# Patient Record
Sex: FEMALE | Race: WHITE | NOT HISPANIC OR LATINO | Employment: FULL TIME | URBAN - NONMETROPOLITAN AREA
[De-identification: names, ages, dates, MRNs, and addresses within clinical notes are randomized per-mention and may not be internally consistent; named-entity substitution may affect disease eponyms.]

---

## 2017-05-31 ENCOUNTER — HOSPITAL ENCOUNTER (EMERGENCY)
Facility: HOSPITAL | Age: 52
Discharge: HOME | End: 2017-05-31
Attending: EMERGENCY MEDICINE | Admitting: EMERGENCY MEDICINE

## 2017-05-31 VITALS
WEIGHT: 102.7 LBS | OXYGEN SATURATION: 98 % | RESPIRATION RATE: 18 BRPM | HEART RATE: 111 BPM | SYSTOLIC BLOOD PRESSURE: 140 MMHG | BODY MASS INDEX: 18.2 KG/M2 | DIASTOLIC BLOOD PRESSURE: 76 MMHG | HEIGHT: 63 IN | TEMPERATURE: 98.4 F

## 2017-05-31 DIAGNOSIS — S16.1XXA CERVICAL STRAIN, INITIAL ENCOUNTER: Primary | ICD-10-CM

## 2017-05-31 DIAGNOSIS — S39.012A LOW BACK STRAIN, INITIAL ENCOUNTER: ICD-10-CM

## 2017-05-31 PROCEDURE — 96372 THER/PROPH/DIAG INJ SC/IM: CPT

## 2017-05-31 PROCEDURE — 25010000002 KETOROLAC TROMETHAMINE PER 15 MG: Performed by: EMERGENCY MEDICINE

## 2017-05-31 PROCEDURE — 99283 EMERGENCY DEPT VISIT LOW MDM: CPT

## 2017-05-31 RX ORDER — KETOROLAC TROMETHAMINE 30 MG/ML
60 INJECTION, SOLUTION INTRAMUSCULAR; INTRAVENOUS ONCE
Status: COMPLETED | OUTPATIENT
Start: 2017-05-31 | End: 2017-05-31

## 2017-05-31 RX ORDER — NAPROXEN SODIUM 550 MG/1
550 TABLET ORAL 2 TIMES DAILY WITH MEALS
Qty: 40 TABLET | Refills: 0 | Status: SHIPPED | OUTPATIENT
Start: 2017-05-31 | End: 2017-06-28

## 2017-05-31 RX ORDER — CYCLOBENZAPRINE HCL 10 MG
10 TABLET ORAL ONCE
Status: COMPLETED | OUTPATIENT
Start: 2017-05-31 | End: 2017-05-31

## 2017-05-31 RX ORDER — CYCLOBENZAPRINE HCL 10 MG
10 TABLET ORAL 3 TIMES DAILY PRN
Qty: 30 TABLET | Refills: 0 | Status: ON HOLD | OUTPATIENT
Start: 2017-05-31 | End: 2018-04-23

## 2017-05-31 RX ADMIN — KETOROLAC TROMETHAMINE 60 MG: 60 INJECTION, SOLUTION INTRAMUSCULAR at 11:45

## 2017-05-31 RX ADMIN — CYCLOBENZAPRINE HYDROCHLORIDE 10 MG: 10 TABLET, FILM COATED ORAL at 11:44

## 2017-06-26 ENCOUNTER — HOSPITAL ENCOUNTER (EMERGENCY)
Facility: HOSPITAL | Age: 52
Discharge: HOME | End: 2017-06-26
Attending: EMERGENCY MEDICINE | Admitting: EMERGENCY MEDICINE

## 2017-06-26 VITALS
BODY MASS INDEX: 19.43 KG/M2 | WEIGHT: 105.6 LBS | OXYGEN SATURATION: 97 % | TEMPERATURE: 98.5 F | HEART RATE: 84 BPM | HEIGHT: 62 IN | RESPIRATION RATE: 18 BRPM | DIASTOLIC BLOOD PRESSURE: 67 MMHG | SYSTOLIC BLOOD PRESSURE: 131 MMHG

## 2017-06-26 DIAGNOSIS — F10.10 ALCOHOL ABUSE: Primary | ICD-10-CM

## 2017-06-26 LAB
ALBUMIN SERPL-MCNC: 4.3 G/DL (ref 3.4–4.8)
ALBUMIN/GLOB SERPL: 1 G/DL (ref 1.1–1.8)
ALP SERPL-CCNC: 105 U/L (ref 38–126)
ALT SERPL W P-5'-P-CCNC: 29 U/L (ref 9–52)
AMPHET+METHAMPHET UR QL: NEGATIVE
ANION GAP SERPL CALCULATED.3IONS-SCNC: 14 MMOL/L (ref 5–15)
APAP SERPL-MCNC: <10 MCG/ML (ref 10–30)
AST SERPL-CCNC: 30 U/L (ref 14–36)
BARBITURATES UR QL SCN: NEGATIVE
BASOPHILS # BLD MANUAL: 0.07 10*3/MM3 (ref 0–0.2)
BASOPHILS NFR BLD AUTO: 2 % (ref 0–2)
BENZODIAZ UR QL SCN: NEGATIVE
BILIRUB SERPL-MCNC: 0.4 MG/DL (ref 0.2–1.3)
BUN BLD-MCNC: 14 MG/DL (ref 7–21)
BUN/CREAT SERPL: 20.6 (ref 7–25)
CALCIUM SPEC-SCNC: 9.1 MG/DL (ref 8.4–10.2)
CANNABINOIDS SERPL QL: POSITIVE
CHLORIDE SERPL-SCNC: 113 MMOL/L (ref 95–110)
CO2 SERPL-SCNC: 18 MMOL/L (ref 22–31)
COCAINE UR QL: NEGATIVE
CREAT BLD-MCNC: 0.68 MG/DL (ref 0.5–1)
DEPRECATED RDW RBC AUTO: 47.6 FL (ref 36.4–46.3)
EOSINOPHIL # BLD MANUAL: 0.03 10*3/MM3 (ref 0–0.7)
EOSINOPHIL NFR BLD MANUAL: 1 % (ref 0–7)
ERYTHROCYTE [DISTWIDTH] IN BLOOD BY AUTOMATED COUNT: 14.6 % (ref 11.5–14.5)
ETHANOL BLD-MCNC: 267 MG/DL (ref 0–10)
ETHANOL UR QL: 0.27 %
GFR SERPL CREATININE-BSD FRML MDRD: 91 ML/MIN/1.73 (ref 51–120)
GLOBULIN UR ELPH-MCNC: 4.5 GM/DL (ref 2.3–3.5)
GLUCOSE BLD-MCNC: 81 MG/DL (ref 60–100)
GLUCOSE BLDC GLUCOMTR-MCNC: 75 MG/DL (ref 70–130)
HCT VFR BLD AUTO: 39.3 % (ref 35–45)
HGB BLD-MCNC: 13.2 G/DL (ref 12–15.5)
HOLD SPECIMEN: NORMAL
HOLD SPECIMEN: NORMAL
LYMPHOCYTES # BLD MANUAL: 1.92 10*3/MM3 (ref 0.6–4.2)
LYMPHOCYTES NFR BLD MANUAL: 15 % (ref 0–12)
LYMPHOCYTES NFR BLD MANUAL: 59 % (ref 10–50)
MCH RBC QN AUTO: 30 PG (ref 26.5–34)
MCHC RBC AUTO-ENTMCNC: 33.6 G/DL (ref 31.4–36)
MCV RBC AUTO: 89.3 FL (ref 80–98)
METHADONE UR QL SCN: NEGATIVE
MONOCYTES # BLD AUTO: 0.49 10*3/MM3 (ref 0–0.9)
NEUTROPHILS # BLD AUTO: 0.72 10*3/MM3 (ref 2–8.6)
NEUTROPHILS NFR BLD MANUAL: 22 % (ref 37–80)
OPIATES UR QL: NEGATIVE
OXYCODONE UR QL SCN: NEGATIVE
PLAT MORPH BLD: NORMAL
PLATELET # BLD AUTO: 244 10*3/MM3 (ref 150–450)
PMV BLD AUTO: 9.9 FL (ref 8–12)
POTASSIUM BLD-SCNC: 3.9 MMOL/L (ref 3.5–5.1)
PROT SERPL-MCNC: 8.8 G/DL (ref 6.3–8.6)
RBC # BLD AUTO: 4.4 10*6/MM3 (ref 3.77–5.16)
RBC MORPH BLD: NORMAL
SALICYLATES SERPL-MCNC: <1 MG/DL (ref 10–20)
SODIUM BLD-SCNC: 145 MMOL/L (ref 137–145)
VARIANT LYMPHS NFR BLD MANUAL: 1 % (ref 0–5)
WBC MORPH BLD: NORMAL
WBC NRBC COR # BLD: 3.25 10*3/MM3 (ref 3.2–9.8)
WHOLE BLOOD HOLD SPECIMEN: NORMAL
WHOLE BLOOD HOLD SPECIMEN: NORMAL

## 2017-06-26 PROCEDURE — 80307 DRUG TEST PRSMV CHEM ANLYZR: CPT | Performed by: EMERGENCY MEDICINE

## 2017-06-26 PROCEDURE — 25010000002 LORAZEPAM PER 2 MG: Performed by: EMERGENCY MEDICINE

## 2017-06-26 PROCEDURE — 96375 TX/PRO/DX INJ NEW DRUG ADDON: CPT

## 2017-06-26 PROCEDURE — 85025 COMPLETE CBC W/AUTO DIFF WBC: CPT | Performed by: EMERGENCY MEDICINE

## 2017-06-26 PROCEDURE — 85007 BL SMEAR W/DIFF WBC COUNT: CPT | Performed by: EMERGENCY MEDICINE

## 2017-06-26 PROCEDURE — 96365 THER/PROPH/DIAG IV INF INIT: CPT

## 2017-06-26 PROCEDURE — 25010000002 THIAMINE PER 100 MG: Performed by: EMERGENCY MEDICINE

## 2017-06-26 PROCEDURE — 25010000002 MAGNESIUM SULFATE PER 500 MG OF MAGNESIUM: Performed by: EMERGENCY MEDICINE

## 2017-06-26 PROCEDURE — 99284 EMERGENCY DEPT VISIT MOD MDM: CPT

## 2017-06-26 PROCEDURE — 93005 ELECTROCARDIOGRAM TRACING: CPT | Performed by: EMERGENCY MEDICINE

## 2017-06-26 PROCEDURE — 80053 COMPREHEN METABOLIC PANEL: CPT | Performed by: EMERGENCY MEDICINE

## 2017-06-26 PROCEDURE — 96376 TX/PRO/DX INJ SAME DRUG ADON: CPT

## 2017-06-26 PROCEDURE — 96366 THER/PROPH/DIAG IV INF ADDON: CPT

## 2017-06-26 PROCEDURE — 93010 ELECTROCARDIOGRAM REPORT: CPT | Performed by: INTERNAL MEDICINE

## 2017-06-26 PROCEDURE — 82962 GLUCOSE BLOOD TEST: CPT

## 2017-06-26 RX ORDER — NICOTINE 21 MG/24HR
1 PATCH, TRANSDERMAL 24 HOURS TRANSDERMAL EVERY 24 HOURS
Status: DISCONTINUED | OUTPATIENT
Start: 2017-06-26 | End: 2017-06-26 | Stop reason: HOSPADM

## 2017-06-26 RX ORDER — LORAZEPAM 2 MG/ML
1 INJECTION INTRAMUSCULAR ONCE
Status: COMPLETED | OUTPATIENT
Start: 2017-06-26 | End: 2017-06-26

## 2017-06-26 RX ORDER — SODIUM CHLORIDE 0.9 % (FLUSH) 0.9 %
10 SYRINGE (ML) INJECTION AS NEEDED
Status: DISCONTINUED | OUTPATIENT
Start: 2017-06-26 | End: 2017-06-26 | Stop reason: HOSPADM

## 2017-06-26 RX ORDER — LORAZEPAM 1 MG/1
1 TABLET ORAL EVERY 8 HOURS PRN
Qty: 15 TABLET | Refills: 0 | Status: SHIPPED | OUTPATIENT
Start: 2017-06-26 | End: 2017-06-28

## 2017-06-26 RX ADMIN — NICOTINE 1 PATCH: 21 PATCH TRANSDERMAL at 15:21

## 2017-06-26 RX ADMIN — FOLIC ACID 1000 ML/HR: 5 INJECTION, SOLUTION INTRAMUSCULAR; INTRAVENOUS; SUBCUTANEOUS at 14:43

## 2017-06-26 RX ADMIN — LORAZEPAM 1 MG: 2 INJECTION INTRAMUSCULAR; INTRAVENOUS at 14:42

## 2017-06-26 RX ADMIN — LORAZEPAM 1 MG: 2 INJECTION INTRAMUSCULAR; INTRAVENOUS at 16:09

## 2017-06-26 NOTE — ED TRIAGE NOTES
Requested that Dr. Maloney step into see pt because she has been agitated and needs to be seen. Informed  Of pt escalating behavior.

## 2017-06-26 NOTE — ED NOTES
"Pt is presented to ED with c/o needing help with alcohol abuse.  Pt tearfully states \"I have a bad problem with drinking, about 5 shots of 99 proof vodka daily, and I can't go without it\".  Pt states she is needing help with her addiction.     Rayna Adam RN  06/26/17 [id number]    "

## 2017-06-26 NOTE — ED NOTES
Pt came in seeking treatment for substance abuse.  After being medically cleared,  A representative from psych saw her and recommended outpatient treatment.  I gave pt resources.  She and family member verbalized understanding pt needs to make the call.

## 2017-06-26 NOTE — ED TRIAGE NOTES
[facility] per dr. Maloney. Spoke with cornel AKERS who requested to speak with the dr. No infromation has been given to Molly AKERS about the situation.

## 2017-06-27 NOTE — ED NOTES
Pt verbalized understanding of discharge instruction. Pt states she had to leave now.      Molly Angela RN  06/26/17 1942

## 2017-06-27 NOTE — ED PROVIDER NOTES
Subjective   Patient is a 52 y.o. female presenting with mental health disorder.   Mental Health Problem   Presenting symptoms: depression    Presenting symptoms: no aggressive behavior, no agitation, no bizarre behavior, no delusions, no disorganized speech, no disorganized thought process, no hallucinations, no homicidal ideas, no paranoid behavior, no self-mutilation, no suicidal thoughts, no suicidal threats and no suicide attempt    Patient accompanied by:  Guardian  Degree of incapacity (severity):  Moderate  Onset quality:  Gradual  Duration:  12 months  Timing:  Constant  Progression:  Worsening  Chronicity:  Chronic  Context: alcohol use and drug abuse (states she quit 1 year ago and switched to alcohol)    Treatment compliance:  Untreated  Relieved by:  Nothing  Worsened by:  Nothing  Ineffective treatments:  None tried  Associated symptoms: anhedonia    Associated symptoms: no abdominal pain, no appetite change, no chest pain, no decreased need for sleep, not distractible, no euphoric mood, no fatigue, no feelings of worthlessness, no headaches, no hypersomnia, no hyperventilation, no insomnia, no irritability, no poor judgment, no school problems and no weight change    Risk factors: no family hx of mental illness, no family violence, no hx of mental illness, no hx of suicide attempts, no neurological disease and no recent psychiatric admission        Review of Systems   Constitutional: Negative.  Negative for appetite change, fatigue and irritability.   HENT: Negative.    Eyes: Negative.    Respiratory: Negative.    Cardiovascular: Negative.  Negative for chest pain.   Gastrointestinal: Negative.  Negative for abdominal pain.   Musculoskeletal: Negative.    Skin: Negative.    Neurological: Negative.  Negative for headaches.   Psychiatric/Behavioral: Negative for agitation, hallucinations, homicidal ideas, paranoia, self-injury and suicidal ideas. The patient does not have insomnia.        Past Medical  History:   Diagnosis Date   • ADHD (attention deficit hyperactivity disorder)    • Alcohol abuse    • Bipolar 1 disorder    • Carpal tunnel syndrome on both sides 2015   • Chronic pain disorder     Back   • Collapsed lung 1986       No Known Allergies    Past Surgical History:   Procedure Laterality Date   • HYSTERECTOMY         Family History   Problem Relation Age of Onset   • Depression Mother    • Anxiety disorder Mother    • Alcohol abuse Father    • Depression Father    • Drug abuse Father    • Anxiety disorder Sister    • Anxiety disorder Maternal Aunt    • Alcohol abuse Paternal Grandfather    • Alcohol abuse Paternal Grandmother    • Drug abuse Cousin        Social History     Social History   • Marital status:      Spouse name: N/A   • Number of children: N/A   • Years of education: N/A     Social History Main Topics   • Smoking status: Current Every Day Smoker     Packs/day: 0.50     Start date: 1977   • Smokeless tobacco: None   • Alcohol use 42.0 oz/week     70 Shots of liquor per week   • Drug use: No      Comment: Previously meth (11/16), THC(2 days ago), synthetics(every couple weeks), shrooms (9-10 months ago)   • Sexual activity: Defer     Other Topics Concern   • None     Social History Narrative   • None           Objective   Physical Exam   Constitutional: She is oriented to person, place, and time. She appears well-developed and well-nourished.   HENT:   Head: Normocephalic and atraumatic.   Mouth/Throat: Oropharynx is clear and moist.   Eyes: Conjunctivae and EOM are normal. Pupils are equal, round, and reactive to light.   Neck: Normal range of motion. Neck supple.   Cardiovascular: Normal rate, regular rhythm and normal heart sounds.  Exam reveals no gallop and no friction rub.    No murmur heard.  Pulmonary/Chest: Effort normal and breath sounds normal. She has no wheezes. She has no rales.   Abdominal: Soft. Bowel sounds are normal. She exhibits no mass. There is no tenderness.  There is no rebound and no guarding.   Musculoskeletal: Normal range of motion. She exhibits no tenderness.   Neurological: She is alert and oriented to person, place, and time. She has normal reflexes. No cranial nerve deficit.   Skin: Skin is warm and dry.   Nursing note and vitals reviewed.      Procedures         ED Course  ED Course      Labs Reviewed   COMPREHENSIVE METABOLIC PANEL - Abnormal; Notable for the following:        Result Value    Chloride 113 (*)     CO2 18.0 (*)     Total Protein 8.8 (*)     Globulin 4.5 (*)     A/G Ratio 1.0 (*)     All other components within normal limits   ACETAMINOPHEN LEVEL - Abnormal; Notable for the following:     Acetaminophen <10.0 (*)     All other components within normal limits   ETHANOL - Abnormal; Notable for the following:     Ethanol 267 (*)     All other components within normal limits   SALICYLATE LEVEL - Abnormal; Notable for the following:     Salicylate <1.0 (*)     All other components within normal limits   URINE DRUG SCREEN - Abnormal; Notable for the following:     THC, Screen, Urine Positive (*)     All other components within normal limits    Narrative:     Negative Thresholds For Drugs Screened in Urine:     Amphetamines          500 ng/ml  Barbiturates          200 ng/ml  Benzodiazepines       200 ng/ml  Cocaine               150 ng/ml  Methadone             300 ng/mL  Opiates               300 ng/mL  Oxycodone             100 ng/mL  THC                   20 ng/mL    The normal value for all drugs tested is negative. This report includes final unconfirmed screening results.  A positive result by this assay can be, at your request, sent to the Reference Lab for confirmation by gas chromatography. Unconfirmed results must not be used for non-medical purposes, such as employment or legal testing. Clinical consideration should be applied to any drug of abuse test result, particularly when unconfirmed results are used.   CBC WITH AUTO DIFFERENTIAL -  Abnormal; Notable for the following:     RDW 14.6 (*)     RDW-SD 47.6 (*)     All other components within normal limits   POCT GLUCOSE FINGERSTICK - Normal   RAINBOW DRAW    Narrative:     The following orders were created for panel order [facility] Draw.  Procedure                               Abnormality         Status                     ---------                               -----------         ------                     Light Blue Top[[id number]]                                   Final result               Green Top (Gel)[[id number]]                                  Final result               Lavender Top[[id number]]                                     Final result               Gold Top - SST[[id number]]                                   Final result                 Please view results for these tests on the individual orders.   POCT GLUCOSE FINGERSTICK   LIGHT BLUE TOP   GREEN TOP   LAVENDER TOP   GOLD TOP - SST   CBC AND DIFFERENTIAL    Narrative:     The following orders were created for panel order CBC & Differential.  Procedure                               Abnormality         Status                     ---------                               -----------         ------                     Manual Differential[[id number]]                              In process                 CBC Auto Differential[[id number]]        Abnormal            Final result                 Please view results for these tests on the individual orders.   MANUAL DIFFERENTIAL       No orders to display   mental heal consult on chart.  Pt is a candidate for OP Tx              MDM    Final diagnoses:   Alcohol abuse            Servando Maloney MD  07/14/17 1500       Servando Maloney MD  07/15/17 1431

## 2017-06-28 ENCOUNTER — LAB (OUTPATIENT)
Dept: LAB | Facility: HOSPITAL | Age: 52
End: 2017-06-28

## 2017-06-28 ENCOUNTER — OFFICE VISIT (OUTPATIENT)
Dept: FAMILY MEDICINE CLINIC | Facility: CLINIC | Age: 52
End: 2017-06-28

## 2017-06-28 VITALS
HEIGHT: 62 IN | HEART RATE: 126 BPM | DIASTOLIC BLOOD PRESSURE: 86 MMHG | WEIGHT: 100.31 LBS | BODY MASS INDEX: 18.46 KG/M2 | SYSTOLIC BLOOD PRESSURE: 132 MMHG | OXYGEN SATURATION: 98 %

## 2017-06-28 DIAGNOSIS — Z76.89 ENCOUNTER TO ESTABLISH CARE WITH NEW DOCTOR: Primary | ICD-10-CM

## 2017-06-28 DIAGNOSIS — F33.2 SEVERE EPISODE OF RECURRENT MAJOR DEPRESSIVE DISORDER, WITHOUT PSYCHOTIC FEATURES (HCC): ICD-10-CM

## 2017-06-28 DIAGNOSIS — Z72.0 TOBACCO ABUSE: ICD-10-CM

## 2017-06-28 DIAGNOSIS — Z76.89 ENCOUNTER TO ESTABLISH CARE WITH NEW DOCTOR: ICD-10-CM

## 2017-06-28 DIAGNOSIS — Z11.59 NEED FOR HEPATITIS C SCREENING TEST: ICD-10-CM

## 2017-06-28 DIAGNOSIS — F10.10 ALCOHOL ABUSE: ICD-10-CM

## 2017-06-28 LAB
HCV AB SER DONR QL: NEGATIVE
T4 FREE SERPL-MCNC: 1.58 NG/DL (ref 0.78–2.19)
TSH SERPL DL<=0.05 MIU/L-ACNC: 0.34 MIU/ML (ref 0.46–4.68)
VIT B12 BLD-MCNC: 632 PG/ML (ref 239–931)

## 2017-06-28 PROCEDURE — 84439 ASSAY OF FREE THYROXINE: CPT | Performed by: FAMILY MEDICINE

## 2017-06-28 PROCEDURE — 99202 OFFICE O/P NEW SF 15 MIN: CPT | Performed by: FAMILY MEDICINE

## 2017-06-28 PROCEDURE — 86803 HEPATITIS C AB TEST: CPT | Performed by: FAMILY MEDICINE

## 2017-06-28 PROCEDURE — 36415 COLL VENOUS BLD VENIPUNCTURE: CPT | Performed by: FAMILY MEDICINE

## 2017-06-28 PROCEDURE — 82607 VITAMIN B-12: CPT | Performed by: FAMILY MEDICINE

## 2017-06-28 PROCEDURE — 84443 ASSAY THYROID STIM HORMONE: CPT | Performed by: FAMILY MEDICINE

## 2017-06-28 RX ORDER — TRAZODONE HYDROCHLORIDE 50 MG/1
50 TABLET ORAL NIGHTLY
Qty: 30 TABLET | Refills: 0 | Status: ON HOLD | OUTPATIENT
Start: 2017-06-28 | End: 2018-04-23

## 2017-06-28 RX ORDER — HYDROXYZINE HYDROCHLORIDE 25 MG/1
25 TABLET, FILM COATED ORAL 3 TIMES DAILY PRN
Qty: 90 TABLET | Refills: 0 | Status: CANCELLED | OUTPATIENT
Start: 2017-06-28

## 2017-06-28 NOTE — PATIENT INSTRUCTIONS
Alcohol Abuse and Nutrition  Alcohol abuse is any pattern of alcohol consumption that harms your health, relationships, or work. Alcohol abuse can affect how your body breaks down and absorbs nutrients from food by causing your liver to work abnormally. Additionally, many people who abuse alcohol do not eat enough carbohydrates, protein, fat, vitamins, and minerals. This can cause poor nutrition (malnutrition) and a lack of nutrients (nutrient deficiencies), which can lead to further complications.  Nutrients that are commonly lacking (deficient) among people who abuse alcohol include:  · Vitamins.    Vitamin A. This is stored in your liver. It is important for your vision, metabolism, and ability to fight off infections (immunity).    B vitamins. These include vitamins such as folate, thiamin, and niacin. These are important in new cell growth and maintenance.    Vitamin C. This plays an important role in iron absorption, wound healing, and immunity.    Vitamin D. This is produced by your liver, but you can also get vitamin D from food. Vitamin D is necessary for your body to absorb and use calcium.  · Minerals.    Calcium. This is important for your bones and your heart and blood vessel (cardiovascular) function.    Iron. This is important for blood, muscle, and nervous system functioning.    Magnesium. This plays an important role in muscle and nerve function, and it helps to control blood sugar and blood pressure.    Zinc. This is important for the normal function of your nervous system and digestive system (gastrointestinal tract).  Nutrition is an essential component of therapy for alcohol abuse. Your health care provider or dietitian will work with you to design a plan that can help restore nutrients to your body and prevent potential complications.  WHAT IS MY PLAN?  Your dietitian may develop a specific diet plan that is based on your condition and any other complications you may have. A diet plan will  commonly include:  · A balanced diet.    Grains: 6-8 oz per day.    Vegetables: 2-3 cups per day.    Fruits: 1-2 cups per day.    Meat and other protein: 5-6 oz per day.    Dairy: 2-3 cups per day.  · Vitamin and mineral supplements.  WHAT DO I NEED TO KNOW ABOUT ALCOHOL AND NUTRITION?  · Consume foods that are high in antioxidants, such as grapes, berries, nuts, green tea, and dark green and orange vegetables. This can help to counteract some of the stress that is placed on your liver by consuming alcohol.  · Avoid food and drinks that are high in fat and sugar. Foods such as sugared soft drinks, salty snack foods, and candy contain empty calories. This means that they lack important nutrients such as protein, fiber, and vitamins.  · Eat frequent meals and snacks. Try to eat 5-6 small meals each day.  · Eat a variety of fresh fruits and vegetables each day. This will help you get plenty of water, fiber, and vitamins in your diet.  · Drink plenty of water and other clear fluids. Try to drink at least 48-64 oz (1.5-2 L) of water per day.  · If you are a vegetarian, eat a variety of protein-rich foods. Pair whole grains with plant-based proteins at meals and snacks to obtain the greatest nutrient benefit from your food. For example, eat rice with beans, put peanut butter on whole-grain toast, or eat oatmeal with sunflower seeds.  · Soak beans and whole grains overnight before cooking. This can help your body to absorb the nutrients more easily.  · Include foods fortified with vitamins and minerals in your diet. Commonly fortified foods include milk, orange juice, cereal, and bread.  · If you are malnourished, your dietitian may recommend a high-protein, high-calorie diet. This may include:    2,000-3,000 calories (kilocalories) per day.     grams of protein per day.  · Your health care provider may recommend a complete nutritional supplement beverage. This can help to restore calories, protein, and vitamins to  your body. Depending on your condition, you may be advised to consume this instead of or in addition to meals.  · Limit your intake of caffeine. Replace drinks like coffee and black tea with decaffeinated coffee and herbal tea.  · Eat a variety of foods that are high in omega fatty acids. These include fish, nuts and seeds, and soybeans. These foods may help your liver to recover and may also stabilize your mood.  · Certain medicines may cause changes in your appetite, taste, and weight. Work with your health care provider and dietitian to make any adjustments to your medicines and diet plan.  · Include other healthy lifestyle choices in your daily routine.    Be physically active.    Get enough sleep.    Spend time doing activities that you enjoy.  · If you are unable to take in enough food and calories by mouth, your health care provider may recommend a feeding tube. This is a tube that passes through your nose and throat, directly into your stomach. Nutritional supplement beverages can be given to you through the feeding tube to help you get the nutrients you need.  · Take vitamin or mineral supplements as recommended by your health care provider.  WHAT FOODS CAN I EAT?  Grains  Enriched pasta. Enriched rice. Fortified whole-grain bread. Fortified whole-grain cereal. Barley. Brown rice. Quinoa. Millet.  Vegetables  All fresh, frozen, and canned vegetables. Spinach. Kale. Artichoke. Carrots. Winter squash and pumpkin. Sweet potatoes. Broccoli. Cabbage. Cucumbers. Tomatoes. Sweet peppers. Green beans. Peas. Corn.  Fruits  All fresh and frozen fruits. Berries. Grapes. Reynaldo. Papaya. Guava. Cherries. Apples. Bananas. Peaches. Plums. Pineapple. Watermelon. Cantaloupe. Oranges. Avocado.  Meats and Other Protein Sources  Beef liver. Lean beef. Pork. Fresh and canned chicken. Fresh fish. Oysters. Sardines. Canned tuna. Shrimp. Eggs with yolks. Nuts and seeds. Peanut butter. Beans and lentils. Soybeans.  Tofu.  Dairy  Whole, low-fat, and nonfat milk. Whole, low-fat, and nonfat yogurt. Cottage cheese. Sour cream. Hard and soft cheeses.  Beverages  Water. Herbal tea. Decaffeinated coffee. Decaffeinated green tea. 100% fruit juice. 100% vegetable juice. Instant breakfast shakes.  Condiments  Ketchup. Mayonnaise. Mustard. Salad dressing. Barbecue sauce.  Sweets and Desserts  Sugar-free ice cream. Sugar-free pudding. Sugar-free gelatin.  Fats and Oils  Butter. Vegetable oil, flaxseed oil, olive oil, and walnut oil.  Other  Complete nutrition shakes. Protein bars. Sugar-free gum.  The items listed above may not be a complete list of recommended foods or beverages. Contact your dietitian for more options.  WHAT FOODS ARE NOT RECOMMENDED?  Grains  Sugar-sweetened breakfast cereals. Flavored instant oatmeal. Fried breads.  Vegetables  Breaded or deep-fried vegetables.  Fruits  Dried fruit with added sugar. Candied fruit. Canned fruit in syrup.  Meats and Other Protein Sources  Breaded or deep-fried meats.  Dairy  Flavored milks. Fried cheese curds or fried cheese sticks.  Beverages  Alcohol. Sugar-sweetened soft drinks. Sugar-sweetened tea. Caffeinated coffee and tea.  Condiments  Sugar. Honey. Agave nectar. Molasses.  Sweets and Desserts  Chocolate. Cake. Cookies. Candy.  Other  Potato chips. Pretzels. Salted nuts. Candied nuts.  The items listed above may not be a complete list of foods and beverages to avoid. Contact your dietitian for more information.     This information is not intended to replace advice given to you by your health care provider. Make sure you discuss any questions you have with your health care provider.     Document Released: 10/12/2006 Document Revised: 01/08/2016 Document Reviewed: 07/21/2015  [employer] Interactive Patient Education ©2017 [employer]    You Can Quit Smoking  If you are ready to quit smoking or are thinking about it, congratulations! You have chosen to help yourself be healthier  "and live longer! There are lots of different ways to quit smoking. Nicotine gum, nicotine patches, a nicotine inhaler, or nicotine nasal spray can help with physical craving. Hypnosis, support groups, and medicines help break the habit of smoking.  TIPS TO GET OFF AND STAY OFF CIGARETTES  · Learn to predict your moods. Do not let a bad situation be your excuse to have a cigarette. Some situations in your life might tempt you to have a cigarette.  · Ask friends and co-workers not to smoke around you.  · Make your home smoke-free.  · Never have \"just one\" cigarette. It leads to wanting another and another. Remind yourself of your decision to quit.  · On a card, make a list of your reasons for not smoking. Read it at least the same number of times a day as you have a cigarette. Tell yourself everyday, \"I do not want to smoke. I choose not to smoke.\"  · Ask someone at home or work to help you with your plan to quit smoking.  · Have something planned after you eat or have a cup of coffee. Take a walk or get other exercise to perk you up. This will help to keep you from overeating.  · Try a relaxation exercise to calm you down and decrease your stress. Remember, you may be tense and nervous the first two weeks after you quit. This will pass.  · Find new activities to keep your hands busy. Play with a pen, coin, or rubber band. Doodle or draw things on paper.  · Brush your teeth right after eating. This will help cut down the craving for the taste of tobacco after meals. You can try mouthwash too.  · Try gum, breath mints, or diet candy to keep something in your mouth.  IF YOU SMOKE AND WANT TO QUIT:  · Do not stock up on cigarettes. Never buy a carton. Wait until one pack is finished before you buy another.  · Never carry cigarettes with you at work or at home.  · Keep cigarettes as far away from you as possible. Leave them with someone else.  · Never carry matches or a lighter with you.  · Ask yourself, \"Do I need this " "cigarette or is this just a reflex?\"  · Bet with someone that you can quit. Put cigarette money in a [employer] bank every morning. If you smoke, you give up the money. If you do not smoke, by the end of the week, you keep the money.  · Keep trying. It takes 21 days to change a habit!  · Talk to your doctor about using medicines to help you quit. These include nicotine replacement gum, lozenges, or skin patches.     This information is not intended to replace advice given to you by your health care provider. Make sure you discuss any questions you have with your health care provider.     Document Released: 10/14/2010 Document Revised: 03/11/2013 Document Reviewed: 05/03/2016  [employer] Interactive Patient Education ©2017 [employer]  Sertraline tablets  What is this medicine?  SERTRALINE (SER tra kerry) is used to treat depression. It may also be used to treat obsessive compulsive disorder, panic disorder, post-trauma stress, premenstrual dysphoric disorder (PMDD) or social anxiety.  This medicine may be used for other purposes; ask your health care provider or pharmacist if you have questions.  COMMON BRAND NAME(S): Zoloft  What should I tell my health care provider before I take this medicine?  They need to know if you have any of these conditions:  -bleeding disorders  -bipolar disorder or a family history of bipolar disorder  -glaucoma  -heart disease  -high blood pressure  -history of irregular heartbeat  -history of low levels of calcium, magnesium, or potassium in the blood  -if you often drink alcohol  -liver disease  -receiving electroconvulsive therapy  -seizures  -suicidal thoughts, plans, or attempt; a previous suicide attempt by you or a family member  -take medicines that treat or prevent blood clots  -thyroid disease  -an unusual or allergic reaction to sertraline, other medicines, foods, dyes, or preservatives  -pregnant or trying to get pregnant  -breast-feeding  How should I use this medicine?  Take " this medicine by mouth with a glass of water. Follow the directions on the prescription label. You can take it with or without food. Take your medicine at regular intervals. Do not take your medicine more often than directed. Do not stop taking this medicine suddenly except upon the advice of your doctor. Stopping this medicine too quickly may cause serious side effects or your condition may worsen.  A special MedGuide will be given to you by the pharmacist with each prescription and refill. Be sure to read this information carefully each time.  Talk to your pediatrician regarding the use of this medicine in children. While this drug may be prescribed for children as young as 7 years for selected conditions, precautions do apply.  Overdosage: If you think you have taken too much of this medicine contact a poison control center or emergency room at once.  NOTE: This medicine is only for you. Do not share this medicine with others.  What if I miss a dose?  If you miss a dose, take it as soon as you can. If it is almost time for your next dose, take only that dose. Do not take double or extra doses.  What may interact with this medicine?  Do not take this medicine with any of the following medications:  -certain medicines for fungal infections like fluconazole, itraconazole, ketoconazole, posaconazole, voriconazole  -cisapride  -disulfiram  -dofetilide  -linezolid  -MAOIs like Carbex, Eldepryl, Marplan, Nardil, and Parnate  -metronidazole  -methylene blue (injected into a vein)  -pimozide  -thioridazine  -ziprasidone  This medicine may also interact with the following medications:  -alcohol  -amphetamines  -aspirin and aspirin-like medicines  -certain medicines for depression, anxiety, or psychotic disturbances  -certain medicines for irregular heart beat like flecainide, propafenone  -certain medicines for migraine headaches like almotriptan, eletriptan, frovatriptan, naratriptan, rizatriptan, sumatriptan,  zolmitriptan  -certain medicines for sleep  -certain medicines for seizures like carbamazepine, valproic acid, phenytoin  -certain medicines that treat or prevent blood clots like warfarin, enoxaparin, dalteparin  -cimetidine  -digoxin  -diuretics  -fentanyl  -furazolidone  -isoniazid  -lithium  -NSAIDs, medicines for pain and inflammation, like ibuprofen or naproxen  -other medicines that prolong the QT interval (cause an abnormal heart rhythm)  -procarbazine  -rasagiline  -supplements like [place]'s wort, kava kava, valerian  -tolbutamide  -tramadol  -tryptophan  This list may not describe all possible interactions. Give your health care provider a list of all the medicines, herbs, non-prescription drugs, or dietary supplements you use. Also tell them if you smoke, drink alcohol, or use illegal drugs. Some items may interact with your medicine.  What should I watch for while using this medicine?  Tell your doctor if your symptoms do not get better or if they get worse. Visit your doctor or health care professional for regular checks on your progress. Because it may take several weeks to see the full effects of this medicine, it is important to continue your treatment as prescribed by your doctor.  Patients and their families should watch out for new or worsening thoughts of suicide or depression. Also watch out for sudden changes in feelings such as feeling anxious, agitated, panicky, irritable, hostile, aggressive, impulsive, severely restless, overly excited and hyperactive, or not being able to sleep. If this happens, especially at the beginning of treatment or after a change in dose, call your health care professional.  You may get drowsy or dizzy. Do not drive, use machinery, or do anything that needs mental alertness until you know how this medicine affects you. Do not stand or sit up quickly, especially if you are an older patient. This reduces the risk of dizzy or fainting spells. Alcohol may interfere  with the effect of this medicine. Avoid alcoholic drinks.  Your mouth may get dry. Chewing sugarless gum or sucking hard candy, and drinking plenty of water may help. Contact your doctor if the problem does not go away or is severe.  What side effects may I notice from receiving this medicine?  Side effects that you should report to your doctor or health care professional as soon as possible:  -allergic reactions like skin rash, itching or hives, swelling of the face, lips, or tongue  -black, tarry stools  -changes in vision  -confusion  -eye pain  -fast, irregular heartbeat  -feeling faint or lightheaded, falls  -hallucination, loss of contact with reality  -loss of balance or coordination  -loss of memory  -restlessness, pacing, inability to keep still  -seizures  -stiff muscles  -suicidal thoughts or other mood changes  -trouble sleeping  -unusual bleeding or bruising  -unusually weak or tired  -vomiting  Side effects that usually do not require medical attention (report to your doctor or health care professional if they continue or are bothersome):  -anxious  -change in appetite or weight  -change in sex drive or performance  -diarrhea  -increased sweating  -indigestion, nausea  -tremors  This list may not describe all possible side effects. Call your doctor for medical advice about side effects. You may report side effects to FDA at 6-934-FDA-7307.  Where should I keep my medicine?  Keep out of the reach of children.  Store at room temperature between 15 and 30 degrees C (59 and 86 degrees F). Throw away any unused medicine after the expiration date.  NOTE: This sheet is a summary. It may not cover all possible information. If you have questions about this medicine, talk to your doctor, pharmacist, or health care provider.     © 2017, [employer]. (2017-02-07 11:57:26)  Suicidal Feelings: How to Help Yourself  Suicide is the taking of one's own life. If you feel as though life is getting too tough to  handle and are thinking about suicide, get help right away. To get help:  · Call your local emergency services (911 in the [place]  · Call a suicide hotline to speak with a trained counselor who understands how you are feeling. The following is a list of suicide hotlines in the [place]. For a list of hotlines in [place], visit www.suicide.org/hotlines/international/ksrozi-dsksqhg-lvnuldpp.html.     7-932-209-TALK (1-727.398.2785).     5-102-VJLAUTV (1-506.743.9652).     1-251.443.4962. This is a hotline for Armenian speakers.     2-566-901-4TTY (1-247.976.7282). This is a hotline for TTY users.     9-538-5-U-DURAN (1-738.528.1874). This is a hotline for lesbian, hernandez, bisexual, transgender, or questioning youth.  · Contact a crisis center or a local suicide prevention center. To find a crisis center or suicide prevention center:    Call your local hospital, clinic, community service organization, mental health center, social service provider, or health department. Ask for assistance in connecting to a crisis center.    Visit www.suicidepreventionlifeline.org/getinvolved/ for a list of crisis centers in the [place], or visit www.suicideprevention.ca/fhbasblr-futhl-lyoxrsi/find-a-crisis-centre for a list of centers in [place].  · Visit the following websites:     National Suicide Prevention Lifeline: www.suicidepreventionlifeline.org     Hopeline: www.hopeline.com     American Foundation for Suicide Prevention: www.afsp.org     The [employer] Project (for lesbian, hernandez, bisexual, transgender, or questioning youth): www.thetrevorproject.org  HOW CAN I HELP MYSELF FEEL BETTER?  · Promise yourself that you will not do anything drastic when you have suicidal feelings. Remember, there is hope. Many people have gotten through suicidal thoughts and feelings, and you will, too. You may have gotten through them before, and this proves that you can get through them again.  · Let family, friends, teachers, or  counselors know how you are feeling. Try not to isolate yourself from those who care about you. Remember, they will want to help you. Talk with someone every day, even if you do not feel sociable. Face-to-face conversation is best.  · Call a mental health professional and see one regularly.  · Visit your primary health care provider every year.  · Eat a well-balanced diet, and space your meals so you eat regularly.  · Get plenty of rest.  · Avoid alcohol and drugs, and remove them from your home. They will only make you feel worse.  · If you are thinking of taking a lot of medicine, give your medicine to someone who can give it to you one day at a time. If you are on antidepressants and are concerned you will overdose, let your health care provider know so he or she can give you safer medicines. Ask your mental health professional about the possible side effects of any medicines you are taking.  · Remove weapons, poisons, knives, and anything else that could harm you from your home.  · Try to stick to routines. Follow a schedule every day. Put self-care on your schedule.  · Make a list of realistic goals, and cross them off when you achieve them. Accomplishments give a sense of worth.  · Wait until you are feeling better before doing the things you find difficult or unpleasant.  · Exercise if you are able. You will feel better if you exercise for even a half hour each day.  · Go out in the sun or into nature. This will help you recover from depression faster. If you have a favorite place to walk, go there.  · Do the things that have always given you pleasure. Play your favorite music, read a good book, paint a picture, play your favorite instrument, or do anything else that takes your mind off your depression if it is safe to do.  · Keep your living space well lit.  · When you are feeling well, write yourself a letter about tips and support that you can read when you are not feeling well.  · Remember that life's  difficulties can be sorted out with help. Conditions can be treated. You can work on thoughts and strategies that serve you well.     This information is not intended to replace advice given to you by your health care provider. Make sure you discuss any questions you have with your health care provider.     Document Released: 06/23/2004 Document Revised: 01/08/2016 Document Reviewed: 04/14/2015  [employer] Interactive Patient Education ©2017 [employer]
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
## 2017-07-25 ENCOUNTER — HOSPITAL ENCOUNTER (EMERGENCY)
Facility: HOSPITAL | Age: 52
Discharge: LEFT BEFORE DISCHARGE | End: 2017-07-25

## 2017-07-25 PROCEDURE — 99211 OFF/OP EST MAY X REQ PHY/QHP: CPT

## 2017-07-26 NOTE — ED NOTES
Pt. Presents to the ED ambulatory after being bitten by a dog (vanita) 2 days prior to arrival.  Pt. Has not been treated, but states that the area has gotten worse.  Pt. Has two areas one on the left upper thigh that is bruised and the other to the left shin that is bruised and has a small bite tere to the center.      Lulu Trivedi RN  07/25/17 [id number]

## 2018-02-09 ENCOUNTER — TELEPHONE (OUTPATIENT)
Dept: FAMILY MEDICINE CLINIC | Facility: CLINIC | Age: 53
End: 2018-02-09

## 2018-02-09 ENCOUNTER — APPOINTMENT (OUTPATIENT)
Dept: GENERAL RADIOLOGY | Facility: HOSPITAL | Age: 53
End: 2018-02-09

## 2018-02-09 ENCOUNTER — HOSPITAL ENCOUNTER (EMERGENCY)
Facility: HOSPITAL | Age: 53
Discharge: HOME | End: 2018-02-09
Attending: FAMILY MEDICINE | Admitting: FAMILY MEDICINE

## 2018-02-09 VITALS
HEIGHT: 62 IN | WEIGHT: 105 LBS | OXYGEN SATURATION: 99 % | DIASTOLIC BLOOD PRESSURE: 87 MMHG | SYSTOLIC BLOOD PRESSURE: 146 MMHG | HEART RATE: 93 BPM | RESPIRATION RATE: 18 BRPM | BODY MASS INDEX: 19.32 KG/M2 | TEMPERATURE: 97.2 F

## 2018-02-09 DIAGNOSIS — S22.42XA CLOSED FRACTURE OF MULTIPLE RIBS OF LEFT SIDE, INITIAL ENCOUNTER: Primary | ICD-10-CM

## 2018-02-09 PROCEDURE — 71101 X-RAY EXAM UNILAT RIBS/CHEST: CPT

## 2018-02-09 PROCEDURE — 99283 EMERGENCY DEPT VISIT LOW MDM: CPT

## 2018-02-09 RX ORDER — HYDROCODONE BITARTRATE AND ACETAMINOPHEN 5; 325 MG/1; MG/1
1 TABLET ORAL EVERY 6 HOURS PRN
Qty: 12 TABLET | Refills: 0 | Status: SHIPPED | OUTPATIENT
Start: 2018-02-09 | End: 2018-02-12

## 2018-02-09 NOTE — DISCHARGE INSTRUCTIONS
Rib Fracture  A rib fracture is a break or crack in one of the bones of the ribs. The ribs are like a cage that goes around your upper chest. A broken or cracked rib is often painful, but most do not cause other problems. Most rib fractures heal on their own in 1-3 months.  Follow these instructions at home:  · Avoid activities that cause pain to the injured area. Protect your injured area.  · Slowly increase activity as told by your doctor.  · Take medicine as told by your doctor.  · Put ice on the injured area for the first 1-2 days after you have been treated or as told by your doctor.  ¨ Put ice in a plastic bag.  ¨ Place a towel between your skin and the bag.  ¨ Leave the ice on for 15-20 minutes at a time, every 2 hours while you are awake.  · Do deep breathing as told by your doctor. You may be told to:  ¨ Take deep breaths many times a day.  ¨ Cough many times a day while hugging a pillow.  ¨ Use a device (incentive spirometer) to perform deep breathing many times a day.  · Drink enough fluids to keep your pee (urine) clear or pale yellow.  · Do not wear a rib belt or binder. These do not allow you to breathe deeply.  Get help right away if:  · You have a fever.  · You have trouble breathing.  · You cannot stop coughing.  · You cough up thick or bloody spit (mucus).  · You feel sick to your stomach (nauseous), throw up (vomit), or have belly (abdominal) pain.  · Your pain gets worse and medicine does not help.  This information is not intended to replace advice given to you by your health care provider. Make sure you discuss any questions you have with your health care provider.  Document Released: 09/26/2009 Document Revised: 05/25/2017 Document Reviewed: 02/19/2014  [employer] Interactive Patient Education © 2017 [employer]
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
## 2018-04-23 ENCOUNTER — HOSPITAL ENCOUNTER (OUTPATIENT)
Facility: HOSPITAL | Age: 53
Setting detail: OBSERVATION
Discharge: HOME | End: 2018-04-25
Attending: EMERGENCY MEDICINE | Admitting: FAMILY MEDICINE

## 2018-04-23 DIAGNOSIS — E87.29 ALCOHOLIC KETOACIDOSIS: ICD-10-CM

## 2018-04-23 DIAGNOSIS — F10.931 DELIRIUM TREMENS (HCC): Primary | ICD-10-CM

## 2018-04-23 DIAGNOSIS — R13.10 DYSPHAGIA, UNSPECIFIED TYPE: ICD-10-CM

## 2018-04-23 PROBLEM — R03.0 ELEVATED BLOOD PRESSURE READING WITHOUT DIAGNOSIS OF HYPERTENSION: Status: ACTIVE | Noted: 2018-04-23

## 2018-04-23 PROBLEM — F10.929 ALCOHOL INTOXICATION (HCC): Status: ACTIVE | Noted: 2018-04-23

## 2018-04-23 PROBLEM — E51.9 THIAMINE DEFICIENCY: Status: ACTIVE | Noted: 2018-04-23

## 2018-04-23 PROBLEM — F32.A DEPRESSION: Status: ACTIVE | Noted: 2018-04-23

## 2018-04-23 PROBLEM — D72.819 LEUKOPENIA: Status: ACTIVE | Noted: 2018-04-23

## 2018-04-23 PROBLEM — E46 PROTEIN MALNUTRITION (HCC): Status: ACTIVE | Noted: 2018-04-23

## 2018-04-23 LAB
ALBUMIN SERPL-MCNC: 4.4 G/DL (ref 3.4–4.8)
ALBUMIN/GLOB SERPL: 1 G/DL (ref 1.1–1.8)
ALP SERPL-CCNC: 114 U/L (ref 38–126)
ALT SERPL W P-5'-P-CCNC: 28 U/L (ref 9–52)
AMPHET+METHAMPHET UR QL: NEGATIVE
ANION GAP SERPL CALCULATED.3IONS-SCNC: 19 MMOL/L (ref 5–15)
AST SERPL-CCNC: 31 U/L (ref 14–36)
B-HCG UR QL: NEGATIVE
BACTERIA UR QL AUTO: ABNORMAL /HPF
BARBITURATES UR QL SCN: NEGATIVE
BASOPHILS # BLD AUTO: 0.02 10*3/MM3 (ref 0–0.2)
BASOPHILS NFR BLD AUTO: 0.9 % (ref 0–2)
BENZODIAZ UR QL SCN: NEGATIVE
BILIRUB SERPL-MCNC: 0.4 MG/DL (ref 0.2–1.3)
BILIRUB UR QL STRIP: NEGATIVE
BUN BLD-MCNC: 13 MG/DL (ref 7–21)
BUN/CREAT SERPL: 19.7 (ref 7–25)
CALCIUM SPEC-SCNC: 8.6 MG/DL (ref 8.4–10.2)
CANNABINOIDS SERPL QL: POSITIVE
CHLORIDE SERPL-SCNC: 103 MMOL/L (ref 95–110)
CLARITY UR: ABNORMAL
CO2 SERPL-SCNC: 18 MMOL/L (ref 22–31)
COCAINE UR QL: NEGATIVE
COLOR UR: YELLOW
CREAT BLD-MCNC: 0.66 MG/DL (ref 0.5–1)
DEPRECATED RDW RBC AUTO: 45.7 FL (ref 36.4–46.3)
EOSINOPHIL # BLD AUTO: 0.01 10*3/MM3 (ref 0–0.7)
EOSINOPHIL NFR BLD AUTO: 0.4 % (ref 0–7)
ERYTHROCYTE [DISTWIDTH] IN BLOOD BY AUTOMATED COUNT: 13.9 % (ref 11.5–14.5)
ETHANOL BLD-MCNC: 94 MG/DL (ref 0–10)
ETHANOL UR QL: 0.09 %
GFR SERPL CREATININE-BSD FRML MDRD: 94 ML/MIN/1.73 (ref 51–120)
GLOBULIN UR ELPH-MCNC: 4.5 GM/DL (ref 2.3–3.5)
GLUCOSE BLD-MCNC: 126 MG/DL (ref 60–100)
GLUCOSE UR STRIP-MCNC: NEGATIVE MG/DL
HCT VFR BLD AUTO: 41.5 % (ref 35–45)
HGB BLD-MCNC: 14.7 G/DL (ref 12–15.5)
HGB UR QL STRIP.AUTO: NEGATIVE
HOLD SPECIMEN: NORMAL
HYALINE CASTS UR QL AUTO: ABNORMAL /LPF
IMM GRANULOCYTES # BLD: 0 10*3/MM3 (ref 0–0.02)
IMM GRANULOCYTES NFR BLD: 0 % (ref 0–0.5)
KETONES UR QL STRIP: ABNORMAL
LEUKOCYTE ESTERASE UR QL STRIP.AUTO: ABNORMAL
LIPASE SERPL-CCNC: 159 U/L (ref 23–300)
LYMPHOCYTES # BLD AUTO: 0.56 10*3/MM3 (ref 0.6–4.2)
LYMPHOCYTES NFR BLD AUTO: 24.1 % (ref 10–50)
MAGNESIUM SERPL-MCNC: 1.5 MG/DL (ref 1.6–2.3)
MCH RBC QN AUTO: 31.7 PG (ref 26.5–34)
MCHC RBC AUTO-ENTMCNC: 35.4 G/DL (ref 31.4–36)
MCV RBC AUTO: 89.6 FL (ref 80–98)
METHADONE UR QL SCN: NEGATIVE
MONOCYTES # BLD AUTO: 0.35 10*3/MM3 (ref 0–0.9)
MONOCYTES NFR BLD AUTO: 15.1 % (ref 0–12)
NEUTROPHILS # BLD AUTO: 1.38 10*3/MM3 (ref 2–8.6)
NEUTROPHILS NFR BLD AUTO: 59.5 % (ref 37–80)
NITRITE UR QL STRIP: NEGATIVE
NRBC BLD MANUAL-RTO: 0 /100 WBC (ref 0–0)
OPIATES UR QL: NEGATIVE
OXYCODONE UR QL SCN: NEGATIVE
PH UR STRIP.AUTO: 7 [PH] (ref 5–9)
PHOSPHATE SERPL-MCNC: 3.1 MG/DL (ref 2.4–4.4)
PLATELET # BLD AUTO: 187 10*3/MM3 (ref 150–450)
PMV BLD AUTO: 9.9 FL (ref 8–12)
POTASSIUM BLD-SCNC: 3 MMOL/L (ref 3.5–5.1)
PROT SERPL-MCNC: 8.9 G/DL (ref 6.3–8.6)
PROT UR QL STRIP: ABNORMAL
RBC # BLD AUTO: 4.63 10*6/MM3 (ref 3.77–5.16)
RBC # UR: ABNORMAL /HPF
REF LAB TEST METHOD: ABNORMAL
SODIUM BLD-SCNC: 140 MMOL/L (ref 137–145)
SP GR UR STRIP: 1.01 (ref 1–1.03)
SQUAMOUS #/AREA URNS HPF: ABNORMAL /HPF
UROBILINOGEN UR QL STRIP: ABNORMAL
WBC NRBC COR # BLD: 2.32 10*3/MM3 (ref 3.2–9.8)
WBC UR QL AUTO: ABNORMAL /HPF
WHOLE BLOOD HOLD SPECIMEN: NORMAL
WHOLE BLOOD HOLD SPECIMEN: NORMAL

## 2018-04-23 PROCEDURE — 96365 THER/PROPH/DIAG IV INF INIT: CPT

## 2018-04-23 PROCEDURE — G0378 HOSPITAL OBSERVATION PER HR: HCPCS

## 2018-04-23 PROCEDURE — 80307 DRUG TEST PRSMV CHEM ANLYZR: CPT | Performed by: EMERGENCY MEDICINE

## 2018-04-23 PROCEDURE — G8998 SWALLOW D/C STATUS: HCPCS | Performed by: SPEECH-LANGUAGE PATHOLOGIST

## 2018-04-23 PROCEDURE — 25010000002 ONDANSETRON PER 1 MG: Performed by: EMERGENCY MEDICINE

## 2018-04-23 PROCEDURE — 85025 COMPLETE CBC W/AUTO DIFF WBC: CPT | Performed by: EMERGENCY MEDICINE

## 2018-04-23 PROCEDURE — 96375 TX/PRO/DX INJ NEW DRUG ADDON: CPT

## 2018-04-23 PROCEDURE — 25010000002 ENOXAPARIN PER 10 MG: Performed by: FAMILY MEDICINE

## 2018-04-23 PROCEDURE — 81001 URINALYSIS AUTO W/SCOPE: CPT | Performed by: EMERGENCY MEDICINE

## 2018-04-23 PROCEDURE — 84100 ASSAY OF PHOSPHORUS: CPT | Performed by: FAMILY MEDICINE

## 2018-04-23 PROCEDURE — 96372 THER/PROPH/DIAG INJ SC/IM: CPT

## 2018-04-23 PROCEDURE — 25010000002 MAGNESIUM SULFATE PER 500 MG OF MAGNESIUM: Performed by: EMERGENCY MEDICINE

## 2018-04-23 PROCEDURE — G8997 SWALLOW GOAL STATUS: HCPCS | Performed by: SPEECH-LANGUAGE PATHOLOGIST

## 2018-04-23 PROCEDURE — 83735 ASSAY OF MAGNESIUM: CPT | Performed by: EMERGENCY MEDICINE

## 2018-04-23 PROCEDURE — G8996 SWALLOW CURRENT STATUS: HCPCS | Performed by: SPEECH-LANGUAGE PATHOLOGIST

## 2018-04-23 PROCEDURE — 25010000002 LORAZEPAM PER 2 MG: Performed by: EMERGENCY MEDICINE

## 2018-04-23 PROCEDURE — 80053 COMPREHEN METABOLIC PANEL: CPT | Performed by: EMERGENCY MEDICINE

## 2018-04-23 PROCEDURE — 99284 EMERGENCY DEPT VISIT MOD MDM: CPT

## 2018-04-23 PROCEDURE — 25010000002 THIAMINE PER 100 MG: Performed by: EMERGENCY MEDICINE

## 2018-04-23 PROCEDURE — 81025 URINE PREGNANCY TEST: CPT | Performed by: EMERGENCY MEDICINE

## 2018-04-23 PROCEDURE — 83690 ASSAY OF LIPASE: CPT | Performed by: EMERGENCY MEDICINE

## 2018-04-23 PROCEDURE — 92610 EVALUATE SWALLOWING FUNCTION: CPT | Performed by: SPEECH-LANGUAGE PATHOLOGIST

## 2018-04-23 RX ORDER — LORAZEPAM 2 MG/ML
1 INJECTION INTRAMUSCULAR
Status: DISCONTINUED | OUTPATIENT
Start: 2018-04-23 | End: 2018-04-25 | Stop reason: HOSPADM

## 2018-04-23 RX ORDER — LABETALOL HYDROCHLORIDE 5 MG/ML
10 INJECTION, SOLUTION INTRAVENOUS
Status: DISCONTINUED | OUTPATIENT
Start: 2018-04-23 | End: 2018-04-25 | Stop reason: HOSPADM

## 2018-04-23 RX ORDER — OXYMETAZOLINE HYDROCHLORIDE 0.05 G/100ML
1 SPRAY NASAL 2 TIMES DAILY PRN
Status: DISCONTINUED | OUTPATIENT
Start: 2018-04-23 | End: 2018-04-25 | Stop reason: HOSPADM

## 2018-04-23 RX ORDER — CHLORDIAZEPOXIDE HYDROCHLORIDE 25 MG/1
50 CAPSULE, GELATIN COATED ORAL EVERY 6 HOURS SCHEDULED
Status: DISCONTINUED | OUTPATIENT
Start: 2018-04-23 | End: 2018-04-25 | Stop reason: HOSPADM

## 2018-04-23 RX ORDER — NALOXONE HCL 0.4 MG/ML
0.4 VIAL (ML) INJECTION
Status: DISCONTINUED | OUTPATIENT
Start: 2018-04-23 | End: 2018-04-25

## 2018-04-23 RX ORDER — LORAZEPAM 2 MG/ML
1 INJECTION INTRAMUSCULAR ONCE
Status: COMPLETED | OUTPATIENT
Start: 2018-04-23 | End: 2018-04-23

## 2018-04-23 RX ORDER — SODIUM CHLORIDE 0.9 % (FLUSH) 0.9 %
1-10 SYRINGE (ML) INJECTION AS NEEDED
Status: DISCONTINUED | OUTPATIENT
Start: 2018-04-23 | End: 2018-04-25 | Stop reason: HOSPADM

## 2018-04-23 RX ORDER — LORAZEPAM 2 MG/ML
0.5 INJECTION INTRAMUSCULAR
Status: DISCONTINUED | OUTPATIENT
Start: 2018-04-23 | End: 2018-04-25 | Stop reason: HOSPADM

## 2018-04-23 RX ORDER — OXYMETAZOLINE HYDROCHLORIDE 0.05 G/100ML
1 SPRAY NASAL ONCE
Status: COMPLETED | OUTPATIENT
Start: 2018-04-23 | End: 2018-04-23

## 2018-04-23 RX ORDER — LORAZEPAM 0.5 MG/1
0.5 TABLET ORAL
Status: DISCONTINUED | OUTPATIENT
Start: 2018-04-23 | End: 2018-04-25 | Stop reason: HOSPADM

## 2018-04-23 RX ORDER — CALCIUM CARBONATE 200(500)MG
1 TABLET,CHEWABLE ORAL 2 TIMES DAILY PRN
Status: DISCONTINUED | OUTPATIENT
Start: 2018-04-23 | End: 2018-04-25 | Stop reason: HOSPADM

## 2018-04-23 RX ORDER — ACETAMINOPHEN 325 MG/1
650 TABLET ORAL EVERY 4 HOURS PRN
Status: DISCONTINUED | OUTPATIENT
Start: 2018-04-23 | End: 2018-04-25 | Stop reason: HOSPADM

## 2018-04-23 RX ORDER — LORAZEPAM 1 MG/1
1 TABLET ORAL
Status: DISCONTINUED | OUTPATIENT
Start: 2018-04-23 | End: 2018-04-25 | Stop reason: HOSPADM

## 2018-04-23 RX ORDER — MORPHINE SULFATE 2 MG/ML
1 INJECTION, SOLUTION INTRAMUSCULAR; INTRAVENOUS EVERY 4 HOURS PRN
Status: DISCONTINUED | OUTPATIENT
Start: 2018-04-23 | End: 2018-04-25

## 2018-04-23 RX ORDER — ONDANSETRON 2 MG/ML
4 INJECTION INTRAMUSCULAR; INTRAVENOUS ONCE
Status: COMPLETED | OUTPATIENT
Start: 2018-04-23 | End: 2018-04-23

## 2018-04-23 RX ORDER — NICOTINE 21 MG/24HR
1 PATCH, TRANSDERMAL 24 HOURS TRANSDERMAL EVERY 24 HOURS
Status: DISCONTINUED | OUTPATIENT
Start: 2018-04-23 | End: 2018-04-25 | Stop reason: HOSPADM

## 2018-04-23 RX ADMIN — ENOXAPARIN SODIUM 40 MG: 40 INJECTION SUBCUTANEOUS at 16:28

## 2018-04-23 RX ADMIN — CHLORDIAZEPOXIDE HYDROCHLORIDE 50 MG: 25 CAPSULE ORAL at 19:30

## 2018-04-23 RX ADMIN — THIAMINE HYDROCHLORIDE 1000 ML/HR: 100 INJECTION, SOLUTION INTRAMUSCULAR; INTRAVENOUS at 11:33

## 2018-04-23 RX ADMIN — NICOTINE 1 PATCH: 21 PATCH TRANSDERMAL at 16:26

## 2018-04-23 RX ADMIN — ONDANSETRON 4 MG: 2 INJECTION INTRAMUSCULAR; INTRAVENOUS at 11:04

## 2018-04-23 RX ADMIN — LORAZEPAM 1 MG: 2 INJECTION INTRAMUSCULAR; INTRAVENOUS at 11:04

## 2018-04-23 RX ADMIN — OXYMETAZOLINE HYDROCHLORIDE 1 SPRAY: 5 SPRAY NASAL at 11:04

## 2018-04-24 PROBLEM — R04.0 EPISTAXIS: Status: ACTIVE | Noted: 2018-04-24

## 2018-04-24 PROBLEM — E87.6 HYPOKALEMIA: Status: ACTIVE | Noted: 2018-04-24

## 2018-04-24 LAB
ALBUMIN SERPL-MCNC: 3.7 G/DL (ref 3.4–4.8)
ALBUMIN/GLOB SERPL: 1 G/DL (ref 1.1–1.8)
ALP SERPL-CCNC: 83 U/L (ref 38–126)
ALT SERPL W P-5'-P-CCNC: 27 U/L (ref 9–52)
ANION GAP SERPL CALCULATED.3IONS-SCNC: 11 MMOL/L (ref 5–15)
AST SERPL-CCNC: 20 U/L (ref 14–36)
BASOPHILS # BLD AUTO: 0.01 10*3/MM3 (ref 0–0.2)
BASOPHILS NFR BLD AUTO: 0.4 % (ref 0–2)
BILIRUB SERPL-MCNC: 0.5 MG/DL (ref 0.2–1.3)
BUN BLD-MCNC: 33 MG/DL (ref 7–21)
BUN/CREAT SERPL: 55.9 (ref 7–25)
CALCIUM SPEC-SCNC: 8.2 MG/DL (ref 8.4–10.2)
CHLORIDE SERPL-SCNC: 105 MMOL/L (ref 95–110)
CO2 SERPL-SCNC: 20 MMOL/L (ref 22–31)
CREAT BLD-MCNC: 0.59 MG/DL (ref 0.5–1)
DEPRECATED RDW RBC AUTO: 45.1 FL (ref 36.4–46.3)
EOSINOPHIL # BLD AUTO: 0.01 10*3/MM3 (ref 0–0.7)
EOSINOPHIL NFR BLD AUTO: 0.4 % (ref 0–7)
ERYTHROCYTE [DISTWIDTH] IN BLOOD BY AUTOMATED COUNT: 13.7 % (ref 11.5–14.5)
ETHANOL BLD-MCNC: <10 MG/DL (ref 0–10)
ETHANOL UR QL: <0.01 %
GFR SERPL CREATININE-BSD FRML MDRD: 107 ML/MIN/1.73 (ref 51–120)
GLOBULIN UR ELPH-MCNC: 3.7 GM/DL (ref 2.3–3.5)
GLUCOSE BLD-MCNC: 105 MG/DL (ref 60–100)
HCT VFR BLD AUTO: 31.1 % (ref 35–45)
HCT VFR BLD AUTO: 33.5 % (ref 35–45)
HGB BLD-MCNC: 11.1 G/DL (ref 12–15.5)
HGB BLD-MCNC: 11.8 G/DL (ref 12–15.5)
IMM GRANULOCYTES # BLD: 0 10*3/MM3 (ref 0–0.02)
IMM GRANULOCYTES NFR BLD: 0 % (ref 0–0.5)
LYMPHOCYTES # BLD AUTO: 0.85 10*3/MM3 (ref 0.6–4.2)
LYMPHOCYTES NFR BLD AUTO: 31 % (ref 10–50)
MCH RBC QN AUTO: 31.6 PG (ref 26.5–34)
MCHC RBC AUTO-ENTMCNC: 35.2 G/DL (ref 31.4–36)
MCV RBC AUTO: 89.8 FL (ref 80–98)
MONOCYTES # BLD AUTO: 0.3 10*3/MM3 (ref 0–0.9)
MONOCYTES NFR BLD AUTO: 10.9 % (ref 0–12)
NEUTROPHILS # BLD AUTO: 1.57 10*3/MM3 (ref 2–8.6)
NEUTROPHILS NFR BLD AUTO: 57.3 % (ref 37–80)
PLATELET # BLD AUTO: 158 10*3/MM3 (ref 150–450)
PMV BLD AUTO: 9.7 FL (ref 8–12)
POTASSIUM BLD-SCNC: 3.4 MMOL/L (ref 3.5–5.1)
POTASSIUM BLD-SCNC: 3.4 MMOL/L (ref 3.5–5.1)
PROT SERPL-MCNC: 7.4 G/DL (ref 6.3–8.6)
RBC # BLD AUTO: 3.73 10*6/MM3 (ref 3.77–5.16)
SODIUM BLD-SCNC: 136 MMOL/L (ref 137–145)
WBC NRBC COR # BLD: 2.74 10*3/MM3 (ref 3.2–9.8)

## 2018-04-24 PROCEDURE — 99219 PR INITIAL OBSERVATION CARE/DAY 50 MINUTES: CPT | Performed by: OTOLARYNGOLOGY

## 2018-04-24 PROCEDURE — 96366 THER/PROPH/DIAG IV INF ADDON: CPT

## 2018-04-24 PROCEDURE — 85018 HEMOGLOBIN: CPT | Performed by: FAMILY MEDICINE

## 2018-04-24 PROCEDURE — 25010000002 LORAZEPAM PER 2 MG: Performed by: FAMILY MEDICINE

## 2018-04-24 PROCEDURE — 85014 HEMATOCRIT: CPT | Performed by: FAMILY MEDICINE

## 2018-04-24 PROCEDURE — 80053 COMPREHEN METABOLIC PANEL: CPT | Performed by: FAMILY MEDICINE

## 2018-04-24 PROCEDURE — G0378 HOSPITAL OBSERVATION PER HR: HCPCS

## 2018-04-24 PROCEDURE — 25010000002 MAGNESIUM SULFATE PER 500 MG OF MAGNESIUM: Performed by: FAMILY MEDICINE

## 2018-04-24 PROCEDURE — 99219 PR INITIAL OBSERVATION CARE/DAY 50 MINUTES: CPT | Performed by: STUDENT IN AN ORGANIZED HEALTH CARE EDUCATION/TRAINING PROGRAM

## 2018-04-24 PROCEDURE — 25010000002 MORPHINE PER 10 MG: Performed by: FAMILY MEDICINE

## 2018-04-24 PROCEDURE — 80307 DRUG TEST PRSMV CHEM ANLYZR: CPT | Performed by: FAMILY MEDICINE

## 2018-04-24 PROCEDURE — 84132 ASSAY OF SERUM POTASSIUM: CPT | Performed by: STUDENT IN AN ORGANIZED HEALTH CARE EDUCATION/TRAINING PROGRAM

## 2018-04-24 PROCEDURE — 25010000002 THIAMINE PER 100 MG: Performed by: FAMILY MEDICINE

## 2018-04-24 PROCEDURE — 85025 COMPLETE CBC W/AUTO DIFF WBC: CPT | Performed by: FAMILY MEDICINE

## 2018-04-24 PROCEDURE — 96376 TX/PRO/DX INJ SAME DRUG ADON: CPT

## 2018-04-24 RX ORDER — AMOXICILLIN AND CLAVULANATE POTASSIUM 500; 125 MG/1; MG/1
1 TABLET, FILM COATED ORAL EVERY 12 HOURS SCHEDULED
Status: DISCONTINUED | OUTPATIENT
Start: 2018-04-24 | End: 2018-04-25 | Stop reason: HOSPADM

## 2018-04-24 RX ORDER — POTASSIUM CHLORIDE 750 MG/1
40 CAPSULE, EXTENDED RELEASE ORAL ONCE
Status: COMPLETED | OUTPATIENT
Start: 2018-04-24 | End: 2018-04-24

## 2018-04-24 RX ADMIN — LORAZEPAM 1 MG: 2 INJECTION, SOLUTION INTRAMUSCULAR; INTRAVENOUS at 14:51

## 2018-04-24 RX ADMIN — AMOXICILLIN AND CLAVULANATE POTASSIUM 500 MG: 500; 125 TABLET, FILM COATED ORAL at 14:43

## 2018-04-24 RX ADMIN — CHLORDIAZEPOXIDE HYDROCHLORIDE 50 MG: 25 CAPSULE ORAL at 01:00

## 2018-04-24 RX ADMIN — CHLORDIAZEPOXIDE HYDROCHLORIDE 50 MG: 25 CAPSULE ORAL at 18:26

## 2018-04-24 RX ADMIN — CHLORDIAZEPOXIDE HYDROCHLORIDE 50 MG: 25 CAPSULE ORAL at 12:28

## 2018-04-24 RX ADMIN — NICOTINE 1 PATCH: 21 PATCH TRANSDERMAL at 14:51

## 2018-04-24 RX ADMIN — POTASSIUM CHLORIDE 40 MEQ: 750 CAPSULE, EXTENDED RELEASE ORAL at 08:39

## 2018-04-24 RX ADMIN — CHLORDIAZEPOXIDE HYDROCHLORIDE 50 MG: 25 CAPSULE ORAL at 05:53

## 2018-04-24 RX ADMIN — LORAZEPAM 1 MG: 2 INJECTION, SOLUTION INTRAMUSCULAR; INTRAVENOUS at 20:07

## 2018-04-24 RX ADMIN — MORPHINE SULFATE 1 MG: 2 INJECTION, SOLUTION INTRAMUSCULAR; INTRAVENOUS at 18:30

## 2018-04-24 RX ADMIN — LORAZEPAM 1 MG: 1 TABLET ORAL at 01:11

## 2018-04-24 RX ADMIN — THIAMINE HYDROCHLORIDE 100 ML/HR: 100 INJECTION, SOLUTION INTRAMUSCULAR; INTRAVENOUS at 08:36

## 2018-04-24 RX ADMIN — MORPHINE SULFATE 1 MG: 2 INJECTION, SOLUTION INTRAMUSCULAR; INTRAVENOUS at 12:29

## 2018-04-24 RX ADMIN — AMOXICILLIN AND CLAVULANATE POTASSIUM 500 MG: 500; 125 TABLET, FILM COATED ORAL at 20:07

## 2018-04-25 VITALS
DIASTOLIC BLOOD PRESSURE: 65 MMHG | WEIGHT: 108 LBS | RESPIRATION RATE: 18 BRPM | BODY MASS INDEX: 19.88 KG/M2 | HEART RATE: 98 BPM | SYSTOLIC BLOOD PRESSURE: 119 MMHG | HEIGHT: 62 IN | TEMPERATURE: 98.2 F | OXYGEN SATURATION: 96 %

## 2018-04-25 LAB
ALBUMIN SERPL-MCNC: 3.5 G/DL (ref 3.4–4.8)
ALBUMIN/GLOB SERPL: 1 G/DL (ref 1.1–1.8)
ALP SERPL-CCNC: 77 U/L (ref 38–126)
ALT SERPL W P-5'-P-CCNC: 20 U/L (ref 9–52)
ANION GAP SERPL CALCULATED.3IONS-SCNC: 10 MMOL/L (ref 5–15)
AST SERPL-CCNC: 16 U/L (ref 14–36)
BASOPHILS # BLD AUTO: 0.01 10*3/MM3 (ref 0–0.2)
BASOPHILS NFR BLD AUTO: 0.3 % (ref 0–2)
BILIRUB SERPL-MCNC: 0.3 MG/DL (ref 0.2–1.3)
BUN BLD-MCNC: 14 MG/DL (ref 7–21)
BUN/CREAT SERPL: 21.9 (ref 7–25)
CALCIUM SPEC-SCNC: 8.6 MG/DL (ref 8.4–10.2)
CHLORIDE SERPL-SCNC: 107 MMOL/L (ref 95–110)
CO2 SERPL-SCNC: 21 MMOL/L (ref 22–31)
CREAT BLD-MCNC: 0.64 MG/DL (ref 0.5–1)
DEPRECATED RDW RBC AUTO: 45.7 FL (ref 36.4–46.3)
EOSINOPHIL # BLD AUTO: 0.07 10*3/MM3 (ref 0–0.7)
EOSINOPHIL NFR BLD AUTO: 2.3 % (ref 0–7)
ERYTHROCYTE [DISTWIDTH] IN BLOOD BY AUTOMATED COUNT: 13.7 % (ref 11.5–14.5)
GFR SERPL CREATININE-BSD FRML MDRD: 97 ML/MIN/1.73 (ref 60–120)
GLOBULIN UR ELPH-MCNC: 3.5 GM/DL (ref 2.3–3.5)
GLUCOSE BLD-MCNC: 84 MG/DL (ref 60–100)
HCT VFR BLD AUTO: 29.6 % (ref 35–45)
HGB BLD-MCNC: 10.5 G/DL (ref 12–15.5)
HOLD SPECIMEN: NORMAL
IMM GRANULOCYTES # BLD: 0 10*3/MM3 (ref 0–0.02)
IMM GRANULOCYTES NFR BLD: 0 % (ref 0–0.5)
LYMPHOCYTES # BLD AUTO: 1.42 10*3/MM3 (ref 0.6–4.2)
LYMPHOCYTES NFR BLD AUTO: 47 % (ref 10–50)
MCH RBC QN AUTO: 32.1 PG (ref 26.5–34)
MCHC RBC AUTO-ENTMCNC: 35.5 G/DL (ref 31.4–36)
MCV RBC AUTO: 90.5 FL (ref 80–98)
MONOCYTES # BLD AUTO: 0.32 10*3/MM3 (ref 0–0.9)
MONOCYTES NFR BLD AUTO: 10.6 % (ref 0–12)
NEUTROPHILS # BLD AUTO: 1.2 10*3/MM3 (ref 2–8.6)
NEUTROPHILS NFR BLD AUTO: 39.8 % (ref 37–80)
PLATELET # BLD AUTO: 129 10*3/MM3 (ref 150–450)
PMV BLD AUTO: 10.1 FL (ref 8–12)
POTASSIUM BLD-SCNC: 3.6 MMOL/L (ref 3.5–5.1)
PROT SERPL-MCNC: 7 G/DL (ref 6.3–8.6)
RBC # BLD AUTO: 3.27 10*6/MM3 (ref 3.77–5.16)
SODIUM BLD-SCNC: 138 MMOL/L (ref 137–145)
WBC NRBC COR # BLD: 3.02 10*3/MM3 (ref 3.2–9.8)

## 2018-04-25 PROCEDURE — 99217 PR OBSERVATION CARE DISCHARGE MANAGEMENT: CPT | Performed by: FAMILY MEDICINE

## 2018-04-25 PROCEDURE — 85025 COMPLETE CBC W/AUTO DIFF WBC: CPT | Performed by: FAMILY MEDICINE

## 2018-04-25 PROCEDURE — 80053 COMPREHEN METABOLIC PANEL: CPT | Performed by: FAMILY MEDICINE

## 2018-04-25 PROCEDURE — 25010000002 THIAMINE PER 100 MG: Performed by: FAMILY MEDICINE

## 2018-04-25 PROCEDURE — 25010000002 MAGNESIUM SULFATE PER 500 MG OF MAGNESIUM: Performed by: FAMILY MEDICINE

## 2018-04-25 PROCEDURE — 96366 THER/PROPH/DIAG IV INF ADDON: CPT

## 2018-04-25 PROCEDURE — G0378 HOSPITAL OBSERVATION PER HR: HCPCS

## 2018-04-25 RX ORDER — CHLORDIAZEPOXIDE HYDROCHLORIDE 25 MG/1
50 CAPSULE, GELATIN COATED ORAL EVERY 6 HOURS SCHEDULED
Qty: 96 CAPSULE | Refills: 0 | Status: SHIPPED | OUTPATIENT
Start: 2018-04-25 | End: 2018-05-07

## 2018-04-25 RX ORDER — ACETAMINOPHEN 325 MG/1
650 TABLET ORAL EVERY 6 HOURS PRN
Status: DISCONTINUED | OUTPATIENT
Start: 2018-04-25 | End: 2018-04-25 | Stop reason: HOSPADM

## 2018-04-25 RX ORDER — AMOXICILLIN AND CLAVULANATE POTASSIUM 500; 125 MG/1; MG/1
1 TABLET, FILM COATED ORAL EVERY 12 HOURS SCHEDULED
Qty: 22 TABLET | Refills: 0 | Status: SHIPPED | OUTPATIENT
Start: 2018-04-25 | End: 2018-05-06

## 2018-04-25 RX ADMIN — THIAMINE HYDROCHLORIDE 100 ML/HR: 100 INJECTION, SOLUTION INTRAMUSCULAR; INTRAVENOUS at 08:32

## 2018-04-25 RX ADMIN — CHLORDIAZEPOXIDE HYDROCHLORIDE 50 MG: 25 CAPSULE ORAL at 00:00

## 2018-04-25 RX ADMIN — AMOXICILLIN AND CLAVULANATE POTASSIUM 500 MG: 500; 125 TABLET, FILM COATED ORAL at 08:32

## 2018-04-25 RX ADMIN — CHLORDIAZEPOXIDE HYDROCHLORIDE 50 MG: 25 CAPSULE ORAL at 06:11

## 2018-04-25 RX ADMIN — CHLORDIAZEPOXIDE HYDROCHLORIDE 50 MG: 25 CAPSULE ORAL at 11:09

## 2018-04-27 ENCOUNTER — OFFICE VISIT (OUTPATIENT)
Dept: OTOLARYNGOLOGY | Facility: CLINIC | Age: 53
End: 2018-04-27

## 2018-04-27 VITALS — TEMPERATURE: 97.1 F | HEIGHT: 62 IN | BODY MASS INDEX: 20.8 KG/M2 | WEIGHT: 113 LBS

## 2018-04-27 DIAGNOSIS — J34.89 NASAL SEPTAL PERFORATION: ICD-10-CM

## 2018-04-27 DIAGNOSIS — R04.0 SEVERE EPISTAXIS: Primary | ICD-10-CM

## 2018-04-27 PROCEDURE — 99213 OFFICE O/P EST LOW 20 MIN: CPT | Performed by: OTOLARYNGOLOGY

## 2018-04-27 PROCEDURE — 31231 NASAL ENDOSCOPY DX: CPT | Performed by: OTOLARYNGOLOGY

## 2018-04-27 NOTE — PATIENT INSTRUCTIONS

## 2018-04-27 NOTE — PROGRESS NOTES
Subjective   Laura Bruce is a 52 y.o. female.   CCfollow-up epistaxis with nasal pack  History of Present Illness   Comes in to have packing removed sh    She has no prior history of epistax she's not have prior nosebleeds re she says she is about to go into a alcohol rehabilitation facility    Is uncomfortable not having a lot o  The following portions of the patient's history were reviewed and updated as appropriate: allergies, current medications, past family history, past medical history, past social history, past surgical history and problem list.      Laura Bruce reports that she has been smoking.  She started smoking about 41 years ago. She has been smoking about 1.00 pack per day. She has never used smokeless tobacco. She reports that she drinks about 42.0 oz of alcohol per week . She reports that she does not use drugs.  Patifollow-up epistaxis with nasal packit is a tobacco user and has been counseled for use of tobacco products    Family History   Problem Relation Age of Onset   • Depression Mother    • Anxiety disorder Mother    • COPD Mother    • Alcohol abuse Father    • Depression Father    • Diabetes Father    • Anxiety disorder Sister    • Bipolar disorder Sister    • Anxiety disorder Maternal Aunt    • Alcohol abuse Paternal Grandfather    • Alcohol abuse Paternal Grandmother          Current Outpatient Prescriptions:   •  amoxicillin-clavulanate (AUGMENTIN) 500-125 MG per tablet, Take 1 tablet by mouth Every 12 (Twelve) Hours for 11 days., Disp: 22 tablet, Rfl: 0  •  chlordiazePOXIDE (LIBRIUM) 25 MG capsule, Take 2 capsules by mouth Every 6 (Six) Hours for 12 days., Disp: 96 capsule, Rfl: 0    No Known Allergies    Past Medical History:   Diagnosis Date   • ADHD (attention deficit hyperactivity disorder)    • Alcohol abuse    • Bilateral carpal tunnel syndrome    • Bipolar 1 disorder    • Carpal tunnel syndrome on both sides 2015   • Chronic pain disorder     Back   • Collapsed  lung 1986   • Depression    • Irritable bowel syndrome    • Spontaneous pneumothorax          Review of Systems   Constitutional: Negative.         Weight gain   HENT: Negative.    Eyes: Negative.    Respiratory: Negative.    Cardiovascular: Negative.    Gastrointestinal: Negative.    Endocrine: Negative.         Hot flashes   Genitourinary: Negative.    Musculoskeletal: Positive for back pain.   Skin: Negative.    Allergic/Immunologic: Negative.    Neurological: Negative.    Hematological: Negative.    Psychiatric/Behavioral: Negative.         Mood changes  depression           Objective   Physical Exam   Constitutional: She is oriented to person, place, and time. She appears well-developed and well-nourished.   HENT:   Head: Normocephalic and atraumatic.   Right Ear: Hearing, tympanic membrane, external ear and ear canal normal.   Left Ear: Hearing, tympanic membrane, external ear and ear canal normal.   Nose: Nose normal. No mucosal edema, rhinorrhea, nasal deformity or septal deviation. No epistaxis. Right sinus exhibits no maxillary sinus tenderness and no frontal sinus tenderness. Left sinus exhibits no maxillary sinus tenderness and no frontal sinus tenderness.       Mouth/Throat: Uvula is midline and oropharynx is clear and moist. Dentures: no clots or blood in pharynx. No trismus in the jaw. Normal dentition. No oropharyngeal exudate or posterior oropharyngeal edema.       Eyes: Conjunctivae are normal.   Neck: Normal range of motion. Neck supple. No JVD present. No tracheal deviation present. No thyromegaly present.   Cardiovascular: Normal rate.    Pulmonary/Chest: Effort normal.   Musculoskeletal: Normal range of motion.   Lymphadenopathy:        Head (right side): No submental, no submandibular, no tonsillar, no preauricular, no posterior auricular and no occipital adenopathy present.        Head (left side): No submental, no submandibular, no tonsillar, no preauricular, no posterior auricular and no  occipital adenopathy present.     She has no cervical adenopathy.        Right cervical: No superficial cervical, no deep cervical and no posterior cervical adenopathy present.       Left cervical: No superficial cervical, no deep cervical and no posterior cervical adenopathy present.   Neurological: She is alert and oriented to person, place, and time. No cranial nerve deficit.   Skin: Skin is warm.   Psychiatric: She has a normal mood and affect. Her speech is normal and behavior is normal. Thought content normal.   Nursing note and vitals reviewed.          Patient comes in with packing in The balloon was deflated and the  The nose further decongestion and and there is found to be a septal perf but no leading site   OMU's were opened her some trauma from the packing in the left inferior turbinat bilaterally    Assessment/Plan   Laura was seen today for follow-up.    Diagnoses and all orders for this visit:    Severe epistaxis    Nasal septal perforation        Brochure was provided she is expla    She'll follow-up in 3 weeks    Limit her activity in terms of be    Information using nasal saline rinSure antibiotics she is tolerating

## 2018-11-16 ENCOUNTER — HOSPITAL ENCOUNTER (EMERGENCY)
Facility: HOSPITAL | Age: 53
Discharge: HOME | End: 2018-11-16
Attending: FAMILY MEDICINE | Admitting: FAMILY MEDICINE

## 2018-11-16 ENCOUNTER — APPOINTMENT (OUTPATIENT)
Dept: GENERAL RADIOLOGY | Facility: HOSPITAL | Age: 53
End: 2018-11-16

## 2018-11-16 VITALS
TEMPERATURE: 98.2 F | RESPIRATION RATE: 18 BRPM | HEART RATE: 85 BPM | HEIGHT: 62 IN | OXYGEN SATURATION: 97 % | BODY MASS INDEX: 20.8 KG/M2 | SYSTOLIC BLOOD PRESSURE: 158 MMHG | WEIGHT: 113 LBS | DIASTOLIC BLOOD PRESSURE: 83 MMHG

## 2018-11-16 DIAGNOSIS — M54.5 ACUTE BILATERAL LOW BACK PAIN, WITH SCIATICA PRESENCE UNSPECIFIED: Primary | ICD-10-CM

## 2018-11-16 PROBLEM — N17.9 ACUTE RENAL FAILURE (HCC): Status: ACTIVE | Noted: 2018-11-16

## 2018-11-16 LAB
ALBUMIN SERPL-MCNC: 3.9 G/DL (ref 3.4–4.8)
ALBUMIN SERPL-MCNC: 4.1 G/DL (ref 3.4–4.8)
ALBUMIN/GLOB SERPL: 1.1 G/DL (ref 1.1–1.8)
ALBUMIN/GLOB SERPL: 1.3 G/DL (ref 1.1–1.8)
ALP SERPL-CCNC: 110 U/L (ref 38–126)
ALP SERPL-CCNC: 177 U/L (ref 38–126)
ALT SERPL W P-5'-P-CCNC: 15 U/L (ref 9–52)
ALT SERPL W P-5'-P-CCNC: 17 U/L (ref 9–52)
ANION GAP SERPL CALCULATED.3IONS-SCNC: 17 MMOL/L (ref 5–15)
ANION GAP SERPL CALCULATED.3IONS-SCNC: 7 MMOL/L (ref 5–15)
AST SERPL-CCNC: 28 U/L (ref 14–36)
AST SERPL-CCNC: 42 U/L (ref 14–36)
BACTERIA UR QL AUTO: ABNORMAL /HPF
BASOPHILS # BLD AUTO: 0.01 10*3/MM3 (ref 0–0.2)
BASOPHILS NFR BLD AUTO: 0.4 % (ref 0–2)
BILIRUB SERPL-MCNC: 0.3 MG/DL (ref 0.2–1.3)
BILIRUB SERPL-MCNC: 0.5 MG/DL (ref 0.2–1.3)
BILIRUB UR QL STRIP: NEGATIVE
BUN BLD-MCNC: 15 MG/DL (ref 7–21)
BUN BLD-MCNC: 28 MG/DL (ref 7–21)
BUN/CREAT SERPL: 25.4 (ref 7–25)
BUN/CREAT SERPL: 8 (ref 7–25)
CALCIUM SPEC-SCNC: 8.5 MG/DL (ref 8.4–10.2)
CALCIUM SPEC-SCNC: 8.6 MG/DL (ref 8.4–10.2)
CHLORIDE SERPL-SCNC: 108 MMOL/L (ref 95–110)
CHLORIDE SERPL-SCNC: 90 MMOL/L (ref 95–110)
CLARITY UR: CLEAR
CO2 SERPL-SCNC: 20 MMOL/L (ref 22–31)
CO2 SERPL-SCNC: 28 MMOL/L (ref 22–31)
COLOR UR: YELLOW
CREAT BLD-MCNC: 0.59 MG/DL (ref 0.5–1)
CREAT BLD-MCNC: 3.51 MG/DL (ref 0.5–1)
DEPRECATED RDW RBC AUTO: 46.5 FL (ref 36.4–46.3)
EOSINOPHIL # BLD AUTO: 0.02 10*3/MM3 (ref 0–0.7)
EOSINOPHIL NFR BLD AUTO: 0.8 % (ref 0–7)
ERYTHROCYTE [DISTWIDTH] IN BLOOD BY AUTOMATED COUNT: 14.2 % (ref 11.5–14.5)
ETHANOL BLD-MCNC: <10 MG/DL (ref 0–10)
ETHANOL UR QL: <0.01 %
GFR SERPL CREATININE-BSD FRML MDRD: 107 ML/MIN/1.73 (ref 51–120)
GFR SERPL CREATININE-BSD FRML MDRD: 14 ML/MIN/1.73 (ref 51–120)
GFR SERPL CREATININE-BSD FRML MDRD: ABNORMAL ML/MIN/1.73 (ref 51–120)
GLOBULIN UR ELPH-MCNC: 3 GM/DL (ref 2.3–3.5)
GLOBULIN UR ELPH-MCNC: 3.6 GM/DL (ref 2.3–3.5)
GLUCOSE BLD-MCNC: 88 MG/DL (ref 60–100)
GLUCOSE BLD-MCNC: 98 MG/DL (ref 60–100)
GLUCOSE BLDC GLUCOMTR-MCNC: 86 MG/DL (ref 70–130)
GLUCOSE UR STRIP-MCNC: NEGATIVE MG/DL
HCT VFR BLD AUTO: 41.7 % (ref 35–45)
HGB BLD-MCNC: 14.6 G/DL (ref 12–15.5)
HGB UR QL STRIP.AUTO: NEGATIVE
HOLD SPECIMEN: NORMAL
HYALINE CASTS UR QL AUTO: ABNORMAL /LPF
IMM GRANULOCYTES # BLD: 0 10*3/MM3 (ref 0–0.02)
IMM GRANULOCYTES NFR BLD: 0 % (ref 0–0.5)
KETONES UR QL STRIP: NEGATIVE
LEUKOCYTE ESTERASE UR QL STRIP.AUTO: NEGATIVE
LYMPHOCYTES # BLD AUTO: 1.36 10*3/MM3 (ref 0.6–4.2)
LYMPHOCYTES NFR BLD AUTO: 54 % (ref 10–50)
MCH RBC QN AUTO: 31.5 PG (ref 26.5–34)
MCHC RBC AUTO-ENTMCNC: 35 G/DL (ref 31.4–36)
MCV RBC AUTO: 90.1 FL (ref 80–98)
MONOCYTES # BLD AUTO: 0.38 10*3/MM3 (ref 0–0.9)
MONOCYTES NFR BLD AUTO: 15.1 % (ref 0–12)
NEUTROPHILS # BLD AUTO: 0.75 10*3/MM3 (ref 2–8.6)
NEUTROPHILS NFR BLD AUTO: 29.7 % (ref 37–80)
NITRITE UR QL STRIP: NEGATIVE
NRBC BLD MANUAL-RTO: 0 /100 WBC (ref 0–0)
PH UR STRIP.AUTO: 7 [PH] (ref 5–9)
PLATELET # BLD AUTO: 217 10*3/MM3 (ref 150–450)
PMV BLD AUTO: 10.3 FL (ref 8–12)
POTASSIUM BLD-SCNC: 3.3 MMOL/L (ref 3.5–5.1)
POTASSIUM BLD-SCNC: 3.6 MMOL/L (ref 3.5–5.1)
PROT SERPL-MCNC: 6.9 G/DL (ref 6.3–8.6)
PROT SERPL-MCNC: 7.7 G/DL (ref 6.3–8.6)
PROT UR QL STRIP: ABNORMAL
RBC # BLD AUTO: 4.63 10*6/MM3 (ref 3.77–5.16)
RBC # UR: ABNORMAL /HPF
REF LAB TEST METHOD: ABNORMAL
SODIUM BLD-SCNC: 135 MMOL/L (ref 137–145)
SODIUM BLD-SCNC: 135 MMOL/L (ref 137–145)
SP GR UR STRIP: 1.02 (ref 1–1.03)
SQUAMOUS #/AREA URNS HPF: ABNORMAL /HPF
UNIDENT CRYS URNS QL MICRO: ABNORMAL /HPF
UROBILINOGEN UR QL STRIP: ABNORMAL
WBC NRBC COR # BLD: 2.52 10*3/MM3 (ref 3.2–9.8)
WBC UR QL AUTO: ABNORMAL /HPF
WHOLE BLOOD HOLD SPECIMEN: NORMAL
WHOLE BLOOD HOLD SPECIMEN: NORMAL

## 2018-11-16 PROCEDURE — 80053 COMPREHEN METABOLIC PANEL: CPT | Performed by: PHYSICIAN ASSISTANT

## 2018-11-16 PROCEDURE — 96360 HYDRATION IV INFUSION INIT: CPT

## 2018-11-16 PROCEDURE — 85025 COMPLETE CBC W/AUTO DIFF WBC: CPT | Performed by: PHYSICIAN ASSISTANT

## 2018-11-16 PROCEDURE — 80053 COMPREHEN METABOLIC PANEL: CPT | Performed by: FAMILY MEDICINE

## 2018-11-16 PROCEDURE — 99284 EMERGENCY DEPT VISIT MOD MDM: CPT

## 2018-11-16 PROCEDURE — 36415 COLL VENOUS BLD VENIPUNCTURE: CPT | Performed by: FAMILY MEDICINE

## 2018-11-16 PROCEDURE — 72100 X-RAY EXAM L-S SPINE 2/3 VWS: CPT

## 2018-11-16 PROCEDURE — 80307 DRUG TEST PRSMV CHEM ANLYZR: CPT | Performed by: PHYSICIAN ASSISTANT

## 2018-11-16 PROCEDURE — 82962 GLUCOSE BLOOD TEST: CPT

## 2018-11-16 PROCEDURE — 81001 URINALYSIS AUTO W/SCOPE: CPT | Performed by: PHYSICIAN ASSISTANT

## 2018-11-16 RX ORDER — ORPHENADRINE CITRATE 100 MG/1
100 TABLET, EXTENDED RELEASE ORAL 2 TIMES DAILY PRN
Qty: 14 TABLET | Refills: 0 | Status: SHIPPED | OUTPATIENT
Start: 2018-11-16 | End: 2018-11-16 | Stop reason: SDUPTHER

## 2018-11-16 RX ORDER — HYDROCODONE BITARTRATE AND ACETAMINOPHEN 5; 325 MG/1; MG/1
1 TABLET ORAL ONCE
Status: COMPLETED | OUTPATIENT
Start: 2018-11-16 | End: 2018-11-16

## 2018-11-16 RX ORDER — ORPHENADRINE CITRATE 100 MG/1
100 TABLET, EXTENDED RELEASE ORAL 2 TIMES DAILY PRN
Qty: 14 TABLET | Refills: 0 | Status: SHIPPED | OUTPATIENT
Start: 2018-11-16 | End: 2018-11-23

## 2018-11-16 RX ORDER — SODIUM CHLORIDE 0.9 % (FLUSH) 0.9 %
10 SYRINGE (ML) INJECTION AS NEEDED
Status: DISCONTINUED | OUTPATIENT
Start: 2018-11-16 | End: 2018-11-16 | Stop reason: HOSPADM

## 2018-11-16 RX ORDER — IBUPROFEN 600 MG/1
600 TABLET ORAL 3 TIMES DAILY
Qty: 21 TABLET | Refills: 0 | Status: SHIPPED | OUTPATIENT
Start: 2018-11-16 | End: 2018-11-23

## 2018-11-16 RX ORDER — IBUPROFEN 600 MG/1
600 TABLET ORAL 3 TIMES DAILY
Qty: 21 TABLET | Refills: 0 | Status: SHIPPED | OUTPATIENT
Start: 2018-11-16 | End: 2018-11-16 | Stop reason: SDUPTHER

## 2018-11-16 RX ADMIN — SODIUM CHLORIDE 1000 ML: 9 INJECTION, SOLUTION INTRAVENOUS at 13:35

## 2018-11-16 RX ADMIN — HYDROCODONE BITARTRATE AND ACETAMINOPHEN 1 TABLET: 5; 325 TABLET ORAL at 13:46

## 2018-11-16 RX ADMIN — Medication 10 ML: at 13:35

## 2018-11-16 NOTE — ED NOTES
Notified DASHAWN Mcclure that patient's finger stick is 86 on blood glucose machine. We are redrawing patient's blood at this time to double check results.      Judy Adams RN  11/16/18 [id number]

## 2018-11-16 NOTE — ED PROVIDER NOTES
Subjective   Patient presents to emergency department for low back pain.  Hx of chronic back pain, depression, IBS, ETOH abuse, bipolar 1, ADHD.  Patient states symptoms started 4 days ago after she got off from work.  States this pain feels different than her chronic low back pain and she has radiating pain to bilateral thighs.  She is a  and bends over a lot.          History provided by:  Patient   used: No    Back Pain   Location:  Lumbar spine  Quality:  Aching  Radiates to:  L thigh and R thigh  Pain severity:  Moderate  Onset quality:  Sudden  Duration:  5 days  Timing:  Constant  Progression:  Worsening  Chronicity:  New  Context: not lifting heavy objects, not occupational injury and not recent injury    Ineffective treatments:  Heating pad  Associated symptoms: leg pain and tingling    Associated symptoms: no abdominal pain, no abdominal swelling, no bladder incontinence, no bowel incontinence, no chest pain, no dysuria, no fever, no headaches, no numbness, no paresthesias, no pelvic pain, no perianal numbness, no weakness and no weight loss        Review of Systems   Constitutional: Negative for fever and weight loss.   HENT: Negative for sore throat and trouble swallowing.    Eyes: Negative for visual disturbance.   Respiratory: Negative for shortness of breath and wheezing.    Cardiovascular: Negative for chest pain.   Gastrointestinal: Negative for abdominal pain and bowel incontinence.   Genitourinary: Positive for flank pain. Negative for bladder incontinence, dysuria and pelvic pain.   Musculoskeletal: Positive for back pain.   Skin: Negative for color change.   Allergic/Immunologic: Negative for immunocompromised state.   Neurological: Positive for tingling. Negative for weakness, numbness, headaches and paresthesias.   Hematological: Does not bruise/bleed easily.   Psychiatric/Behavioral: Negative for confusion.       Past Medical History:   Diagnosis Date   • ADHD  "(attention deficit hyperactivity disorder)    • Alcohol abuse    • Bilateral carpal tunnel syndrome    • Bipolar 1 disorder (CMS/HCC)    • Carpal tunnel syndrome on both sides 2015   • Chronic pain disorder     Back   • Collapsed lung 1986   • Depression    • Irritable bowel syndrome    • Spontaneous pneumothorax        No Known Allergies    Past Surgical History:   Procedure Laterality Date   • CHEST TUBE INSERTION      30 years ago for a spontaneous pneumothorax   • COLONOSCOPY     • HYSTERECTOMY     • TOTAL ABDOMINAL HYSTERECTOMY WITH SALPINGO OOPHORECTOMY         Family History   Problem Relation Age of Onset   • Depression Mother    • Anxiety disorder Mother    • COPD Mother    • Alcohol abuse Father    • Depression Father    • Diabetes Father    • Anxiety disorder Sister    • Bipolar disorder Sister    • Anxiety disorder Maternal Aunt    • Alcohol abuse Paternal Grandfather    • Alcohol abuse Paternal Grandmother        Social History     Socioeconomic History   • Marital status: Legally      Spouse name: Not on file   • Number of children: Not on file   • Years of education: Not on file   • Highest education level: Not on file   Tobacco Use   • Smoking status: Current Every Day Smoker     Packs/day: 1.00     Start date: 1977   • Smokeless tobacco: Never Used   Substance and Sexual Activity   • Alcohol use: Yes     Alcohol/week: 42.0 oz     Types: 70 Shots of liquor per week     Comment: 6-10 shots of 99 proof daily(states no alcohol in 5 days)   • Drug use: No     Comment: Previously meth (11/16), THC(2 days ago), synthetics(every couple weeks), shrooms (9-10 months ago)   • Sexual activity: Yes     Partners: Male   Social History Narrative    Lives at home with ernesto.  Used to work as a  and .           Objective      /81 (BP Location: Left arm, Patient Position: Lying)   Pulse 91   Temp 98.2 °F (36.8 °C) (Oral)   Resp 18   Ht 157.5 cm (62\")   Wt 51.3 kg (113 lb)   " SpO2 96%   BMI 20.67 kg/m²     Physical Exam   Constitutional: She is oriented to person, place, and time. She appears well-developed and well-nourished. She appears distressed.   HENT:   Head: Normocephalic and atraumatic.   Eyes: Conjunctivae are normal.   Cardiovascular: Normal rate, regular rhythm, normal heart sounds and intact distal pulses.   Pulmonary/Chest: Effort normal and breath sounds normal. No stridor. No respiratory distress.   Abdominal: Soft. Normal appearance and bowel sounds are normal. She exhibits no distension. There is tenderness. There is CVA tenderness (bilateral).   Musculoskeletal: She exhibits no edema.   Neurological: She is alert and oriented to person, place, and time.   Skin: Skin is warm. Capillary refill takes less than 2 seconds.   Psychiatric: She has a normal mood and affect. Her behavior is normal. Thought content normal.   Nursing note and vitals reviewed.      Procedures           ED Course  ED Course as of Nov 16 1448 Fri Nov 16, 2018   1308 Paged hospitalist  [HERNANDEZ]   1339 POC glucose per nursing staff was 86.  Holding regular insulin bolus.  Re-drawing CMP.    [HERNANDEZ]   1431 Repeat CMP was normal.  Contacted Mai Ayala in lab and discussed.  Concerned about CMP results.  Lab is looking into it.  Apologized to patient and discussed results.  Stable to discharge home.    [HERNANDEZ]   1444 Original CMP showed creatine in 3.5's and glucose was 431.  Results no longer in epic.    [HERNANDEZ]      ED Course User Index  [HERNANDEZ] Ren Sun PA-C      Results for orders placed or performed during the hospital encounter of 11/16/18   Comprehensive Metabolic Panel   Result Value Ref Range    Glucose 431 (C) 60 - 100 mg/dL    BUN 28 (H) 7 - 21 mg/dL    Creatinine 3.51 (H) 0.50 - 1.00 mg/dL    Sodium 135 (L) 137 - 145 mmol/L    Potassium 3.6 3.5 - 5.1 mmol/L    Chloride 90 (L) 95 - 110 mmol/L    CO2 28.0 22.0 - 31.0 mmol/L    Calcium 8.6 8.4 - 10.2 mg/dL    Total Protein 6.9 6.3 - 8.6 g/dL     Albumin 3.90 3.40 - 4.80 g/dL    ALT (SGPT) 17 9 - 52 U/L    AST (SGOT) 42 (H) 14 - 36 U/L    Alkaline Phosphatase 177 (H) 38 - 126 U/L    Total Bilirubin 0.3 0.2 - 1.3 mg/dL    eGFR Non  Amer 14 (L) 51 - 120 mL/min/1.73    eGFR  African Amer  51 - 120 mL/min/1.73    Globulin 3.0 2.3 - 3.5 gm/dL    A/G Ratio 1.3 1.1 - 1.8 g/dL    BUN/Creatinine Ratio 8.0 7.0 - 25.0    Anion Gap 17.0 (H) 5.0 - 15.0 mmol/L   Ethanol   Result Value Ref Range    Ethanol <10 0 - 10 mg/dL    Ethanol % <0.010 %   CBC Auto Differential   Result Value Ref Range    WBC 2.52 (L) 3.20 - 9.80 10*3/mm3    RBC 4.63 3.77 - 5.16 10*6/mm3    Hemoglobin 14.6 12.0 - 15.5 g/dL    Hematocrit 41.7 35.0 - 45.0 %    MCV 90.1 80.0 - 98.0 fL    MCH 31.5 26.5 - 34.0 pg    MCHC 35.0 31.4 - 36.0 g/dL    RDW 14.2 11.5 - 14.5 %    RDW-SD 46.5 (H) 36.4 - 46.3 fl    MPV 10.3 8.0 - 12.0 fL    Platelets 217 150 - 450 10*3/mm3    Neutrophil % 29.7 (L) 37.0 - 80.0 %    Lymphocyte % 54.0 (H) 10.0 - 50.0 %    Monocyte % 15.1 (H) 0.0 - 12.0 %    Eosinophil % 0.8 0.0 - 7.0 %    Basophil % 0.4 0.0 - 2.0 %    Immature Grans % 0.0 0.0 - 0.5 %    Neutrophils, Absolute 0.75 (L) 2.00 - 8.60 10*3/mm3    Lymphocytes, Absolute 1.36 0.60 - 4.20 10*3/mm3    Monocytes, Absolute 0.38 0.00 - 0.90 10*3/mm3    Eosinophils, Absolute 0.02 0.00 - 0.70 10*3/mm3    Basophils, Absolute 0.01 0.00 - 0.20 10*3/mm3    Immature Grans, Absolute 0.00 0.00 - 0.02 10*3/mm3    nRBC 0.0 0.0 - 0.0 /100 WBC     Xr Spine Lumbar 2 Or 3 View    Result Date: 11/16/2018  Narrative: Patient Name:  ASPEN BENOIT Patient ID:  [id number] Ordering:  LORENA LINDER Attending:  MALGORZATA HORTON Referring:  LORENA LINDER ------------------------------------------------ Lumbar Spine, INDICATION: Low back pain with pain in the bilateral hips. COMPARISON: None FINDINGS:  3 Images The lumbar spine shows normal curvature. All the vertebral bodies show normal height and density. No compression seen. The  intervertebral disc spaces are maintained. Facet arthropathy noted at L5-S1. No anterolisthesis seen. No acute bony abnormality identified. Bilateral sacroiliac joints are unremarkable.     Impression: CONCLUSION:  1. Multilevel degenerative changes noted at L5-S1. No acute bony abnormality seen. Electronically signed by:  Dylan Monroe  11/16/2018 12:43 PM CST Workstation: [id number]                [facility]      Final diagnoses:   Acute renal failure, unspecified acute renal failure type (CMS/HCC)   Hyperglycemia                   Ren Sun PA-C  11/16/18 9400

## 2018-11-16 NOTE — ED NOTES
Per DASHAWN Mcclure do not send patient upstairs before repeat CMP results.      Judy Adams, RN  11/16/18 6027

## 2019-01-05 ENCOUNTER — HOSPITAL ENCOUNTER (OUTPATIENT)
Facility: HOSPITAL | Age: 54
Setting detail: OBSERVATION
End: 2019-01-06
Attending: FAMILY MEDICINE | Admitting: FAMILY MEDICINE

## 2019-01-05 DIAGNOSIS — F10.920 ALCOHOLIC INTOXICATION WITHOUT COMPLICATION (HCC): ICD-10-CM

## 2019-01-05 DIAGNOSIS — R45.851 SUICIDAL IDEATION: Primary | ICD-10-CM

## 2019-01-05 PROBLEM — R64 CACHEXIA (HCC): Status: ACTIVE | Noted: 2019-01-05

## 2019-01-05 PROBLEM — F10.10 ALCOHOL ABUSE: Status: ACTIVE | Noted: 2019-01-05

## 2019-01-05 LAB
ALBUMIN SERPL-MCNC: 4.5 G/DL (ref 3.4–4.8)
ALBUMIN/GLOB SERPL: 1.3 G/DL (ref 1.1–1.8)
ALP SERPL-CCNC: 105 U/L (ref 38–126)
ALT SERPL W P-5'-P-CCNC: 8 U/L (ref 9–52)
AMPHET+METHAMPHET UR QL: NEGATIVE
ANION GAP SERPL CALCULATED.3IONS-SCNC: 12 MMOL/L (ref 5–15)
APAP SERPL-MCNC: <10 MCG/ML (ref 10–30)
AST SERPL-CCNC: 15 U/L (ref 14–36)
BARBITURATES UR QL SCN: NEGATIVE
BENZODIAZ UR QL SCN: NEGATIVE
BILIRUB SERPL-MCNC: 0.2 MG/DL (ref 0.2–1.3)
BUN BLD-MCNC: 16 MG/DL (ref 7–21)
BUN/CREAT SERPL: 18.8 (ref 7–25)
CALCIUM SPEC-SCNC: 9.3 MG/DL (ref 8.4–10.2)
CANNABINOIDS SERPL QL: POSITIVE
CHLORIDE SERPL-SCNC: 106 MMOL/L (ref 95–110)
CO2 SERPL-SCNC: 21 MMOL/L (ref 22–31)
COCAINE UR QL: NEGATIVE
CREAT BLD-MCNC: 0.85 MG/DL (ref 0.5–1)
DEPRECATED RDW RBC AUTO: 44.1 FL (ref 36.4–46.3)
ERYTHROCYTE [DISTWIDTH] IN BLOOD BY AUTOMATED COUNT: 13.6 % (ref 11.5–14.5)
ETHANOL BLD-MCNC: 263 MG/DL (ref 0–10)
ETHANOL UR QL: 0.26 %
GFR SERPL CREATININE-BSD FRML MDRD: 70 ML/MIN/1.73 (ref 51–120)
GLOBULIN UR ELPH-MCNC: 3.6 GM/DL (ref 2.3–3.5)
GLUCOSE BLD-MCNC: 90 MG/DL (ref 60–100)
HCT VFR BLD AUTO: 43.2 % (ref 35–45)
HGB BLD-MCNC: 15.6 G/DL (ref 12–15.5)
HOLD SPECIMEN: NORMAL
LYMPHOCYTES # BLD MANUAL: 2.98 10*3/MM3 (ref 0.6–4.2)
LYMPHOCYTES NFR BLD MANUAL: 2 % (ref 0–12)
LYMPHOCYTES NFR BLD MANUAL: 64 % (ref 10–50)
MCH RBC QN AUTO: 31.7 PG (ref 26.5–34)
MCHC RBC AUTO-ENTMCNC: 36.1 G/DL (ref 31.4–36)
MCV RBC AUTO: 87.8 FL (ref 80–98)
METHADONE UR QL SCN: NEGATIVE
MONOCYTES # BLD AUTO: 0.09 10*3/MM3 (ref 0–0.9)
NEUTROPHILS # BLD AUTO: 1.26 10*3/MM3 (ref 2–8.6)
NEUTROPHILS NFR BLD MANUAL: 27 % (ref 37–80)
OPIATES UR QL: NEGATIVE
OXYCODONE UR QL SCN: NEGATIVE
PLATELET # BLD AUTO: 291 10*3/MM3 (ref 150–450)
PMV BLD AUTO: 10.1 FL (ref 8–12)
POTASSIUM BLD-SCNC: 3.9 MMOL/L (ref 3.5–5.1)
PROT SERPL-MCNC: 8.1 G/DL (ref 6.3–8.6)
RBC # BLD AUTO: 4.92 10*6/MM3 (ref 3.77–5.16)
RBC MORPH BLD: NORMAL
SALICYLATES SERPL-MCNC: 8.6 MG/DL (ref 10–20)
SMALL PLATELETS BLD QL SMEAR: ADEQUATE
SODIUM BLD-SCNC: 139 MMOL/L (ref 137–145)
VARIANT LYMPHS NFR BLD MANUAL: 7 % (ref 0–5)
WBC MORPH BLD: NORMAL
WBC NRBC COR # BLD: 4.65 10*3/MM3 (ref 3.2–9.8)
WHOLE BLOOD HOLD SPECIMEN: NORMAL

## 2019-01-05 PROCEDURE — 80307 DRUG TEST PRSMV CHEM ANLYZR: CPT | Performed by: PHYSICIAN ASSISTANT

## 2019-01-05 PROCEDURE — 85007 BL SMEAR W/DIFF WBC COUNT: CPT | Performed by: PHYSICIAN ASSISTANT

## 2019-01-05 PROCEDURE — 25010000002 LORAZEPAM PER 2 MG: Performed by: FAMILY MEDICINE

## 2019-01-05 PROCEDURE — G0378 HOSPITAL OBSERVATION PER HR: HCPCS

## 2019-01-05 PROCEDURE — 99285 EMERGENCY DEPT VISIT HI MDM: CPT

## 2019-01-05 PROCEDURE — 80053 COMPREHEN METABOLIC PANEL: CPT | Performed by: PHYSICIAN ASSISTANT

## 2019-01-05 PROCEDURE — 96374 THER/PROPH/DIAG INJ IV PUSH: CPT

## 2019-01-05 PROCEDURE — 85025 COMPLETE CBC W/AUTO DIFF WBC: CPT | Performed by: PHYSICIAN ASSISTANT

## 2019-01-05 RX ORDER — FOLIC ACID 1 MG/1
1 TABLET ORAL DAILY
Status: DISCONTINUED | OUTPATIENT
Start: 2019-01-06 | End: 2019-01-06 | Stop reason: HOSPADM

## 2019-01-05 RX ORDER — ONDANSETRON 2 MG/ML
4 INJECTION INTRAMUSCULAR; INTRAVENOUS EVERY 6 HOURS PRN
Status: DISCONTINUED | OUTPATIENT
Start: 2019-01-05 | End: 2019-01-06 | Stop reason: HOSPADM

## 2019-01-05 RX ORDER — SODIUM CHLORIDE 0.9 % (FLUSH) 0.9 %
3 SYRINGE (ML) INJECTION EVERY 12 HOURS SCHEDULED
Status: DISCONTINUED | OUTPATIENT
Start: 2019-01-05 | End: 2019-01-06 | Stop reason: HOSPADM

## 2019-01-05 RX ORDER — NICOTINE 21 MG/24HR
1 PATCH, TRANSDERMAL 24 HOURS TRANSDERMAL
Status: DISCONTINUED | OUTPATIENT
Start: 2019-01-05 | End: 2019-01-06 | Stop reason: HOSPADM

## 2019-01-05 RX ORDER — DIPHENOXYLATE HYDROCHLORIDE AND ATROPINE SULFATE 2.5; .025 MG/1; MG/1
1 TABLET ORAL DAILY
Status: DISCONTINUED | OUTPATIENT
Start: 2019-01-06 | End: 2019-01-06 | Stop reason: HOSPADM

## 2019-01-05 RX ORDER — SODIUM CHLORIDE 0.9 % (FLUSH) 0.9 %
3-10 SYRINGE (ML) INJECTION AS NEEDED
Status: DISCONTINUED | OUTPATIENT
Start: 2019-01-05 | End: 2019-01-06 | Stop reason: HOSPADM

## 2019-01-05 RX ORDER — LORAZEPAM 2 MG/ML
1 INJECTION INTRAMUSCULAR
Status: DISCONTINUED | OUTPATIENT
Start: 2019-01-05 | End: 2019-01-06

## 2019-01-05 RX ORDER — LORAZEPAM 1 MG/1
1 TABLET ORAL
Status: DISCONTINUED | OUTPATIENT
Start: 2019-01-05 | End: 2019-01-06

## 2019-01-05 RX ORDER — LORAZEPAM 2 MG/ML
0.5 INJECTION INTRAMUSCULAR
Status: DISCONTINUED | OUTPATIENT
Start: 2019-01-05 | End: 2019-01-06

## 2019-01-05 RX ORDER — LORAZEPAM 0.5 MG/1
0.5 TABLET ORAL
Status: DISCONTINUED | OUTPATIENT
Start: 2019-01-05 | End: 2019-01-06

## 2019-01-05 RX ORDER — THIAMINE MONONITRATE (VIT B1) 100 MG
100 TABLET ORAL DAILY
Status: DISCONTINUED | OUTPATIENT
Start: 2019-01-06 | End: 2019-01-06 | Stop reason: HOSPADM

## 2019-01-05 RX ADMIN — LORAZEPAM 1 MG: 2 INJECTION, SOLUTION INTRAMUSCULAR; INTRAVENOUS at 20:02

## 2019-01-05 RX ADMIN — NICOTINE 1 PATCH: 21 PATCH TRANSDERMAL at 17:01

## 2019-01-05 RX ADMIN — SODIUM CHLORIDE, PRESERVATIVE FREE 3 ML: 5 INJECTION INTRAVENOUS at 20:06

## 2019-01-05 NOTE — ED NOTES
Patient placed in yellow gown, skid socks upon arrival. Security has belonging. Sitter at door     Amalia Brewster LPN  01/05/19 0450

## 2019-01-05 NOTE — NURSING NOTE
[facility] to ER to evaluate pt. Was informed by Dr.Jason Early that pt was going to be admitted to the medical floor.

## 2019-01-05 NOTE — ED PROVIDER NOTES
Subjective   Patient presents to emergency department for suicidal ideation, depression, anxiety.  No suicide plan.  Hx of bipolar, ADHD, depression, anxiety.    States she ran out of her medications several months ago.  No plan but is having suicidal thoughts and depression which is worsening.  Endorses ETOH and occasional marijuana use.  Hx of being homeless, states she moved back from [place] to live with her father.            History provided by:  Patient   used: No    Psychiatric Evaluation   Location:  Suicidal ideation, depression, anxiety  Severity:  Moderate  Onset quality:  Gradual  Timing:  Constant  Progression:  Worsening  Associated symptoms: no abdominal pain, no chest pain, no congestion, no cough, no diarrhea, no ear pain, no fatigue, no fever, no headaches, no loss of consciousness, no myalgias, no nausea, no rash, no rhinorrhea, no shortness of breath, no sore throat, no vomiting and no wheezing        Review of Systems   Constitutional: Negative for fatigue and fever.   HENT: Negative for congestion, ear pain, rhinorrhea and sore throat.    Respiratory: Negative for cough, shortness of breath and wheezing.    Cardiovascular: Negative for chest pain.   Gastrointestinal: Negative for abdominal pain, diarrhea, nausea and vomiting.   Genitourinary: Negative for dysuria and flank pain.   Musculoskeletal: Negative for myalgias.   Skin: Negative for color change and rash.   Allergic/Immunologic: Negative for immunocompromised state.   Neurological: Negative for loss of consciousness and headaches.   Psychiatric/Behavioral: Positive for suicidal ideas. Negative for confusion and hallucinations. The patient is nervous/anxious.        Past Medical History:   Diagnosis Date   • ADHD (attention deficit hyperactivity disorder)    • Alcohol abuse    • Bilateral carpal tunnel syndrome    • Bipolar 1 disorder (CMS/[facility])    • Carpal tunnel syndrome on both sides 2015   • Chronic pain disorder      Back   • Collapsed lung 1986   • Depression    • Irritable bowel syndrome    • Spontaneous pneumothorax        No Known Allergies    Past Surgical History:   Procedure Laterality Date   • CHEST TUBE INSERTION      30 years ago for a spontaneous pneumothorax   • COLONOSCOPY     • HYSTERECTOMY     • TOTAL ABDOMINAL HYSTERECTOMY WITH SALPINGO OOPHORECTOMY         Family History   Problem Relation Age of Onset   • Depression Mother    • Anxiety disorder Mother    • COPD Mother    • Alcohol abuse Father    • Depression Father    • Diabetes Father    • Anxiety disorder Sister    • Bipolar disorder Sister    • Anxiety disorder Maternal Aunt    • Alcohol abuse Paternal Grandfather    • Alcohol abuse Paternal Grandmother        Social History     Socioeconomic History   • Marital status: Legally      Spouse name: Not on file   • Number of children: Not on file   • Years of education: Not on file   • Highest education level: Not on file   Tobacco Use   • Smoking status: Current Every Day Smoker     Packs/day: 1.00     Start date: 1977   • Smokeless tobacco: Never Used   Substance and Sexual Activity   • Alcohol use: Yes     Alcohol/week: 42.0 oz     Types: 70 Shots of liquor per week     Comment: 6-10 shots of 99 proof daily(states no alcohol in 5 days)   • Drug use: No     Comment: Previously meth (11/16), THC(2 days ago), synthetics(every couple weeks), shrooms (9-10 months ago)   • Sexual activity: Yes     Partners: Male   Social History Narrative    Lives at home with ernesto.  Used to work as a  and .           Objective   Physical Exam   Constitutional: She is oriented to person, place, and time. She appears well-developed and well-nourished. No distress.   Cachectic     HENT:   Head: Normocephalic and atraumatic.   Cardiovascular: Normal rate, regular rhythm, normal heart sounds and intact distal pulses.   Pulmonary/Chest: Effort normal and breath sounds normal. No respiratory distress. She  has no wheezes.   Abdominal: Soft. She exhibits no distension. There is no tenderness.   Musculoskeletal: She exhibits no edema.   Neurological: She is alert and oriented to person, place, and time.   Skin: Skin is warm. Capillary refill takes less than 2 seconds.   Psychiatric: Her behavior is normal. Thought content normal.   Tearful     Nursing note and vitals reviewed.      Procedures           ED Course      Results for orders placed or performed during the hospital encounter of 01/05/19   Comprehensive Metabolic Panel   Result Value Ref Range    Glucose 90 60 - 100 mg/dL    BUN 16 7 - 21 mg/dL    Creatinine 0.85 0.50 - 1.00 mg/dL    Sodium 139 137 - 145 mmol/L    Potassium 3.9 3.5 - 5.1 mmol/L    Chloride 106 95 - 110 mmol/L    CO2 21.0 (L) 22.0 - 31.0 mmol/L    Calcium 9.3 8.4 - 10.2 mg/dL    Total Protein 8.1 6.3 - 8.6 g/dL    Albumin 4.50 3.40 - 4.80 g/dL    ALT (SGPT) 8 (L) 9 - 52 U/L    AST (SGOT) 15 14 - 36 U/L    Alkaline Phosphatase 105 38 - 126 U/L    Total Bilirubin 0.2 0.2 - 1.3 mg/dL    eGFR Non  Amer 70 51 - 120 mL/min/1.73    Globulin 3.6 (H) 2.3 - 3.5 gm/dL    A/G Ratio 1.3 1.1 - 1.8 g/dL    BUN/Creatinine Ratio 18.8 7.0 - 25.0    Anion Gap 12.0 5.0 - 15.0 mmol/L   Acetaminophen Level   Result Value Ref Range    Acetaminophen <10.0 (L) 10.0 - 30.0 mcg/mL   Ethanol   Result Value Ref Range    Ethanol 263 (H) 0 - 10 mg/dL    Ethanol % 0.263 %   Salicylate Level   Result Value Ref Range    Salicylate 8.6 (L) 10.0 - 20.0 mg/dL   Urine Drug Screen - Urine, Clean Catch   Result Value Ref Range    Amphetamine Screen, Urine Negative Negative    Barbiturates Screen, Urine Negative Negative    Benzodiazepine Screen, Urine Negative Negative    Cocaine Screen, Urine Negative Negative    Methadone Screen, Urine Negative Negative    Opiate Screen Negative Negative    Oxycodone Screen, Urine Negative Negative    THC, Screen, Urine Positive (A) Negative   CBC Auto Differential   Result Value Ref Range     WBC 4.65 3.20 - 9.80 10*3/mm3    RBC 4.92 3.77 - 5.16 10*6/mm3    Hemoglobin 15.6 (H) 12.0 - 15.5 g/dL    Hematocrit 43.2 35.0 - 45.0 %    MCV 87.8 80.0 - 98.0 fL    MCH 31.7 26.5 - 34.0 pg    MCHC 36.1 (H) 31.4 - 36.0 g/dL    RDW 13.6 11.5 - 14.5 %    RDW-SD 44.1 36.4 - 46.3 fl    MPV 10.1 8.0 - 12.0 fL    Platelets 291 150 - 450 10*3/mm3   Light Blue Top   Result Value Ref Range    Extra Tube hold for add-on    Gold Top - SST   Result Value Ref Range    Extra Tube Hold for add-ons.    Manual Differential   Result Value Ref Range    Neutrophil % 27.0 (L) 37.0 - 80.0 %    Lymphocyte % 64.0 (H) 10.0 - 50.0 %    Monocyte % 2.0 0.0 - 12.0 %    Atypical Lymphocyte % 7.0 (H) 0.0 - 5.0 %    Neutrophils Absolute 1.26 (L) 2.00 - 8.60 10*3/mm3    Lymphocytes Absolute 2.98 0.60 - 4.20 10*3/mm3    Monocytes Absolute 0.09 0.00 - 0.90 10*3/mm3    RBC Morphology Normal Normal    WBC Morphology Normal Normal    Platelet Estimate Adequate Normal                 MDM      Final diagnoses:   Suicidal ideation   Alcoholic intoxication without complication (CMS/HCC)            Ren Sun PA-C  01/05/19 2284

## 2019-01-06 ENCOUNTER — HOSPITAL ENCOUNTER (INPATIENT)
Facility: HOSPITAL | Age: 54
LOS: 4 days | Discharge: HOME | End: 2019-01-10
Attending: PSYCHIATRY & NEUROLOGY | Admitting: PSYCHIATRY & NEUROLOGY

## 2019-01-06 VITALS
TEMPERATURE: 98.7 F | OXYGEN SATURATION: 94 % | RESPIRATION RATE: 18 BRPM | DIASTOLIC BLOOD PRESSURE: 79 MMHG | BODY MASS INDEX: 19.71 KG/M2 | SYSTOLIC BLOOD PRESSURE: 136 MMHG | HEIGHT: 62 IN | WEIGHT: 107.1 LBS | HEART RATE: 96 BPM

## 2019-01-06 LAB
ALBUMIN SERPL-MCNC: 4.1 G/DL (ref 3.4–4.8)
ALBUMIN/GLOB SERPL: 1.2 G/DL (ref 1.1–1.8)
ALP SERPL-CCNC: 107 U/L (ref 38–126)
ALT SERPL W P-5'-P-CCNC: 9 U/L (ref 9–52)
ANION GAP SERPL CALCULATED.3IONS-SCNC: 12 MMOL/L (ref 5–15)
AST SERPL-CCNC: 18 U/L (ref 14–36)
BASOPHILS # BLD AUTO: 0.03 10*3/MM3 (ref 0–0.2)
BASOPHILS NFR BLD AUTO: 0.7 % (ref 0–2)
BILIRUB SERPL-MCNC: 0.6 MG/DL (ref 0.2–1.3)
BUN BLD-MCNC: 16 MG/DL (ref 7–21)
BUN/CREAT SERPL: 19.3 (ref 7–25)
CALCIUM SPEC-SCNC: 8.7 MG/DL (ref 8.4–10.2)
CHLORIDE SERPL-SCNC: 101 MMOL/L (ref 95–110)
CO2 SERPL-SCNC: 19 MMOL/L (ref 22–31)
CREAT BLD-MCNC: 0.83 MG/DL (ref 0.5–1)
DEPRECATED RDW RBC AUTO: 43.4 FL (ref 36.4–46.3)
EOSINOPHIL # BLD AUTO: 0.09 10*3/MM3 (ref 0–0.7)
EOSINOPHIL NFR BLD AUTO: 2.2 % (ref 0–7)
ERYTHROCYTE [DISTWIDTH] IN BLOOD BY AUTOMATED COUNT: 13.3 % (ref 11.5–14.5)
ETHANOL BLD-MCNC: <10 MG/DL (ref 0–10)
ETHANOL UR QL: <0.01 %
GFR SERPL CREATININE-BSD FRML MDRD: 72 ML/MIN/1.73 (ref 51–120)
GLOBULIN UR ELPH-MCNC: 3.3 GM/DL (ref 2.3–3.5)
GLUCOSE BLD-MCNC: 105 MG/DL (ref 60–100)
HCT VFR BLD AUTO: 38.7 % (ref 35–45)
HGB BLD-MCNC: 13.9 G/DL (ref 12–15.5)
IMM GRANULOCYTES # BLD AUTO: 0.01 10*3/MM3 (ref 0–0.02)
IMM GRANULOCYTES NFR BLD AUTO: 0.2 % (ref 0–0.5)
LYMPHOCYTES # BLD AUTO: 2.09 10*3/MM3 (ref 0.6–4.2)
LYMPHOCYTES NFR BLD AUTO: 52 % (ref 10–50)
MCH RBC QN AUTO: 31.6 PG (ref 26.5–34)
MCHC RBC AUTO-ENTMCNC: 35.9 G/DL (ref 31.4–36)
MCV RBC AUTO: 88 FL (ref 80–98)
MONOCYTES # BLD AUTO: 0.74 10*3/MM3 (ref 0–0.9)
MONOCYTES NFR BLD AUTO: 18.4 % (ref 0–12)
NEUTROPHILS # BLD AUTO: 1.06 10*3/MM3 (ref 2–8.6)
NEUTROPHILS NFR BLD AUTO: 26.5 % (ref 37–80)
PLATELET # BLD AUTO: 225 10*3/MM3 (ref 150–450)
PMV BLD AUTO: 10 FL (ref 8–12)
POTASSIUM BLD-SCNC: 2.9 MMOL/L (ref 3.5–5.1)
POTASSIUM BLD-SCNC: 3.7 MMOL/L (ref 3.5–5.1)
PROT SERPL-MCNC: 7.4 G/DL (ref 6.3–8.6)
RBC # BLD AUTO: 4.4 10*6/MM3 (ref 3.77–5.16)
SODIUM BLD-SCNC: 132 MMOL/L (ref 137–145)
WBC NRBC COR # BLD: 4.02 10*3/MM3 (ref 3.2–9.8)

## 2019-01-06 PROCEDURE — 80053 COMPREHEN METABOLIC PANEL: CPT | Performed by: FAMILY MEDICINE

## 2019-01-06 PROCEDURE — 84443 ASSAY THYROID STIM HORMONE: CPT | Performed by: FAMILY MEDICINE

## 2019-01-06 PROCEDURE — 94799 UNLISTED PULMONARY SVC/PX: CPT

## 2019-01-06 PROCEDURE — 84132 ASSAY OF SERUM POTASSIUM: CPT | Performed by: FAMILY MEDICINE

## 2019-01-06 PROCEDURE — 25010000002 LORAZEPAM PER 2 MG: Performed by: FAMILY MEDICINE

## 2019-01-06 PROCEDURE — G0378 HOSPITAL OBSERVATION PER HR: HCPCS

## 2019-01-06 PROCEDURE — 96376 TX/PRO/DX INJ SAME DRUG ADON: CPT

## 2019-01-06 PROCEDURE — 84439 ASSAY OF FREE THYROXINE: CPT | Performed by: FAMILY MEDICINE

## 2019-01-06 PROCEDURE — 99244 OFF/OP CNSLTJ NEW/EST MOD 40: CPT | Performed by: PSYCHIATRY & NEUROLOGY

## 2019-01-06 PROCEDURE — 82306 VITAMIN D 25 HYDROXY: CPT | Performed by: PSYCHIATRY & NEUROLOGY

## 2019-01-06 PROCEDURE — 25010000002 THIAMINE PER 100 MG: Performed by: PSYCHIATRY & NEUROLOGY

## 2019-01-06 PROCEDURE — 85025 COMPLETE CBC W/AUTO DIFF WBC: CPT | Performed by: FAMILY MEDICINE

## 2019-01-06 PROCEDURE — 99220 PR INITIAL OBSERVATION CARE/DAY 70 MINUTES: CPT | Performed by: FAMILY MEDICINE

## 2019-01-06 PROCEDURE — 80307 DRUG TEST PRSMV CHEM ANLYZR: CPT | Performed by: FAMILY MEDICINE

## 2019-01-06 PROCEDURE — 94760 N-INVAS EAR/PLS OXIMETRY 1: CPT

## 2019-01-06 RX ORDER — LORAZEPAM 1 MG/1
1 TABLET ORAL
Status: DISCONTINUED | OUTPATIENT
Start: 2019-01-06 | End: 2019-01-10 | Stop reason: HOSPADM

## 2019-01-06 RX ORDER — POTASSIUM CHLORIDE 750 MG/1
40 CAPSULE, EXTENDED RELEASE ORAL 2 TIMES DAILY WITH MEALS
Status: DISCONTINUED | OUTPATIENT
Start: 2019-01-06 | End: 2019-01-06 | Stop reason: HOSPADM

## 2019-01-06 RX ORDER — LANOLIN ALCOHOL/MO/W.PET/CERES
5 CREAM (GRAM) TOPICAL NIGHTLY
Status: DISCONTINUED | OUTPATIENT
Start: 2019-01-06 | End: 2019-01-06 | Stop reason: HOSPADM

## 2019-01-06 RX ORDER — POTASSIUM CHLORIDE 750 MG/1
40 CAPSULE, EXTENDED RELEASE ORAL DAILY
Status: DISCONTINUED | OUTPATIENT
Start: 2019-01-06 | End: 2019-01-06

## 2019-01-06 RX ORDER — LORAZEPAM 0.5 MG/1
0.5 TABLET ORAL EVERY 8 HOURS
Status: DISCONTINUED | OUTPATIENT
Start: 2019-01-07 | End: 2019-01-06 | Stop reason: HOSPADM

## 2019-01-06 RX ORDER — ONDANSETRON 4 MG/1
4 TABLET, FILM COATED ORAL EVERY 6 HOURS PRN
Status: DISCONTINUED | OUTPATIENT
Start: 2019-01-06 | End: 2019-01-10 | Stop reason: HOSPADM

## 2019-01-06 RX ORDER — NICOTINE 21 MG/24HR
1 PATCH, TRANSDERMAL 24 HOURS TRANSDERMAL EVERY 24 HOURS
Status: DISCONTINUED | OUTPATIENT
Start: 2019-01-07 | End: 2019-01-10 | Stop reason: HOSPADM

## 2019-01-06 RX ORDER — LORAZEPAM 0.5 MG/1
0.5 TABLET ORAL EVERY 6 HOURS
Status: DISCONTINUED | OUTPATIENT
Start: 2019-01-06 | End: 2019-01-06 | Stop reason: HOSPADM

## 2019-01-06 RX ORDER — TRAZODONE HYDROCHLORIDE 50 MG/1
50 TABLET ORAL NIGHTLY PRN
Status: DISCONTINUED | OUTPATIENT
Start: 2019-01-06 | End: 2019-01-09

## 2019-01-06 RX ORDER — CLONIDINE HYDROCHLORIDE 0.1 MG/1
0.1 TABLET ORAL EVERY 4 HOURS PRN
Status: DISCONTINUED | OUTPATIENT
Start: 2019-01-06 | End: 2019-01-10 | Stop reason: HOSPADM

## 2019-01-06 RX ORDER — LOPERAMIDE HYDROCHLORIDE 2 MG/1
2 CAPSULE ORAL 4 TIMES DAILY PRN
Status: DISCONTINUED | OUTPATIENT
Start: 2019-01-06 | End: 2019-01-10 | Stop reason: HOSPADM

## 2019-01-06 RX ORDER — ACETAMINOPHEN 325 MG/1
650 TABLET ORAL EVERY 4 HOURS PRN
Status: DISCONTINUED | OUTPATIENT
Start: 2019-01-06 | End: 2019-01-10 | Stop reason: HOSPADM

## 2019-01-06 RX ORDER — ALUMINA, MAGNESIA, AND SIMETHICONE 2400; 2400; 240 MG/30ML; MG/30ML; MG/30ML
15 SUSPENSION ORAL EVERY 6 HOURS PRN
Status: DISCONTINUED | OUTPATIENT
Start: 2019-01-06 | End: 2019-01-10 | Stop reason: HOSPADM

## 2019-01-06 RX ORDER — HYDROXYZINE PAMOATE 50 MG/1
50 CAPSULE ORAL EVERY 6 HOURS PRN
Status: DISCONTINUED | OUTPATIENT
Start: 2019-01-06 | End: 2019-01-07

## 2019-01-06 RX ADMIN — LORAZEPAM 0.5 MG: 2 INJECTION, SOLUTION INTRAMUSCULAR; INTRAVENOUS at 08:41

## 2019-01-06 RX ADMIN — MELATONIN 5.25 MG: 3 TAB ORAL at 20:13

## 2019-01-06 RX ADMIN — LORAZEPAM 1 MG: 1 TABLET ORAL at 21:29

## 2019-01-06 RX ADMIN — FOLIC ACID 1 MG: 1 TABLET ORAL at 08:41

## 2019-01-06 RX ADMIN — LORAZEPAM 1 MG: 2 INJECTION, SOLUTION INTRAMUSCULAR; INTRAVENOUS at 00:56

## 2019-01-06 RX ADMIN — NICOTINE 1 PATCH: 21 PATCH TRANSDERMAL at 10:44

## 2019-01-06 RX ADMIN — THERA TABS 1 TABLET: TAB at 08:41

## 2019-01-06 RX ADMIN — LORAZEPAM 0.5 MG: 0.5 TABLET ORAL at 14:42

## 2019-01-06 RX ADMIN — Medication 100 MG: at 08:41

## 2019-01-06 RX ADMIN — THIAMINE HYDROCHLORIDE 100 MG: 100 INJECTION, SOLUTION INTRAMUSCULAR; INTRAVENOUS at 18:06

## 2019-01-06 RX ADMIN — POTASSIUM CHLORIDE 40 MEQ: 750 CAPSULE, EXTENDED RELEASE ORAL at 11:34

## 2019-01-06 RX ADMIN — CLONIDINE HYDROCHLORIDE 0.1 MG: 0.1 TABLET ORAL at 21:29

## 2019-01-06 RX ADMIN — POTASSIUM CHLORIDE 40 MEQ: 750 CAPSULE, EXTENDED RELEASE ORAL at 14:39

## 2019-01-06 RX ADMIN — SODIUM CHLORIDE, PRESERVATIVE FREE 3 ML: 5 INJECTION INTRAVENOUS at 08:41

## 2019-01-06 RX ADMIN — TRAZODONE HYDROCHLORIDE 50 MG: 50 TABLET ORAL at 21:30

## 2019-01-06 NOTE — PLAN OF CARE
Problem: Patient Care Overview  Goal: Plan of Care Review  Outcome: Ongoing (interventions implemented as appropriate)    Goal: Individualization and Mutuality  Outcome: Ongoing (interventions implemented as appropriate)    Goal: Discharge Needs Assessment  Outcome: Ongoing (interventions implemented as appropriate)    Goal: Interprofessional Rounds/Family Conf  Outcome: Ongoing (interventions implemented as appropriate)      Problem: Suicide Risk (Adult)  Goal: Identify Related Risk Factors and Signs and Symptoms  Outcome: Ongoing (interventions implemented as appropriate)    Goal: Strength-Based Wellness/Recovery  Outcome: Ongoing (interventions implemented as appropriate)    Goal: Physical Safety  Outcome: Ongoing (interventions implemented as appropriate)      Problem: Fall Risk (Adult)  Goal: Identify Related Risk Factors and Signs and Symptoms  Outcome: Ongoing (interventions implemented as appropriate)    Goal: Absence of Fall  Outcome: Ongoing (interventions implemented as appropriate)      Problem: Alcohol Withdrawal Acute, Risk/Actual (Adult)  Goal: Signs and Symptoms of Listed Potential Problems Will be Absent, Minimized or Managed (Alcohol Withdrawal Acute, Risk/Actual)  Outcome: Ongoing (interventions implemented as appropriate)

## 2019-01-06 NOTE — CONSULTS
"Inpatient Consult to Psychiatry    2019    Referring Provider: Dr. Estrada  Reason for Consultation: suicidal ideation    Source of History:  chart review and the patient    HPI:    Patient is a 53 y.o. female who seen for suicidal ideation. Onset of depression was gradual starting 2 years ago.  Symptoms have been present on an increasingly more frequent basis. Symptoms are associated with anxiety, insomnia, depressed mood and substance use.  Symptoms are aggravated by problems with substance abuse.   Patient's symptom severity is severe.   Patient reports that level of hopefulness is poor.  Patient's symptoms occur in the context of history of alcohol use.    Patient was admitted to family medicine service due to elevated ETOH level of 263.  She had reported suicidal ideation in the ED so consult to psych was made.  She notes she feels helpless and \"in a rut\" and wants help.  She notes she had gotten meds from rehab that she ran out of in one month and was not able to follow up.  She notes finding it hard to live since mom .    Patient is a chronic alcoholic. She began using alcohol at the age of 13. She began to use this daily after her the death of her mother 2 years ago. She usually drinks 6-7 shots of 90 proof alcohol. She drank 9-10 shots day before admission, and had 1.5-2 shots the morning of admission.    She did attend and graduate from [facility] in May 2018, but relapsed 90 days later. She has had withdrawals with DT's in the past, but denies any seizures from withdrawals.  She does have a hx of drug use in the past for approximately one year. She reports she quit this 2.5 years ago. She smoke meth and snorted many other drugs. She does have damage to her nasal septum from this.    Psychiatric Review Of Systems:  anxiety, depression and suicidal ideations    History  Past psychiatric history:    Psychiatric Hospitalizations: Patient has had 2 prior hospitalizations.  First for suicide " attempt by OD.  Second for threatening  while intoxicated.    Suicide Attempts: Patient has had 1  prior suicide attempt.    Prior Treatment and Medications Tried: Rehab twice.  Once about 25yrs ago and stayed sober 4yrs and second in April 2018 and stayed sober 90 days.    History of violence or legal issues: DUI x 1, THC possession, paraphernalia, trunacy as a child    Social History:    Social History     Socioeconomic History   • Marital status: Legally      Spouse name: Not on file   • Number of children: Not on file   • Years of education: Not on file   • Highest education level: Not on file   Social Needs   • Financial resource strain: Not on file   • Food insecurity - worry: Not on file   • Food insecurity - inability: Not on file   • Transportation needs - medical: Not on file   • Transportation needs - non-medical: Not on file   Occupational History   • Not on file   Tobacco Use   • Smoking status: Current Every Day Smoker     Packs/day: 1.00     Start date: 1977   • Smokeless tobacco: Never Used   Substance and Sexual Activity   • Alcohol use: Yes     Alcohol/week: 42.0 oz     Types: 70 Shots of liquor per week     Comment: 6-10 shots of 99 proof daily(states no alcohol in 5 days)   • Drug use: No     Comment: Previously meth (11/16), THC(2 days ago), synthetics(every couple weeks), shrooms (9-10 months ago)   • Sexual activity: Yes     Partners: Male   Other Topics Concern   • Not on file   Social History Narrative    Lives at home with fijanaee.  Used to work as a  and .         Family History:    Family History   Problem Relation Age of Onset   • Depression Mother    • Anxiety disorder Mother    • COPD Mother    • Alcohol abuse Father    • Depression Father    • Diabetes Father    • Anxiety disorder Sister    • Bipolar disorder Sister    • Anxiety disorder Maternal Aunt    • Alcohol abuse Paternal Grandfather    • Alcohol abuse Paternal Grandmother        Past Medical  and Surgical History:    Past Medical History:   Diagnosis Date   • ADHD (attention deficit hyperactivity disorder)    • Alcohol abuse    • Bilateral carpal tunnel syndrome    • Bipolar 1 disorder (CMS/HCC)    • Carpal tunnel syndrome on both sides 2015   • Chronic pain disorder     Back   • Collapsed lung 1986   • Depression    • Irritable bowel syndrome    • Spontaneous pneumothorax      Past Surgical History:   Procedure Laterality Date   • CHEST TUBE INSERTION      30 years ago for a spontaneous pneumothorax   • COLONOSCOPY     • HYSTERECTOMY     • TOTAL ABDOMINAL HYSTERECTOMY WITH SALPINGO OOPHORECTOMY       Allergies:  Patient has no known allergies.  No medications prior to admission.       Medical Review Of Systems:  Reviewed review of systems from  Dr. Jensen's H&P note from yesterday:  Constitutional: Positive for appetite change. Negative for chills and fever.   HENT: Negative for congestion, ear pain, hearing loss, rhinorrhea, sore throat and trouble swallowing.    Eyes: Negative for pain and visual disturbance.   Respiratory: Positive for cough (productive of clear mucus. ) and shortness of breath.    Cardiovascular: Negative for chest pain and palpitations.   Gastrointestinal: Negative for abdominal pain, constipation, diarrhea, nausea and vomiting.   Genitourinary: Negative for hematuria.   Musculoskeletal: Negative for back pain and neck pain.   Skin: Negative for rash and wound.   Neurological: Positive for headaches. Negative for dizziness, syncope and weakness.   Psychiatric/Behavioral: Positive for dysphoric mood, sleep disturbance and suicidal ideas. The patient is nervous/anxious.    Objective     Vital Signs    Temp:  [96.8 °F (36 °C)-99.6 °F (37.6 °C)] 98.7 °F (37.1 °C)  Heart Rate:  [] 96  Resp:  [18-20] 18  BP: (113-151)/() 136/79    Physical Exam:   General Appearance: alert, appears stated age and cooperative,  Hygiene:   good  Gait & Station: in bed  Musculoskeletal: tremor  present    Mental Status Exam:   Cooperation:  Cooperative  Eye Contact:  Good  Psychomotor Behavior:  Restless  Mood: Sad/Depressed and Anxious/Nervous  Affect:  mood-congruent  Speech:  Rapid  Thought Process:  Coherent  Associations: Circumstantial  Thought Content:     Mood congurent   Suicidal:  Suicidal Ideation   Homicidal:  None   Hallucinations:  None   Delusion:  Unable to demonstrate  Cognitive Functioning:   Consciousness: awake and alert   Orientation:  Person, Place, Time and Situation   Attention: normal Concentration: Grossly intact   Language:  Intact Vocabulary: Average   Short Term Memory: grossly intact   Long Term Memory: grossly intact   Fund of Knowledge: Average  Reliability:  fair  Insight:  diminished  Judgement:  Impaired  Impulse Control:  Impaired    Diagnostic Data:    Lab Results (last 72 hours)     Procedure Component Value Units Date/Time    Potassium [[id number]]  (Normal) Collected:  01/06/19 1442    Specimen:  Blood Updated:  01/06/19 1505     Potassium 3.7 mmol/L     Ethanol [[id number]] Collected:  01/06/19 0543    Specimen:  Blood Updated:  01/06/19 0753     Ethanol <10 mg/dL      Ethanol % <0.010 %     Comprehensive Metabolic Panel [[id number]]  (Abnormal) Collected:  01/06/19 0543    Specimen:  Blood Updated:  01/06/19 0748     Glucose 105 mg/dL      BUN 16 mg/dL      Creatinine 0.83 mg/dL      Sodium 132 mmol/L      Potassium 2.9 mmol/L      Chloride 101 mmol/L      CO2 19.0 mmol/L      Calcium 8.7 mg/dL      Total Protein 7.4 g/dL      Albumin 4.10 g/dL      ALT (SGPT) 9 U/L      AST (SGOT) 18 U/L      Alkaline Phosphatase 107 U/L      Total Bilirubin 0.6 mg/dL      eGFR Non African Amer 72 mL/min/1.73      Globulin 3.3 gm/dL      A/G Ratio 1.2 g/dL      BUN/Creatinine Ratio 19.3     Anion Gap 12.0 mmol/L     CBC Auto Differential [[id number]]  (Abnormal) Collected:  01/06/19 0543    Specimen:  Blood Updated:  01/06/19 0731     WBC 4.02 10*3/mm3      RBC 4.40 10*6/mm3       Hemoglobin 13.9 g/dL      Hematocrit 38.7 %      MCV 88.0 fL      MCH 31.6 pg      MCHC 35.9 g/dL      RDW 13.3 %      RDW-SD 43.4 fl      MPV 10.0 fL      Platelets 225 10*3/mm3      Neutrophil % 26.5 %      Lymphocyte % 52.0 %      Comment: Manual diff 1/5/2019         Monocyte % 18.4 %      Eosinophil % 2.2 %      Basophil % 0.7 %      Immature Grans % 0.2 %      Neutrophils, Absolute 1.06 10*3/mm3      Lymphocytes, Absolute 2.09 10*3/mm3      Monocytes, Absolute 0.74 10*3/mm3      Eosinophils, Absolute 0.09 10*3/mm3      Basophils, Absolute 0.03 10*3/mm3      Immature Grans, Absolute 0.01 10*3/mm3     Narrative:       Manual diff 1/5/2019    CBC & Differential [[id number]] Collected:  01/05/19 1512    Specimen:  Blood Updated:  01/05/19 1627    Narrative:       The following orders were created for panel order CBC & Differential.  Procedure                               Abnormality         Status                     ---------                               -----------         ------                     Manual Differential[[id number]]          Abnormal            Final result               CBC Auto Differential[[id number]]        Abnormal            Final result                 Please view results for these tests on the individual orders.    Manual Differential [[id number]]  (Abnormal) Collected:  01/05/19 1512    Specimen:  Blood Updated:  01/05/19 1627     Neutrophil % 27.0 %      Lymphocyte % 64.0 %      Monocyte % 2.0 %      Atypical Lymphocyte % 7.0 %      Neutrophils Absolute 1.26 10*3/mm3      Lymphocytes Absolute 2.98 10*3/mm3      Monocytes Absolute 0.09 10*3/mm3      RBC Morphology Normal     WBC Morphology Normal     Platelet Estimate Adequate    Extra Tubes [[id number]] Collected:  01/05/19 1511    Specimen:  Blood, Venous Line Updated:  01/05/19 1615    Narrative:       The following orders were created for panel order Extra Tubes.  Procedure                               Abnormality         Status                      ---------                               -----------         ------                     Light Blue Top[[id number]]                                   Final result               Gold Top - SST[[id number]]                                   Final result                 Please view results for these tests on the individual orders.    Light Blue Top [[id number]] Collected:  01/05/19 1511    Specimen:  Blood Updated:  01/05/19 1615     Extra Tube hold for add-on     Comment: Auto resulted       Gold Top - SST [[id number]] Collected:  01/05/19 1511    Specimen:  Blood Updated:  01/05/19 1615     Extra Tube Hold for add-ons.     Comment: Auto resulted.       CBC Auto Differential [[id number]]  (Abnormal) Collected:  01/05/19 1512    Specimen:  Blood Updated:  01/05/19 1603     WBC 4.65 10*3/mm3      RBC 4.92 10*6/mm3      Hemoglobin 15.6 g/dL      Hematocrit 43.2 %      MCV 87.8 fL      MCH 31.7 pg      MCHC 36.1 g/dL      RDW 13.6 %      RDW-SD 44.1 fl      MPV 10.1 fL      Platelets 291 10*3/mm3     Acetaminophen Level [[id number]]  (Abnormal) Collected:  01/05/19 1511    Specimen:  Blood Updated:  01/05/19 1600     Acetaminophen <10.0 mcg/mL     Ethanol [[id number]]  (Abnormal) Collected:  01/05/19 1511    Specimen:  Blood Updated:  01/05/19 1559     Ethanol 263 mg/dL      Ethanol % 0.263 %     Urine Drug Screen - Urine, Clean Catch [[id number]]  (Abnormal) Collected:  01/05/19 1522    Specimen:  Urine, Clean Catch Updated:  01/05/19 1559     Amphetamine Screen, Urine Negative     Barbiturates Screen, Urine Negative     Benzodiazepine Screen, Urine Negative     Cocaine Screen, Urine Negative     Methadone Screen, Urine Negative     Opiate Screen Negative     Oxycodone Screen, Urine Negative     THC, Screen, Urine Positive    Narrative:       Negative Thresholds For Drugs Screened in Urine:     Amphetamines          500 ng/ml  Barbiturates          200 ng/ml  Benzodiazepines       200 ng/ml  Cocaine               150  ng/ml  Methadone             300 ng/mL  Opiates               300 ng/mL  Oxycodone             100 ng/mL  THC                   20 ng/mL    The normal value for all drugs tested is negative. This report includes final unconfirmed screening results.  A positive result by this assay can be, at your request, sent to the Reference Lab for confirmation by gas chromatography. Unconfirmed results must not be used for non-medical purposes, such as employment or legal testing. Clinical consideration should be applied to any drug of abuse test result, particularly when unconfirmed results are used.    Comprehensive Metabolic Panel [[id number]]  (Abnormal) Collected:  01/05/19 1511    Specimen:  Blood Updated:  01/05/19 1549     Glucose 90 mg/dL      BUN 16 mg/dL      Creatinine 0.85 mg/dL      Sodium 139 mmol/L      Potassium 3.9 mmol/L      Chloride 106 mmol/L      CO2 21.0 mmol/L      Calcium 9.3 mg/dL      Total Protein 8.1 g/dL      Albumin 4.50 g/dL      ALT (SGPT) 8 U/L      AST (SGOT) 15 U/L      Alkaline Phosphatase 105 U/L      Total Bilirubin 0.2 mg/dL      eGFR Non African Amer 70 mL/min/1.73      Globulin 3.6 gm/dL      A/G Ratio 1.3 g/dL      BUN/Creatinine Ratio 18.8     Anion Gap 12.0 mmol/L     Salicylate Level [[id number]]  (Abnormal) Collected:  01/05/19 1511    Specimen:  Blood Updated:  01/05/19 1549     Salicylate 8.6 mg/dL         Imaging Results (last 72 hours)     ** No results found for the last 72 hours. **          Assessment/Plan       Suicidal ideation    Alcohol abuse    Cachexia (CMS/[facility])      Recommendations:    Patient needs psych admission once medically cleared.  Will not start any medications for the depression now.  Will order one dose of IV thiamine as patient has had only oral thus far.    Harrison Quach MD  01/06/19  4:37 PM

## 2019-01-06 NOTE — ED NOTES
Security has belongings including cell phone locked up. [facility] saw patient, reports patient to go to medical floor for observation medically     Amalia Brewster, ALEXYS  01/05/19 1852       Amalia Brewster, ALEXYS  01/05/19 1900

## 2019-01-06 NOTE — ED NOTES
Telemetry transmitter placed and confirmed at ext [phone].   Paitent transported per Wheelchair to floor with ED tech2 and security present.     Amalia Brewster, LPN  01/05/19 4611

## 2019-01-06 NOTE — PLAN OF CARE
Problem: Patient Care Overview  Goal: Plan of Care Review   01/06/19 0416   Coping/Psychosocial   Plan of Care Reviewed With patient   Plan of Care Review   Progress no change       Problem: Suicide Risk (Adult)  Goal: Identify Related Risk Factors and Signs and Symptoms  Outcome: Ongoing (interventions implemented as appropriate)    Goal: Strength-Based Wellness/Recovery  Outcome: Ongoing (interventions implemented as appropriate)    Goal: Physical Safety  Outcome: Ongoing (interventions implemented as appropriate)      Problem: Fall Risk (Adult)  Goal: Identify Related Risk Factors and Signs and Symptoms  Outcome: Ongoing (interventions implemented as appropriate)    Goal: Absence of Fall  Outcome: Ongoing (interventions implemented as appropriate)      Problem: Alcohol Withdrawal Acute, Risk/Actual (Adult)  Goal: Signs and Symptoms of Listed Potential Problems Will be Absent, Minimized or Managed (Alcohol Withdrawal Acute, Risk/Actual)  Outcome: Ongoing (interventions implemented as appropriate)

## 2019-01-06 NOTE — SIGNIFICANT NOTE
Called Dr Andrade this morning at 0825 regarding pyschiatric consult.   Alcohol level this morning was <10, 263 at time of Admission    Dr Andrade agreed to see pt today.     Shawn Estrada MD  Family Medicine Resident PGY1  [facility]        This document has been electronically signed by Shawn Estrada MD on January 6, 2019 8:48 AM

## 2019-01-06 NOTE — H&P
"    HISTORY AND PHYSICAL  NAME: Laura Bruce  : 1965  MRN: [id number]    DATE OF ADMISSION: 19    DATE & TIME SEEN: 19 7:23 PM    PCP: Tessy Charles MD    CODE STATUS: Full code    CHIEF COMPLAINT Suicidal ideations    HPI:  Laura Bruce is a 53 y.o. female with a CMH of depression, chronic alcoholism, and back pain, who presents to the E.D. complaining of suicidal ideations. She has long standing depression. She was started on antidepression medication while in rehab for her Alcoholism in 2018. She did not follow up with outpatient providers, and ran out of her medications. She has not taken this for several months. She has became progressively more depressed. She reports she feels \"tired with life\", and \" just wants life to get better\". She denies having a plan to kill herself because she states, \" I am a coward, and cant do it\". She does think of scenarios often of different ways that she could die.    Patient is a chronic alcoholic. She began using alcohol at the age of 13. She began to use this daily after her the death of her mother 2 years ago. She usually drinks 6-7 shots of 90 proof alcohol. She drank 9-10 shots yesterday, and had 1.5-2 shots this morning between 0500 and 0700. She did attend and graduate from [facility] in May 2018, but relapsed 90 days later. She has had withdrawals with DT's in the past, but denies any seizures from withdrawals. Patient currently lives at the [facility]. She does have a 6 months hx of homelessness in the past. She does have a hx of drug use in the past for approximately one year. She reports she quit this 2.5 years ago. She smoke meth and snorted many other drugs. She does have damage to her nasal septum from this.     CONCURRENT MEDICAL HISTORY:  Past Medical History:   Diagnosis Date   • ADHD (attention deficit hyperactivity disorder)    • Alcohol abuse    • Bilateral carpal tunnel syndrome    • Bipolar 1 " disorder (CMS/[facility])    • Carpal tunnel syndrome on both sides 2015   • Chronic pain disorder     Back   • Collapsed lung 1986   • Depression    • Irritable bowel syndrome    • Spontaneous pneumothorax        PAST SURGICAL HISTORY:  Past Surgical History:   Procedure Laterality Date   • CHEST TUBE INSERTION      30 years ago for a spontaneous pneumothorax   • COLONOSCOPY     • HYSTERECTOMY     • TOTAL ABDOMINAL HYSTERECTOMY WITH SALPINGO OOPHORECTOMY         FAMILY HISTORY:  Family History   Problem Relation Age of Onset   • Depression Mother    • Anxiety disorder Mother    • COPD Mother    • Alcohol abuse Father    • Depression Father    • Diabetes Father    • Anxiety disorder Sister    • Bipolar disorder Sister    • Anxiety disorder Maternal Aunt    • Alcohol abuse Paternal Grandfather    • Alcohol abuse Paternal Grandmother         SOCIAL HISTORY:  Social History     Socioeconomic History   • Marital status: Legally      Spouse name: Not on file   • Number of children: Not on file   • Years of education: Not on file   • Highest education level: Not on file   Social Needs   • Financial resource strain: Not on file   • Food insecurity - worry: Not on file   • Food insecurity - inability: Not on file   • Transportation needs - medical: Not on file   • Transportation needs - non-medical: Not on file   Occupational History   • Not on file   Tobacco Use   • Smoking status: Current Every Day Smoker     Packs/day: 1.00     Start date: 1977   • Smokeless tobacco: Never Used   Substance and Sexual Activity   • Alcohol use: Yes     Alcohol/week: 42.0 oz     Types: 70 Shots of liquor per week     Comment: 6-10 shots of 99 proof daily(states no alcohol in 5 days)   • Drug use: No     Comment: Previously meth (11/16), THC(2 days ago), synthetics(every couple weeks), shrooms (9-10 months ago)   • Sexual activity: Yes     Partners: Male   Other Topics Concern   • Not on file   Social History Narrative    Lives at home  with ernesto.  Used to work as a  and .       HOME MEDICATIONS:  Prior to Admission medications    Not on File       ALLERGIES:  Patient has no known allergies.    REVIEW OF SYSTEMS  Review of Systems   Constitutional: Positive for appetite change. Negative for chills and fever.   HENT: Negative for congestion, ear pain, hearing loss, rhinorrhea, sore throat and trouble swallowing.    Eyes: Negative for pain and visual disturbance.   Respiratory: Positive for cough (productive of clear mucus. ) and shortness of breath.    Cardiovascular: Negative for chest pain and palpitations.   Gastrointestinal: Negative for abdominal pain, constipation, diarrhea, nausea and vomiting.   Genitourinary: Negative for hematuria.   Musculoskeletal: Negative for back pain and neck pain.   Skin: Negative for rash and wound.   Neurological: Positive for headaches. Negative for dizziness, syncope and weakness.   Psychiatric/Behavioral: Positive for dysphoric mood, sleep disturbance and suicidal ideas. The patient is nervous/anxious.        PHYSICAL EXAM:  Temp:  [98.2 °F (36.8 °C)-99.6 °F (37.6 °C)] 99.6 °F (37.6 °C)  Heart Rate:  [] 129  Resp:  [18-20] 20  BP: (133-163)/() 148/100  Body mass index is 19.27 kg/m².     Physical Exam   Constitutional: She is oriented to person, place, and time. She appears well-developed.   Patient appears Cachetic. She is tearful several times during the examination.    HENT:   Head: Normocephalic and atraumatic.   Right Ear: External ear normal.   Left Ear: External ear normal.   There is a hole in the inner portion of her nasal septum.    Eyes: Conjunctivae and EOM are normal. Pupils are equal, round, and reactive to light. No scleral icterus.   Neck: Normal range of motion. Neck supple. No tracheal deviation present. No thyromegaly present.   Cardiovascular: Normal rate, regular rhythm and normal heart sounds. Exam reveals no gallop and no friction rub.   No murmur  heard.  Pulmonary/Chest: Effort normal. No respiratory distress. She has no wheezes. She has no rales.   Abdominal: Soft. Bowel sounds are normal. She exhibits no distension. There is no tenderness.   Musculoskeletal: Normal range of motion. She exhibits no tenderness.   Lymphadenopathy:     She has no cervical adenopathy.   Neurological: She is alert and oriented to person, place, and time.   Skin: Skin is warm and dry. No rash noted.   Psychiatric: She has a normal mood and affect. Her behavior is normal. Judgment and thought content normal.   Vitals reviewed.      DIAGNOSTIC DATA:   Lab Results (last 24 hours)     Procedure Component Value Units Date/Time    CBC & Differential [[id number]] Collected:  01/05/19 1512    Specimen:  Blood Updated:  01/05/19 1627    Narrative:       The following orders were created for panel order CBC & Differential.  Procedure                               Abnormality         Status                     ---------                               -----------         ------                     Manual Differential[[id number]]          Abnormal            Final result               CBC Auto Differential[[id number]]        Abnormal            Final result                 Please view results for these tests on the individual orders.    Manual Differential [[id number]]  (Abnormal) Collected:  01/05/19 1512    Specimen:  Blood Updated:  01/05/19 1627     Neutrophil % 27.0 %      Lymphocyte % 64.0 %      Monocyte % 2.0 %      Atypical Lymphocyte % 7.0 %      Neutrophils Absolute 1.26 10*3/mm3      Lymphocytes Absolute 2.98 10*3/mm3      Monocytes Absolute 0.09 10*3/mm3      RBC Morphology Normal     WBC Morphology Normal     Platelet Estimate Adequate    Extra Tubes [[id number]] Collected:  01/05/19 1511    Specimen:  Blood, Venous Line Updated:  01/05/19 1615    Narrative:       The following orders were created for panel order Extra Tubes.  Procedure                               Abnormality          Status                     ---------                               -----------         ------                     Light Blue Top[[id number]]                                   Final result               Gold Top - SST[[id number]]                                   Final result                 Please view results for these tests on the individual orders.    Light Blue Top [[id number]] Collected:  01/05/19 1511    Specimen:  Blood Updated:  01/05/19 1615     Extra Tube hold for add-on     Comment: Auto resulted       Gold Top - SST [[id number]] Collected:  01/05/19 1511    Specimen:  Blood Updated:  01/05/19 1615     Extra Tube Hold for add-ons.     Comment: Auto resulted.       CBC Auto Differential [[id number]]  (Abnormal) Collected:  01/05/19 1512    Specimen:  Blood Updated:  01/05/19 1603     WBC 4.65 10*3/mm3      RBC 4.92 10*6/mm3      Hemoglobin 15.6 g/dL      Hematocrit 43.2 %      MCV 87.8 fL      MCH 31.7 pg      MCHC 36.1 g/dL      RDW 13.6 %      RDW-SD 44.1 fl      MPV 10.1 fL      Platelets 291 10*3/mm3     Acetaminophen Level [[id number]]  (Abnormal) Collected:  01/05/19 1511    Specimen:  Blood Updated:  01/05/19 1600     Acetaminophen <10.0 mcg/mL     Ethanol [[id number]]  (Abnormal) Collected:  01/05/19 1511    Specimen:  Blood Updated:  01/05/19 1559     Ethanol 263 mg/dL      Ethanol % 0.263 %     Urine Drug Screen - Urine, Clean Catch [[id number]]  (Abnormal) Collected:  01/05/19 1522    Specimen:  Urine, Clean Catch Updated:  01/05/19 1559     Amphetamine Screen, Urine Negative     Barbiturates Screen, Urine Negative     Benzodiazepine Screen, Urine Negative     Cocaine Screen, Urine Negative     Methadone Screen, Urine Negative     Opiate Screen Negative     Oxycodone Screen, Urine Negative     THC, Screen, Urine Positive    Narrative:       Negative Thresholds For Drugs Screened in Urine:     Amphetamines          500 ng/ml  Barbiturates          200 ng/ml  Benzodiazepines       200 ng/ml  Cocaine                150 ng/ml  Methadone             300 ng/mL  Opiates               300 ng/mL  Oxycodone             100 ng/mL  THC                   20 ng/mL    The normal value for all drugs tested is negative. This report includes final unconfirmed screening results.  A positive result by this assay can be, at your request, sent to the Reference Lab for confirmation by gas chromatography. Unconfirmed results must not be used for non-medical purposes, such as employment or legal testing. Clinical consideration should be applied to any drug of abuse test result, particularly when unconfirmed results are used.    Comprehensive Metabolic Panel [[id number]]  (Abnormal) Collected:  01/05/19 1511    Specimen:  Blood Updated:  01/05/19 1549     Glucose 90 mg/dL      BUN 16 mg/dL      Creatinine 0.85 mg/dL      Sodium 139 mmol/L      Potassium 3.9 mmol/L      Chloride 106 mmol/L      CO2 21.0 mmol/L      Calcium 9.3 mg/dL      Total Protein 8.1 g/dL      Albumin 4.50 g/dL      ALT (SGPT) 8 U/L      AST (SGOT) 15 U/L      Alkaline Phosphatase 105 U/L      Total Bilirubin 0.2 mg/dL      eGFR Non African Amer 70 mL/min/1.73      Globulin 3.6 gm/dL      A/G Ratio 1.3 g/dL      BUN/Creatinine Ratio 18.8     Anion Gap 12.0 mmol/L     Salicylate Level [[id number]]  (Abnormal) Collected:  01/05/19 1511    Specimen:  Blood Updated:  01/05/19 1549     Salicylate 8.6 mg/dL            Imaging Results (last 24 hours)     ** No results found for the last 24 hours. **            I reviewed the patient's new clinical results.    ASSESSMENT AND PLAN: This is a 53 y.o. female with:    Active Hospital Problems    Diagnosis Date Noted   • Suicidal ideation [R45.851] 01/05/2019     The patient is having suicidal ideations. A 72 hour hold has been placed. The patient is currently intoxicated.  - Inpatient psych will be consulted when her ETOH level reaches zero.   - One on one sitter.  - Suicidal precautions.  - I will not initiate any any depression  medications at this time, will defer this until evaluation via psych.      • Alcohol abuse [F10.10] 01/05/2019     Patient is currently intoxicated. Her alcohol level was 0.263 at admission. I will recheck this in the morning. Patient has a hx of DT's.  - Psychiatry will be consulted when alcohol level reaches zeros.   - I will order CIWA protocol. I will start with the lower dose due to being less than 50 kg. This will increase if needed.   - Vitamin supplementation will be given for 3 days.      • Cachexia (CMS/HCC) [R64] 01/05/2019     Patient has a BMI of 19.27.   - Nutrition consult will be placed.          DVT prophylaxis: SCDs/TEDs     [facility] # [id number], reviewed and consistent with patient reported medications.    Expected Length of Stay: Where: Inpatient psych.  and When:  1-2 days    I discussed the patients findings and my recommendations with patient.     Dr. Whatley  is the attending on record at time of admission, She is aware of the patient's status and agrees with the above history and physical.          This document has been electronically signed by Declan Jensen MD on January 5, 2019 8:27 PM

## 2019-01-06 NOTE — ED NOTES
I relieved Fabiola from sitting. Patient is still being monitored. Just put the Telimentary on pt and will be going upstairs in a second.      Summer Prater  01/05/19 [id number]

## 2019-01-06 NOTE — PROGRESS NOTES
FAMILY MEDICINE DAILY PROGRESS NOTE  NAME: Laura Bruce  : 1965  MRN: [id number]     LOS: 0 days     PROVIDER OF SERVICE: Shawn Estrada MD    Chief Complaint: Suicidal ideation    Subjective:     Interval History:  History taken from: patient  Patient is a 53-year-old  female admitted for verbalized suicidal ideation.  Patient stated that she has no intent to harm herself currently  Denied chest pain, shortness of air, fever, chills, nausea or vomiting, diarrhea.    CIWA score 9     Review of Systems:   Review of Systems   Constitutional: Negative for activity change, appetite change and chills.   HENT: Negative for congestion, dental problem, sneezing, sore throat and tinnitus.    Eyes: Negative for discharge and itching.   Respiratory: Negative for apnea, choking and chest tightness.    Cardiovascular: Negative for chest pain and leg swelling.   Gastrointestinal: Negative for abdominal distention and abdominal pain.   Genitourinary: Negative for difficulty urinating, dyspareunia and dysuria.   Musculoskeletal: Negative for arthralgias, back pain and gait problem.   Skin: Negative for color change.   Allergic/Immunologic: Negative for environmental allergies.   Neurological: Negative for dizziness and facial asymmetry.   Psychiatric/Behavioral: Negative for agitation and behavioral problems.       Objective:     Vital Signs  Temp:  [96.8 °F (36 °C)-99.6 °F (37.6 °C)] 97.2 °F (36.2 °C)  Heart Rate:  [] 94  Resp:  [18-20] 18  BP: (113-163)/() 113/93    Physical Exam  Physical Exam   Constitutional: She is oriented to person, place, and time. She appears well-developed and well-nourished. She is cooperative.   HENT:   Head: Normocephalic and atraumatic.   Right Ear: Hearing normal.   Left Ear: Hearing normal.   Nose: Nose normal.   Mouth/Throat: Oropharynx is clear and moist and mucous membranes are normal.   Eyes: Conjunctivae and lids are normal.   Neck: Trachea normal, normal  range of motion and phonation normal.   Cardiovascular: Normal rate and regular rhythm.   Pulses:       Dorsalis pedis pulses are 2+ on the right side, and 2+ on the left side.   Pulmonary/Chest: Effort normal and breath sounds normal.   Abdominal: Soft. Bowel sounds are normal.   Neurological: She is alert and oriented to person, place, and time.   Psychiatric: She has a normal mood and affect. Her speech is normal. Thought content normal. She is not actively hallucinating.   Denied: SI and HI       Medication Review    Current Facility-Administered Medications:   •  thiamine (VITAMIN B-1) tablet 100 mg, 100 mg, Oral, Daily **AND** multivitamin (THERAGRAN) tablet 1 tablet, 1 tablet, Oral, Daily **AND** folic acid (FOLVITE) tablet 1 mg, 1 mg, Oral, Daily, Declan Jensen MD  •  LORazepam (ATIVAN) tablet 0.5 mg, 0.5 mg, Oral, Q2H PRN **OR** LORazepam (ATIVAN) injection 0.5 mg, 0.5 mg, Intravenous, Q2H PRN **OR** LORazepam (ATIVAN) tablet 1 mg, 1 mg, Oral, Q1H PRN **OR** LORazepam (ATIVAN) injection 1 mg, 1 mg, Intravenous, Q1H PRN, 1 mg at 01/06/19 0056 **OR** LORazepam (ATIVAN) injection 1 mg, 1 mg, Intravenous, Q15 Min PRN, 1 mg at 01/05/19 2002 **OR** LORazepam (ATIVAN) injection 1 mg, 1 mg, Intramuscular, Q15 Min PRN, Declan Jensen MD  •  nicotine (NICODERM CQ) 21 MG/24HR patch 1 patch, 1 patch, Transdermal, Q24H, Ren Sun PA-C, 1 patch at 01/05/19 1701  •  ondansetron (ZOFRAN) injection 4 mg, 4 mg, Intravenous, Q6H PRN, Declan Jensen MD  •  sodium chloride 0.9 % flush 3 mL, 3 mL, Intravenous, Q12H, Declan Jensen MD, 3 mL at 01/05/19 2006  •  sodium chloride 0.9 % flush 3-10 mL, 3-10 mL, Intravenous, PRN, Declan Jensen MD     Diagnostic Data    Lab Results (last 24 hours)     Procedure Component Value Units Date/Time    Ethanol [[id number]] Collected:  01/06/19 0543    Specimen:  Blood Updated:  01/06/19 0753     Ethanol <10 mg/dL      Ethanol % <0.010 %      Comprehensive Metabolic Panel [[id number]]  (Abnormal) Collected:  01/06/19 0543    Specimen:  Blood Updated:  01/06/19 0748     Glucose 105 mg/dL      BUN 16 mg/dL      Creatinine 0.83 mg/dL      Sodium 132 mmol/L      Potassium 2.9 mmol/L      Chloride 101 mmol/L      CO2 19.0 mmol/L      Calcium 8.7 mg/dL      Total Protein 7.4 g/dL      Albumin 4.10 g/dL      ALT (SGPT) 9 U/L      AST (SGOT) 18 U/L      Alkaline Phosphatase 107 U/L      Total Bilirubin 0.6 mg/dL      eGFR Non African Amer 72 mL/min/1.73      Globulin 3.3 gm/dL      A/G Ratio 1.2 g/dL      BUN/Creatinine Ratio 19.3     Anion Gap 12.0 mmol/L     CBC Auto Differential [[id number]]  (Abnormal) Collected:  01/06/19 0543    Specimen:  Blood Updated:  01/06/19 0731     WBC 4.02 10*3/mm3      RBC 4.40 10*6/mm3      Hemoglobin 13.9 g/dL      Hematocrit 38.7 %      MCV 88.0 fL      MCH 31.6 pg      MCHC 35.9 g/dL      RDW 13.3 %      RDW-SD 43.4 fl      MPV 10.0 fL      Platelets 225 10*3/mm3      Neutrophil % 26.5 %      Lymphocyte % 52.0 %      Comment: Manual diff 1/5/2019         Monocyte % 18.4 %      Eosinophil % 2.2 %      Basophil % 0.7 %      Immature Grans % 0.2 %      Neutrophils, Absolute 1.06 10*3/mm3      Lymphocytes, Absolute 2.09 10*3/mm3      Monocytes, Absolute 0.74 10*3/mm3      Eosinophils, Absolute 0.09 10*3/mm3      Basophils, Absolute 0.03 10*3/mm3      Immature Grans, Absolute 0.01 10*3/mm3     Narrative:       Manual diff 1/5/2019    CBC & Differential [[id number]] Collected:  01/05/19 1512    Specimen:  Blood Updated:  01/05/19 1627    Narrative:       The following orders were created for panel order CBC & Differential.  Procedure                               Abnormality         Status                     ---------                               -----------         ------                     Manual Differential[[id number]]          Abnormal            Final result               CBC Auto Differential[[id number]]        Abnormal             Final result                 Please view results for these tests on the individual orders.    Manual Differential [[id number]]  (Abnormal) Collected:  01/05/19 1512    Specimen:  Blood Updated:  01/05/19 1627     Neutrophil % 27.0 %      Lymphocyte % 64.0 %      Monocyte % 2.0 %      Atypical Lymphocyte % 7.0 %      Neutrophils Absolute 1.26 10*3/mm3      Lymphocytes Absolute 2.98 10*3/mm3      Monocytes Absolute 0.09 10*3/mm3      RBC Morphology Normal     WBC Morphology Normal     Platelet Estimate Adequate    Extra Tubes [[id number]] Collected:  01/05/19 1511    Specimen:  Blood, Venous Line Updated:  01/05/19 1615    Narrative:       The following orders were created for panel order Extra Tubes.  Procedure                               Abnormality         Status                     ---------                               -----------         ------                     Light Blue Top[[id number]]                                   Final result               Gold Top - SST[[id number]]                                   Final result                 Please view results for these tests on the individual orders.    Light Blue Top [[id number]] Collected:  01/05/19 1511    Specimen:  Blood Updated:  01/05/19 1615     Extra Tube hold for add-on     Comment: Auto resulted       Gold Top - SST [[id number]] Collected:  01/05/19 1511    Specimen:  Blood Updated:  01/05/19 1615     Extra Tube Hold for add-ons.     Comment: Auto resulted.       CBC Auto Differential [[id number]]  (Abnormal) Collected:  01/05/19 1512    Specimen:  Blood Updated:  01/05/19 1603     WBC 4.65 10*3/mm3      RBC 4.92 10*6/mm3      Hemoglobin 15.6 g/dL      Hematocrit 43.2 %      MCV 87.8 fL      MCH 31.7 pg      MCHC 36.1 g/dL      RDW 13.6 %      RDW-SD 44.1 fl      MPV 10.1 fL      Platelets 291 10*3/mm3     Acetaminophen Level [[id number]]  (Abnormal) Collected:  01/05/19 1511    Specimen:  Blood Updated:  01/05/19 1600     Acetaminophen <10.0 mcg/mL      Ethanol [[id number]]  (Abnormal) Collected:  01/05/19 1511    Specimen:  Blood Updated:  01/05/19 1559     Ethanol 263 mg/dL      Ethanol % 0.263 %     Urine Drug Screen - Urine, Clean Catch [[id number]]  (Abnormal) Collected:  01/05/19 1522    Specimen:  Urine, Clean Catch Updated:  01/05/19 1559     Amphetamine Screen, Urine Negative     Barbiturates Screen, Urine Negative     Benzodiazepine Screen, Urine Negative     Cocaine Screen, Urine Negative     Methadone Screen, Urine Negative     Opiate Screen Negative     Oxycodone Screen, Urine Negative     THC, Screen, Urine Positive    Narrative:       Negative Thresholds For Drugs Screened in Urine:     Amphetamines          500 ng/ml  Barbiturates          200 ng/ml  Benzodiazepines       200 ng/ml  Cocaine               150 ng/ml  Methadone             300 ng/mL  Opiates               300 ng/mL  Oxycodone             100 ng/mL  THC                   20 ng/mL    The normal value for all drugs tested is negative. This report includes final unconfirmed screening results.  A positive result by this assay can be, at your request, sent to the Reference Lab for confirmation by gas chromatography. Unconfirmed results must not be used for non-medical purposes, such as employment or legal testing. Clinical consideration should be applied to any drug of abuse test result, particularly when unconfirmed results are used.    Comprehensive Metabolic Panel [[id number]]  (Abnormal) Collected:  01/05/19 1511    Specimen:  Blood Updated:  01/05/19 1549     Glucose 90 mg/dL      BUN 16 mg/dL      Creatinine 0.85 mg/dL      Sodium 139 mmol/L      Potassium 3.9 mmol/L      Chloride 106 mmol/L      CO2 21.0 mmol/L      Calcium 9.3 mg/dL      Total Protein 8.1 g/dL      Albumin 4.50 g/dL      ALT (SGPT) 8 U/L      AST (SGOT) 15 U/L      Alkaline Phosphatase 105 U/L      Total Bilirubin 0.2 mg/dL      eGFR Non African Amer 70 mL/min/1.73      Globulin 3.6 gm/dL      A/G Ratio 1.3 g/dL       BUN/Creatinine Ratio 18.8     Anion Gap 12.0 mmol/L     Salicylate Level [[id number]]  (Abnormal) Collected:  01/05/19 1511    Specimen:  Blood Updated:  01/05/19 1549     Salicylate 8.6 mg/dL            Imaging Results (last 24 hours)     ** No results found for the last 24 hours. **          I reviewed the patient's new clinical results.    Assessment/Plan:     Active Hospital Problems    Diagnosis   • **Suicidal ideation     The patient is having suicidal ideations. A 72 hour hold has been placed. The patient is currently intoxicated.  - Inpatient psych will be consulted when her ETOH level reaches zero.   - One on one sitter.  - Suicidal precautions.  - I will not initiate any any depression medications at this time, will defer this until evaluation via psych.      • Alcohol abuse     Patient is currently intoxicated. Her alcohol level was 0.263 at admission. I will recheck this in the morning. Patient has a hx of DT's.  - Psychiatry will be consulted when alcohol level reaches zeros.   - I will order CIWA protocol. I will start with the lower dose due to being less than 50 kg. This will increase if needed.   - Vitamin supplementation will be given for 3 days.      • Cachexia (CMS/HCC)     Patient has a BMI of 19.27.   - Nutrition consult appreciate recommendations           DVT prophylaxis: SCDs/TEDs  Code Status and Medical Interventions:   Ordered at: 01/05/19 1943     Level Of Support Discussed With:    Patient     Code Status:    CPR     Medical Interventions (Level of Support Prior to Arrest):    Full       Plan for disposition:Home when medically and pyschologically stable.       Time: 20 min     Shawn Estrada MD  Family Medicine Resident PGY1  [facility]        This document has been electronically signed by Shawn Estrada MD on January 6, 2019 8:01 AM

## 2019-01-07 PROBLEM — F43.10 POST TRAUMATIC STRESS DISORDER (PTSD): Status: ACTIVE | Noted: 2019-01-07

## 2019-01-07 PROBLEM — F33.2 SEVERE EPISODE OF RECURRENT MAJOR DEPRESSIVE DISORDER, WITHOUT PSYCHOTIC FEATURES: Status: ACTIVE | Noted: 2019-01-07

## 2019-01-07 PROBLEM — F10.20 ALCOHOL USE DISORDER, SEVERE, DEPENDENCE (HCC): Status: ACTIVE | Noted: 2019-01-07

## 2019-01-07 LAB
25(OH)D3 SERPL-MCNC: 35.4 NG/ML (ref 30–100)
ARTICHOKE IGE QN: 53 MG/DL (ref 1–129)
CHOLEST SERPL-MCNC: 151 MG/DL (ref 0–199)
GLUCOSE P FAST SERPL-MCNC: 93 MG/DL (ref 60–110)
HDLC SERPL-MCNC: 84 MG/DL (ref 60–200)
LDLC/HDLC SERPL: 0.65 {RATIO} (ref 0–3.22)
T4 FREE SERPL-MCNC: 1.14 NG/DL (ref 0.78–2.19)
TRIGL SERPL-MCNC: 62 MG/DL (ref 20–199)
TSH SERPL DL<=0.05 MIU/L-ACNC: 0.82 MIU/ML (ref 0.46–4.68)

## 2019-01-07 PROCEDURE — 80061 LIPID PANEL: CPT | Performed by: PSYCHIATRY & NEUROLOGY

## 2019-01-07 PROCEDURE — 99232 SBSQ HOSP IP/OBS MODERATE 35: CPT | Performed by: FAMILY MEDICINE

## 2019-01-07 PROCEDURE — 93005 ELECTROCARDIOGRAM TRACING: CPT | Performed by: PSYCHIATRY & NEUROLOGY

## 2019-01-07 PROCEDURE — 90791 PSYCH DIAGNOSTIC EVALUATION: CPT | Performed by: PSYCHIATRY & NEUROLOGY

## 2019-01-07 PROCEDURE — 82947 ASSAY GLUCOSE BLOOD QUANT: CPT | Performed by: PSYCHIATRY & NEUROLOGY

## 2019-01-07 PROCEDURE — 93010 ELECTROCARDIOGRAM REPORT: CPT | Performed by: INTERNAL MEDICINE

## 2019-01-07 RX ORDER — DULOXETIN HYDROCHLORIDE 30 MG/1
30 CAPSULE, DELAYED RELEASE ORAL DAILY
Status: DISCONTINUED | OUTPATIENT
Start: 2019-01-07 | End: 2019-01-08

## 2019-01-07 RX ORDER — MULTIVITAMIN,THERAPEUTIC
1 TABLET ORAL DAILY
Status: COMPLETED | OUTPATIENT
Start: 2019-01-07 | End: 2019-01-09

## 2019-01-07 RX ORDER — TERAZOSIN 1 MG/1
1 CAPSULE ORAL NIGHTLY
Status: DISCONTINUED | OUTPATIENT
Start: 2019-01-07 | End: 2019-01-08

## 2019-01-07 RX ORDER — HYDROXYZINE PAMOATE 50 MG/1
50 CAPSULE ORAL EVERY 4 HOURS PRN
Status: DISCONTINUED | OUTPATIENT
Start: 2019-01-07 | End: 2019-01-10 | Stop reason: HOSPADM

## 2019-01-07 RX ORDER — FOLIC ACID 1 MG/1
1 TABLET ORAL DAILY
Status: COMPLETED | OUTPATIENT
Start: 2019-01-07 | End: 2019-01-09

## 2019-01-07 RX ORDER — THIAMINE MONONITRATE (VIT B1) 100 MG
100 TABLET ORAL DAILY
Status: COMPLETED | OUTPATIENT
Start: 2019-01-07 | End: 2019-01-09

## 2019-01-07 RX ADMIN — Medication 100 MG: at 20:15

## 2019-01-07 RX ADMIN — TRAZODONE HYDROCHLORIDE 50 MG: 50 TABLET ORAL at 20:13

## 2019-01-07 RX ADMIN — THERA TABS 1 TABLET: TAB at 20:14

## 2019-01-07 RX ADMIN — HYDROXYZINE PAMOATE 50 MG: 50 CAPSULE ORAL at 08:47

## 2019-01-07 RX ADMIN — NICOTINE 1 PATCH: 21 PATCH TRANSDERMAL at 08:43

## 2019-01-07 RX ADMIN — FOLIC ACID 1 MG: 1 TABLET ORAL at 20:15

## 2019-01-07 RX ADMIN — HYDROXYZINE PAMOATE 50 MG: 50 CAPSULE ORAL at 13:54

## 2019-01-07 RX ADMIN — TERAZOSIN HYDROCHLORIDE ANHYDROUS 1 MG: 1 CAPSULE ORAL at 20:10

## 2019-01-07 RX ADMIN — DULOXETINE HYDROCHLORIDE 30 MG: 30 CAPSULE, DELAYED RELEASE ORAL at 20:10

## 2019-01-07 RX ADMIN — HYDROXYZINE PAMOATE 50 MG: 50 CAPSULE ORAL at 18:04

## 2019-01-07 NOTE — NURSING NOTE
Behavior     Anxiety: Excess Worry  Depression: depressed mood  Pain  0  AVH   no  S/I   no  H/I   no    Affect   mood-congruent    Note: Patient reports felling anxious this morning. CIWA score of 6 at that time. Vistaril was given. Patient states she is anxious to leave the hospital and just wants to get back (to motel). Patient denies SI at this time. Will continue to monitor.      Intervention    Instructed in medication usage and effects  Medications administered as ordered  Encouraged to verbalize needs      Response    Verbalized understanding   Did patient take medications as ordered yes   Did patient interact with assessment?  yes     Plan    Will monitor for safety  Will monitor every 15 minutes as ordered  Will evaluate and promote the plan of care    Last BM:  unknown date  (Please chart in I/O as well)

## 2019-01-07 NOTE — CONSULTS
CHIEF COMPLAINT/REASON FOR VISIT:  Suicidal Ideation    HPI:  Patient presented to our ED with the above complaint on January 5 around 3 PM.  He reported history of bipolar disease heavy alcohol use.  She had no specific plan at that point and says that she really has no home but reports living at the [facility].  Initial ethanol level was 263 and she was initially admitted to the family medicine inpatient service.  There is no discharge summary from that admission as yet and the last progress note January 6 had not been updated since admission.  Patient does report on the behavioral health unit that she considers herself a chronic alcoholic and has been using alcohol since age 13.  He denies a previous history of alcoholic seizures.          PROBLEM LIST:  Patient Active Problem List    Diagnosis   • Suicidal ideation [R45.851]   • Alcohol abuse [F10.10]   • Cachexia (CMS/[facility]) [R64]   • Acute renal failure (CMS/[facility]) [N17.9]   • Hypokalemia [E87.6]   • Epistaxis [R04.0]   • Alcohol intoxication (CMS/[facility]) [F10.929]   • Depression [F32.9]   • Suspected thiamine deficiency due to chronic alcohol intake [E51.9]   • Protein malnutrition (CMS/[facility]) [E46]   • Alcoholic ketoacidosis [E87.2]   • Elevated blood pressure reading without diagnosis of hypertension [R03.0]   • Leukopenia [D72.819]         CURRENT MEDICATIONS:  No medications prior to admission.       ALLERGIES:  Patient has no known allergies.      PAST MEDICAL/SURGICAL HISTORY:  Past Medical History:   Diagnosis Date   • ADHD (attention deficit hyperactivity disorder)    • Alcohol abuse    • Bilateral carpal tunnel syndrome    • Bipolar 1 disorder (CMS/HCC)    • Carpal tunnel syndrome on both sides 2015   • Chronic pain disorder     Back   • Collapsed lung 1986   • Depression    • Irritable bowel syndrome    • Spontaneous pneumothorax        Past Surgical History:   Procedure Laterality Date   • CHEST TUBE INSERTION      30 years ago for a spontaneous  pneumothorax   • COLONOSCOPY     • HYSTERECTOMY     • TOTAL ABDOMINAL HYSTERECTOMY WITH SALPINGO OOPHORECTOMY         Review of Systems   Constitutional: Negative for activity change, appetite change, fatigue and fever.   HENT: Negative for congestion, ear discharge, ear pain, facial swelling, hearing loss, nosebleeds, postnasal drip, rhinorrhea, sinus pressure, sore throat, tinnitus and trouble swallowing.    Eyes: Negative for pain, discharge and visual disturbance.   Respiratory: Negative for cough, shortness of breath and wheezing.    Cardiovascular: Negative for chest pain, palpitations and leg swelling.   Gastrointestinal: Negative for abdominal pain, blood in stool, constipation, diarrhea, nausea and vomiting.   Genitourinary: Negative for difficulty urinating, dyspareunia, dysuria, flank pain, frequency, hematuria, menstrual problem, pelvic pain, urgency, vaginal bleeding and vaginal discharge.   Musculoskeletal: Negative for arthralgias, back pain, joint swelling, myalgias and neck pain.   Skin: Negative for rash and wound.   Neurological: Negative for dizziness, seizures, syncope, weakness, light-headedness and headaches.   Hematological: Negative for adenopathy.       Social History     Socioeconomic History   • Marital status: Legally      Spouse name: Not on file   • Number of children: Not on file   • Years of education: Not on file   • Highest education level: Not on file   Social Needs   • Financial resource strain: Not on file   • Food insecurity - worry: Not on file   • Food insecurity - inability: Not on file   • Transportation needs - medical: Not on file   • Transportation needs - non-medical: Not on file   Occupational History   • Not on file   Tobacco Use   • Smoking status: Current Every Day Smoker     Packs/day: 1.00     Start date: 1977   • Smokeless tobacco: Never Used   Substance and Sexual Activity   • Alcohol use: Yes     Alcohol/week: 42.0 oz     Types: 70 Shots of liquor per  "week     Comment: 6-10 shots of 99 proof daily(states no alcohol in 5 days)   • Drug use: No     Comment: Previously meth (11/16), THC(2 days ago), synthetics(every couple weeks), shrooms (9-10 months ago)   • Sexual activity: Yes     Partners: Male   Other Topics Concern   • Not on file   Social History Narrative    Lives at home with fijanaee.  Used to work as a  and .       Family History   Problem Relation Age of Onset   • Depression Mother    • Anxiety disorder Mother    • COPD Mother    • Alcohol abuse Father    • Depression Father    • Diabetes Father    • Anxiety disorder Sister    • Bipolar disorder Sister    • Anxiety disorder Maternal Aunt    • Alcohol abuse Paternal Grandfather    • Alcohol abuse Paternal Grandmother              Objective     /65 (BP Location: Left arm, Patient Position: Lying)   Pulse 98   Temp 98.6 °F (37 °C) (Oral)   Resp 18   Ht 157.5 cm (62.01\")   Wt 49.4 kg (109 lb)   SpO2 94%   BMI 19.93 kg/m²     Physical Exam   Constitutional: She appears well-developed and well-nourished.   HENT:   Head: Normocephalic and atraumatic.   Eyes: Conjunctivae and EOM are normal.   Neck: Normal range of motion. Neck supple. Thyromegaly present.   Thyroid is symmetrically enlarged about 2+ with no nodule appreciated.   Cardiovascular: Normal rate, regular rhythm and normal heart sounds. Exam reveals no gallop and no friction rub.   No murmur heard.  Pulmonary/Chest: Effort normal and breath sounds normal. No respiratory distress. She has no wheezes. She has no rales.   Abdominal: Soft. She exhibits no distension and no mass. There is no tenderness. There is no rebound and no guarding.   Musculoskeletal: Normal range of motion.   Lymphadenopathy:     She has no cervical adenopathy.   Neurological: She is alert. She has normal strength. She displays no tremor and normal reflexes. No cranial nerve deficit or sensory deficit. She exhibits normal muscle tone. Coordination " normal. She displays no Babinski's sign on the right side. She displays no Babinski's sign on the left side.   Reflex Scores:       Tricep reflexes are 4+ on the right side and 4+ on the left side.       Bicep reflexes are 4+ on the right side and 4+ on the left side.       Brachioradialis reflexes are 4+ on the right side and 4+ on the left side.       Patellar reflexes are 4+ on the right side and 4+ on the left side.       Achilles reflexes are 4+ on the right side and 4+ on the left side.  Finger to nose has a little dysmetria with the left hand but is basically normal and there is no tremor.   Skin: Skin is warm and dry. No rash noted. No erythema.   Bilateral forearm superficial scratches (patient says this is from her dog).  There is no unusual erythema or exudate.   Nursing note and vitals reviewed.      Dystonia/Tardive Dyskinesia  Absent  Meningeal Signs  Absent    Diagnostic Studies  Initial urine drug screen positive only for THC.  The ethanol level on January 5 at 3 PM was 263 and then on January 6 at 6 AM it was less than 10.  Most recent lab was on January 6 at 6 AM where glucose was minimally elevated at 105 but fasting this morning is 93.  On that CMP everything was normal except for slightly decreased sodium at 132 and potassium of 2.9.  Repeat potassium that afternoon was 3.7.  CBC on January 6 is normal.  Urinalysis was apparently not done.  Salicylate level on January 5 at 3 PM was 8.6 and acetaminophen was less than 10.    EKG done on January 7 at 6 AM shows:  Vent. Rate : 084 BPM     Atrial Rate : 084 BPM     P-R Int : 156 ms          QRS Dur : 088 ms      QT Int : 380 ms       P-R-T Axes : 079 087 081 degrees     QTc Int : 449 ms    Normal sinus rhythm  Normal ECG  When compared with ECG of 26-JUN-2017 15:00,  No significant change was found    Assessment/Plan     Past Medical History:   Diagnosis Date   • ADHD (attention deficit hyperactivity disorder)    • Alcohol abuse    • Bilateral  carpal tunnel syndrome    • Bipolar 1 disorder (CMS/HCC)    • Carpal tunnel syndrome on both sides 2015   • Chronic pain disorder     Back   • Collapsed lung 1986   • Depression    • Irritable bowel syndrome    • Spontaneous pneumothorax      Hyper reflexia may be mild alcohol withdrawal, but TSH was decreased in June 2017 with a normal free T4.  We will recheck that on this admission.    Suggest continuing unit protocol for possibility of withdrawal.      Continue Home Meds as ordered. Mental health and pain issues managed per psychiatry.  Further diagnostic studies or intervention based on hospital course.

## 2019-01-07 NOTE — PLAN OF CARE
Problem: Patient Care Overview  Goal: Plan of Care Review  Outcome: Ongoing (interventions implemented as appropriate)    Goal: Individualization and Mutuality  Outcome: Ongoing (interventions implemented as appropriate)    Goal: Discharge Needs Assessment  Outcome: Ongoing (interventions implemented as appropriate)    Goal: Interprofessional Rounds/Family Conf  Outcome: Ongoing (interventions implemented as appropriate)      Problem: Overarching Goals (Adult)  Goal: Adheres to Safety Considerations for Self and Others  Outcome: Ongoing (interventions implemented as appropriate)    Goal: Optimized Coping Skills in Response to Life Stressors  Outcome: Ongoing (interventions implemented as appropriate)    Goal: Develops/Participates in Therapeutic [place] to Support Successful Transition  Outcome: Ongoing (interventions implemented as appropriate)

## 2019-01-07 NOTE — NURSING NOTE
"Pt on unit at 2020, escorted by 4 [facility] nurse and security, belonging brought and being processed.  Pt is alert and oriented x 4, highly anxious but cooperative, and signed in as a voluntary admission.  Pt has notable tremors, complains of light disturbance, and has mild sweating, is tearful.  Blood pressure is elevated.  Pt will receive ativan 1mg po prn per alcohol withdrawal protocol, as well as catapres for blood pressure. Pt body check is complete, scratches to left forearm noted \"from my dog.\"  Pt stated she does not feel suicidal at this time, that this is mostly related to her drinking, denies feeling like she is any danger living with boyfriend at [facility], states \"hes probably more likely to be in danger from me when im drinking because I can get quite mouthy,\" though denies any HI.  pt has recently lost her job. Pt states she drank to cope with the depression of \"being in that room all day.\"  Pt monitored on CIWA, current score of 12.  Suicide, seizure, and fall precautions initiated.  Will continue to monitor for safety.  "

## 2019-01-07 NOTE — CONSULTS
Adult Nutrition  Assessment    Patient Name:  Laura Bruce  YOB: 1965  MRN: [id number]  Admit Date:  1/6/2019    Assessment Date:  1/7/2019    Comments:  Pt reports good appetite.  Voiced beverage preferences.  BMI 19.  Pt indicates her usual wt is 110-115.  Willing receive milk with meals and Boost at breakfast.  Intake 100% - 3x, 75% - 1x.  Meds and labs reviewed.    Reason for Assessment     Row Name 01/07/19 1322          Reason for Assessment    Reason For Assessment  per organizational policy     Identified At Risk by Screening Criteria  BMI             Labs/Tests/Procedures/Meds     Row Name 01/07/19 1322          Labs/Procedures/Meds    Lab Results Reviewed  reviewed        Medications    Pertinent Medications Reviewed  reviewed           Estimated/Assessed Needs     Row Name 01/07/19 1323          Calculation Measurements    Weight Used For Calculations  50 kg (110 lb 3.7 oz)        Estimated/Assessed Needs    Additional Documentation  Calorie Requirements (Group);Fluid Requirements (Group);Doddridge-St. Jeor Equation (Group);Protein Requirements (Group)        Calorie Requirements    Estimated Calorie Requirement (kcal/day)  1375     Estimated Calorie Need Method  Doddridge-St Jeor        KCAL/KG    14 Kcal/Kg (kcal)  700     15 Kcal/Kg (kcal)  750     18 Kcal/Kg (kcal)  900     20 Kcal/Kg (kcal)  1000     25 Kcal/Kg (kcal)  1250     30 Kcal/Kg (kcal)  1500     35 Kcal/Kg (kcal)  1750     40 Kcal/Kg (kcal)  2000     45 Kcal/Kg (kcal)  2250     50 Kcal/Kg (kcal)  2500        Doddridge-St. Jeor Equation    RMR (Doddridge-St. Jeor Equation)  1058.38        Protein Requirements    Est Protein Requirement Amount (gms/kg)  1.0 gm protein     Estimated Protein Requirements (gms/day)  50        Fluid Requirements    Estimated Fluid Requirements (mL/day)  1500     RDA Method (mL)  1500     Beto-Segar Method (over 20 kg)  2500         Nutrition Prescription Ordered     Row Name 01/07/19 132           Nutrition Prescription PO    Current PO Diet  Regular         Evaluation of Received Nutrient/Fluid Intake     Row Name 01/07/19 1325 01/07/19 1323       Calculation Measurements    Weight Used For Calculations  --  50 kg (110 lb 3.7 oz)       PO Evaluation    Number of Meals  4  --    % PO Intake  100% - 3x, 75% - 1x  --        Evaluation of Prescribed Nutrient/Fluid Intake     Row Name 01/07/19 1323          Calculation Measurements    Weight Used For Calculations  50 kg (110 lb 3.7 oz)             Electronically signed by:  Eun Duvall RD  01/07/19 1:26 PM

## 2019-01-07 NOTE — NURSING NOTE
Dr Chavez ROS         General  Up from 4 [place] after alcohol intoxication with withdrawal, CIWA 12    Eyes   glasses/contact lens    ENT/Mouth   None    Cardio   None    Resp   None    GI    None       None    MS    None    Skin/Hair/Nails   Scratches form pet dog    Neuro   None

## 2019-01-07 NOTE — PLAN OF CARE
Problem: Patient Care Overview  Goal: Plan of Care Review  Outcome: Ongoing (interventions implemented as appropriate)  Encourage intake of meals and supplements.   01/07/19 1331   Coping/Psychosocial   Plan of Care Reviewed With patient   Plan of Care Review   Progress no change   OTHER   Outcome Summary Intake 100% - 3x, 75% - 1x.

## 2019-01-07 NOTE — NURSING NOTE
"Patient has been tearful and anxious intermittently throughout the day. Patient has visible tremors in her hands. Patient states \"I think it's just because today is my first day here and I can't control my emotions\". Patient reports vistaril administered this morning did help with her anxiety. Vistaril given again and order changed to q 4 hrs prn per Dr. Quach. Patient currently participating in recreational therapy. Will continue to monitor.   "

## 2019-01-07 NOTE — DISCHARGE SUMMARY
DISCHARGE SUMMARY    PATIENT NAME: Laura Bruce       PHYSICIAN: Tessy Charles MD  : 1965  MRN: [id number]    ADMITTED: 2019     DISCHARGED: 2019    ADMISSION DIAGNOSES:  Active Hospital Problems    Diagnosis Date Noted   • **Suicidal ideation [R45.851] 2019   • Alcohol abuse [F10.10] 2019   • Cachexia (CMS/HCC) [R64] 2019      Resolved Hospital Problems   No resolved problems to display.     DISCHARGE DIAGNOSES:   Active Hospital Problems    Diagnosis Date Noted   • **Suicidal ideation [R45.851] 2019   • Alcohol abuse [F10.10] 2019   • Cachexia (CMS/HCC) [R64] 2019      Resolved Hospital Problems   No resolved problems to display.       SERVICE: Family Medicine.  Attending: Dr. Whatley   Resident: Tessy Charles MD    CONSULTS:   Consult Orders (all) (From admission, onward)    Start     Ordered    19 0935  Inpatient Psychiatrist Consult  Once,   Status:  Canceled     Specialty:  Psychiatry  Provider:  (Not yet assigned)    19 0936    19 0852  Inpatient Nutrition Consult  Once,   Status:  Canceled     Provider:  (Not yet assigned)    19 0852    19 0832  Inpatient Psychiatrist Consult  Once     Specialty:  Psychiatry  Provider:  Harrison Quach MD    19 0831    19 0806  Inpatient Case Management  Consult  Once     Provider:  (Not yet assigned)    19 0807    19 0801  Inpatient Nutrition Consult  Once,   Status:  Canceled     Provider:  (Not yet assigned)    19 0800    19 1646  Family Practice - Resident (on-call MD unless specified)  Once,   Status:  Canceled     Specialty:  Family Medicine  Provider:  Tessy Charles MD    19 1646    19 1450  Psych / Access to see  Once,   Status:  Canceled     Provider:  (Not yet assigned)    19 1450          PROCEDURES:   Imaging Results (last 24 hours)     ** No results found for the last 24 hours.  "**        ECG/EMG Results (last 24 hours)     ** No results found for the last 24 hours. **          HISTORY OF PRESENT ILLNESS: Copied from Dr. ANDREW Jensen's H&P  Laura Rimma Bruce is a 53 y.o. female with a CMH of depression, chronic alcoholism, and back pain, who presents to the E.D. complaining of suicidal ideations. She has long standing depression. She was started on antidepression medication while in rehab for her Alcoholism in 5/2018. She did not follow up with outpatient providers, and ran out of her medications. She has not taken this for several months. She has became progressively more depressed. She reports she feels \"tired with life\", and \" just wants life to get better\". She denies having a plan to kill herself because she states, \" I am a coward, and cant do it\". She does think of scenarios often of different ways that she could die.     Patient is a chronic alcoholic. She began using alcohol at the age of 13. She began to use this daily after her the death of her mother 2 years ago. She usually drinks 6-7 shots of 90 proof alcohol. She drank 9-10 shots yesterday, and had 1.5-2 shots this morning between 0500 and 0700. She did attend and graduate from [facility] in May 2018, but relapsed 90 days later. She has had withdrawals with DT's in the past, but denies any seizures from withdrawals. Patient currently lives at the [facility]. She does have a 6 months hx of homelessness in the past. She does have a hx of drug use in the past for approximately one year. She reports she quit this 2.5 years ago. She smoke meth and snorted many other drugs. She does have damage to her nasal septum from this.     DIAGNOSTIC DATA:   Lab Results (last 24 hours)     Procedure Component Value Units Date/Time    Potassium [[id number]]  (Normal) Collected:  01/06/19 1442    Specimen:  Blood Updated:  01/06/19 1505     Potassium 3.7 mmol/L     Ethanol [[id number]] Collected:  01/06/19 0543    Specimen:  " Blood Updated:  01/06/19 0753     Ethanol <10 mg/dL      Ethanol % <0.010 %     Comprehensive Metabolic Panel [[id number]]  (Abnormal) Collected:  01/06/19 0543    Specimen:  Blood Updated:  01/06/19 0748     Glucose 105 mg/dL      BUN 16 mg/dL      Creatinine 0.83 mg/dL      Sodium 132 mmol/L      Potassium 2.9 mmol/L      Chloride 101 mmol/L      CO2 19.0 mmol/L      Calcium 8.7 mg/dL      Total Protein 7.4 g/dL      Albumin 4.10 g/dL      ALT (SGPT) 9 U/L      AST (SGOT) 18 U/L      Alkaline Phosphatase 107 U/L      Total Bilirubin 0.6 mg/dL      eGFR Non African Amer 72 mL/min/1.73      Globulin 3.3 gm/dL      A/G Ratio 1.2 g/dL      BUN/Creatinine Ratio 19.3     Anion Gap 12.0 mmol/L     CBC Auto Differential [[id number]]  (Abnormal) Collected:  01/06/19 0543    Specimen:  Blood Updated:  01/06/19 0731     WBC 4.02 10*3/mm3      RBC 4.40 10*6/mm3      Hemoglobin 13.9 g/dL      Hematocrit 38.7 %      MCV 88.0 fL      MCH 31.6 pg      MCHC 35.9 g/dL      RDW 13.3 %      RDW-SD 43.4 fl      MPV 10.0 fL      Platelets 225 10*3/mm3      Neutrophil % 26.5 %      Lymphocyte % 52.0 %      Comment: Manual diff 1/5/2019         Monocyte % 18.4 %      Eosinophil % 2.2 %      Basophil % 0.7 %      Immature Grans % 0.2 %      Neutrophils, Absolute 1.06 10*3/mm3      Lymphocytes, Absolute 2.09 10*3/mm3      Monocytes, Absolute 0.74 10*3/mm3      Eosinophils, Absolute 0.09 10*3/mm3      Basophils, Absolute 0.03 10*3/mm3      Immature Grans, Absolute 0.01 10*3/mm3     Narrative:       Manual diff 1/5/2019         HOSPITAL COURSE:  Laura Bruce was admitted to the hospital overnight for observation and management of acute alcohol intoxication, alcohol level 263 mg/dL. Patient endorsed a concurrent history of alcohol abuse, having completed multiple attempts at rehabilitation and multiple relapses. Patient was provided IV fluids and multivitamin. CIWA protocol was initiated and PRN Ativan was provided for agitation and  signs of withdrawal. Electrolytes were monitored and replaced as needed.     Additionally, patient reported symptoms of depression and suicidal ideations. Thyroid function was within normal limits. UDS was positive for THC. Patient was ordered a personal sitter to monitor her through the course of the night. Patient had an uneventful night and when alcohol levels dropped below 10 mg/dL, consultation was placed to psychiatry. Psychiatry was agreeable to transferring patient to psychiatric unit for further evaluation and management of depression.     Patient was prepared for discharge and transfer to psychiatric unit. Patient was encouraged to follow up with  Residency following discharge from psychiatric unit.     DISCHARGE CONDITION:   Stable    DISPOSITION:  Psychiatric Hospital or Unit ([facility])    DISCHARGE MEDICATIONS     Discharge Medications      Patient Not Prescribed Medications Upon Discharge         INSTRUCTIONS:  Activity:   Activity Instructions     Activity as Tolerated          Diet:   Diet Instructions     Diet: Regular      Discharge Diet:  Regular        Special instructions: Patient instructed to call MD or return to ED with worsening shortness of breath, chest pain, fever greater than 100.4 degrees F or any other medical concerns.    FOLLOW UP: No PCP on file; follow up upon discharge from psychiatric unit  Dr. Shawn Estrada  [address] [place] [address]    [place] [address]      PENDING TEST RESULTS AT DISCHARGE  None    Dr. Whatley  is the attending at time of discharge. She is aware of the patient's status and agrees with the above discharge summary.    Signature  Tessy Charles MD  [facility] Resident, PGY III        This document has been electronically signed by Tessy Charles MD on January 7, 2019 11:39 AM

## 2019-01-07 NOTE — PLAN OF CARE
Problem: Alcohol Withdrawal Acute, Risk/Actual (Adult)  Goal: Signs and Symptoms of Listed Potential Problems Will be Absent, Minimized or Managed (Alcohol Withdrawal Acute, Risk/Actual)  Outcome: Ongoing (interventions implemented as appropriate)  Pt has elevated blood pressure, score of 12 on CIWA due to anxiety/tremors/visual disturbance.  Pt received ativan 1mg po prn per alcohol withdrawal protocol.  Will continue to monitor for safety.

## 2019-01-07 NOTE — PLAN OF CARE
Problem: Patient Care Overview  Goal: Discharge Needs Assessment  Outcome: Ongoing (interventions implemented as appropriate)    Goal: Interprofessional Rounds/Family Conf  Outcome: Ongoing (interventions implemented as appropriate)      Problem: Overarching Goals (Adult)  Goal: Adheres to Safety Considerations for Self and Others  Outcome: Ongoing (interventions implemented as appropriate)    Goal: Optimized Coping Skills in Response to Life Stressors  Outcome: Ongoing (interventions implemented as appropriate)    Goal: Develops/Participates in Therapeutic [place] to Support Successful Transition  Outcome: Ongoing (interventions implemented as appropriate)      Problem: Suicidal Behavior (Adult)  Goal: Suicidal Behavior is Absent/Minimized/Managed  Outcome: Ongoing (interventions implemented as appropriate)      Problem: Alcohol Withdrawal Acute, Risk/Actual (Adult)  Goal: Signs and Symptoms of Listed Potential Problems Will be Absent, Minimized or Managed (Alcohol Withdrawal Acute, Risk/Actual)  Outcome: Ongoing (interventions implemented as appropriate)
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
## 2019-01-08 LAB — VIT B12 BLD-MCNC: 619 PG/ML (ref 239–931)

## 2019-01-08 PROCEDURE — 99232 SBSQ HOSP IP/OBS MODERATE 35: CPT | Performed by: PSYCHIATRY & NEUROLOGY

## 2019-01-08 PROCEDURE — 82607 VITAMIN B-12: CPT | Performed by: PSYCHIATRY & NEUROLOGY

## 2019-01-08 RX ORDER — HYDROXYZINE PAMOATE 25 MG/1
25 CAPSULE ORAL 3 TIMES DAILY
Status: DISCONTINUED | OUTPATIENT
Start: 2019-01-08 | End: 2019-01-10 | Stop reason: HOSPADM

## 2019-01-08 RX ORDER — TERAZOSIN 1 MG/1
1 CAPSULE ORAL EVERY 12 HOURS SCHEDULED
Status: DISCONTINUED | OUTPATIENT
Start: 2019-01-08 | End: 2019-01-09

## 2019-01-08 RX ORDER — DULOXETIN HYDROCHLORIDE 60 MG/1
60 CAPSULE, DELAYED RELEASE ORAL DAILY
Status: DISCONTINUED | OUTPATIENT
Start: 2019-01-09 | End: 2019-01-10 | Stop reason: HOSPADM

## 2019-01-08 RX ADMIN — TERAZOSIN HYDROCHLORIDE 1 MG: 1 CAPSULE ORAL at 15:15

## 2019-01-08 RX ADMIN — HYDROXYZINE PAMOATE 50 MG: 50 CAPSULE ORAL at 11:59

## 2019-01-08 RX ADMIN — HYDROXYZINE PAMOATE 25 MG: 25 CAPSULE ORAL at 15:21

## 2019-01-08 RX ADMIN — DULOXETINE HYDROCHLORIDE 30 MG: 30 CAPSULE, DELAYED RELEASE ORAL at 08:05

## 2019-01-08 RX ADMIN — Medication 100 MG: at 08:06

## 2019-01-08 RX ADMIN — TERAZOSIN HYDROCHLORIDE 1 MG: 1 CAPSULE ORAL at 20:23

## 2019-01-08 RX ADMIN — NICOTINE 1 PATCH: 21 PATCH TRANSDERMAL at 08:05

## 2019-01-08 RX ADMIN — HYDROXYZINE PAMOATE 50 MG: 50 CAPSULE ORAL at 08:06

## 2019-01-08 RX ADMIN — TRAZODONE HYDROCHLORIDE 50 MG: 50 TABLET ORAL at 20:24

## 2019-01-08 RX ADMIN — HYDROXYZINE PAMOATE 25 MG: 25 CAPSULE ORAL at 20:23

## 2019-01-08 RX ADMIN — THERA TABS 1 TABLET: TAB at 08:06

## 2019-01-08 RX ADMIN — FOLIC ACID 1 MG: 1 TABLET ORAL at 08:06

## 2019-01-08 NOTE — PROGRESS NOTES
1/8/2019    Chief Complaint: suicidal ideation    Subjective:  Patient is a 53 y.o. female who was hospitalized for suicidal ideation.       Patient was seen in treatment team and alone.  She notes mood is improving.  She notes some anxiety and some continued hypervigilance.  She notes vistaril helps anxiety but does not cause sedation.  She notes no hypotensive side effects to terazosin.  She notes some trouble with tossing and turning last night.  Withdrawal improving.    Objective     Vital Signs    Temp:  [96.8 °F (36 °C)-97 °F (36.1 °C)] 96.8 °F (36 °C)  Heart Rate:  [92-98] 98  Resp:  [18] 18  BP: (134-163)/(85-90) 134/85    Physical Exam:   General Appearance: alert, appears stated age and cooperative,  Hygiene:   good  Gait & Station: Normal  Musculoskeletal: No tremors or abnormal involuntary movements    Mental Status Exam:   Cooperation:  Cooperative  Eye Contact:  Good  Psychomotor Behavior:  Appropriate  Mood: Anxious/Nervous  Affect:  constricted  Speech:  Normal  Thought Process:  Coherent  Associations: Goal Directed  Thought Content:     Normal   Suicidal:  Denies SI and nihilistic thinking is improving   Homicidal:  None   Hallucinations:  None   Delusion:  None  Cognitive Functioning:   Consciousness: awake, alert and oriented  Reliability:  good  Insight:  Fair  Judgement:  improving  Impulse Control:  Fair    Lab Results (last 24 hours)     Procedure Component Value Units Date/Time    Vitamin B12 [[id number]]  (Normal) Collected:  01/08/19 0525    Specimen:  Blood Updated:  01/08/19 0653     Vitamin B-12 619 pg/mL     Vitamin D 25 Hydroxy [[id number]]  (Normal) Collected:  01/06/19 1442    Specimen:  Blood Updated:  01/07/19 1702     25 Hydroxy, Vitamin D 35.4 ng/ml         Imaging Results (last 24 hours)     ** No results found for the last 24 hours. **          Medicine:   Current Facility-Administered Medications:   •  acetaminophen (TYLENOL) tablet 650 mg, 650 mg, Oral, Q4H PRN, Philomena  MD Harrison  •  aluminum-magnesium hydroxide-simethicone (MAALOX MAX) 400-400-40 MG/5ML suspension 15 mL, 15 mL, Oral, Q6H PRN, Harrison Quach MD  •  CloNIDine (CATAPRES) tablet 0.1 mg, 0.1 mg, Oral, Q4H PRN, Harrison Quach MD, 0.1 mg at 01/06/19 2129  •  DULoxetine (CYMBALTA) DR capsule 30 mg, 30 mg, Oral, Daily, Harrison Quach MD, 30 mg at 01/08/19 0805  •  thiamine (VITAMIN B-1) tablet 100 mg, 100 mg, Oral, Daily, 100 mg at 01/08/19 0806 **AND** THERA tablet 1 tablet, 1 tablet, Oral, Daily, 1 tablet at 01/08/19 0806 **AND** folic acid (FOLVITE) tablet 1 mg, 1 mg, Oral, Daily, Harrison Quach MD, 1 mg at 01/08/19 0806  •  hydrOXYzine (VISTARIL) capsule 50 mg, 50 mg, Oral, Q4H PRN, Harrison Quach MD, 50 mg at 01/08/19 0806  •  loperamide (IMODIUM) capsule 2 mg, 2 mg, Oral, 4x Daily PRN, Harrison Quach MD  •  LORazepam (ATIVAN) tablet 1 mg, 1 mg, Oral, Q2H PRN, Harrison Quach MD, 1 mg at 01/06/19 2129  •  magnesium hydroxide (MILK OF MAGNESIA) suspension 2400 mg/10mL 10 mL, 10 mL, Oral, Daily PRN, Harrison Quach MD  •  nicotine (NICODERM CQ) 21 MG/24HR patch 1 patch, 1 patch, Transdermal, Q24H, Harrison Quach MD, 1 patch at 01/08/19 0805  •  ondansetron (ZOFRAN) tablet 4 mg, 4 mg, Oral, Q6H PRN, Harrison Quach MD  •  terazosin (HYTRIN) capsule 1 mg, 1 mg, Oral, Nightly, Harrison Quach MD, 1 mg at 01/07/19 2010  •  traZODone (DESYREL) tablet 50 mg, 50 mg, Oral, Nightly PRN, Harrison Quach MD, 50 mg at 01/07/19 2013    Diagnoses/Assessment:     Post traumatic stress disorder (PTSD)    Suicidal ideation    Severe episode of recurrent major depressive disorder, without psychotic features (CMS/HCC)    Alcohol use disorder, severe, dependence (CMS/HCC)      Treatment Plan:    1) Will continue care for the patient on the behavioral health unit at [facility] to ensure patient safety.  2) Will continue to provide treatment with the unit milieu,  activities, therapies and psychopharmacological management.  3) Patient to be placed on or continued on  Q15 minute checks  and Suicide and Seizure precautions.  4) Pertinent medical issues: no active medical concerns.  5) Will order following labs: none  6) Will make the following medication changes: Will increase cymbalta to 60mg daily.  Will increase the terazosin to 1mg bid.  Will start vistaril 25mg tid for anxiety.  Will continue the trazodone 50mg qhs prn for sleep.  7) Will continue discharge planning as appropriate for patient.  8) Psychotherapy provided for less than 16 minutes.    Treatment plan and medication risks and benefits discussed with: Patient    Harrison Quach MD  01/08/19  1:19 PM

## 2019-01-08 NOTE — PLAN OF CARE
Problem: Patient Care Overview  Goal: Plan of Care Review  Outcome: Ongoing (interventions implemented as appropriate)   01/08/19 1004   Coping/Psychosocial   Plan of Care Reviewed With patient   Plan of Care Review   Progress improving   Coping/Psychosocial   Patient Agreement with Plan of Care agrees     Goal: Individualization and Mutuality  Outcome: Ongoing (interventions implemented as appropriate)    Goal: Discharge Needs Assessment  Outcome: Ongoing (interventions implemented as appropriate)    Goal: Interprofessional Rounds/Family Conf  Outcome: Ongoing (interventions implemented as appropriate)      Problem: Overarching Goals (Adult)  Goal: Adheres to Safety Considerations for Self and Others  Outcome: Ongoing (interventions implemented as appropriate)    Goal: Optimized Coping Skills in Response to Life Stressors  Outcome: Ongoing (interventions implemented as appropriate)    Goal: Develops/Participates in Therapeutic [place] to Support Successful Transition  Outcome: Ongoing (interventions implemented as appropriate)

## 2019-01-08 NOTE — PLAN OF CARE
Problem: Overarching Goals (Adult)  Goal: Adheres to Safety Considerations for Self and Others  Outcome: Ongoing (interventions implemented as appropriate)   01/07/19 2043   Overarching Goals (Adult)   Adheres to Safety Considerations for Self and Others making progress toward outcome     Goal: Optimized Coping Skills in Response to Life Stressors  Outcome: Ongoing (interventions implemented as appropriate)   01/07/19 2043   Overarching Goals (Adult)   Optimized Coping Skills in Response to Life Stressors making progress toward outcome     Goal: Develops/Participates in Therapeutic [place] to Support Successful Transition  Outcome: Ongoing (interventions implemented as appropriate)   01/07/19 2043   Overarching Goals (Adult)   Develops/Participates in Therapeutic [place] to Support Successful Transition making progress toward outcome

## 2019-01-08 NOTE — PLAN OF CARE
Problem: Patient Care Overview  Goal: Discharge Needs Assessment  Outcome: Ongoing (interventions implemented as appropriate)    Goal: Interprofessional Rounds/Family Conf  Outcome: Ongoing (interventions implemented as appropriate)      Problem: Overarching Goals (Adult)  Goal: Adheres to Safety Considerations for Self and Others  Outcome: Ongoing (interventions implemented as appropriate)    Goal: Optimized Coping Skills in Response to Life Stressors  Outcome: Ongoing (interventions implemented as appropriate)    Goal: Develops/Participates in Therapeutic [place] to Support Successful Transition  Outcome: Ongoing (interventions implemented as appropriate)      Problem: Suicidal Behavior (Adult)  Goal: Suicidal Behavior is Absent/Minimized/Managed  Outcome: Ongoing (interventions implemented as appropriate)      Problem: Alcohol Withdrawal Acute, Risk/Actual (Adult)  Goal: Signs and Symptoms of Listed Potential Problems Will be Absent, Minimized or Managed (Alcohol Withdrawal Acute, Risk/Actual)  Outcome: Ongoing (interventions implemented as appropriate)

## 2019-01-08 NOTE — NURSING NOTE
Behavior     Anxiety: Excess Worry  Depression: depressed mood and hopelessness  Pain  0  AVH   no  S/I   no  H/I   no    Affect   mood-congruent    Note: Patient's affect is depressed with frown and slouched shoulders. Patient requested vistaril for anxiety this morning. Patient participates in group and socializes with peers.       Intervention    Instructed in medication usage and effects  Medications administered as ordered  Encouraged to verbalize needs      Response    Verbalized understanding   Did patient take medications as ordered yes   Did patient interact with assessment?  yes     Plan    Will monitor for safety  Will monitor every 15 minutes as ordered  Will evaluate and promote the plan of care    Last BM:  unknown date  (Please chart in I/O as well)

## 2019-01-08 NOTE — NURSING NOTE
"Behavior     Anxiety: Excess Worry  Depression: depressed mood  Pain  0  AVH   no  S/I   no  H/I   no    Affect   mood-congruent    Note: Patient sitting in hallway on assessment. Patient reports anxiety and depression but states that her anxiety \"is better.\" Patient's CIWA score is 1 at this time. Patient is cooperative. Makes appropriate eye contact. Took all medications as ordered. Requested PRN Trazodone for sleep. Encouraged open communication with staff. No S/S of distress. Will continue to monitor.       Intervention    Instructed in medication usage and effects  Medications administered as ordered  Encouraged to verbalize needs      Response    Verbalized understanding   Did patient take medications as ordered yes   Did patient interact with assessment?  yes     Plan    Will monitor for safety  Will monitor every 15 minutes as ordered  Will evaluate and promote the plan of care    Last BM:  unknown date  (Please chart in I/O as well)    "

## 2019-01-09 PROCEDURE — 99232 SBSQ HOSP IP/OBS MODERATE 35: CPT | Performed by: PSYCHIATRY & NEUROLOGY

## 2019-01-09 RX ORDER — TERAZOSIN 1 MG/1
1 CAPSULE ORAL DAILY
Status: DISCONTINUED | OUTPATIENT
Start: 2019-01-10 | End: 2019-01-10 | Stop reason: HOSPADM

## 2019-01-09 RX ORDER — NALTREXONE HYDROCHLORIDE 50 MG/1
50 TABLET, FILM COATED ORAL DAILY
Status: DISCONTINUED | OUTPATIENT
Start: 2019-01-11 | End: 2019-01-10 | Stop reason: HOSPADM

## 2019-01-09 RX ORDER — NALTREXONE HYDROCHLORIDE 50 MG/1
25 TABLET, FILM COATED ORAL DAILY
Status: COMPLETED | OUTPATIENT
Start: 2019-01-10 | End: 2019-01-10

## 2019-01-09 RX ORDER — TERAZOSIN 2 MG/1
2 CAPSULE ORAL NIGHTLY
Status: DISCONTINUED | OUTPATIENT
Start: 2019-01-09 | End: 2019-01-10 | Stop reason: HOSPADM

## 2019-01-09 RX ORDER — TRAZODONE HYDROCHLORIDE 100 MG/1
100 TABLET ORAL NIGHTLY PRN
Status: DISCONTINUED | OUTPATIENT
Start: 2019-01-09 | End: 2019-01-10 | Stop reason: HOSPADM

## 2019-01-09 RX ADMIN — HYDROXYZINE PAMOATE 25 MG: 25 CAPSULE ORAL at 20:17

## 2019-01-09 RX ADMIN — THERA TABS 1 TABLET: TAB at 08:20

## 2019-01-09 RX ADMIN — HYDROXYZINE PAMOATE 50 MG: 50 CAPSULE ORAL at 11:12

## 2019-01-09 RX ADMIN — TERAZOSIN HYDROCHLORIDE 1 MG: 1 CAPSULE ORAL at 08:20

## 2019-01-09 RX ADMIN — HYDROXYZINE PAMOATE 25 MG: 25 CAPSULE ORAL at 08:20

## 2019-01-09 RX ADMIN — NICOTINE 1 PATCH: 21 PATCH TRANSDERMAL at 08:19

## 2019-01-09 RX ADMIN — Medication 100 MG: at 08:20

## 2019-01-09 RX ADMIN — TERAZOSIN HYDROCHLORIDE ANHYDROUS 2 MG: 2 CAPSULE ORAL at 20:17

## 2019-01-09 RX ADMIN — FOLIC ACID 1 MG: 1 TABLET ORAL at 08:20

## 2019-01-09 RX ADMIN — DULOXETINE 60 MG: 60 CAPSULE, DELAYED RELEASE ORAL at 08:20

## 2019-01-09 RX ADMIN — HYDROXYZINE PAMOATE 25 MG: 25 CAPSULE ORAL at 16:07

## 2019-01-09 RX ADMIN — TRAZODONE HYDROCHLORIDE 100 MG: 100 TABLET ORAL at 20:17

## 2019-01-09 NOTE — PROGRESS NOTES
1/9/2019    Chief Complaint: suicidal ideation    Subjective:  Patient is a 53 y.o. female who was hospitalized for suicidal ideation.       Patient reports that she had trouble sleeping last night.  She notes anxiety is better this morning.  She notes no side effects to the terazosin.  No withdrawal.    Objective     Vital Signs    Temp:  [97.1 °F (36.2 °C)-98.7 °F (37.1 °C)] 98.7 °F (37.1 °C)  Heart Rate:  [90-96] 96  Resp:  [18] 18  BP: (122-140)/(76-90) 140/90    Physical Exam:   General Appearance: alert, appears stated age and cooperative,  Hygiene:   good  Gait & Station: Normal  Musculoskeletal: No tremors or abnormal involuntary movements    Mental Status Exam:   Cooperation:  Cooperative  Eye Contact:  Good  Psychomotor Behavior:  Appropriate  Mood: Improving but still notes depression  Affect:  mood-congruent  Speech:  Normal  Thought Process:  Coherent  Associations: Goal Directed  Thought Content:     Normal   Suicidal:  Denies   Homicidal:  None   Hallucinations:  None   Delusion:  None  Cognitive Functioning:   Consciousness: awake, alert and oriented  Reliability:  good  Insight:  Fair  Judgement:  improving  Impulse Control:  Fair    Lab Results (last 24 hours)     ** No results found for the last 24 hours. **        Imaging Results (last 24 hours)     ** No results found for the last 24 hours. **          Medicine:   Current Facility-Administered Medications:   •  acetaminophen (TYLENOL) tablet 650 mg, 650 mg, Oral, Q4H PRN, Harrison Quach MD  •  aluminum-magnesium hydroxide-simethicone (MAALOX MAX) 400-400-40 MG/5ML suspension 15 mL, 15 mL, Oral, Q6H PRN, Harrison Quach MD  •  CloNIDine (CATAPRES) tablet 0.1 mg, 0.1 mg, Oral, Q4H PRN, Harrison Quach MD, 0.1 mg at 01/06/19 2129  •  DULoxetine (CYMBALTA) DR capsule 60 mg, 60 mg, Oral, Daily, Harrison Quach MD, 60 mg at 01/09/19 0820  •  hydrOXYzine (VISTARIL) capsule 25 mg, 25 mg, Oral, TID, Harrison Quach MD, 25 mg at  01/09/19 0820  •  hydrOXYzine (VISTARIL) capsule 50 mg, 50 mg, Oral, Q4H PRN, Harrison Quach MD, 50 mg at 01/09/19 1112  •  loperamide (IMODIUM) capsule 2 mg, 2 mg, Oral, 4x Daily PRN, Harrison Quach MD  •  LORazepam (ATIVAN) tablet 1 mg, 1 mg, Oral, Q2H PRN, Harrison Quach MD, 1 mg at 01/06/19 2129  •  magnesium hydroxide (MILK OF MAGNESIA) suspension 2400 mg/10mL 10 mL, 10 mL, Oral, Daily PRN, Harrison Quach MD  •  nicotine (NICODERM CQ) 21 MG/24HR patch 1 patch, 1 patch, Transdermal, Q24H, Harrison Quach MD, 1 patch at 01/09/19 0819  •  ondansetron (ZOFRAN) tablet 4 mg, 4 mg, Oral, Q6H PRN, Harrison Quach MD  •  terazosin (HYTRIN) capsule 1 mg, 1 mg, Oral, Q12H, Harrison Quach MD, 1 mg at 01/09/19 0820  •  traZODone (DESYREL) tablet 50 mg, 50 mg, Oral, Nightly PRN, Harrison Quach MD, 50 mg at 01/08/19 2024    Diagnoses/Assessment:     Post traumatic stress disorder (PTSD)    Suicidal ideation    Severe episode of recurrent major depressive disorder, without psychotic features (CMS/HCC)    Alcohol use disorder, severe, dependence (CMS/HCC)      Treatment Plan:    1) Will continue care for the patient on the behavioral health unit at [facility] to ensure patient safety.  2) Will continue to provide treatment with the unit milieu, activities, therapies and psychopharmacological management.  3) Patient to be placed on or continued on  Q15 minute checks  and Suicide and Seizure precautions.  4) Pertinent medical issues: no active medical concerns.  5) Will order following labs: none  6) Will make the following medication changes: Will cont cymbalta 60mg daily.  Will increase the terazosin to 1mg qam and 2mg qhs.  Will continue vistaril 25mg tid for anxiety.  Will increase the trazodone to 100mg qhs prn for sleep.  Will start naltrexone 25mg daily tomorrow morning.  7) Will continue discharge planning as appropriate for patient.  8) Psychotherapy provided for 20  minutes.  Provided CBT for insomnia and discussed relaxations techniques.    Treatment plan and medication risks and benefits discussed with: Patient    Harrison Quach MD  01/09/19  2:58 PM

## 2019-01-09 NOTE — PLAN OF CARE
Problem: Overarching Goals (Adult)  Goal: Adheres to Safety Considerations for Self and Others  Outcome: Ongoing (interventions implemented as appropriate)    Goal: Optimized Coping Skills in Response to Life Stressors  Outcome: Ongoing (interventions implemented as appropriate)    Goal: Develops/Participates in Therapeutic [place] to Support Successful Transition  Outcome: Ongoing (interventions implemented as appropriate)      Problem: Suicidal Behavior (Adult)  Goal: Suicidal Behavior is Absent/Minimized/Managed  Outcome: Ongoing (interventions implemented as appropriate)      Problem: Alcohol Withdrawal Acute, Risk/Actual (Adult)  Goal: Signs and Symptoms of Listed Potential Problems Will be Absent, Minimized or Managed (Alcohol Withdrawal Acute, Risk/Actual)  Outcome: Ongoing (interventions implemented as appropriate)

## 2019-01-09 NOTE — PLAN OF CARE
Problem: Patient Care Overview  Goal: Individualization and Mutuality  Outcome: Ongoing (interventions implemented as appropriate)    Goal: Discharge Needs Assessment  Outcome: Ongoing (interventions implemented as appropriate)    Goal: Interprofessional Rounds/Family Conf  Outcome: Ongoing (interventions implemented as appropriate)      Problem: Overarching Goals (Adult)  Goal: Adheres to Safety Considerations for Self and Others  Outcome: Ongoing (interventions implemented as appropriate)    Goal: Optimized Coping Skills in Response to Life Stressors  Outcome: Ongoing (interventions implemented as appropriate)    Goal: Develops/Participates in Therapeutic [place] to Support Successful Transition  Outcome: Ongoing (interventions implemented as appropriate)      Problem: Suicidal Behavior (Adult)  Goal: Suicidal Behavior is Absent/Minimized/Managed  Outcome: Ongoing (interventions implemented as appropriate)

## 2019-01-09 NOTE — PROGRESS NOTES
Met with patient and completed Recreation Therapy Assessment.  Patient states she used to be very active and social.  She used to enjoy seeing friends, going shopping, riding bikes and writing poetry.  She states that she has quit doing everything.  She reports that she loves working but recently lost her job.  Patient states that she drinks to cope with stress.  She states that she wants to get back to enjoying life and doing the things she used to do.  Patient will be encouraged to participate in all groups and increase motivation for normal leisure interest.

## 2019-01-09 NOTE — NURSING NOTE
Behavior     Anxiety: Restless/Edgy  Depression: depressed mood  Pain  0  AVH   no  S/I   no  H/I   no    Affect   flat    Note: Patient interviewed in her room. Her affect is flat, she has a depressed mood. She denies having any AVH, S/I, or H/I. She slept well last night. She is still having some anxiety and depression. She interacts well with staff and peers. No abnormal movements or gestures. She states she is feeling some better. Says before she came in she felt helpless, she states that she had been off of her medications for a while.           Intervention    Instructed in medication usage and effects  Medications administered as ordered  Encouraged to verbalize needs      Response    Verbalized understanding   Did patient take medications as ordered yes   Did patient interact with assessment?  yes     Plan    Will monitor for safety  Will monitor every 15 minutes as ordered  Will evaluate and promote the plan of care    Last BM:  01/08/19  (Please chart in I/O as well)

## 2019-01-10 VITALS
BODY MASS INDEX: 20.06 KG/M2 | SYSTOLIC BLOOD PRESSURE: 122 MMHG | TEMPERATURE: 97.6 F | WEIGHT: 109 LBS | DIASTOLIC BLOOD PRESSURE: 69 MMHG | OXYGEN SATURATION: 97 % | HEIGHT: 62 IN | RESPIRATION RATE: 18 BRPM | HEART RATE: 91 BPM

## 2019-01-10 PROBLEM — R45.851 SUICIDAL IDEATION: Status: RESOLVED | Noted: 2019-01-05 | Resolved: 2019-01-10

## 2019-01-10 PROCEDURE — 99238 HOSP IP/OBS DSCHRG MGMT 30/<: CPT | Performed by: PSYCHIATRY & NEUROLOGY

## 2019-01-10 RX ORDER — NALTREXONE HYDROCHLORIDE 50 MG/1
50 TABLET, FILM COATED ORAL DAILY
Qty: 30 TABLET | Refills: 0 | Status: SHIPPED | OUTPATIENT
Start: 2019-01-11 | End: 2019-01-24 | Stop reason: SDUPTHER

## 2019-01-10 RX ORDER — TRAZODONE HYDROCHLORIDE 100 MG/1
100 TABLET ORAL NIGHTLY PRN
Qty: 30 TABLET | Refills: 0 | Status: SHIPPED | OUTPATIENT
Start: 2019-01-10 | End: 2019-01-24 | Stop reason: DRUGHIGH

## 2019-01-10 RX ORDER — DULOXETIN HYDROCHLORIDE 60 MG/1
60 CAPSULE, DELAYED RELEASE ORAL DAILY
Qty: 30 CAPSULE | Refills: 0 | Status: SHIPPED | OUTPATIENT
Start: 2019-01-11 | End: 2019-01-24 | Stop reason: SDUPTHER

## 2019-01-10 RX ORDER — HYDROXYZINE PAMOATE 25 MG/1
25 CAPSULE ORAL 3 TIMES DAILY
Qty: 90 CAPSULE | Refills: 0 | Status: SHIPPED | OUTPATIENT
Start: 2019-01-10 | End: 2019-01-24

## 2019-01-10 RX ORDER — TERAZOSIN 2 MG/1
2 CAPSULE ORAL 2 TIMES DAILY
Qty: 60 CAPSULE | Refills: 0 | Status: SHIPPED | OUTPATIENT
Start: 2019-01-10 | End: 2019-01-24 | Stop reason: SDUPTHER

## 2019-01-10 RX ADMIN — TERAZOSIN HYDROCHLORIDE ANHYDROUS 1 MG: 2 CAPSULE ORAL at 08:11

## 2019-01-10 RX ADMIN — DULOXETINE 60 MG: 60 CAPSULE, DELAYED RELEASE ORAL at 08:10

## 2019-01-10 RX ADMIN — HYDROXYZINE PAMOATE 25 MG: 25 CAPSULE ORAL at 08:11

## 2019-01-10 RX ADMIN — HYDROXYZINE PAMOATE 50 MG: 50 CAPSULE ORAL at 12:36

## 2019-01-10 RX ADMIN — Medication 25 MG: at 08:11

## 2019-01-10 RX ADMIN — NICOTINE 1 PATCH: 21 PATCH TRANSDERMAL at 08:13

## 2019-01-10 NOTE — PLAN OF CARE
Problem: Suicidal Behavior (Adult)  Goal: Suicidal Behavior is Absent/Minimized/Managed  Outcome: Outcome(s) achieved Date Met: 01/10/19      Problem: Alcohol Withdrawal Acute, Risk/Actual (Adult)  Goal: Signs and Symptoms of Listed Potential Problems Will be Absent, Minimized or Managed (Alcohol Withdrawal Acute, Risk/Actual)  Outcome: Outcome(s) achieved Date Met: 01/10/19

## 2019-01-10 NOTE — PLAN OF CARE
Problem: Suicidal Behavior (Adult)  Goal: Suicidal Behavior is Absent/Minimized/Managed  Outcome: Ongoing (interventions implemented as appropriate)      Problem: Alcohol Withdrawal Acute, Risk/Actual (Adult)  Goal: Signs and Symptoms of Listed Potential Problems Will be Absent, Minimized or Managed (Alcohol Withdrawal Acute, Risk/Actual)  Outcome: Ongoing (interventions implemented as appropriate)

## 2019-01-10 NOTE — NURSING NOTE
Pt discharged per Dr's orders . Aftercare and discharge plan reviewed with pt and copy given. Scripts called into [facility] pharmacy. Pt had all personal belongings upon leaving.  She was alert, oriented x3 verbal and ambulatory upon discharge.

## 2019-01-10 NOTE — NURSING NOTE
Behavior     Anxiety: Restless/Edgy  Depression: depressed mood  Pain  0  AVH   no  S/I   no  H/I   no    Affect   flat    Note:Patient in visitation room interacting with peers. She states that she is having some very mild anxiety and depression at this time but states she received vistaril earlier for anxiety attack and it helped her calm down. She states she had a better day today but had restless sleep last night due to two nightmares. Patient request something for sleep, PRN trazodone given.    Will continue to monitor and provide for needs.      Intervention    Instructed in medication usage and effects  Medications administered as ordered  Encouraged to verbalize needs      Response    Verbalized understanding   Did patient take medications as ordered yes   Did patient interact with assessment?  yes     Plan    Will monitor for safety  Will monitor every 15 minutes as ordered  Will evaluate and promote the plan of care    Last BM:  unknown date  (Please chart in I/O as well)

## 2019-01-10 NOTE — PLAN OF CARE
Problem: Patient Care Overview  Goal: Individualization and Mutuality  Outcome: Ongoing (interventions implemented as appropriate)    Goal: Discharge Needs Assessment  Outcome: Ongoing (interventions implemented as appropriate)    Goal: Interprofessional Rounds/Family Conf  Outcome: Ongoing (interventions implemented as appropriate)      Problem: Overarching Goals (Adult)  Goal: Adheres to Safety Considerations for Self and Others  Outcome: Ongoing (interventions implemented as appropriate)    Goal: Optimized Coping Skills in Response to Life Stressors  Outcome: Ongoing (interventions implemented as appropriate)    Goal: Develops/Participates in Therapeutic [place] to Support Successful Transition  Outcome: Ongoing (interventions implemented as appropriate)      Problem: Suicidal Behavior (Adult)  Goal: Suicidal Behavior is Absent/Minimized/Managed  Outcome: Ongoing (interventions implemented as appropriate)
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
## 2019-01-10 NOTE — NURSING NOTE
Behavior     Anxiety: Patient denies at this time  Depression: Patient denies at this time  Pain  0  AVH   no  S/I   no  H/I   no    Affect   flat    Note:See previous note      Intervention    Instructed in medication usage and effects  Medications administered as ordered  Encouraged to verbalize needs      Response    Verbalized understanding   Did patient take medications as ordered yes   Did patient interact with assessment?  yes     Plan    Will monitor for safety  Will monitor every 15 minutes as ordered  Will evaluate and promote the plan of care    Last BM:  01.09/19  (Please chart in I/O as well)

## 2019-01-10 NOTE — SIGNIFICANT NOTE
"   01/10/19 1125   Individual Counseling   Length of Session 15   Topic Dc planning/ safety plan   Patient Response Pt presented in interview room dressed appropriately. Mood was calm, affect was bright, Pt. reported that she came in because she was intoxicated with alcohol, the  has scheduled pt. at [facility] and have refereed pt. to outpatient rehab. Per pt. \"I wish I had knew the resources before and there is a medication which can help me with the craving\" Pt. 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 Per pt. she feels safe going back at the [employer] as his boyfriend and dog stays there too . Pt. will take [employer] to go back to [employer]. CSW educated pt on Crisis Hotline. BPRS was completed upon dc. Pt did participate in individual and group tx, was med compliant.      "

## 2019-01-10 NOTE — NURSING NOTE
Pt is alert, oriented x3 verbal and ambulatory.Reports sleeping well last night. Appropriate interaction with staff and peers. Took meds with no problem. Denies depression or anxiety at this time. Will monitor for safety.

## 2019-01-24 ENCOUNTER — LAB (OUTPATIENT)
Dept: LAB | Facility: HOSPITAL | Age: 54
End: 2019-01-24

## 2019-01-24 ENCOUNTER — OFFICE VISIT (OUTPATIENT)
Dept: FAMILY MEDICINE CLINIC | Facility: CLINIC | Age: 54
End: 2019-01-24

## 2019-01-24 VITALS
WEIGHT: 114.13 LBS | OXYGEN SATURATION: 96 % | SYSTOLIC BLOOD PRESSURE: 136 MMHG | BODY MASS INDEX: 21 KG/M2 | DIASTOLIC BLOOD PRESSURE: 78 MMHG | HEART RATE: 107 BPM | HEIGHT: 62 IN

## 2019-01-24 DIAGNOSIS — F43.10 PTSD (POST-TRAUMATIC STRESS DISORDER): ICD-10-CM

## 2019-01-24 DIAGNOSIS — F31.9 BIPOLAR 1 DISORDER (HCC): ICD-10-CM

## 2019-01-24 DIAGNOSIS — F31.9 BIPOLAR 1 DISORDER (HCC): Primary | ICD-10-CM

## 2019-01-24 DIAGNOSIS — F10.10 ALCOHOL ABUSE: ICD-10-CM

## 2019-01-24 LAB
ALBUMIN SERPL-MCNC: 4.5 G/DL (ref 3.4–4.8)
ALBUMIN/GLOB SERPL: 1.4 G/DL (ref 1.1–1.8)
ALP SERPL-CCNC: 85 U/L (ref 38–126)
ALT SERPL W P-5'-P-CCNC: 10 U/L (ref 9–52)
ANION GAP SERPL CALCULATED.3IONS-SCNC: 6 MMOL/L (ref 5–15)
AST SERPL-CCNC: 17 U/L (ref 14–36)
BASOPHILS # BLD AUTO: 0.02 10*3/MM3 (ref 0–0.2)
BASOPHILS NFR BLD AUTO: 0.5 % (ref 0–2)
BILIRUB SERPL-MCNC: 0.3 MG/DL (ref 0.2–1.3)
BUN BLD-MCNC: 13 MG/DL (ref 7–21)
BUN/CREAT SERPL: 14.3 (ref 7–25)
CALCIUM SPEC-SCNC: 9.8 MG/DL (ref 8.4–10.2)
CHLORIDE SERPL-SCNC: 100 MMOL/L (ref 95–110)
CO2 SERPL-SCNC: 27 MMOL/L (ref 22–31)
CREAT BLD-MCNC: 0.91 MG/DL (ref 0.5–1)
DEPRECATED RDW RBC AUTO: 42.1 FL (ref 36.4–46.3)
EOSINOPHIL # BLD AUTO: 0.09 10*3/MM3 (ref 0–0.7)
EOSINOPHIL NFR BLD AUTO: 2.4 % (ref 0–7)
ERYTHROCYTE [DISTWIDTH] IN BLOOD BY AUTOMATED COUNT: 12.9 % (ref 11.5–14.5)
GFR SERPL CREATININE-BSD FRML MDRD: 65 ML/MIN/1.73 (ref 51–120)
GLOBULIN UR ELPH-MCNC: 3.3 GM/DL (ref 2.3–3.5)
GLUCOSE BLD-MCNC: 92 MG/DL (ref 60–100)
HCT VFR BLD AUTO: 38.7 % (ref 35–45)
HGB BLD-MCNC: 13.6 G/DL (ref 12–15.5)
IMM GRANULOCYTES # BLD AUTO: 0.01 10*3/MM3 (ref 0–0.02)
IMM GRANULOCYTES NFR BLD AUTO: 0.3 % (ref 0–0.5)
LYMPHOCYTES # BLD AUTO: 1.61 10*3/MM3 (ref 0.6–4.2)
LYMPHOCYTES NFR BLD AUTO: 42.6 % (ref 10–50)
MCH RBC QN AUTO: 31.3 PG (ref 26.5–34)
MCHC RBC AUTO-ENTMCNC: 35.1 G/DL (ref 31.4–36)
MCV RBC AUTO: 89 FL (ref 80–98)
MONOCYTES # BLD AUTO: 0.7 10*3/MM3 (ref 0–0.9)
MONOCYTES NFR BLD AUTO: 18.5 % (ref 0–12)
NEUTROPHILS # BLD AUTO: 1.35 10*3/MM3 (ref 2–8.6)
NEUTROPHILS NFR BLD AUTO: 35.7 % (ref 37–80)
PLATELET # BLD AUTO: 279 10*3/MM3 (ref 150–450)
PMV BLD AUTO: 9.6 FL (ref 8–12)
POTASSIUM BLD-SCNC: 4.2 MMOL/L (ref 3.5–5.1)
PROT SERPL-MCNC: 7.8 G/DL (ref 6.3–8.6)
RBC # BLD AUTO: 4.35 10*6/MM3 (ref 3.77–5.16)
SODIUM BLD-SCNC: 133 MMOL/L (ref 137–145)
WBC NRBC COR # BLD: 3.78 10*3/MM3 (ref 3.2–9.8)

## 2019-01-24 PROCEDURE — 85025 COMPLETE CBC W/AUTO DIFF WBC: CPT

## 2019-01-24 PROCEDURE — 80053 COMPREHEN METABOLIC PANEL: CPT

## 2019-01-24 PROCEDURE — 99214 OFFICE O/P EST MOD 30 MIN: CPT | Performed by: FAMILY MEDICINE

## 2019-01-24 PROCEDURE — 36415 COLL VENOUS BLD VENIPUNCTURE: CPT

## 2019-01-24 RX ORDER — QUETIAPINE FUMARATE 50 MG/1
50 TABLET, FILM COATED ORAL NIGHTLY
Qty: 30 TABLET | Refills: 2 | Status: SHIPPED | OUTPATIENT
Start: 2019-01-24 | End: 2019-04-10 | Stop reason: SDUPTHER

## 2019-01-24 RX ORDER — NALTREXONE HYDROCHLORIDE 50 MG/1
50 TABLET, FILM COATED ORAL DAILY
Qty: 30 TABLET | Refills: 1 | Status: SHIPPED | OUTPATIENT
Start: 2019-01-24 | End: 2019-02-12 | Stop reason: SDUPTHER

## 2019-01-24 RX ORDER — HYDROXYZINE PAMOATE 25 MG/1
50 CAPSULE ORAL 3 TIMES DAILY PRN
Qty: 180 CAPSULE | Refills: 2 | Status: SHIPPED | OUTPATIENT
Start: 2019-01-24 | End: 2020-05-19 | Stop reason: HOSPADM

## 2019-01-24 RX ORDER — TERAZOSIN 2 MG/1
2 CAPSULE ORAL 2 TIMES DAILY
Qty: 60 CAPSULE | Refills: 1 | Status: SHIPPED | OUTPATIENT
Start: 2019-01-24 | End: 2019-02-12 | Stop reason: SDUPTHER

## 2019-01-24 RX ORDER — DULOXETIN HYDROCHLORIDE 60 MG/1
60 CAPSULE, DELAYED RELEASE ORAL DAILY
Qty: 30 CAPSULE | Refills: 1 | Status: SHIPPED | OUTPATIENT
Start: 2019-01-24 | End: 2019-02-12 | Stop reason: SDUPTHER

## 2019-01-24 NOTE — PROGRESS NOTES
Subjective   Laura Bruce is a 53 y.o. female.    cc: Establish Care  History of Present Illness The patient comes in for check of their chronic medical issues which include PTSD,Bipolar and Alcohol Abuse. She has been hospitalized recently on the Psychiatry Floor. She is doing better. She needs medications refilled. .      The following portions of the patient's history were reviewed and updated as appropriate: allergies, current medications, past family history, past medical history, past social history, past surgical history and problem list.    Review of Systems   Constitutional: Negative for fatigue and fever.   Respiratory: Negative for cough, chest tightness and stridor.    Cardiovascular: Negative for chest pain and leg swelling.   Psychiatric/Behavioral: Positive for agitation, dysphoric mood, sleep disturbance, depressed mood and stress. Negative for behavioral problems, decreased concentration, hallucinations and negative for hyperactivity. The patient is nervous/anxious.    All other systems reviewed and are negative.      Objective   Physical Exam   Constitutional: She appears well-developed and well-nourished.   HENT:   Head: Normocephalic and atraumatic.   Right Ear: External ear normal.   Left Ear: External ear normal.   Nose: Nose normal.   Mouth/Throat: Oropharynx is clear and moist.   Eyes: Pupils are equal, round, and reactive to light.   Neck: Normal range of motion.   Cardiovascular: Normal rate, regular rhythm and normal heart sounds. Exam reveals no gallop and no friction rub.   No murmur heard.  Pulmonary/Chest: Effort normal and breath sounds normal. No stridor. No respiratory distress. She has no wheezes. She has no rales.   Abdominal: Soft. Bowel sounds are normal. She exhibits no distension and no mass. There is no tenderness. There is no guarding.   Skin: Skin is warm and dry.   Vitals reviewed.        Assessment/Plan   Laura was seen today for establish care.    Diagnoses and all  orders for this visit:    Bipolar 1 disorder (CMS/HCC)  -     Comprehensive metabolic panel; Future  -     CBC w AUTO Differential; Future    PTSD (post-traumatic stress disorder)    Alcohol abuse    Other orders  -     DULoxetine (CYMBALTA) 60 MG capsule; Take 1 capsule by mouth Daily.  -     terazosin (HYTRIN) 2 MG capsule; Take 1 capsule by mouth 2 (Two) Times a Day.  -     naltrexone (DEPADE) 50 MG tablet; Take 1 tablet by mouth Daily.  -     hydrOXYzine pamoate (VISTARIL) 25 MG capsule; Take 2 capsules by mouth 3 (Three) Times a Day As Needed for Itching.  -     QUEtiapine (SEROQUEL) 50 MG tablet; Take 1 tablet by mouth Every Night.      Return to the clinic in 1 month/s.  Will contact with results as needed.

## 2019-02-12 ENCOUNTER — TELEPHONE (OUTPATIENT)
Dept: FAMILY MEDICINE CLINIC | Facility: CLINIC | Age: 54
End: 2019-02-12

## 2019-02-12 RX ORDER — NALTREXONE HYDROCHLORIDE 50 MG/1
50 TABLET, FILM COATED ORAL DAILY
Qty: 30 TABLET | Refills: 1 | Status: SHIPPED | OUTPATIENT
Start: 2019-02-12 | End: 2020-07-07 | Stop reason: HOSPADM

## 2019-02-12 RX ORDER — TERAZOSIN 2 MG/1
2 CAPSULE ORAL 2 TIMES DAILY
Qty: 60 CAPSULE | Refills: 1 | Status: SHIPPED | OUTPATIENT
Start: 2019-02-12 | End: 2019-04-25 | Stop reason: SDUPTHER

## 2019-02-12 RX ORDER — DULOXETIN HYDROCHLORIDE 60 MG/1
60 CAPSULE, DELAYED RELEASE ORAL DAILY
Qty: 30 CAPSULE | Refills: 1 | Status: SHIPPED | OUTPATIENT
Start: 2019-02-12 | End: 2019-04-25 | Stop reason: SDUPTHER

## 2019-02-12 NOTE — TELEPHONE ENCOUNTER
ASPEN BENOIT IS NEEDING REFILLS TO BE SENT TO [facility] FOR:  CYMBALTA  NALTREXONE 50MG  TERAZOSIN 2MG  HER# [phone]

## 2019-02-15 ENCOUNTER — TELEPHONE (OUTPATIENT)
Dept: FAMILY MEDICINE CLINIC | Facility: CLINIC | Age: 54
End: 2019-02-15

## 2019-04-10 ENCOUNTER — LAB (OUTPATIENT)
Dept: LAB | Facility: HOSPITAL | Age: 54
End: 2019-04-10

## 2019-04-10 ENCOUNTER — OFFICE VISIT (OUTPATIENT)
Dept: FAMILY MEDICINE CLINIC | Facility: CLINIC | Age: 54
End: 2019-04-10

## 2019-04-10 VITALS
DIASTOLIC BLOOD PRESSURE: 70 MMHG | WEIGHT: 107.5 LBS | BODY MASS INDEX: 19.78 KG/M2 | HEART RATE: 84 BPM | SYSTOLIC BLOOD PRESSURE: 126 MMHG | OXYGEN SATURATION: 98 % | HEIGHT: 62 IN

## 2019-04-10 DIAGNOSIS — F43.21 GRIEF: Primary | ICD-10-CM

## 2019-04-10 DIAGNOSIS — F32.1 CURRENT MODERATE EPISODE OF MAJOR DEPRESSIVE DISORDER, UNSPECIFIED WHETHER RECURRENT (HCC): ICD-10-CM

## 2019-04-10 DIAGNOSIS — F43.10 POST TRAUMATIC STRESS DISORDER (PTSD): ICD-10-CM

## 2019-04-10 PROCEDURE — 80053 COMPREHEN METABOLIC PANEL: CPT

## 2019-04-10 PROCEDURE — 85025 COMPLETE CBC W/AUTO DIFF WBC: CPT

## 2019-04-10 PROCEDURE — 99214 OFFICE O/P EST MOD 30 MIN: CPT | Performed by: FAMILY MEDICINE

## 2019-04-10 PROCEDURE — 36415 COLL VENOUS BLD VENIPUNCTURE: CPT

## 2019-04-10 RX ORDER — QUETIAPINE FUMARATE 50 MG/1
100 TABLET, FILM COATED ORAL NIGHTLY
Qty: 60 TABLET | Refills: 2 | Status: SHIPPED | OUTPATIENT
Start: 2019-04-10 | End: 2019-09-25 | Stop reason: SDUPTHER

## 2019-04-10 NOTE — PROGRESS NOTES
Subjective   Laura Bruce is a 53 y.o. female.   Cc:dot  History of Present Illness The patient comes in for check of her PTSD and Depression. She is going through the loss of her boyfriend to cancer. SHe is quite upset.     The following portions of the patient's history were reviewed and updated as appropriate: allergies, current medications, past family history, past medical history, past social history, past surgical history and problem list.    Review of Systems   Constitutional: Negative for fatigue and fever.   Respiratory: Negative for cough, chest tightness and stridor.    Cardiovascular: Negative for chest pain, palpitations and leg swelling.   Psychiatric/Behavioral: Positive for dysphoric mood and depressed mood. Negative for agitation.       Objective   Physical Exam   Constitutional: She appears well-developed and well-nourished.   HENT:   Head: Normocephalic and atraumatic.   Right Ear: External ear normal.   Left Ear: External ear normal.   Nose: Nose normal.   Mouth/Throat: Oropharynx is clear and moist.   Eyes: Pupils are equal, round, and reactive to light.   Neck: Normal range of motion.   Cardiovascular: Normal rate, regular rhythm and normal heart sounds. Exam reveals no gallop and no friction rub.   No murmur heard.  Pulmonary/Chest: Effort normal and breath sounds normal. No stridor. No respiratory distress. She has no wheezes. She has no rales.   Abdominal: Soft. Bowel sounds are normal. She exhibits no distension. There is no tenderness. There is no guarding.   Neurological: She is alert.   Skin: Skin is warm and dry.   Vitals reviewed.        Assessment/Plan   Laura was seen today for follow-up.    Diagnoses and all orders for this visit:    Grief    Post traumatic stress disorder (PTSD)    Current moderate episode of major depressive disorder, unspecified whether recurrent (CMS/HCC)  -     Comprehensive metabolic panel; Future  -     CBC w AUTO Differential; Future    Other  orders  -     QUEtiapine (SEROQUEL) 50 MG tablet; Take 2 tablets by mouth Every Night.      Return to the clinic in 6 weeks/s.  Will contact with results as needed.

## 2019-04-11 LAB
ALBUMIN SERPL-MCNC: 4.3 G/DL (ref 3.5–5.2)
ALBUMIN/GLOB SERPL: 1.1 G/DL
ALP SERPL-CCNC: 80 U/L (ref 39–117)
ALT SERPL W P-5'-P-CCNC: 12 U/L (ref 1–33)
ANION GAP SERPL CALCULATED.3IONS-SCNC: 14.3 MMOL/L
AST SERPL-CCNC: 9 U/L (ref 1–32)
BASOPHILS # BLD AUTO: 0.04 10*3/MM3 (ref 0–0.2)
BASOPHILS NFR BLD AUTO: 1.2 % (ref 0–1.5)
BILIRUB SERPL-MCNC: 0.3 MG/DL (ref 0.2–1.2)
BUN BLD-MCNC: 14 MG/DL (ref 6–20)
BUN/CREAT SERPL: 15.2 (ref 7–25)
CALCIUM SPEC-SCNC: 9.9 MG/DL (ref 8.6–10.5)
CHLORIDE SERPL-SCNC: 101 MMOL/L (ref 98–107)
CO2 SERPL-SCNC: 22.7 MMOL/L (ref 22–29)
CREAT BLD-MCNC: 0.92 MG/DL (ref 0.57–1)
DEPRECATED RDW RBC AUTO: 43.8 FL (ref 37–54)
EOSINOPHIL # BLD AUTO: 0.06 10*3/MM3 (ref 0–0.4)
EOSINOPHIL NFR BLD AUTO: 1.7 % (ref 0.3–6.2)
ERYTHROCYTE [DISTWIDTH] IN BLOOD BY AUTOMATED COUNT: 13.3 % (ref 12.3–15.4)
GFR SERPL CREATININE-BSD FRML MDRD: 64 ML/MIN/1.73
GLOBULIN UR ELPH-MCNC: 3.9 GM/DL
GLUCOSE BLD-MCNC: 96 MG/DL (ref 65–99)
HCT VFR BLD AUTO: 41.6 % (ref 34–46.6)
HGB BLD-MCNC: 13.6 G/DL (ref 12–15.9)
IMM GRANULOCYTES # BLD AUTO: 0 10*3/MM3 (ref 0–0.05)
IMM GRANULOCYTES NFR BLD AUTO: 0 % (ref 0–0.5)
LYMPHOCYTES # BLD AUTO: 1.67 10*3/MM3 (ref 0.7–3.1)
LYMPHOCYTES NFR BLD AUTO: 48.3 % (ref 19.6–45.3)
MCH RBC QN AUTO: 29.4 PG (ref 26.6–33)
MCHC RBC AUTO-ENTMCNC: 32.7 G/DL (ref 31.5–35.7)
MCV RBC AUTO: 89.8 FL (ref 79–97)
MONOCYTES # BLD AUTO: 0.5 10*3/MM3 (ref 0.1–0.9)
MONOCYTES NFR BLD AUTO: 14.5 % (ref 5–12)
NEUTROPHILS # BLD AUTO: 1.19 10*3/MM3 (ref 1.4–7)
NEUTROPHILS NFR BLD AUTO: 34.3 % (ref 42.7–76)
NRBC BLD AUTO-RTO: 0 /100 WBC (ref 0–0)
PLATELET # BLD AUTO: 299 10*3/MM3 (ref 140–450)
PMV BLD AUTO: 10.7 FL (ref 6–12)
POTASSIUM BLD-SCNC: 3.5 MMOL/L (ref 3.5–5.2)
PROT SERPL-MCNC: 8.2 G/DL (ref 6–8.5)
RBC # BLD AUTO: 4.63 10*6/MM3 (ref 3.77–5.28)
SODIUM BLD-SCNC: 138 MMOL/L (ref 136–145)
WBC NRBC COR # BLD: 3.46 10*3/MM3 (ref 3.4–10.8)

## 2019-04-25 RX ORDER — TERAZOSIN 2 MG/1
2 CAPSULE ORAL 2 TIMES DAILY
Qty: 60 CAPSULE | Refills: 2 | Status: ON HOLD | OUTPATIENT
Start: 2019-04-25 | End: 2020-05-05

## 2019-04-25 RX ORDER — DULOXETIN HYDROCHLORIDE 60 MG/1
60 CAPSULE, DELAYED RELEASE ORAL DAILY
Qty: 30 CAPSULE | Refills: 2 | Status: SHIPPED | OUTPATIENT
Start: 2019-04-25 | End: 2020-01-27

## 2019-09-26 RX ORDER — QUETIAPINE FUMARATE 50 MG/1
100 TABLET, FILM COATED ORAL NIGHTLY
Qty: 60 TABLET | Refills: 2 | Status: SHIPPED | OUTPATIENT
Start: 2019-09-26 | End: 2020-02-19 | Stop reason: SDUPTHER

## 2020-01-27 ENCOUNTER — OFFICE VISIT (OUTPATIENT)
Dept: FAMILY MEDICINE CLINIC | Facility: CLINIC | Age: 55
End: 2020-01-27

## 2020-01-27 ENCOUNTER — LAB (OUTPATIENT)
Dept: LAB | Facility: HOSPITAL | Age: 55
End: 2020-01-27

## 2020-01-27 VITALS
WEIGHT: 114.3 LBS | DIASTOLIC BLOOD PRESSURE: 86 MMHG | BODY MASS INDEX: 21.04 KG/M2 | HEART RATE: 103 BPM | SYSTOLIC BLOOD PRESSURE: 158 MMHG | HEIGHT: 62 IN | OXYGEN SATURATION: 95 %

## 2020-01-27 DIAGNOSIS — F33.9 RECURRENT MAJOR DEPRESSIVE DISORDER, REMISSION STATUS UNSPECIFIED (HCC): ICD-10-CM

## 2020-01-27 DIAGNOSIS — K64.9 HEMORRHOIDS, UNSPECIFIED HEMORRHOID TYPE: ICD-10-CM

## 2020-01-27 DIAGNOSIS — M25.512 ACUTE PAIN OF LEFT SHOULDER: Primary | ICD-10-CM

## 2020-01-27 DIAGNOSIS — F41.9 ANXIETY: ICD-10-CM

## 2020-01-27 LAB
ALBUMIN SERPL-MCNC: 4.2 G/DL (ref 3.5–5.2)
ALBUMIN/GLOB SERPL: 1 G/DL
ALP SERPL-CCNC: 95 U/L (ref 39–117)
ALT SERPL W P-5'-P-CCNC: 47 U/L (ref 1–33)
ANION GAP SERPL CALCULATED.3IONS-SCNC: 14.8 MMOL/L (ref 5–15)
AST SERPL-CCNC: 22 U/L (ref 1–32)
BASOPHILS # BLD AUTO: 0.05 10*3/MM3 (ref 0–0.2)
BASOPHILS NFR BLD AUTO: 1 % (ref 0–1.5)
BILIRUB SERPL-MCNC: <0.2 MG/DL (ref 0.2–1.2)
BUN BLD-MCNC: 13 MG/DL (ref 6–20)
BUN/CREAT SERPL: 14 (ref 7–25)
CALCIUM SPEC-SCNC: 10.3 MG/DL (ref 8.6–10.5)
CHLORIDE SERPL-SCNC: 98 MMOL/L (ref 98–107)
CHOLEST SERPL-MCNC: 209 MG/DL (ref 0–200)
CO2 SERPL-SCNC: 23.2 MMOL/L (ref 22–29)
CREAT BLD-MCNC: 0.93 MG/DL (ref 0.57–1)
DEPRECATED RDW RBC AUTO: 42.8 FL (ref 37–54)
EOSINOPHIL # BLD AUTO: 0.19 10*3/MM3 (ref 0–0.4)
EOSINOPHIL NFR BLD AUTO: 4 % (ref 0.3–6.2)
ERYTHROCYTE [DISTWIDTH] IN BLOOD BY AUTOMATED COUNT: 12.9 % (ref 12.3–15.4)
GFR SERPL CREATININE-BSD FRML MDRD: 63 ML/MIN/1.73
GLOBULIN UR ELPH-MCNC: 4.3 GM/DL
GLUCOSE BLD-MCNC: 91 MG/DL (ref 65–99)
HCT VFR BLD AUTO: 43.5 % (ref 34–46.6)
HDLC SERPL-MCNC: 71 MG/DL (ref 40–60)
HGB BLD-MCNC: 15.4 G/DL (ref 12–15.9)
IMM GRANULOCYTES # BLD AUTO: 0.01 10*3/MM3 (ref 0–0.05)
IMM GRANULOCYTES NFR BLD AUTO: 0.2 % (ref 0–0.5)
LDLC SERPL CALC-MCNC: 124 MG/DL (ref 0–100)
LDLC/HDLC SERPL: 1.75 {RATIO}
LYMPHOCYTES # BLD AUTO: 1.8 10*3/MM3 (ref 0.7–3.1)
LYMPHOCYTES NFR BLD AUTO: 37.4 % (ref 19.6–45.3)
MCH RBC QN AUTO: 33 PG (ref 26.6–33)
MCHC RBC AUTO-ENTMCNC: 35.4 G/DL (ref 31.5–35.7)
MCV RBC AUTO: 93.3 FL (ref 79–97)
MONOCYTES # BLD AUTO: 0.78 10*3/MM3 (ref 0.1–0.9)
MONOCYTES NFR BLD AUTO: 16.2 % (ref 5–12)
NEUTROPHILS # BLD AUTO: 1.98 10*3/MM3 (ref 1.7–7)
NEUTROPHILS NFR BLD AUTO: 41.2 % (ref 42.7–76)
NRBC BLD AUTO-RTO: 0 /100 WBC (ref 0–0.2)
PLATELET # BLD AUTO: 391 10*3/MM3 (ref 140–450)
PMV BLD AUTO: 10.3 FL (ref 6–12)
POTASSIUM BLD-SCNC: 4.6 MMOL/L (ref 3.5–5.2)
PROT SERPL-MCNC: 8.5 G/DL (ref 6–8.5)
RBC # BLD AUTO: 4.66 10*6/MM3 (ref 3.77–5.28)
SODIUM BLD-SCNC: 136 MMOL/L (ref 136–145)
TRIGL SERPL-MCNC: 70 MG/DL (ref 0–150)
VLDLC SERPL-MCNC: 14 MG/DL (ref 5–40)
WBC NRBC COR # BLD: 4.81 10*3/MM3 (ref 3.4–10.8)

## 2020-01-27 PROCEDURE — 80053 COMPREHEN METABOLIC PANEL: CPT

## 2020-01-27 PROCEDURE — 80061 LIPID PANEL: CPT

## 2020-01-27 PROCEDURE — 99214 OFFICE O/P EST MOD 30 MIN: CPT | Performed by: FAMILY MEDICINE

## 2020-01-27 PROCEDURE — 85025 COMPLETE CBC W/AUTO DIFF WBC: CPT

## 2020-01-27 PROCEDURE — 36415 COLL VENOUS BLD VENIPUNCTURE: CPT

## 2020-01-27 RX ORDER — BUSPIRONE HYDROCHLORIDE 5 MG/1
5 TABLET ORAL 3 TIMES DAILY
Qty: 90 TABLET | Refills: 3 | Status: SHIPPED | OUTPATIENT
Start: 2020-01-27 | End: 2020-05-19 | Stop reason: HOSPADM

## 2020-01-27 RX ORDER — METHOCARBAMOL 500 MG/1
500 TABLET, FILM COATED ORAL 4 TIMES DAILY
Qty: 112 TABLET | Refills: 0 | Status: ON HOLD | OUTPATIENT
Start: 2020-01-27 | End: 2020-05-05

## 2020-01-27 RX ORDER — PAROXETINE HYDROCHLORIDE 20 MG/1
20 TABLET, FILM COATED ORAL EVERY MORNING
Qty: 30 TABLET | Refills: 2 | Status: SHIPPED | OUTPATIENT
Start: 2020-01-27 | End: 2020-05-19 | Stop reason: HOSPADM

## 2020-01-27 NOTE — PROGRESS NOTES
"Subjective   Laura Rimma Bruce is a 54 y.o. female.   Cc:follow up  HPI The patient comes in for evaluation of some pain in her left scapular area.She denies injury .It has been present several weeks. Weight is actually up 4 pounds. She needs refills for her Anxiety and Depression. She needs referral for colonoscopy. She has been having what feels like a prolapse of her rectum.    The following portions of the patient's history were reviewed and updated as appropriate: allergies, current medications, past family history, past medical history, past social history, past surgical history and problem list.    Review of Systems   Constitutional: Negative for fatigue and fever.   Respiratory: Negative for cough, chest tightness and stridor.    Cardiovascular: Negative for chest pain, palpitations and leg swelling.   Musculoskeletal: Positive for arthralgias and back pain.       Objective   Physical Exam   Constitutional: She appears well-developed and well-nourished.   HENT:   Head: Normocephalic and atraumatic.   Right Ear: External ear normal.   Left Ear: External ear normal.   Nose: Nose normal.   Mouth/Throat: Oropharynx is clear and moist.   Eyes: Conjunctivae are normal.   Neck: Normal range of motion.   Cardiovascular: Normal rate, regular rhythm and normal heart sounds. Exam reveals no gallop and no friction rub.   No murmur heard.  Pulmonary/Chest: Effort normal and breath sounds normal. No stridor. No respiratory distress. She has no wheezes. She has no rales.   Abdominal: Soft. Bowel sounds are normal. She exhibits no distension. There is no tenderness. There is no guarding.   Genitourinary:   Genitourinary Comments: There are multiple Hemorrhoids present.externally.   Musculoskeletal:   The area over the left scapula is mildly tender.    Skin: Skin is warm.   Vitals reviewed.        Visit Vitals  /86   Pulse 103   Ht 157.5 cm (62\")   Wt 51.8 kg (114 lb 4.8 oz)   LMP 01/24/2001 (Within Months)   SpO2 " 95%   BMI 20.91 kg/m²     Body mass index is 20.91 kg/m².      Assessment/Plan   Laura was seen today for med refill and generalized body aches.    Diagnoses and all orders for this visit:    Acute pain of left shoulder  -     XR Chest PA & Lateral; Future    Recurrent major depressive disorder, remission status unspecified (CMS/HCC)  -     Comprehensive metabolic panel; Future  -     Lipid panel; Future  -     CBC w AUTO Differential; Future    Anxiety    Hemorrhoids, unspecified hemorrhoid type  -     Ambulatory Referral to Gastroenterology    Other orders  -     busPIRone (BUSPAR) 5 MG tablet; Take 1 tablet by mouth 3 (Three) Times a Day.  -     PARoxetine (PAXIL) 20 MG tablet; Take 1 tablet by mouth Every Morning.  -     methocarbamol (ROBAXIN) 500 MG tablet; Take 1 tablet by mouth 4 (Four) Times a Day.    Return to the clinic in 1 month/s.  Will contact with results as needed.

## 2020-02-19 RX ORDER — QUETIAPINE FUMARATE 50 MG/1
100 TABLET, FILM COATED ORAL NIGHTLY
Qty: 60 TABLET | Refills: 2 | Status: SHIPPED | OUTPATIENT
Start: 2020-02-19 | End: 2020-07-07 | Stop reason: HOSPADM

## 2020-05-05 ENCOUNTER — HOSPITAL ENCOUNTER (INPATIENT)
Facility: HOSPITAL | Age: 55
LOS: 12 days | Discharge: HOME | End: 2020-05-19
Attending: FAMILY MEDICINE | Admitting: FAMILY MEDICINE

## 2020-05-05 DIAGNOSIS — R45.851 SUICIDAL IDEATION: ICD-10-CM

## 2020-05-05 DIAGNOSIS — Z74.09 IMPAIRED PHYSICAL MOBILITY: ICD-10-CM

## 2020-05-05 DIAGNOSIS — F10.921 ALCOHOL INTOXICATION WITH DELIRIUM (HCC): Primary | ICD-10-CM

## 2020-05-05 DIAGNOSIS — D69.6 THROMBOCYTOPENIA (HCC): ICD-10-CM

## 2020-05-05 DIAGNOSIS — F10.931 ALCOHOL WITHDRAWAL SYNDROME, WITH DELIRIUM (HCC): ICD-10-CM

## 2020-05-05 DIAGNOSIS — D72.819 LEUKOPENIA, UNSPECIFIED TYPE: ICD-10-CM

## 2020-05-05 PROBLEM — N89.8 VAGINAL DISCHARGE: Status: ACTIVE | Noted: 2020-05-05

## 2020-05-05 LAB
ALBUMIN SERPL-MCNC: 4.2 G/DL (ref 3.5–5.2)
ALBUMIN/GLOB SERPL: 1.2 G/DL
ALP SERPL-CCNC: 111 U/L (ref 39–117)
ALT SERPL W P-5'-P-CCNC: 40 U/L (ref 1–33)
AMPHET+METHAMPHET UR QL: NEGATIVE
AMPHETAMINES UR QL: NEGATIVE
ANION GAP SERPL CALCULATED.3IONS-SCNC: 15 MMOL/L (ref 5–15)
APAP SERPL-MCNC: <5 MCG/ML (ref 10–30)
APAP SERPL-MCNC: <5 MCG/ML (ref 10–30)
AST SERPL-CCNC: 58 U/L (ref 1–32)
BARBITURATES UR QL SCN: NEGATIVE
BASOPHILS # BLD MANUAL: 0.03 10*3/MM3 (ref 0–0.2)
BASOPHILS NFR BLD AUTO: 1 % (ref 0–1.5)
BENZODIAZ UR QL SCN: NEGATIVE
BILIRUB SERPL-MCNC: <0.2 MG/DL (ref 0.2–1.2)
BUN BLD-MCNC: 11 MG/DL (ref 6–20)
BUN/CREAT SERPL: 17.7 (ref 7–25)
BUPRENORPHINE SERPL-MCNC: NEGATIVE NG/ML
CALCIUM SPEC-SCNC: 8.4 MG/DL (ref 8.6–10.5)
CANDIDA ALBICANS: POSITIVE
CANNABINOIDS SERPL QL: NEGATIVE
CHLORIDE SERPL-SCNC: 105 MMOL/L (ref 98–107)
CO2 SERPL-SCNC: 22 MMOL/L (ref 22–29)
COCAINE UR QL: NEGATIVE
CREAT BLD-MCNC: 0.62 MG/DL (ref 0.57–1)
DEPRECATED RDW RBC AUTO: 47.3 FL (ref 37–54)
EOSINOPHIL # BLD MANUAL: 0.03 10*3/MM3 (ref 0–0.4)
EOSINOPHIL NFR BLD MANUAL: 1 % (ref 0.3–6.2)
ERYTHROCYTE [DISTWIDTH] IN BLOOD BY AUTOMATED COUNT: 14 % (ref 12.3–15.4)
ETHANOL BLD-MCNC: 490 MG/DL (ref 0–10)
ETHANOL UR QL: 0.49 %
GARDNERELLA VAGINALIS: NEGATIVE
GFR SERPL CREATININE-BSD FRML MDRD: 100 ML/MIN/1.73
GLOBULIN UR ELPH-MCNC: 3.5 GM/DL
GLUCOSE BLD-MCNC: 100 MG/DL (ref 65–99)
HCT VFR BLD AUTO: 39.8 % (ref 34–46.6)
HGB BLD-MCNC: 13.6 G/DL (ref 12–15.9)
HOLD SPECIMEN: NORMAL
HOLD SPECIMEN: NORMAL
LYMPHOCYTES # BLD MANUAL: 1.49 10*3/MM3 (ref 0.7–3.1)
LYMPHOCYTES NFR BLD MANUAL: 16 % (ref 5–12)
LYMPHOCYTES NFR BLD MANUAL: 56 % (ref 19.6–45.3)
MAGNESIUM SERPL-MCNC: 2.1 MG/DL (ref 1.6–2.6)
MAGNESIUM SERPL-MCNC: 2.8 MG/DL (ref 1.6–2.6)
MCH RBC QN AUTO: 31.3 PG (ref 26.6–33)
MCHC RBC AUTO-ENTMCNC: 34.2 G/DL (ref 31.5–35.7)
MCV RBC AUTO: 91.7 FL (ref 79–97)
METHADONE UR QL SCN: NEGATIVE
MONOCYTES # BLD AUTO: 0.43 10*3/MM3 (ref 0.1–0.9)
NEUTROPHILS # BLD AUTO: 0.69 10*3/MM3 (ref 1.7–7)
NEUTROPHILS NFR BLD MANUAL: 26 % (ref 42.7–76)
OPIATES UR QL: NEGATIVE
OXYCODONE UR QL SCN: NEGATIVE
PCP UR QL SCN: NEGATIVE
PLATELET # BLD AUTO: 136 10*3/MM3 (ref 140–450)
PMV BLD AUTO: 10.4 FL (ref 6–12)
POTASSIUM BLD-SCNC: 3.4 MMOL/L (ref 3.5–5.2)
PROPOXYPH UR QL: NEGATIVE
PROT SERPL-MCNC: 7.7 G/DL (ref 6–8.5)
RBC # BLD AUTO: 4.34 10*6/MM3 (ref 3.77–5.28)
RBC MORPH BLD: NORMAL
SALICYLATES SERPL-MCNC: <0.3 MG/DL
SALICYLATES SERPL-MCNC: <0.3 MG/DL
SMALL PLATELETS BLD QL SMEAR: ADEQUATE
SODIUM BLD-SCNC: 142 MMOL/L (ref 136–145)
T VAGINALIS DNA VAG QL PROBE+SIG AMP: NEGATIVE
TRICYCLICS UR QL SCN: NEGATIVE
WBC MORPH BLD: NORMAL
WBC NRBC COR # BLD: 2.66 10*3/MM3 (ref 3.4–10.8)
WHOLE BLOOD HOLD SPECIMEN: NORMAL
WHOLE BLOOD HOLD SPECIMEN: NORMAL

## 2020-05-05 PROCEDURE — 80053 COMPREHEN METABOLIC PANEL: CPT | Performed by: FAMILY MEDICINE

## 2020-05-05 PROCEDURE — 80307 DRUG TEST PRSMV CHEM ANLYZR: CPT | Performed by: FAMILY MEDICINE

## 2020-05-05 PROCEDURE — G0378 HOSPITAL OBSERVATION PER HR: HCPCS

## 2020-05-05 PROCEDURE — 99285 EMERGENCY DEPT VISIT HI MDM: CPT

## 2020-05-05 PROCEDURE — 87661 TRICHOMONAS VAGINALIS AMPLIF: CPT | Performed by: STUDENT IN AN ORGANIZED HEALTH CARE EDUCATION/TRAINING PROGRAM

## 2020-05-05 PROCEDURE — 85025 COMPLETE CBC W/AUTO DIFF WBC: CPT

## 2020-05-05 PROCEDURE — 80307 DRUG TEST PRSMV CHEM ANLYZR: CPT | Performed by: STUDENT IN AN ORGANIZED HEALTH CARE EDUCATION/TRAINING PROGRAM

## 2020-05-05 PROCEDURE — 87510 GARDNER VAG DNA DIR PROBE: CPT | Performed by: STUDENT IN AN ORGANIZED HEALTH CARE EDUCATION/TRAINING PROGRAM

## 2020-05-05 PROCEDURE — 87491 CHLMYD TRACH DNA AMP PROBE: CPT | Performed by: STUDENT IN AN ORGANIZED HEALTH CARE EDUCATION/TRAINING PROGRAM

## 2020-05-05 PROCEDURE — 83735 ASSAY OF MAGNESIUM: CPT | Performed by: FAMILY MEDICINE

## 2020-05-05 PROCEDURE — 83735 ASSAY OF MAGNESIUM: CPT | Performed by: PHYSICIAN ASSISTANT

## 2020-05-05 PROCEDURE — 87480 CANDIDA DNA DIR PROBE: CPT | Performed by: STUDENT IN AN ORGANIZED HEALTH CARE EDUCATION/TRAINING PROGRAM

## 2020-05-05 PROCEDURE — 99219 PR INITIAL OBSERVATION CARE/DAY 50 MINUTES: CPT | Performed by: STUDENT IN AN ORGANIZED HEALTH CARE EDUCATION/TRAINING PROGRAM

## 2020-05-05 PROCEDURE — 25010000002 THIAMINE PER 100 MG: Performed by: PHYSICIAN ASSISTANT

## 2020-05-05 PROCEDURE — 25010000002 MAGNESIUM SULFATE PER 500 MG OF MAGNESIUM: Performed by: PHYSICIAN ASSISTANT

## 2020-05-05 PROCEDURE — 87660 TRICHOMONAS VAGIN DIR PROBE: CPT | Performed by: STUDENT IN AN ORGANIZED HEALTH CARE EDUCATION/TRAINING PROGRAM

## 2020-05-05 PROCEDURE — 85007 BL SMEAR W/DIFF WBC COUNT: CPT | Performed by: FAMILY MEDICINE

## 2020-05-05 PROCEDURE — 25010000002 LORAZEPAM PER 2 MG: Performed by: STUDENT IN AN ORGANIZED HEALTH CARE EDUCATION/TRAINING PROGRAM

## 2020-05-05 PROCEDURE — 87591 N.GONORRHOEAE DNA AMP PROB: CPT | Performed by: STUDENT IN AN ORGANIZED HEALTH CARE EDUCATION/TRAINING PROGRAM

## 2020-05-05 RX ORDER — TERAZOSIN 2 MG/1
2 CAPSULE ORAL 2 TIMES DAILY
Status: DISCONTINUED | OUTPATIENT
Start: 2020-05-05 | End: 2020-05-19 | Stop reason: HOSPADM

## 2020-05-05 RX ORDER — QUETIAPINE FUMARATE 100 MG/1
100 TABLET, FILM COATED ORAL NIGHTLY
Status: DISCONTINUED | OUTPATIENT
Start: 2020-05-05 | End: 2020-05-06

## 2020-05-05 RX ORDER — LORAZEPAM 2 MG/1
2 TABLET ORAL ONCE
Status: COMPLETED | OUTPATIENT
Start: 2020-05-05 | End: 2020-05-05

## 2020-05-05 RX ORDER — LORAZEPAM 2 MG/ML
1 INJECTION INTRAMUSCULAR
Status: DISCONTINUED | OUTPATIENT
Start: 2020-05-05 | End: 2020-05-06

## 2020-05-05 RX ORDER — DOCUSATE SODIUM 100 MG/1
100 CAPSULE, LIQUID FILLED ORAL 2 TIMES DAILY PRN
Status: DISCONTINUED | OUTPATIENT
Start: 2020-05-05 | End: 2020-05-19 | Stop reason: HOSPADM

## 2020-05-05 RX ORDER — LORAZEPAM 2 MG/1
2 TABLET ORAL
Status: DISCONTINUED | OUTPATIENT
Start: 2020-05-05 | End: 2020-05-06

## 2020-05-05 RX ORDER — SODIUM CHLORIDE 0.9 % (FLUSH) 0.9 %
10 SYRINGE (ML) INJECTION AS NEEDED
Status: DISCONTINUED | OUTPATIENT
Start: 2020-05-05 | End: 2020-05-19 | Stop reason: HOSPADM

## 2020-05-05 RX ORDER — METHOCARBAMOL 500 MG/1
500 TABLET, FILM COATED ORAL 4 TIMES DAILY PRN
COMMUNITY
End: 2020-07-13 | Stop reason: HOSPADM

## 2020-05-05 RX ORDER — PAROXETINE HYDROCHLORIDE 20 MG/1
20 TABLET, FILM COATED ORAL EVERY MORNING
Status: DISCONTINUED | OUTPATIENT
Start: 2020-05-06 | End: 2020-05-06

## 2020-05-05 RX ORDER — METHOCARBAMOL 500 MG/1
500 TABLET, FILM COATED ORAL 4 TIMES DAILY
Status: DISCONTINUED | OUTPATIENT
Start: 2020-05-05 | End: 2020-05-19 | Stop reason: HOSPADM

## 2020-05-05 RX ORDER — LORAZEPAM 2 MG/ML
2 INJECTION INTRAMUSCULAR
Status: DISCONTINUED | OUTPATIENT
Start: 2020-05-05 | End: 2020-05-06

## 2020-05-05 RX ORDER — NICOTINE 21 MG/24HR
1 PATCH, TRANSDERMAL 24 HOURS TRANSDERMAL EVERY 24 HOURS
Status: DISCONTINUED | OUTPATIENT
Start: 2020-05-05 | End: 2020-05-19 | Stop reason: HOSPADM

## 2020-05-05 RX ORDER — LORAZEPAM 0.5 MG/1
1 TABLET ORAL
Status: DISCONTINUED | OUTPATIENT
Start: 2020-05-05 | End: 2020-05-06

## 2020-05-05 RX ORDER — NALTREXONE HYDROCHLORIDE 50 MG/1
50 TABLET, FILM COATED ORAL DAILY
Status: DISCONTINUED | OUTPATIENT
Start: 2020-05-06 | End: 2020-05-06

## 2020-05-05 RX ORDER — THIAMINE MONONITRATE (VIT B1) 100 MG
100 TABLET ORAL DAILY
Status: DISCONTINUED | OUTPATIENT
Start: 2020-05-05 | End: 2020-05-05 | Stop reason: SDUPTHER

## 2020-05-05 RX ORDER — LORAZEPAM 2 MG/ML
1 INJECTION INTRAMUSCULAR EVERY 6 HOURS
Status: DISCONTINUED | OUTPATIENT
Start: 2020-05-05 | End: 2020-05-06

## 2020-05-05 RX ORDER — POTASSIUM CHLORIDE 750 MG/1
20 CAPSULE, EXTENDED RELEASE ORAL DAILY
Status: DISCONTINUED | OUTPATIENT
Start: 2020-05-05 | End: 2020-05-19 | Stop reason: HOSPADM

## 2020-05-05 RX ORDER — BUSPIRONE HYDROCHLORIDE 5 MG/1
5 TABLET ORAL 3 TIMES DAILY
Status: DISCONTINUED | OUTPATIENT
Start: 2020-05-05 | End: 2020-05-06

## 2020-05-05 RX ORDER — FAMOTIDINE 20 MG/1
20 TABLET, FILM COATED ORAL DAILY
Status: DISCONTINUED | OUTPATIENT
Start: 2020-05-06 | End: 2020-05-19 | Stop reason: HOSPADM

## 2020-05-05 RX ORDER — LORAZEPAM 2 MG/ML
1 INJECTION INTRAMUSCULAR EVERY 8 HOURS
Status: DISCONTINUED | OUTPATIENT
Start: 2020-05-06 | End: 2020-05-06 | Stop reason: SDUPTHER

## 2020-05-05 RX ORDER — HYDROXYZINE PAMOATE 25 MG/1
50 CAPSULE ORAL 3 TIMES DAILY PRN
Status: DISCONTINUED | OUTPATIENT
Start: 2020-05-05 | End: 2020-05-06

## 2020-05-05 RX ORDER — SODIUM CHLORIDE 0.9 % (FLUSH) 0.9 %
10 SYRINGE (ML) INJECTION EVERY 12 HOURS SCHEDULED
Status: DISCONTINUED | OUTPATIENT
Start: 2020-05-05 | End: 2020-05-19 | Stop reason: HOSPADM

## 2020-05-05 RX ADMIN — POTASSIUM CHLORIDE 20 MEQ: 10 CAPSULE, COATED, EXTENDED RELEASE ORAL at 16:43

## 2020-05-05 RX ADMIN — TERAZOSIN HYDROCHLORIDE 2 MG: 2 CAPSULE ORAL at 20:39

## 2020-05-05 RX ADMIN — BUSPIRONE HYDROCHLORIDE 5 MG: 5 TABLET ORAL at 16:43

## 2020-05-05 RX ADMIN — BUSPIRONE HYDROCHLORIDE 5 MG: 5 TABLET ORAL at 20:39

## 2020-05-05 RX ADMIN — LORAZEPAM 1 MG: 2 INJECTION INTRAMUSCULAR; INTRAVENOUS at 16:38

## 2020-05-05 RX ADMIN — QUETIAPINE 100 MG: 100 TABLET ORAL at 20:39

## 2020-05-05 RX ADMIN — FOLIC ACID 1000 ML/HR: 5 INJECTION, SOLUTION INTRAMUSCULAR; INTRAVENOUS; SUBCUTANEOUS at 13:13

## 2020-05-05 RX ADMIN — METHOCARBAMOL 500 MG: 500 TABLET, FILM COATED ORAL at 20:39

## 2020-05-05 RX ADMIN — NICOTINE 1 PATCH: 21 PATCH, EXTENDED RELEASE TRANSDERMAL at 13:40

## 2020-05-05 RX ADMIN — SODIUM CHLORIDE, PRESERVATIVE FREE 10 ML: 5 INJECTION INTRAVENOUS at 20:40

## 2020-05-05 RX ADMIN — LORAZEPAM 2 MG: 2 TABLET ORAL at 11:47

## 2020-05-06 PROBLEM — F10.929 ALCOHOL INTOXICATION (HCC): Status: RESOLVED | Noted: 2018-04-23 | Resolved: 2020-05-06

## 2020-05-06 PROBLEM — D72.819 LEUKOPENIA: Status: RESOLVED | Noted: 2018-04-23 | Resolved: 2020-05-06

## 2020-05-06 PROBLEM — D69.6 THROMBOCYTOPENIA: Status: ACTIVE | Noted: 2020-05-06

## 2020-05-06 PROBLEM — B37.31 CANDIDA VAGINITIS: Status: ACTIVE | Noted: 2020-05-05

## 2020-05-06 LAB
ALBUMIN SERPL-MCNC: 3.4 G/DL (ref 3.5–5.2)
ALBUMIN/GLOB SERPL: 1 G/DL
ALP SERPL-CCNC: 108 U/L (ref 39–117)
ALT SERPL W P-5'-P-CCNC: 32 U/L (ref 1–33)
ANION GAP SERPL CALCULATED.3IONS-SCNC: 13 MMOL/L (ref 5–15)
ANISOCYTOSIS BLD QL: NORMAL
AST SERPL-CCNC: 39 U/L (ref 1–32)
BASOPHILS # BLD AUTO: 0.03 10*3/MM3 (ref 0–0.2)
BASOPHILS NFR BLD AUTO: 0.7 % (ref 0–1.5)
BILIRUB SERPL-MCNC: 0.4 MG/DL (ref 0.2–1.2)
BUN BLD-MCNC: 9 MG/DL (ref 6–20)
BUN/CREAT SERPL: 16.1 (ref 7–25)
C TRACH RRNA CVX QL NAA+PROBE: NEGATIVE
CALCIUM SPEC-SCNC: 8.6 MG/DL (ref 8.6–10.5)
CHLORIDE SERPL-SCNC: 102 MMOL/L (ref 98–107)
CO2 SERPL-SCNC: 21 MMOL/L (ref 22–29)
CREAT BLD-MCNC: 0.56 MG/DL (ref 0.57–1)
DEPRECATED RDW RBC AUTO: 47.7 FL (ref 37–54)
EOSINOPHIL # BLD AUTO: 0.03 10*3/MM3 (ref 0–0.4)
EOSINOPHIL NFR BLD AUTO: 0.7 % (ref 0.3–6.2)
ERYTHROCYTE [DISTWIDTH] IN BLOOD BY AUTOMATED COUNT: 14.2 % (ref 12.3–15.4)
ETHANOL BLD-MCNC: <10 MG/DL (ref 0–10)
ETHANOL UR QL: <0.01 %
GFR SERPL CREATININE-BSD FRML MDRD: 113 ML/MIN/1.73
GLOBULIN UR ELPH-MCNC: 3.5 GM/DL
GLUCOSE BLD-MCNC: 100 MG/DL (ref 65–99)
HCT VFR BLD AUTO: 35.1 % (ref 34–46.6)
HGB BLD-MCNC: 12.1 G/DL (ref 12–15.9)
IMM GRANULOCYTES # BLD AUTO: 0.01 10*3/MM3 (ref 0–0.05)
IMM GRANULOCYTES NFR BLD AUTO: 0.2 % (ref 0–0.5)
LIPASE SERPL-CCNC: 38 U/L (ref 13–60)
LYMPHOCYTES # BLD AUTO: 1.48 10*3/MM3 (ref 0.7–3.1)
LYMPHOCYTES NFR BLD AUTO: 33 % (ref 19.6–45.3)
MACROCYTES BLD QL SMEAR: NORMAL
MCH RBC QN AUTO: 31.7 PG (ref 26.6–33)
MCHC RBC AUTO-ENTMCNC: 34.5 G/DL (ref 31.5–35.7)
MCV RBC AUTO: 91.9 FL (ref 79–97)
MONOCYTES # BLD AUTO: 0.45 10*3/MM3 (ref 0.1–0.9)
MONOCYTES NFR BLD AUTO: 10 % (ref 5–12)
N GONORRHOEA RRNA SPEC QL NAA+PROBE: NEGATIVE
NEUTROPHILS # BLD AUTO: 2.49 10*3/MM3 (ref 1.7–7)
NEUTROPHILS NFR BLD AUTO: 55.4 % (ref 42.7–76)
NRBC BLD AUTO-RTO: 0 /100 WBC (ref 0–0.2)
PLATELET # BLD AUTO: 96 10*3/MM3 (ref 140–450)
PMV BLD AUTO: 11 FL (ref 6–12)
POTASSIUM BLD-SCNC: 3.2 MMOL/L (ref 3.5–5.2)
PROT SERPL-MCNC: 6.9 G/DL (ref 6–8.5)
RBC # BLD AUTO: 3.82 10*6/MM3 (ref 3.77–5.28)
SMALL PLATELETS BLD QL SMEAR: NORMAL
SODIUM BLD-SCNC: 136 MMOL/L (ref 136–145)
TRICHOMONAS VAGINALIS PCR: NEGATIVE
WBC MORPH BLD: NORMAL
WBC NRBC COR # BLD: 4.49 10*3/MM3 (ref 3.4–10.8)

## 2020-05-06 PROCEDURE — 25010000002 LORAZEPAM PER 2 MG: Performed by: STUDENT IN AN ORGANIZED HEALTH CARE EDUCATION/TRAINING PROGRAM

## 2020-05-06 PROCEDURE — G0378 HOSPITAL OBSERVATION PER HR: HCPCS

## 2020-05-06 PROCEDURE — 99225 PR SBSQ OBSERVATION CARE/DAY 25 MINUTES: CPT | Performed by: STUDENT IN AN ORGANIZED HEALTH CARE EDUCATION/TRAINING PROGRAM

## 2020-05-06 PROCEDURE — 90833 PSYTX W PT W E/M 30 MIN: CPT | Performed by: PSYCHIATRY & NEUROLOGY

## 2020-05-06 PROCEDURE — 80053 COMPREHEN METABOLIC PANEL: CPT | Performed by: STUDENT IN AN ORGANIZED HEALTH CARE EDUCATION/TRAINING PROGRAM

## 2020-05-06 PROCEDURE — 25010000002 LORAZEPAM PER 2 MG: Performed by: PSYCHIATRY & NEUROLOGY

## 2020-05-06 PROCEDURE — 85025 COMPLETE CBC W/AUTO DIFF WBC: CPT | Performed by: STUDENT IN AN ORGANIZED HEALTH CARE EDUCATION/TRAINING PROGRAM

## 2020-05-06 PROCEDURE — 83690 ASSAY OF LIPASE: CPT | Performed by: STUDENT IN AN ORGANIZED HEALTH CARE EDUCATION/TRAINING PROGRAM

## 2020-05-06 PROCEDURE — 85007 BL SMEAR W/DIFF WBC COUNT: CPT | Performed by: STUDENT IN AN ORGANIZED HEALTH CARE EDUCATION/TRAINING PROGRAM

## 2020-05-06 PROCEDURE — 80307 DRUG TEST PRSMV CHEM ANLYZR: CPT | Performed by: STUDENT IN AN ORGANIZED HEALTH CARE EDUCATION/TRAINING PROGRAM

## 2020-05-06 PROCEDURE — 99214 OFFICE O/P EST MOD 30 MIN: CPT | Performed by: PSYCHIATRY & NEUROLOGY

## 2020-05-06 PROCEDURE — 25010000002 THIAMINE PER 100 MG: Performed by: STUDENT IN AN ORGANIZED HEALTH CARE EDUCATION/TRAINING PROGRAM

## 2020-05-06 PROCEDURE — 25010000002 THIAMINE PER 100 MG: Performed by: PSYCHIATRY & NEUROLOGY

## 2020-05-06 RX ORDER — LORAZEPAM 2 MG/ML
6 INJECTION INTRAMUSCULAR ONCE
Status: COMPLETED | OUTPATIENT
Start: 2020-05-06 | End: 2020-05-06

## 2020-05-06 RX ORDER — FLUCONAZOLE 150 MG/1
150 TABLET ORAL ONCE
Status: COMPLETED | OUTPATIENT
Start: 2020-05-06 | End: 2020-05-06

## 2020-05-06 RX ORDER — LANOLIN ALCOHOL/MO/W.PET/CERES
10 CREAM (GRAM) TOPICAL NIGHTLY
Status: DISCONTINUED | OUTPATIENT
Start: 2020-05-06 | End: 2020-05-19 | Stop reason: HOSPADM

## 2020-05-06 RX ORDER — LORAZEPAM 2 MG/ML
1 INJECTION INTRAMUSCULAR EVERY 6 HOURS
Status: DISCONTINUED | OUTPATIENT
Start: 2020-05-06 | End: 2020-05-06

## 2020-05-06 RX ORDER — LORAZEPAM 2 MG/ML
4 INJECTION INTRAMUSCULAR EVERY 4 HOURS
Status: DISCONTINUED | OUTPATIENT
Start: 2020-05-06 | End: 2020-05-07

## 2020-05-06 RX ORDER — POTASSIUM CHLORIDE 750 MG/1
40 CAPSULE, EXTENDED RELEASE ORAL ONCE
Status: COMPLETED | OUTPATIENT
Start: 2020-05-06 | End: 2020-05-06

## 2020-05-06 RX ORDER — LORAZEPAM 2 MG/ML
6 INJECTION INTRAMUSCULAR
Status: DISCONTINUED | OUTPATIENT
Start: 2020-05-06 | End: 2020-05-11

## 2020-05-06 RX ADMIN — METHOCARBAMOL 500 MG: 500 TABLET, FILM COATED ORAL at 08:17

## 2020-05-06 RX ADMIN — BUSPIRONE HYDROCHLORIDE 5 MG: 5 TABLET ORAL at 08:17

## 2020-05-06 RX ADMIN — FAMOTIDINE 20 MG: 20 TABLET, FILM COATED ORAL at 08:17

## 2020-05-06 RX ADMIN — LORAZEPAM 1 MG: 2 INJECTION, SOLUTION INTRAMUSCULAR; INTRAVENOUS at 16:03

## 2020-05-06 RX ADMIN — METHOCARBAMOL 500 MG: 500 TABLET, FILM COATED ORAL at 20:18

## 2020-05-06 RX ADMIN — NALTREXONE HYDROCHLORIDE 50 MG: 50 TABLET, FILM COATED ORAL at 08:17

## 2020-05-06 RX ADMIN — METHOCARBAMOL 500 MG: 500 TABLET, FILM COATED ORAL at 11:43

## 2020-05-06 RX ADMIN — TERAZOSIN HYDROCHLORIDE 2 MG: 2 CAPSULE ORAL at 20:18

## 2020-05-06 RX ADMIN — MELATONIN 9.75 MG: at 22:59

## 2020-05-06 RX ADMIN — LORAZEPAM 1 MG: 2 INJECTION INTRAMUSCULAR; INTRAVENOUS at 08:22

## 2020-05-06 RX ADMIN — PAROXETINE HYDROCHLORIDE 20 MG: 20 TABLET, FILM COATED ORAL at 06:58

## 2020-05-06 RX ADMIN — BUSPIRONE HYDROCHLORIDE 5 MG: 5 TABLET ORAL at 16:01

## 2020-05-06 RX ADMIN — FLUCONAZOLE 150 MG: 150 TABLET ORAL at 11:44

## 2020-05-06 RX ADMIN — SODIUM CHLORIDE, PRESERVATIVE FREE 10 ML: 5 INJECTION INTRAVENOUS at 20:19

## 2020-05-06 RX ADMIN — LORAZEPAM 6 MG: 2 INJECTION INTRAMUSCULAR; INTRAVENOUS at 17:24

## 2020-05-06 RX ADMIN — LORAZEPAM 4 MG: 2 INJECTION INTRAMUSCULAR; INTRAVENOUS at 20:19

## 2020-05-06 RX ADMIN — SODIUM CHLORIDE, PRESERVATIVE FREE 10 ML: 5 INJECTION INTRAVENOUS at 09:44

## 2020-05-06 RX ADMIN — TERAZOSIN HYDROCHLORIDE 2 MG: 2 CAPSULE ORAL at 08:17

## 2020-05-06 RX ADMIN — SODIUM CHLORIDE, PRESERVATIVE FREE 10 ML: 5 INJECTION INTRAVENOUS at 08:18

## 2020-05-06 RX ADMIN — LORAZEPAM 1 MG: 2 INJECTION INTRAMUSCULAR; INTRAVENOUS at 01:18

## 2020-05-06 RX ADMIN — METHOCARBAMOL 500 MG: 500 TABLET, FILM COATED ORAL at 17:24

## 2020-05-06 RX ADMIN — POTASSIUM CHLORIDE 40 MEQ: 10 CAPSULE, COATED, EXTENDED RELEASE ORAL at 06:58

## 2020-05-06 RX ADMIN — THIAMINE HYDROCHLORIDE 100 MG: 100 INJECTION, SOLUTION INTRAMUSCULAR; INTRAVENOUS at 09:44

## 2020-05-06 RX ADMIN — NICOTINE 1 PATCH: 21 PATCH, EXTENDED RELEASE TRANSDERMAL at 13:35

## 2020-05-06 RX ADMIN — POTASSIUM CHLORIDE 20 MEQ: 10 CAPSULE, COATED, EXTENDED RELEASE ORAL at 09:43

## 2020-05-06 RX ADMIN — THIAMINE HYDROCHLORIDE 500 MG: 100 INJECTION, SOLUTION INTRAMUSCULAR; INTRAVENOUS at 18:43

## 2020-05-06 RX ADMIN — LORAZEPAM 1 MG: 2 INJECTION, SOLUTION INTRAMUSCULAR; INTRAVENOUS at 11:43

## 2020-05-07 PROBLEM — F10.20 ALCOHOL USE DISORDER, SEVERE, DEPENDENCE (HCC): Status: ACTIVE | Noted: 2020-05-07

## 2020-05-07 PROBLEM — F10.939 ALCOHOL WITHDRAWAL (HCC): Status: ACTIVE | Noted: 2018-04-23

## 2020-05-07 PROBLEM — E51.2 WERNICKE'S ENCEPHALOPATHY: Status: ACTIVE | Noted: 2018-04-23

## 2020-05-07 PROBLEM — F33.2 SEVERE EPISODE OF RECURRENT MAJOR DEPRESSIVE DISORDER, WITHOUT PSYCHOTIC FEATURES (HCC): Status: ACTIVE | Noted: 2018-04-23

## 2020-05-07 PROBLEM — F10.931 WITHDRAWAL SYMPTOMS, ALCOHOL, WITH DELIRIUM (HCC): Status: ACTIVE | Noted: 2020-05-07

## 2020-05-07 LAB
ALBUMIN SERPL-MCNC: 3.7 G/DL (ref 3.5–5.2)
ALBUMIN/GLOB SERPL: 0.9 G/DL
ALP SERPL-CCNC: 111 U/L (ref 39–117)
ALT SERPL W P-5'-P-CCNC: 35 U/L (ref 1–33)
ANION GAP SERPL CALCULATED.3IONS-SCNC: 12 MMOL/L (ref 5–15)
AST SERPL-CCNC: 49 U/L (ref 1–32)
BASOPHILS # BLD AUTO: 0.02 10*3/MM3 (ref 0–0.2)
BASOPHILS NFR BLD AUTO: 1 % (ref 0–1.5)
BILIRUB SERPL-MCNC: 0.5 MG/DL (ref 0.2–1.2)
BUN BLD-MCNC: 6 MG/DL (ref 6–20)
BUN/CREAT SERPL: 9.7 (ref 7–25)
CALCIUM SPEC-SCNC: 9 MG/DL (ref 8.6–10.5)
CHLORIDE SERPL-SCNC: 102 MMOL/L (ref 98–107)
CO2 SERPL-SCNC: 21 MMOL/L (ref 22–29)
CREAT BLD-MCNC: 0.62 MG/DL (ref 0.57–1)
DEPRECATED RDW RBC AUTO: 46.3 FL (ref 37–54)
EOSINOPHIL # BLD AUTO: 0.04 10*3/MM3 (ref 0–0.4)
EOSINOPHIL NFR BLD AUTO: 1.9 % (ref 0.3–6.2)
ERYTHROCYTE [DISTWIDTH] IN BLOOD BY AUTOMATED COUNT: 13.6 % (ref 12.3–15.4)
GFR SERPL CREATININE-BSD FRML MDRD: 100 ML/MIN/1.73
GLOBULIN UR ELPH-MCNC: 3.9 GM/DL
GLUCOSE BLD-MCNC: 94 MG/DL (ref 65–99)
HCT VFR BLD AUTO: 38.2 % (ref 34–46.6)
HGB BLD-MCNC: 13.1 G/DL (ref 12–15.9)
IMM GRANULOCYTES # BLD AUTO: 0.01 10*3/MM3 (ref 0–0.05)
IMM GRANULOCYTES NFR BLD AUTO: 0.5 % (ref 0–0.5)
LYMPHOCYTES # BLD AUTO: 0.96 10*3/MM3 (ref 0.7–3.1)
LYMPHOCYTES NFR BLD AUTO: 46.6 % (ref 19.6–45.3)
MCH RBC QN AUTO: 31.5 PG (ref 26.6–33)
MCHC RBC AUTO-ENTMCNC: 34.3 G/DL (ref 31.5–35.7)
MCV RBC AUTO: 91.8 FL (ref 79–97)
MONOCYTES # BLD AUTO: 0.28 10*3/MM3 (ref 0.1–0.9)
MONOCYTES NFR BLD AUTO: 13.6 % (ref 5–12)
NEUTROPHILS # BLD AUTO: 0.75 10*3/MM3 (ref 1.7–7)
NEUTROPHILS NFR BLD AUTO: 36.4 % (ref 42.7–76)
NRBC BLD AUTO-RTO: 0 /100 WBC (ref 0–0.2)
PLATELET # BLD AUTO: 100 10*3/MM3 (ref 140–450)
PMV BLD AUTO: 10.7 FL (ref 6–12)
POTASSIUM BLD-SCNC: 3.7 MMOL/L (ref 3.5–5.2)
PROT SERPL-MCNC: 7.6 G/DL (ref 6–8.5)
RBC # BLD AUTO: 4.16 10*6/MM3 (ref 3.77–5.28)
SODIUM BLD-SCNC: 135 MMOL/L (ref 136–145)
WBC NRBC COR # BLD: 2.06 10*3/MM3 (ref 3.4–10.8)

## 2020-05-07 PROCEDURE — 94799 UNLISTED PULMONARY SVC/PX: CPT

## 2020-05-07 PROCEDURE — 25010000002 LORAZEPAM PER 2 MG: Performed by: PSYCHIATRY & NEUROLOGY

## 2020-05-07 PROCEDURE — 25010000002 THIAMINE PER 100 MG: Performed by: PSYCHIATRY & NEUROLOGY

## 2020-05-07 PROCEDURE — 94760 N-INVAS EAR/PLS OXIMETRY 1: CPT

## 2020-05-07 PROCEDURE — 99233 SBSQ HOSP IP/OBS HIGH 50: CPT | Performed by: PSYCHIATRY & NEUROLOGY

## 2020-05-07 PROCEDURE — 99232 SBSQ HOSP IP/OBS MODERATE 35: CPT | Performed by: STUDENT IN AN ORGANIZED HEALTH CARE EDUCATION/TRAINING PROGRAM

## 2020-05-07 PROCEDURE — 80053 COMPREHEN METABOLIC PANEL: CPT | Performed by: STUDENT IN AN ORGANIZED HEALTH CARE EDUCATION/TRAINING PROGRAM

## 2020-05-07 PROCEDURE — 85025 COMPLETE CBC W/AUTO DIFF WBC: CPT | Performed by: STUDENT IN AN ORGANIZED HEALTH CARE EDUCATION/TRAINING PROGRAM

## 2020-05-07 RX ORDER — LORAZEPAM 2 MG/ML
4 INJECTION INTRAMUSCULAR
Status: DISCONTINUED | OUTPATIENT
Start: 2020-05-07 | End: 2020-05-10

## 2020-05-07 RX ADMIN — LORAZEPAM 4 MG: 2 INJECTION INTRAMUSCULAR; INTRAVENOUS at 17:14

## 2020-05-07 RX ADMIN — LORAZEPAM 4 MG: 2 INJECTION INTRAMUSCULAR; INTRAVENOUS at 22:58

## 2020-05-07 RX ADMIN — LORAZEPAM 4 MG: 2 INJECTION INTRAMUSCULAR; INTRAVENOUS at 09:13

## 2020-05-07 RX ADMIN — TERAZOSIN HYDROCHLORIDE 2 MG: 2 CAPSULE ORAL at 20:54

## 2020-05-07 RX ADMIN — SODIUM CHLORIDE, PRESERVATIVE FREE 10 ML: 5 INJECTION INTRAVENOUS at 09:15

## 2020-05-07 RX ADMIN — LORAZEPAM 6 MG: 2 INJECTION INTRAMUSCULAR; INTRAVENOUS at 19:45

## 2020-05-07 RX ADMIN — SODIUM CHLORIDE, PRESERVATIVE FREE 10 ML: 5 INJECTION INTRAVENOUS at 21:01

## 2020-05-07 RX ADMIN — THIAMINE HYDROCHLORIDE 200 MG: 100 INJECTION, SOLUTION INTRAMUSCULAR; INTRAVENOUS at 01:04

## 2020-05-07 RX ADMIN — FAMOTIDINE 20 MG: 20 TABLET, FILM COATED ORAL at 09:12

## 2020-05-07 RX ADMIN — METHOCARBAMOL 500 MG: 500 TABLET, FILM COATED ORAL at 17:14

## 2020-05-07 RX ADMIN — LORAZEPAM 6 MG: 2 INJECTION INTRAMUSCULAR; INTRAVENOUS at 12:11

## 2020-05-07 RX ADMIN — THIAMINE HYDROCHLORIDE 200 MG: 100 INJECTION, SOLUTION INTRAMUSCULAR; INTRAVENOUS at 09:15

## 2020-05-07 RX ADMIN — LORAZEPAM 4 MG: 2 INJECTION INTRAMUSCULAR; INTRAVENOUS at 14:19

## 2020-05-07 RX ADMIN — LORAZEPAM 4 MG: 2 INJECTION INTRAMUSCULAR; INTRAVENOUS at 20:54

## 2020-05-07 RX ADMIN — LORAZEPAM 6 MG: 2 INJECTION INTRAMUSCULAR; INTRAVENOUS at 18:53

## 2020-05-07 RX ADMIN — NICOTINE 1 PATCH: 21 PATCH, EXTENDED RELEASE TRANSDERMAL at 15:33

## 2020-05-07 RX ADMIN — METHOCARBAMOL 500 MG: 500 TABLET, FILM COATED ORAL at 15:32

## 2020-05-07 RX ADMIN — THIAMINE HYDROCHLORIDE 500 MG: 100 INJECTION, SOLUTION INTRAMUSCULAR; INTRAVENOUS at 21:55

## 2020-05-07 RX ADMIN — METHOCARBAMOL 500 MG: 500 TABLET, FILM COATED ORAL at 09:12

## 2020-05-07 RX ADMIN — THIAMINE HYDROCHLORIDE 200 MG: 100 INJECTION, SOLUTION INTRAMUSCULAR; INTRAVENOUS at 17:19

## 2020-05-07 RX ADMIN — TERAZOSIN HYDROCHLORIDE 2 MG: 2 CAPSULE ORAL at 09:21

## 2020-05-07 RX ADMIN — LORAZEPAM 4 MG: 2 INJECTION INTRAMUSCULAR; INTRAVENOUS at 05:37

## 2020-05-07 RX ADMIN — MELATONIN 9.75 MG: at 20:54

## 2020-05-07 RX ADMIN — METHOCARBAMOL 500 MG: 500 TABLET, FILM COATED ORAL at 20:54

## 2020-05-07 RX ADMIN — LORAZEPAM 4 MG: 2 INJECTION INTRAMUSCULAR; INTRAVENOUS at 01:05

## 2020-05-07 RX ADMIN — POTASSIUM CHLORIDE 20 MEQ: 10 CAPSULE, COATED, EXTENDED RELEASE ORAL at 09:12

## 2020-05-08 PROBLEM — E87.6 HYPOKALEMIA: Status: RESOLVED | Noted: 2018-04-24 | Resolved: 2020-05-08

## 2020-05-08 LAB
ALBUMIN SERPL-MCNC: 3.5 G/DL (ref 3.5–5.2)
ALBUMIN/GLOB SERPL: 0.9 G/DL
ALP SERPL-CCNC: 104 U/L (ref 39–117)
ALT SERPL W P-5'-P-CCNC: 36 U/L (ref 1–33)
ANION GAP SERPL CALCULATED.3IONS-SCNC: 13 MMOL/L (ref 5–15)
AST SERPL-CCNC: 42 U/L (ref 1–32)
BASOPHILS # BLD AUTO: 0.02 10*3/MM3 (ref 0–0.2)
BASOPHILS NFR BLD AUTO: 0.7 % (ref 0–1.5)
BILIRUB SERPL-MCNC: 0.4 MG/DL (ref 0.2–1.2)
BUN BLD-MCNC: 9 MG/DL (ref 6–20)
BUN/CREAT SERPL: 14.1 (ref 7–25)
CALCIUM SPEC-SCNC: 9.4 MG/DL (ref 8.6–10.5)
CHLORIDE SERPL-SCNC: 104 MMOL/L (ref 98–107)
CO2 SERPL-SCNC: 20 MMOL/L (ref 22–29)
CREAT BLD-MCNC: 0.64 MG/DL (ref 0.57–1)
DEPRECATED RDW RBC AUTO: 46 FL (ref 37–54)
EOSINOPHIL # BLD AUTO: 0.08 10*3/MM3 (ref 0–0.4)
EOSINOPHIL NFR BLD AUTO: 2.6 % (ref 0.3–6.2)
ERYTHROCYTE [DISTWIDTH] IN BLOOD BY AUTOMATED COUNT: 13.6 % (ref 12.3–15.4)
GFR SERPL CREATININE-BSD FRML MDRD: 97 ML/MIN/1.73
GLOBULIN UR ELPH-MCNC: 3.8 GM/DL
GLUCOSE BLD-MCNC: 93 MG/DL (ref 65–99)
HCT VFR BLD AUTO: 37.9 % (ref 34–46.6)
HGB BLD-MCNC: 12.6 G/DL (ref 12–15.9)
IMM GRANULOCYTES # BLD AUTO: 0 10*3/MM3 (ref 0–0.05)
IMM GRANULOCYTES NFR BLD AUTO: 0 % (ref 0–0.5)
LYMPHOCYTES # BLD AUTO: 1.05 10*3/MM3 (ref 0.7–3.1)
LYMPHOCYTES NFR BLD AUTO: 34.7 % (ref 19.6–45.3)
MCH RBC QN AUTO: 31 PG (ref 26.6–33)
MCHC RBC AUTO-ENTMCNC: 33.2 G/DL (ref 31.5–35.7)
MCV RBC AUTO: 93.1 FL (ref 79–97)
MONOCYTES # BLD AUTO: 0.37 10*3/MM3 (ref 0.1–0.9)
MONOCYTES NFR BLD AUTO: 12.2 % (ref 5–12)
NEUTROPHILS # BLD AUTO: 1.51 10*3/MM3 (ref 1.7–7)
NEUTROPHILS NFR BLD AUTO: 49.8 % (ref 42.7–76)
NRBC BLD AUTO-RTO: 0 /100 WBC (ref 0–0.2)
PLATELET # BLD AUTO: 102 10*3/MM3 (ref 140–450)
PMV BLD AUTO: 10.3 FL (ref 6–12)
POTASSIUM BLD-SCNC: 3.7 MMOL/L (ref 3.5–5.2)
PROT SERPL-MCNC: 7.3 G/DL (ref 6–8.5)
RBC # BLD AUTO: 4.07 10*6/MM3 (ref 3.77–5.28)
SODIUM BLD-SCNC: 137 MMOL/L (ref 136–145)
WBC NRBC COR # BLD: 3.03 10*3/MM3 (ref 3.4–10.8)

## 2020-05-08 PROCEDURE — 85025 COMPLETE CBC W/AUTO DIFF WBC: CPT | Performed by: STUDENT IN AN ORGANIZED HEALTH CARE EDUCATION/TRAINING PROGRAM

## 2020-05-08 PROCEDURE — 99232 SBSQ HOSP IP/OBS MODERATE 35: CPT | Performed by: STUDENT IN AN ORGANIZED HEALTH CARE EDUCATION/TRAINING PROGRAM

## 2020-05-08 PROCEDURE — 80053 COMPREHEN METABOLIC PANEL: CPT | Performed by: STUDENT IN AN ORGANIZED HEALTH CARE EDUCATION/TRAINING PROGRAM

## 2020-05-08 PROCEDURE — 25010000002 THIAMINE PER 100 MG: Performed by: PSYCHIATRY & NEUROLOGY

## 2020-05-08 PROCEDURE — 25010000002 LORAZEPAM PER 2 MG: Performed by: PSYCHIATRY & NEUROLOGY

## 2020-05-08 PROCEDURE — 99232 SBSQ HOSP IP/OBS MODERATE 35: CPT | Performed by: PSYCHIATRY & NEUROLOGY

## 2020-05-08 RX ADMIN — SODIUM CHLORIDE, PRESERVATIVE FREE 10 ML: 5 INJECTION INTRAVENOUS at 10:55

## 2020-05-08 RX ADMIN — SODIUM CHLORIDE, PRESERVATIVE FREE 10 ML: 5 INJECTION INTRAVENOUS at 20:51

## 2020-05-08 RX ADMIN — LORAZEPAM 4 MG: 2 INJECTION INTRAMUSCULAR; INTRAVENOUS at 23:24

## 2020-05-08 RX ADMIN — METHOCARBAMOL 500 MG: 500 TABLET, FILM COATED ORAL at 16:57

## 2020-05-08 RX ADMIN — METHOCARBAMOL 500 MG: 500 TABLET, FILM COATED ORAL at 20:49

## 2020-05-08 RX ADMIN — THIAMINE HYDROCHLORIDE 500 MG: 100 INJECTION, SOLUTION INTRAMUSCULAR; INTRAVENOUS at 16:58

## 2020-05-08 RX ADMIN — THIAMINE HYDROCHLORIDE 500 MG: 100 INJECTION, SOLUTION INTRAMUSCULAR; INTRAVENOUS at 08:43

## 2020-05-08 RX ADMIN — METHOCARBAMOL 500 MG: 500 TABLET, FILM COATED ORAL at 08:42

## 2020-05-08 RX ADMIN — LORAZEPAM 4 MG: 2 INJECTION INTRAMUSCULAR; INTRAVENOUS at 16:58

## 2020-05-08 RX ADMIN — THIAMINE HYDROCHLORIDE 500 MG: 100 INJECTION, SOLUTION INTRAMUSCULAR; INTRAVENOUS at 20:58

## 2020-05-08 RX ADMIN — TERAZOSIN HYDROCHLORIDE 2 MG: 2 CAPSULE ORAL at 20:49

## 2020-05-08 RX ADMIN — LORAZEPAM 4 MG: 2 INJECTION INTRAMUSCULAR; INTRAVENOUS at 12:19

## 2020-05-08 RX ADMIN — FAMOTIDINE 20 MG: 20 TABLET, FILM COATED ORAL at 08:42

## 2020-05-08 RX ADMIN — NICOTINE 1 PATCH: 21 PATCH, EXTENDED RELEASE TRANSDERMAL at 14:27

## 2020-05-08 RX ADMIN — TERAZOSIN HYDROCHLORIDE 2 MG: 2 CAPSULE ORAL at 08:42

## 2020-05-08 RX ADMIN — MELATONIN 9.75 MG: at 20:49

## 2020-05-08 RX ADMIN — LORAZEPAM 4 MG: 2 INJECTION INTRAMUSCULAR; INTRAVENOUS at 04:55

## 2020-05-08 RX ADMIN — LORAZEPAM 4 MG: 2 INJECTION INTRAMUSCULAR; INTRAVENOUS at 02:01

## 2020-05-08 RX ADMIN — LORAZEPAM 4 MG: 2 INJECTION INTRAMUSCULAR; INTRAVENOUS at 20:51

## 2020-05-08 RX ADMIN — LORAZEPAM 4 MG: 2 INJECTION INTRAMUSCULAR; INTRAVENOUS at 08:45

## 2020-05-08 RX ADMIN — METHOCARBAMOL 500 MG: 500 TABLET, FILM COATED ORAL at 12:19

## 2020-05-08 RX ADMIN — LORAZEPAM 4 MG: 2 INJECTION INTRAMUSCULAR; INTRAVENOUS at 14:34

## 2020-05-09 LAB
ALBUMIN SERPL-MCNC: 3.4 G/DL (ref 3.5–5.2)
ALBUMIN/GLOB SERPL: 0.9 G/DL
ALP SERPL-CCNC: 103 U/L (ref 39–117)
ALT SERPL W P-5'-P-CCNC: 43 U/L (ref 1–33)
ANION GAP SERPL CALCULATED.3IONS-SCNC: 14 MMOL/L (ref 5–15)
AST SERPL-CCNC: 42 U/L (ref 1–32)
BASOPHILS # BLD AUTO: 0.02 10*3/MM3 (ref 0–0.2)
BASOPHILS NFR BLD AUTO: 0.8 % (ref 0–1.5)
BILIRUB SERPL-MCNC: 0.3 MG/DL (ref 0.2–1.2)
BUN BLD-MCNC: 11 MG/DL (ref 6–20)
BUN/CREAT SERPL: 16.9 (ref 7–25)
CALCIUM SPEC-SCNC: 9.3 MG/DL (ref 8.6–10.5)
CHLORIDE SERPL-SCNC: 101 MMOL/L (ref 98–107)
CO2 SERPL-SCNC: 20 MMOL/L (ref 22–29)
CREAT BLD-MCNC: 0.65 MG/DL (ref 0.57–1)
DEPRECATED RDW RBC AUTO: 46.1 FL (ref 37–54)
EOSINOPHIL # BLD AUTO: 0.09 10*3/MM3 (ref 0–0.4)
EOSINOPHIL NFR BLD AUTO: 3.4 % (ref 0.3–6.2)
ERYTHROCYTE [DISTWIDTH] IN BLOOD BY AUTOMATED COUNT: 13.5 % (ref 12.3–15.4)
GFR SERPL CREATININE-BSD FRML MDRD: 95 ML/MIN/1.73
GLOBULIN UR ELPH-MCNC: 3.7 GM/DL
GLUCOSE BLD-MCNC: 94 MG/DL (ref 65–99)
HCT VFR BLD AUTO: 39.1 % (ref 34–46.6)
HGB BLD-MCNC: 13.2 G/DL (ref 12–15.9)
IMM GRANULOCYTES # BLD AUTO: 0 10*3/MM3 (ref 0–0.05)
IMM GRANULOCYTES NFR BLD AUTO: 0 % (ref 0–0.5)
LYMPHOCYTES # BLD AUTO: 1.26 10*3/MM3 (ref 0.7–3.1)
LYMPHOCYTES NFR BLD AUTO: 47.4 % (ref 19.6–45.3)
MCH RBC QN AUTO: 31.6 PG (ref 26.6–33)
MCHC RBC AUTO-ENTMCNC: 33.8 G/DL (ref 31.5–35.7)
MCV RBC AUTO: 93.5 FL (ref 79–97)
MONOCYTES # BLD AUTO: 0.45 10*3/MM3 (ref 0.1–0.9)
MONOCYTES NFR BLD AUTO: 16.9 % (ref 5–12)
NEUTROPHILS # BLD AUTO: 0.84 10*3/MM3 (ref 1.7–7)
NEUTROPHILS NFR BLD AUTO: 31.5 % (ref 42.7–76)
NRBC BLD AUTO-RTO: 0 /100 WBC (ref 0–0.2)
PLATELET # BLD AUTO: 126 10*3/MM3 (ref 140–450)
PMV BLD AUTO: 11 FL (ref 6–12)
POTASSIUM BLD-SCNC: 3.5 MMOL/L (ref 3.5–5.2)
PROT SERPL-MCNC: 7.1 G/DL (ref 6–8.5)
RBC # BLD AUTO: 4.18 10*6/MM3 (ref 3.77–5.28)
SODIUM BLD-SCNC: 135 MMOL/L (ref 136–145)
WBC NRBC COR # BLD: 2.66 10*3/MM3 (ref 3.4–10.8)

## 2020-05-09 PROCEDURE — 80053 COMPREHEN METABOLIC PANEL: CPT | Performed by: STUDENT IN AN ORGANIZED HEALTH CARE EDUCATION/TRAINING PROGRAM

## 2020-05-09 PROCEDURE — 99232 SBSQ HOSP IP/OBS MODERATE 35: CPT | Performed by: STUDENT IN AN ORGANIZED HEALTH CARE EDUCATION/TRAINING PROGRAM

## 2020-05-09 PROCEDURE — 25010000002 LORAZEPAM PER 2 MG: Performed by: PSYCHIATRY & NEUROLOGY

## 2020-05-09 PROCEDURE — 99232 SBSQ HOSP IP/OBS MODERATE 35: CPT | Performed by: PSYCHIATRY & NEUROLOGY

## 2020-05-09 PROCEDURE — 25010000002 THIAMINE PER 100 MG: Performed by: PSYCHIATRY & NEUROLOGY

## 2020-05-09 PROCEDURE — 85025 COMPLETE CBC W/AUTO DIFF WBC: CPT | Performed by: STUDENT IN AN ORGANIZED HEALTH CARE EDUCATION/TRAINING PROGRAM

## 2020-05-09 RX ORDER — SODIUM CHLORIDE 9 MG/ML
INJECTION, SOLUTION INTRAVENOUS
Status: COMPLETED
Start: 2020-05-09 | End: 2020-05-09

## 2020-05-09 RX ADMIN — SODIUM CHLORIDE, PRESERVATIVE FREE 10 ML: 5 INJECTION INTRAVENOUS at 20:05

## 2020-05-09 RX ADMIN — MELATONIN 9.75 MG: at 20:03

## 2020-05-09 RX ADMIN — LORAZEPAM 4 MG: 2 INJECTION INTRAMUSCULAR; INTRAVENOUS at 07:57

## 2020-05-09 RX ADMIN — THIAMINE HYDROCHLORIDE 250 MG: 100 INJECTION, SOLUTION INTRAMUSCULAR; INTRAVENOUS at 22:14

## 2020-05-09 RX ADMIN — NICOTINE 1 PATCH: 21 PATCH, EXTENDED RELEASE TRANSDERMAL at 14:19

## 2020-05-09 RX ADMIN — METHOCARBAMOL 500 MG: 500 TABLET, FILM COATED ORAL at 14:18

## 2020-05-09 RX ADMIN — TERAZOSIN HYDROCHLORIDE 2 MG: 2 CAPSULE ORAL at 07:56

## 2020-05-09 RX ADMIN — SODIUM CHLORIDE, PRESERVATIVE FREE 10 ML: 5 INJECTION INTRAVENOUS at 22:14

## 2020-05-09 RX ADMIN — FAMOTIDINE 20 MG: 20 TABLET, FILM COATED ORAL at 07:56

## 2020-05-09 RX ADMIN — METHOCARBAMOL 500 MG: 500 TABLET, FILM COATED ORAL at 17:24

## 2020-05-09 RX ADMIN — LORAZEPAM 4 MG: 2 INJECTION INTRAMUSCULAR; INTRAVENOUS at 14:18

## 2020-05-09 RX ADMIN — METHOCARBAMOL 500 MG: 500 TABLET, FILM COATED ORAL at 07:57

## 2020-05-09 RX ADMIN — LORAZEPAM 4 MG: 2 INJECTION INTRAMUSCULAR; INTRAVENOUS at 22:15

## 2020-05-09 RX ADMIN — LORAZEPAM 4 MG: 2 INJECTION INTRAMUSCULAR; INTRAVENOUS at 10:58

## 2020-05-09 RX ADMIN — THIAMINE HYDROCHLORIDE 500 MG: 100 INJECTION, SOLUTION INTRAMUSCULAR; INTRAVENOUS at 08:12

## 2020-05-09 RX ADMIN — SODIUM CHLORIDE, PRESERVATIVE FREE 10 ML: 5 INJECTION INTRAVENOUS at 09:08

## 2020-05-09 RX ADMIN — SODIUM CHLORIDE 1000 ML: 9 INJECTION, SOLUTION INTRAVENOUS at 22:14

## 2020-05-09 RX ADMIN — METHOCARBAMOL 500 MG: 500 TABLET, FILM COATED ORAL at 20:03

## 2020-05-09 RX ADMIN — LORAZEPAM 4 MG: 2 INJECTION INTRAMUSCULAR; INTRAVENOUS at 05:39

## 2020-05-09 RX ADMIN — POTASSIUM CHLORIDE 20 MEQ: 10 CAPSULE, COATED, EXTENDED RELEASE ORAL at 07:56

## 2020-05-09 RX ADMIN — THIAMINE HYDROCHLORIDE 500 MG: 100 INJECTION, SOLUTION INTRAMUSCULAR; INTRAVENOUS at 15:07

## 2020-05-09 RX ADMIN — LORAZEPAM 4 MG: 2 INJECTION INTRAMUSCULAR; INTRAVENOUS at 17:24

## 2020-05-09 RX ADMIN — LORAZEPAM 4 MG: 2 INJECTION INTRAMUSCULAR; INTRAVENOUS at 20:04

## 2020-05-10 LAB
ALBUMIN SERPL-MCNC: 3.4 G/DL (ref 3.5–5.2)
ALBUMIN/GLOB SERPL: 0.9 G/DL
ALP SERPL-CCNC: 108 U/L (ref 39–117)
ALT SERPL W P-5'-P-CCNC: 41 U/L (ref 1–33)
ANION GAP SERPL CALCULATED.3IONS-SCNC: 11 MMOL/L (ref 5–15)
AST SERPL-CCNC: 33 U/L (ref 1–32)
BASOPHILS # BLD AUTO: 0.02 10*3/MM3 (ref 0–0.2)
BASOPHILS NFR BLD AUTO: 0.8 % (ref 0–1.5)
BILIRUB SERPL-MCNC: 0.2 MG/DL (ref 0.2–1.2)
BUN BLD-MCNC: 13 MG/DL (ref 6–20)
BUN/CREAT SERPL: 20.6 (ref 7–25)
CALCIUM SPEC-SCNC: 9.2 MG/DL (ref 8.6–10.5)
CHLORIDE SERPL-SCNC: 102 MMOL/L (ref 98–107)
CO2 SERPL-SCNC: 21 MMOL/L (ref 22–29)
CREAT BLD-MCNC: 0.63 MG/DL (ref 0.57–1)
DEPRECATED RDW RBC AUTO: 46.5 FL (ref 37–54)
EOSINOPHIL # BLD AUTO: 0.08 10*3/MM3 (ref 0–0.4)
EOSINOPHIL NFR BLD AUTO: 3.2 % (ref 0.3–6.2)
ERYTHROCYTE [DISTWIDTH] IN BLOOD BY AUTOMATED COUNT: 13.5 % (ref 12.3–15.4)
GFR SERPL CREATININE-BSD FRML MDRD: 98 ML/MIN/1.73
GLOBULIN UR ELPH-MCNC: 3.7 GM/DL
GLUCOSE BLD-MCNC: 97 MG/DL (ref 65–99)
HCT VFR BLD AUTO: 37.7 % (ref 34–46.6)
HGB BLD-MCNC: 12.8 G/DL (ref 12–15.9)
IMM GRANULOCYTES # BLD AUTO: 0.01 10*3/MM3 (ref 0–0.05)
IMM GRANULOCYTES NFR BLD AUTO: 0.4 % (ref 0–0.5)
LYMPHOCYTES # BLD AUTO: 1.07 10*3/MM3 (ref 0.7–3.1)
LYMPHOCYTES NFR BLD AUTO: 42.8 % (ref 19.6–45.3)
MCH RBC QN AUTO: 31.7 PG (ref 26.6–33)
MCHC RBC AUTO-ENTMCNC: 34 G/DL (ref 31.5–35.7)
MCV RBC AUTO: 93.3 FL (ref 79–97)
MONOCYTES # BLD AUTO: 0.5 10*3/MM3 (ref 0.1–0.9)
MONOCYTES NFR BLD AUTO: 20 % (ref 5–12)
NEUTROPHILS # BLD AUTO: 0.82 10*3/MM3 (ref 1.7–7)
NEUTROPHILS NFR BLD AUTO: 32.8 % (ref 42.7–76)
NRBC BLD AUTO-RTO: 0 /100 WBC (ref 0–0.2)
PLATELET # BLD AUTO: 144 10*3/MM3 (ref 140–450)
PMV BLD AUTO: 10.6 FL (ref 6–12)
POTASSIUM BLD-SCNC: 3.7 MMOL/L (ref 3.5–5.2)
PROT SERPL-MCNC: 7.1 G/DL (ref 6–8.5)
RBC # BLD AUTO: 4.04 10*6/MM3 (ref 3.77–5.28)
SODIUM BLD-SCNC: 134 MMOL/L (ref 136–145)
WBC NRBC COR # BLD: 2.5 10*3/MM3 (ref 3.4–10.8)

## 2020-05-10 PROCEDURE — 85025 COMPLETE CBC W/AUTO DIFF WBC: CPT | Performed by: STUDENT IN AN ORGANIZED HEALTH CARE EDUCATION/TRAINING PROGRAM

## 2020-05-10 PROCEDURE — 25010000002 THIAMINE PER 100 MG: Performed by: PSYCHIATRY & NEUROLOGY

## 2020-05-10 PROCEDURE — 97116 GAIT TRAINING THERAPY: CPT

## 2020-05-10 PROCEDURE — 99232 SBSQ HOSP IP/OBS MODERATE 35: CPT | Performed by: STUDENT IN AN ORGANIZED HEALTH CARE EDUCATION/TRAINING PROGRAM

## 2020-05-10 PROCEDURE — 99232 SBSQ HOSP IP/OBS MODERATE 35: CPT | Performed by: PSYCHIATRY & NEUROLOGY

## 2020-05-10 PROCEDURE — 97530 THERAPEUTIC ACTIVITIES: CPT

## 2020-05-10 PROCEDURE — 25010000002 LORAZEPAM PER 2 MG: Performed by: PSYCHIATRY & NEUROLOGY

## 2020-05-10 PROCEDURE — 80053 COMPREHEN METABOLIC PANEL: CPT | Performed by: STUDENT IN AN ORGANIZED HEALTH CARE EDUCATION/TRAINING PROGRAM

## 2020-05-10 PROCEDURE — 97161 PT EVAL LOW COMPLEX 20 MIN: CPT

## 2020-05-10 RX ORDER — ESCITALOPRAM OXALATE 10 MG/1
10 TABLET ORAL DAILY
Status: DISCONTINUED | OUTPATIENT
Start: 2020-05-11 | End: 2020-05-19 | Stop reason: HOSPADM

## 2020-05-10 RX ORDER — QUETIAPINE FUMARATE 25 MG/1
50 TABLET, FILM COATED ORAL NIGHTLY
Status: DISCONTINUED | OUTPATIENT
Start: 2020-05-10 | End: 2020-05-13

## 2020-05-10 RX ORDER — ESCITALOPRAM OXALATE 5 MG/1
5 TABLET ORAL DAILY
Status: COMPLETED | OUTPATIENT
Start: 2020-05-10 | End: 2020-05-10

## 2020-05-10 RX ORDER — LORAZEPAM 2 MG/ML
3 INJECTION INTRAMUSCULAR
Status: DISCONTINUED | OUTPATIENT
Start: 2020-05-10 | End: 2020-05-11

## 2020-05-10 RX ADMIN — METHOCARBAMOL 500 MG: 500 TABLET, FILM COATED ORAL at 17:25

## 2020-05-10 RX ADMIN — NICOTINE 1 PATCH: 21 PATCH, EXTENDED RELEASE TRANSDERMAL at 13:50

## 2020-05-10 RX ADMIN — ESCITALOPRAM 5 MG: 5 TABLET, FILM COATED ORAL at 14:59

## 2020-05-10 RX ADMIN — QUETIAPINE FUMARATE 50 MG: 25 TABLET ORAL at 20:10

## 2020-05-10 RX ADMIN — FAMOTIDINE 20 MG: 20 TABLET, FILM COATED ORAL at 08:28

## 2020-05-10 RX ADMIN — THIAMINE HYDROCHLORIDE 250 MG: 100 INJECTION, SOLUTION INTRAMUSCULAR; INTRAVENOUS at 15:00

## 2020-05-10 RX ADMIN — LORAZEPAM 3 MG: 2 INJECTION INTRAMUSCULAR; INTRAVENOUS at 15:00

## 2020-05-10 RX ADMIN — LORAZEPAM 3 MG: 2 INJECTION INTRAMUSCULAR; INTRAVENOUS at 17:25

## 2020-05-10 RX ADMIN — MELATONIN 9.75 MG: at 20:10

## 2020-05-10 RX ADMIN — METHOCARBAMOL 500 MG: 500 TABLET, FILM COATED ORAL at 20:10

## 2020-05-10 RX ADMIN — LORAZEPAM 4 MG: 2 INJECTION INTRAMUSCULAR; INTRAVENOUS at 13:50

## 2020-05-10 RX ADMIN — LORAZEPAM 4 MG: 2 INJECTION INTRAMUSCULAR; INTRAVENOUS at 08:28

## 2020-05-10 RX ADMIN — LORAZEPAM 4 MG: 2 INJECTION INTRAMUSCULAR; INTRAVENOUS at 02:28

## 2020-05-10 RX ADMIN — LORAZEPAM 3 MG: 2 INJECTION INTRAMUSCULAR; INTRAVENOUS at 20:09

## 2020-05-10 RX ADMIN — METHOCARBAMOL 500 MG: 500 TABLET, FILM COATED ORAL at 08:28

## 2020-05-10 RX ADMIN — THIAMINE HYDROCHLORIDE 250 MG: 100 INJECTION, SOLUTION INTRAMUSCULAR; INTRAVENOUS at 20:16

## 2020-05-10 RX ADMIN — METHOCARBAMOL 500 MG: 500 TABLET, FILM COATED ORAL at 13:50

## 2020-05-10 RX ADMIN — TERAZOSIN HYDROCHLORIDE 2 MG: 2 CAPSULE ORAL at 20:10

## 2020-05-10 RX ADMIN — THIAMINE HYDROCHLORIDE 250 MG: 100 INJECTION, SOLUTION INTRAMUSCULAR; INTRAVENOUS at 08:28

## 2020-05-10 RX ADMIN — POTASSIUM CHLORIDE 20 MEQ: 10 CAPSULE, COATED, EXTENDED RELEASE ORAL at 08:29

## 2020-05-11 PROBLEM — F32.1 CURRENT MODERATE EPISODE OF MAJOR DEPRESSIVE DISORDER WITHOUT PRIOR EPISODE: Status: ACTIVE | Noted: 2020-05-11

## 2020-05-11 LAB
ALBUMIN SERPL-MCNC: 3.4 G/DL (ref 3.5–5.2)
ALBUMIN/GLOB SERPL: 1 G/DL
ALP SERPL-CCNC: 107 U/L (ref 39–117)
ALT SERPL W P-5'-P-CCNC: 39 U/L (ref 1–33)
ANION GAP SERPL CALCULATED.3IONS-SCNC: 11 MMOL/L (ref 5–15)
AST SERPL-CCNC: 27 U/L (ref 1–32)
BASOPHILS # BLD AUTO: 0.02 10*3/MM3 (ref 0–0.2)
BASOPHILS NFR BLD AUTO: 0.9 % (ref 0–1.5)
BILIRUB SERPL-MCNC: 0.2 MG/DL (ref 0.2–1.2)
BUN BLD-MCNC: 13 MG/DL (ref 6–20)
BUN/CREAT SERPL: 19.1 (ref 7–25)
CALCIUM SPEC-SCNC: 9.3 MG/DL (ref 8.6–10.5)
CHLORIDE SERPL-SCNC: 102 MMOL/L (ref 98–107)
CO2 SERPL-SCNC: 22 MMOL/L (ref 22–29)
CREAT BLD-MCNC: 0.68 MG/DL (ref 0.57–1)
DEPRECATED RDW RBC AUTO: 45.4 FL (ref 37–54)
EOSINOPHIL # BLD AUTO: 0.07 10*3/MM3 (ref 0–0.4)
EOSINOPHIL NFR BLD AUTO: 3.1 % (ref 0.3–6.2)
ERYTHROCYTE [DISTWIDTH] IN BLOOD BY AUTOMATED COUNT: 13.2 % (ref 12.3–15.4)
GFR SERPL CREATININE-BSD FRML MDRD: 90 ML/MIN/1.73
GLOBULIN UR ELPH-MCNC: 3.4 GM/DL
GLUCOSE BLD-MCNC: 94 MG/DL (ref 65–99)
HCT VFR BLD AUTO: 36.7 % (ref 34–46.6)
HGB BLD-MCNC: 12.3 G/DL (ref 12–15.9)
IMM GRANULOCYTES # BLD AUTO: 0.01 10*3/MM3 (ref 0–0.05)
IMM GRANULOCYTES NFR BLD AUTO: 0.4 % (ref 0–0.5)
LYMPHOCYTES # BLD AUTO: 1.13 10*3/MM3 (ref 0.7–3.1)
LYMPHOCYTES NFR BLD AUTO: 49.6 % (ref 19.6–45.3)
MCH RBC QN AUTO: 31.2 PG (ref 26.6–33)
MCHC RBC AUTO-ENTMCNC: 33.5 G/DL (ref 31.5–35.7)
MCV RBC AUTO: 93.1 FL (ref 79–97)
MONOCYTES # BLD AUTO: 0.45 10*3/MM3 (ref 0.1–0.9)
MONOCYTES NFR BLD AUTO: 19.7 % (ref 5–12)
NEUTROPHILS # BLD AUTO: 0.6 10*3/MM3 (ref 1.7–7)
NEUTROPHILS NFR BLD AUTO: 26.3 % (ref 42.7–76)
NRBC BLD AUTO-RTO: 0 /100 WBC (ref 0–0.2)
PLATELET # BLD AUTO: 167 10*3/MM3 (ref 140–450)
PMV BLD AUTO: 10.7 FL (ref 6–12)
POTASSIUM BLD-SCNC: 3.8 MMOL/L (ref 3.5–5.2)
PROT SERPL-MCNC: 6.8 G/DL (ref 6–8.5)
RBC # BLD AUTO: 3.94 10*6/MM3 (ref 3.77–5.28)
SODIUM BLD-SCNC: 135 MMOL/L (ref 136–145)
WBC NRBC COR # BLD: 2.28 10*3/MM3 (ref 3.4–10.8)

## 2020-05-11 PROCEDURE — 99232 SBSQ HOSP IP/OBS MODERATE 35: CPT | Performed by: STUDENT IN AN ORGANIZED HEALTH CARE EDUCATION/TRAINING PROGRAM

## 2020-05-11 PROCEDURE — 25010000002 LORAZEPAM PER 2 MG: Performed by: PSYCHIATRY & NEUROLOGY

## 2020-05-11 PROCEDURE — 99232 SBSQ HOSP IP/OBS MODERATE 35: CPT | Performed by: PSYCHIATRY & NEUROLOGY

## 2020-05-11 PROCEDURE — 80053 COMPREHEN METABOLIC PANEL: CPT | Performed by: STUDENT IN AN ORGANIZED HEALTH CARE EDUCATION/TRAINING PROGRAM

## 2020-05-11 PROCEDURE — 97530 THERAPEUTIC ACTIVITIES: CPT

## 2020-05-11 PROCEDURE — 25010000002 THIAMINE PER 100 MG: Performed by: PSYCHIATRY & NEUROLOGY

## 2020-05-11 PROCEDURE — 97116 GAIT TRAINING THERAPY: CPT

## 2020-05-11 PROCEDURE — 85025 COMPLETE CBC W/AUTO DIFF WBC: CPT | Performed by: STUDENT IN AN ORGANIZED HEALTH CARE EDUCATION/TRAINING PROGRAM

## 2020-05-11 RX ORDER — LORAZEPAM 2 MG/ML
4 INJECTION INTRAMUSCULAR
Status: DISCONTINUED | OUTPATIENT
Start: 2020-05-11 | End: 2020-05-19 | Stop reason: HOSPADM

## 2020-05-11 RX ORDER — LORAZEPAM 2 MG/ML
3 INJECTION INTRAMUSCULAR EVERY 4 HOURS
Status: DISCONTINUED | OUTPATIENT
Start: 2020-05-11 | End: 2020-05-13

## 2020-05-11 RX ADMIN — THIAMINE HYDROCHLORIDE 250 MG: 100 INJECTION, SOLUTION INTRAMUSCULAR; INTRAVENOUS at 09:42

## 2020-05-11 RX ADMIN — FAMOTIDINE 20 MG: 20 TABLET, FILM COATED ORAL at 09:33

## 2020-05-11 RX ADMIN — NICOTINE 1 PATCH: 21 PATCH, EXTENDED RELEASE TRANSDERMAL at 14:47

## 2020-05-11 RX ADMIN — TERAZOSIN HYDROCHLORIDE 2 MG: 2 CAPSULE ORAL at 09:33

## 2020-05-11 RX ADMIN — LORAZEPAM 3 MG: 2 INJECTION INTRAMUSCULAR; INTRAVENOUS at 20:13

## 2020-05-11 RX ADMIN — LORAZEPAM 3 MG: 2 INJECTION INTRAMUSCULAR; INTRAVENOUS at 09:37

## 2020-05-11 RX ADMIN — METHOCARBAMOL 500 MG: 500 TABLET, FILM COATED ORAL at 20:17

## 2020-05-11 RX ADMIN — TERAZOSIN HYDROCHLORIDE 2 MG: 2 CAPSULE ORAL at 20:17

## 2020-05-11 RX ADMIN — METHOCARBAMOL 500 MG: 500 TABLET, FILM COATED ORAL at 17:46

## 2020-05-11 RX ADMIN — METHOCARBAMOL 500 MG: 500 TABLET, FILM COATED ORAL at 11:16

## 2020-05-11 RX ADMIN — METHOCARBAMOL 500 MG: 500 TABLET, FILM COATED ORAL at 09:32

## 2020-05-11 RX ADMIN — POTASSIUM CHLORIDE 20 MEQ: 10 CAPSULE, COATED, EXTENDED RELEASE ORAL at 09:33

## 2020-05-11 RX ADMIN — ESCITALOPRAM OXALATE 10 MG: 10 TABLET ORAL at 09:33

## 2020-05-11 RX ADMIN — THIAMINE HYDROCHLORIDE 250 MG: 100 INJECTION, SOLUTION INTRAMUSCULAR; INTRAVENOUS at 16:16

## 2020-05-11 RX ADMIN — QUETIAPINE FUMARATE 50 MG: 25 TABLET ORAL at 20:13

## 2020-05-11 RX ADMIN — SODIUM CHLORIDE, PRESERVATIVE FREE 10 ML: 5 INJECTION INTRAVENOUS at 20:14

## 2020-05-11 RX ADMIN — LORAZEPAM 3 MG: 2 INJECTION INTRAMUSCULAR; INTRAVENOUS at 16:16

## 2020-05-11 RX ADMIN — MELATONIN 9.75 MG: at 20:13

## 2020-05-11 RX ADMIN — THIAMINE HYDROCHLORIDE 250 MG: 100 INJECTION, SOLUTION INTRAMUSCULAR; INTRAVENOUS at 20:14

## 2020-05-11 RX ADMIN — SODIUM CHLORIDE, PRESERVATIVE FREE 10 ML: 5 INJECTION INTRAVENOUS at 09:34

## 2020-05-12 PROBLEM — B37.31 CANDIDA VAGINITIS: Status: RESOLVED | Noted: 2020-05-05 | Resolved: 2020-05-12

## 2020-05-12 LAB
ALBUMIN SERPL-MCNC: 3 G/DL (ref 3.5–5.2)
ALBUMIN/GLOB SERPL: 0.9 G/DL
ALP SERPL-CCNC: 110 U/L (ref 39–117)
ALT SERPL W P-5'-P-CCNC: 33 U/L (ref 1–33)
ANION GAP SERPL CALCULATED.3IONS-SCNC: 15 MMOL/L (ref 5–15)
AST SERPL-CCNC: 23 U/L (ref 1–32)
BASOPHILS # BLD AUTO: 0.03 10*3/MM3 (ref 0–0.2)
BASOPHILS NFR BLD AUTO: 1.1 % (ref 0–1.5)
BILIRUB SERPL-MCNC: 0.2 MG/DL (ref 0.2–1.2)
BUN BLD-MCNC: 14 MG/DL (ref 6–20)
BUN/CREAT SERPL: 17.9 (ref 7–25)
CALCIUM SPEC-SCNC: 9 MG/DL (ref 8.6–10.5)
CHLORIDE SERPL-SCNC: 103 MMOL/L (ref 98–107)
CO2 SERPL-SCNC: 17 MMOL/L (ref 22–29)
CREAT BLD-MCNC: 0.78 MG/DL (ref 0.57–1)
DEPRECATED RDW RBC AUTO: 48.7 FL (ref 37–54)
EOSINOPHIL # BLD AUTO: 0.1 10*3/MM3 (ref 0–0.4)
EOSINOPHIL NFR BLD AUTO: 3.8 % (ref 0.3–6.2)
ERYTHROCYTE [DISTWIDTH] IN BLOOD BY AUTOMATED COUNT: 13.7 % (ref 12.3–15.4)
GFR SERPL CREATININE-BSD FRML MDRD: 77 ML/MIN/1.73
GLOBULIN UR ELPH-MCNC: 3.5 GM/DL
GLUCOSE BLD-MCNC: 113 MG/DL (ref 65–99)
HCT VFR BLD AUTO: 37.7 % (ref 34–46.6)
HGB BLD-MCNC: 12.2 G/DL (ref 12–15.9)
IMM GRANULOCYTES # BLD AUTO: 0.02 10*3/MM3 (ref 0–0.05)
IMM GRANULOCYTES NFR BLD AUTO: 0.8 % (ref 0–0.5)
LYMPHOCYTES # BLD AUTO: 1.21 10*3/MM3 (ref 0.7–3.1)
LYMPHOCYTES NFR BLD AUTO: 46.2 % (ref 19.6–45.3)
MCH RBC QN AUTO: 31.4 PG (ref 26.6–33)
MCHC RBC AUTO-ENTMCNC: 32.4 G/DL (ref 31.5–35.7)
MCV RBC AUTO: 96.9 FL (ref 79–97)
MONOCYTES # BLD AUTO: 0.62 10*3/MM3 (ref 0.1–0.9)
MONOCYTES NFR BLD AUTO: 23.7 % (ref 5–12)
NEUTROPHILS # BLD AUTO: 0.64 10*3/MM3 (ref 1.7–7)
NEUTROPHILS NFR BLD AUTO: 24.4 % (ref 42.7–76)
NRBC BLD AUTO-RTO: 0 /100 WBC (ref 0–0.2)
PLATELET # BLD AUTO: 146 10*3/MM3 (ref 140–450)
PMV BLD AUTO: 10.4 FL (ref 6–12)
POTASSIUM BLD-SCNC: 4.3 MMOL/L (ref 3.5–5.2)
PROT SERPL-MCNC: 6.5 G/DL (ref 6–8.5)
RBC # BLD AUTO: 3.89 10*6/MM3 (ref 3.77–5.28)
SODIUM BLD-SCNC: 135 MMOL/L (ref 136–145)
WBC NRBC COR # BLD: 2.62 10*3/MM3 (ref 3.4–10.8)

## 2020-05-12 PROCEDURE — 99232 SBSQ HOSP IP/OBS MODERATE 35: CPT | Performed by: PSYCHIATRY & NEUROLOGY

## 2020-05-12 PROCEDURE — 99232 SBSQ HOSP IP/OBS MODERATE 35: CPT | Performed by: STUDENT IN AN ORGANIZED HEALTH CARE EDUCATION/TRAINING PROGRAM

## 2020-05-12 PROCEDURE — 80053 COMPREHEN METABOLIC PANEL: CPT | Performed by: STUDENT IN AN ORGANIZED HEALTH CARE EDUCATION/TRAINING PROGRAM

## 2020-05-12 PROCEDURE — 97110 THERAPEUTIC EXERCISES: CPT

## 2020-05-12 PROCEDURE — 97116 GAIT TRAINING THERAPY: CPT

## 2020-05-12 PROCEDURE — 25010000002 LORAZEPAM PER 2 MG: Performed by: PSYCHIATRY & NEUROLOGY

## 2020-05-12 PROCEDURE — 25010000002 THIAMINE PER 100 MG: Performed by: PSYCHIATRY & NEUROLOGY

## 2020-05-12 PROCEDURE — 85025 COMPLETE CBC W/AUTO DIFF WBC: CPT | Performed by: STUDENT IN AN ORGANIZED HEALTH CARE EDUCATION/TRAINING PROGRAM

## 2020-05-12 RX ADMIN — METHOCARBAMOL 500 MG: 500 TABLET, FILM COATED ORAL at 16:14

## 2020-05-12 RX ADMIN — TERAZOSIN HYDROCHLORIDE 2 MG: 2 CAPSULE ORAL at 09:14

## 2020-05-12 RX ADMIN — ESCITALOPRAM OXALATE 10 MG: 10 TABLET ORAL at 09:14

## 2020-05-12 RX ADMIN — METHOCARBAMOL 500 MG: 500 TABLET, FILM COATED ORAL at 11:21

## 2020-05-12 RX ADMIN — SODIUM CHLORIDE, PRESERVATIVE FREE 10 ML: 5 INJECTION INTRAVENOUS at 09:16

## 2020-05-12 RX ADMIN — TERAZOSIN HYDROCHLORIDE 2 MG: 2 CAPSULE ORAL at 20:27

## 2020-05-12 RX ADMIN — FAMOTIDINE 20 MG: 20 TABLET, FILM COATED ORAL at 09:14

## 2020-05-12 RX ADMIN — SODIUM CHLORIDE, PRESERVATIVE FREE 10 ML: 5 INJECTION INTRAVENOUS at 20:32

## 2020-05-12 RX ADMIN — METHOCARBAMOL 500 MG: 500 TABLET, FILM COATED ORAL at 09:14

## 2020-05-12 RX ADMIN — LORAZEPAM 3 MG: 2 INJECTION INTRAMUSCULAR; INTRAVENOUS at 20:26

## 2020-05-12 RX ADMIN — LORAZEPAM 3 MG: 2 INJECTION INTRAMUSCULAR; INTRAVENOUS at 00:09

## 2020-05-12 RX ADMIN — LORAZEPAM 3 MG: 2 INJECTION INTRAMUSCULAR; INTRAVENOUS at 04:10

## 2020-05-12 RX ADMIN — THIAMINE HYDROCHLORIDE 250 MG: 100 INJECTION, SOLUTION INTRAMUSCULAR; INTRAVENOUS at 16:08

## 2020-05-12 RX ADMIN — MELATONIN 9.75 MG: at 20:27

## 2020-05-12 RX ADMIN — QUETIAPINE FUMARATE 50 MG: 25 TABLET ORAL at 20:26

## 2020-05-12 RX ADMIN — THIAMINE HYDROCHLORIDE 250 MG: 100 INJECTION, SOLUTION INTRAMUSCULAR; INTRAVENOUS at 09:15

## 2020-05-12 RX ADMIN — LORAZEPAM 3 MG: 2 INJECTION INTRAMUSCULAR; INTRAVENOUS at 16:08

## 2020-05-12 RX ADMIN — METHOCARBAMOL 500 MG: 500 TABLET, FILM COATED ORAL at 20:27

## 2020-05-12 RX ADMIN — NICOTINE 1 PATCH: 21 PATCH, EXTENDED RELEASE TRANSDERMAL at 16:14

## 2020-05-12 RX ADMIN — POTASSIUM CHLORIDE 20 MEQ: 10 CAPSULE, COATED, EXTENDED RELEASE ORAL at 09:14

## 2020-05-13 PROBLEM — E87.5 HYPERKALEMIA: Status: ACTIVE | Noted: 2020-05-13

## 2020-05-13 LAB
ALBUMIN SERPL-MCNC: 3.4 G/DL (ref 3.5–5.2)
ALBUMIN/GLOB SERPL: 0.9 G/DL
ALP SERPL-CCNC: 107 U/L (ref 39–117)
ALT SERPL W P-5'-P-CCNC: 36 U/L (ref 1–33)
ANION GAP SERPL CALCULATED.3IONS-SCNC: 11 MMOL/L (ref 5–15)
AST SERPL-CCNC: 24 U/L (ref 1–32)
BILIRUB SERPL-MCNC: <0.2 MG/DL (ref 0.2–1.2)
BUN BLD-MCNC: 13 MG/DL (ref 6–20)
BUN/CREAT SERPL: 16.3 (ref 7–25)
CALCIUM SPEC-SCNC: 9.5 MG/DL (ref 8.6–10.5)
CHLORIDE SERPL-SCNC: 104 MMOL/L (ref 98–107)
CO2 SERPL-SCNC: 20 MMOL/L (ref 22–29)
CREAT BLD-MCNC: 0.8 MG/DL (ref 0.57–1)
DEPRECATED RDW RBC AUTO: 47.7 FL (ref 37–54)
EOSINOPHIL # BLD MANUAL: 0.06 10*3/MM3 (ref 0–0.4)
EOSINOPHIL NFR BLD MANUAL: 2 % (ref 0.3–6.2)
ERYTHROCYTE [DISTWIDTH] IN BLOOD BY AUTOMATED COUNT: 13.4 % (ref 12.3–15.4)
GFR SERPL CREATININE-BSD FRML MDRD: 75 ML/MIN/1.73
GLOBULIN UR ELPH-MCNC: 3.7 GM/DL
GLUCOSE BLD-MCNC: 97 MG/DL (ref 65–99)
HCT VFR BLD AUTO: 37.9 % (ref 34–46.6)
HGB BLD-MCNC: 12.7 G/DL (ref 12–15.9)
LYMPHOCYTES # BLD MANUAL: 1.84 10*3/MM3 (ref 0.7–3.1)
LYMPHOCYTES NFR BLD MANUAL: 21 % (ref 5–12)
LYMPHOCYTES NFR BLD MANUAL: 58 % (ref 19.6–45.3)
MCH RBC QN AUTO: 32.2 PG (ref 26.6–33)
MCHC RBC AUTO-ENTMCNC: 33.5 G/DL (ref 31.5–35.7)
MCV RBC AUTO: 95.9 FL (ref 79–97)
MONOCYTES # BLD AUTO: 0.67 10*3/MM3 (ref 0.1–0.9)
NEUTROPHILS # BLD AUTO: 0.6 10*3/MM3 (ref 1.7–7)
NEUTROPHILS NFR BLD MANUAL: 19 % (ref 42.7–76)
PLATELET # BLD AUTO: 224 10*3/MM3 (ref 140–450)
PMV BLD AUTO: 10 FL (ref 6–12)
POTASSIUM BLD-SCNC: 4.3 MMOL/L (ref 3.5–5.2)
POTASSIUM BLD-SCNC: 5.4 MMOL/L (ref 3.5–5.2)
PROT SERPL-MCNC: 7.1 G/DL (ref 6–8.5)
RBC # BLD AUTO: 3.95 10*6/MM3 (ref 3.77–5.28)
RBC MORPH BLD: NORMAL
SMALL PLATELETS BLD QL SMEAR: ADEQUATE
SODIUM BLD-SCNC: 135 MMOL/L (ref 136–145)
WBC MORPH BLD: NORMAL
WBC NRBC COR # BLD: 3.18 10*3/MM3 (ref 3.4–10.8)

## 2020-05-13 PROCEDURE — 93010 ELECTROCARDIOGRAM REPORT: CPT | Performed by: INTERNAL MEDICINE

## 2020-05-13 PROCEDURE — 99232 SBSQ HOSP IP/OBS MODERATE 35: CPT | Performed by: PSYCHIATRY & NEUROLOGY

## 2020-05-13 PROCEDURE — 85007 BL SMEAR W/DIFF WBC COUNT: CPT | Performed by: STUDENT IN AN ORGANIZED HEALTH CARE EDUCATION/TRAINING PROGRAM

## 2020-05-13 PROCEDURE — 25010000002 LORAZEPAM PER 2 MG: Performed by: PSYCHIATRY & NEUROLOGY

## 2020-05-13 PROCEDURE — 85025 COMPLETE CBC W/AUTO DIFF WBC: CPT | Performed by: STUDENT IN AN ORGANIZED HEALTH CARE EDUCATION/TRAINING PROGRAM

## 2020-05-13 PROCEDURE — 80053 COMPREHEN METABOLIC PANEL: CPT | Performed by: STUDENT IN AN ORGANIZED HEALTH CARE EDUCATION/TRAINING PROGRAM

## 2020-05-13 PROCEDURE — 90833 PSYTX W PT W E/M 30 MIN: CPT | Performed by: PSYCHIATRY & NEUROLOGY

## 2020-05-13 PROCEDURE — 25010000002 THIAMINE PER 100 MG: Performed by: PSYCHIATRY & NEUROLOGY

## 2020-05-13 PROCEDURE — 84132 ASSAY OF SERUM POTASSIUM: CPT | Performed by: STUDENT IN AN ORGANIZED HEALTH CARE EDUCATION/TRAINING PROGRAM

## 2020-05-13 PROCEDURE — 97110 THERAPEUTIC EXERCISES: CPT

## 2020-05-13 PROCEDURE — 99232 SBSQ HOSP IP/OBS MODERATE 35: CPT | Performed by: STUDENT IN AN ORGANIZED HEALTH CARE EDUCATION/TRAINING PROGRAM

## 2020-05-13 PROCEDURE — 97116 GAIT TRAINING THERAPY: CPT

## 2020-05-13 PROCEDURE — 93005 ELECTROCARDIOGRAM TRACING: CPT | Performed by: STUDENT IN AN ORGANIZED HEALTH CARE EDUCATION/TRAINING PROGRAM

## 2020-05-13 RX ORDER — SODIUM POLYSTYRENE SULFONATE 15 G/60ML
15 SUSPENSION ORAL; RECTAL ONCE
Status: DISCONTINUED | OUTPATIENT
Start: 2020-05-13 | End: 2020-05-19 | Stop reason: HOSPADM

## 2020-05-13 RX ORDER — QUETIAPINE FUMARATE 25 MG/1
75 TABLET, FILM COATED ORAL NIGHTLY
Status: DISCONTINUED | OUTPATIENT
Start: 2020-05-13 | End: 2020-05-16

## 2020-05-13 RX ORDER — LORAZEPAM 2 MG/ML
2 INJECTION INTRAMUSCULAR EVERY 4 HOURS
Status: DISCONTINUED | OUTPATIENT
Start: 2020-05-13 | End: 2020-05-15

## 2020-05-13 RX ADMIN — METHOCARBAMOL 500 MG: 500 TABLET, FILM COATED ORAL at 11:32

## 2020-05-13 RX ADMIN — LORAZEPAM 2 MG: 2 INJECTION INTRAMUSCULAR; INTRAVENOUS at 20:24

## 2020-05-13 RX ADMIN — METHOCARBAMOL 500 MG: 500 TABLET, FILM COATED ORAL at 18:03

## 2020-05-13 RX ADMIN — LORAZEPAM 3 MG: 2 INJECTION INTRAMUSCULAR; INTRAVENOUS at 11:32

## 2020-05-13 RX ADMIN — MELATONIN 9.75 MG: at 20:24

## 2020-05-13 RX ADMIN — ESCITALOPRAM OXALATE 10 MG: 10 TABLET ORAL at 08:19

## 2020-05-13 RX ADMIN — METHOCARBAMOL 500 MG: 500 TABLET, FILM COATED ORAL at 20:26

## 2020-05-13 RX ADMIN — NICOTINE 1 PATCH: 21 PATCH, EXTENDED RELEASE TRANSDERMAL at 14:57

## 2020-05-13 RX ADMIN — LORAZEPAM 3 MG: 2 INJECTION INTRAMUSCULAR; INTRAVENOUS at 08:19

## 2020-05-13 RX ADMIN — LORAZEPAM 2 MG: 2 INJECTION INTRAMUSCULAR; INTRAVENOUS at 16:05

## 2020-05-13 RX ADMIN — QUETIAPINE FUMARATE 75 MG: 25 TABLET ORAL at 20:24

## 2020-05-13 RX ADMIN — FAMOTIDINE 20 MG: 20 TABLET, FILM COATED ORAL at 08:19

## 2020-05-13 RX ADMIN — SODIUM CHLORIDE, PRESERVATIVE FREE 10 ML: 5 INJECTION INTRAVENOUS at 20:25

## 2020-05-13 RX ADMIN — TERAZOSIN HYDROCHLORIDE 2 MG: 2 CAPSULE ORAL at 08:19

## 2020-05-13 RX ADMIN — LORAZEPAM 3 MG: 2 INJECTION INTRAMUSCULAR; INTRAVENOUS at 04:12

## 2020-05-13 RX ADMIN — TERAZOSIN HYDROCHLORIDE 2 MG: 2 CAPSULE ORAL at 20:26

## 2020-05-13 RX ADMIN — SODIUM CHLORIDE, PRESERVATIVE FREE 10 ML: 5 INJECTION INTRAVENOUS at 11:33

## 2020-05-13 RX ADMIN — SODIUM CHLORIDE, PRESERVATIVE FREE 10 ML: 5 INJECTION INTRAVENOUS at 08:19

## 2020-05-13 RX ADMIN — METHOCARBAMOL 500 MG: 500 TABLET, FILM COATED ORAL at 08:19

## 2020-05-13 RX ADMIN — THIAMINE HYDROCHLORIDE 200 MG: 100 INJECTION, SOLUTION INTRAMUSCULAR; INTRAVENOUS at 20:24

## 2020-05-14 PROBLEM — E51.2 WERNICKE'S ENCEPHALOPATHY: Status: RESOLVED | Noted: 2018-04-23 | Resolved: 2020-05-14

## 2020-05-14 PROBLEM — D69.6 THROMBOCYTOPENIA (HCC): Status: RESOLVED | Noted: 2020-05-06 | Resolved: 2020-05-14

## 2020-05-14 PROBLEM — E87.5 HYPERKALEMIA: Status: RESOLVED | Noted: 2020-05-13 | Resolved: 2020-05-14

## 2020-05-14 LAB
ALBUMIN SERPL-MCNC: 3.5 G/DL (ref 3.5–5.2)
ALBUMIN/GLOB SERPL: 0.9 G/DL
ALP SERPL-CCNC: 121 U/L (ref 39–117)
ALT SERPL W P-5'-P-CCNC: 32 U/L (ref 1–33)
ANION GAP SERPL CALCULATED.3IONS-SCNC: 13 MMOL/L (ref 5–15)
AST SERPL-CCNC: 18 U/L (ref 1–32)
BASOPHILS # BLD AUTO: 0.04 10*3/MM3 (ref 0–0.2)
BASOPHILS NFR BLD AUTO: 1.7 % (ref 0–1.5)
BILIRUB SERPL-MCNC: <0.2 MG/DL (ref 0.2–1.2)
BUN BLD-MCNC: 12 MG/DL (ref 6–20)
BUN/CREAT SERPL: 15.8 (ref 7–25)
CALCIUM SPEC-SCNC: 9.5 MG/DL (ref 8.6–10.5)
CHLORIDE SERPL-SCNC: 99 MMOL/L (ref 98–107)
CO2 SERPL-SCNC: 22 MMOL/L (ref 22–29)
CREAT BLD-MCNC: 0.76 MG/DL (ref 0.57–1)
DEPRECATED RDW RBC AUTO: 44.9 FL (ref 37–54)
EOSINOPHIL # BLD AUTO: 0.09 10*3/MM3 (ref 0–0.4)
EOSINOPHIL NFR BLD AUTO: 3.8 % (ref 0.3–6.2)
ERYTHROCYTE [DISTWIDTH] IN BLOOD BY AUTOMATED COUNT: 13.2 % (ref 12.3–15.4)
GFR SERPL CREATININE-BSD FRML MDRD: 79 ML/MIN/1.73
GLOBULIN UR ELPH-MCNC: 3.8 GM/DL
GLUCOSE BLD-MCNC: 89 MG/DL (ref 65–99)
HCT VFR BLD AUTO: 38.1 % (ref 34–46.6)
HGB BLD-MCNC: 12.9 G/DL (ref 12–15.9)
IMM GRANULOCYTES # BLD AUTO: 0 10*3/MM3 (ref 0–0.05)
IMM GRANULOCYTES NFR BLD AUTO: 0 % (ref 0–0.5)
LYMPHOCYTES # BLD AUTO: 1.05 10*3/MM3 (ref 0.7–3.1)
LYMPHOCYTES NFR BLD AUTO: 44.5 % (ref 19.6–45.3)
MCH RBC QN AUTO: 31.5 PG (ref 26.6–33)
MCHC RBC AUTO-ENTMCNC: 33.9 G/DL (ref 31.5–35.7)
MCV RBC AUTO: 93.2 FL (ref 79–97)
MONOCYTES # BLD AUTO: 0.67 10*3/MM3 (ref 0.1–0.9)
MONOCYTES NFR BLD AUTO: 28.4 % (ref 5–12)
NEUTROPHILS # BLD AUTO: 0.51 10*3/MM3 (ref 1.7–7)
NEUTROPHILS NFR BLD AUTO: 21.6 % (ref 42.7–76)
NRBC BLD AUTO-RTO: 0 /100 WBC (ref 0–0.2)
PLATELET # BLD AUTO: 262 10*3/MM3 (ref 140–450)
PMV BLD AUTO: 9.7 FL (ref 6–12)
POTASSIUM BLD-SCNC: 4.2 MMOL/L (ref 3.5–5.2)
PROT SERPL-MCNC: 7.3 G/DL (ref 6–8.5)
RBC # BLD AUTO: 4.09 10*6/MM3 (ref 3.77–5.28)
SODIUM BLD-SCNC: 134 MMOL/L (ref 136–145)
WBC NRBC COR # BLD: 2.36 10*3/MM3 (ref 3.4–10.8)

## 2020-05-14 PROCEDURE — 99232 SBSQ HOSP IP/OBS MODERATE 35: CPT | Performed by: STUDENT IN AN ORGANIZED HEALTH CARE EDUCATION/TRAINING PROGRAM

## 2020-05-14 PROCEDURE — 97530 THERAPEUTIC ACTIVITIES: CPT

## 2020-05-14 PROCEDURE — 25010000002 THIAMINE PER 100 MG: Performed by: PSYCHIATRY & NEUROLOGY

## 2020-05-14 PROCEDURE — 99232 SBSQ HOSP IP/OBS MODERATE 35: CPT | Performed by: PSYCHIATRY & NEUROLOGY

## 2020-05-14 PROCEDURE — 85025 COMPLETE CBC W/AUTO DIFF WBC: CPT | Performed by: STUDENT IN AN ORGANIZED HEALTH CARE EDUCATION/TRAINING PROGRAM

## 2020-05-14 PROCEDURE — 97116 GAIT TRAINING THERAPY: CPT

## 2020-05-14 PROCEDURE — 25010000002 LORAZEPAM PER 2 MG: Performed by: PSYCHIATRY & NEUROLOGY

## 2020-05-14 PROCEDURE — 80053 COMPREHEN METABOLIC PANEL: CPT | Performed by: STUDENT IN AN ORGANIZED HEALTH CARE EDUCATION/TRAINING PROGRAM

## 2020-05-14 RX ADMIN — TERAZOSIN HYDROCHLORIDE 2 MG: 2 CAPSULE ORAL at 21:01

## 2020-05-14 RX ADMIN — ESCITALOPRAM OXALATE 10 MG: 10 TABLET ORAL at 09:43

## 2020-05-14 RX ADMIN — METHOCARBAMOL 500 MG: 500 TABLET, FILM COATED ORAL at 09:43

## 2020-05-14 RX ADMIN — NICOTINE 1 PATCH: 21 PATCH, EXTENDED RELEASE TRANSDERMAL at 13:55

## 2020-05-14 RX ADMIN — LORAZEPAM 2 MG: 2 INJECTION INTRAMUSCULAR; INTRAVENOUS at 13:55

## 2020-05-14 RX ADMIN — SODIUM CHLORIDE, PRESERVATIVE FREE 10 ML: 5 INJECTION INTRAVENOUS at 09:43

## 2020-05-14 RX ADMIN — METHOCARBAMOL 500 MG: 500 TABLET, FILM COATED ORAL at 18:51

## 2020-05-14 RX ADMIN — THIAMINE HYDROCHLORIDE 200 MG: 100 INJECTION, SOLUTION INTRAMUSCULAR; INTRAVENOUS at 21:05

## 2020-05-14 RX ADMIN — METHOCARBAMOL 500 MG: 500 TABLET, FILM COATED ORAL at 13:55

## 2020-05-14 RX ADMIN — SODIUM CHLORIDE, PRESERVATIVE FREE 10 ML: 5 INJECTION INTRAVENOUS at 21:01

## 2020-05-14 RX ADMIN — TERAZOSIN HYDROCHLORIDE 2 MG: 2 CAPSULE ORAL at 09:43

## 2020-05-14 RX ADMIN — POTASSIUM CHLORIDE 20 MEQ: 10 CAPSULE, COATED, EXTENDED RELEASE ORAL at 09:43

## 2020-05-14 RX ADMIN — QUETIAPINE FUMARATE 75 MG: 25 TABLET ORAL at 21:01

## 2020-05-14 RX ADMIN — MELATONIN 9.75 MG: at 21:01

## 2020-05-14 RX ADMIN — METHOCARBAMOL 500 MG: 500 TABLET, FILM COATED ORAL at 21:01

## 2020-05-14 RX ADMIN — FAMOTIDINE 20 MG: 20 TABLET, FILM COATED ORAL at 09:43

## 2020-05-14 RX ADMIN — LORAZEPAM 2 MG: 2 INJECTION INTRAMUSCULAR; INTRAVENOUS at 21:01

## 2020-05-14 RX ADMIN — THIAMINE HYDROCHLORIDE 200 MG: 100 INJECTION, SOLUTION INTRAMUSCULAR; INTRAVENOUS at 14:39

## 2020-05-14 RX ADMIN — LORAZEPAM 2 MG: 2 INJECTION INTRAMUSCULAR; INTRAVENOUS at 09:43

## 2020-05-15 LAB
ALBUMIN SERPL-MCNC: 3.6 G/DL (ref 3.5–5.2)
ALBUMIN/GLOB SERPL: 1 G/DL
ALP SERPL-CCNC: 112 U/L (ref 39–117)
ALT SERPL W P-5'-P-CCNC: 29 U/L (ref 1–33)
ANION GAP SERPL CALCULATED.3IONS-SCNC: 14 MMOL/L (ref 5–15)
AST SERPL-CCNC: 17 U/L (ref 1–32)
BASOPHILS # BLD AUTO: 0.05 10*3/MM3 (ref 0–0.2)
BASOPHILS NFR BLD AUTO: 1.5 % (ref 0–1.5)
BILIRUB SERPL-MCNC: <0.2 MG/DL (ref 0.2–1.2)
BUN BLD-MCNC: 10 MG/DL (ref 6–20)
BUN/CREAT SERPL: 12.7 (ref 7–25)
CALCIUM SPEC-SCNC: 9.2 MG/DL (ref 8.6–10.5)
CHLORIDE SERPL-SCNC: 98 MMOL/L (ref 98–107)
CO2 SERPL-SCNC: 23 MMOL/L (ref 22–29)
CREAT BLD-MCNC: 0.79 MG/DL (ref 0.57–1)
DEPRECATED RDW RBC AUTO: 46.4 FL (ref 37–54)
EOSINOPHIL # BLD AUTO: 0.12 10*3/MM3 (ref 0–0.4)
EOSINOPHIL NFR BLD AUTO: 3.7 % (ref 0.3–6.2)
ERYTHROCYTE [DISTWIDTH] IN BLOOD BY AUTOMATED COUNT: 13.3 % (ref 12.3–15.4)
GFR SERPL CREATININE-BSD FRML MDRD: 76 ML/MIN/1.73
GLOBULIN UR ELPH-MCNC: 3.7 GM/DL
GLUCOSE BLD-MCNC: 160 MG/DL (ref 65–99)
HCT VFR BLD AUTO: 39.5 % (ref 34–46.6)
HGB BLD-MCNC: 13.1 G/DL (ref 12–15.9)
IMM GRANULOCYTES # BLD AUTO: 0.06 10*3/MM3 (ref 0–0.05)
IMM GRANULOCYTES NFR BLD AUTO: 1.8 % (ref 0–0.5)
LYMPHOCYTES # BLD AUTO: 1.49 10*3/MM3 (ref 0.7–3.1)
LYMPHOCYTES NFR BLD AUTO: 45.6 % (ref 19.6–45.3)
MCH RBC QN AUTO: 31.4 PG (ref 26.6–33)
MCHC RBC AUTO-ENTMCNC: 33.2 G/DL (ref 31.5–35.7)
MCV RBC AUTO: 94.7 FL (ref 79–97)
MONOCYTES # BLD AUTO: 0.81 10*3/MM3 (ref 0.1–0.9)
MONOCYTES NFR BLD AUTO: 24.8 % (ref 5–12)
NEUTROPHILS # BLD AUTO: 0.74 10*3/MM3 (ref 1.7–7)
NEUTROPHILS NFR BLD AUTO: 22.6 % (ref 42.7–76)
NRBC BLD AUTO-RTO: 0 /100 WBC (ref 0–0.2)
PLATELET # BLD AUTO: 280 10*3/MM3 (ref 140–450)
PMV BLD AUTO: 9.9 FL (ref 6–12)
POTASSIUM BLD-SCNC: 3.8 MMOL/L (ref 3.5–5.2)
PROT SERPL-MCNC: 7.3 G/DL (ref 6–8.5)
RBC # BLD AUTO: 4.17 10*6/MM3 (ref 3.77–5.28)
SODIUM BLD-SCNC: 135 MMOL/L (ref 136–145)
WBC NRBC COR # BLD: 3.27 10*3/MM3 (ref 3.4–10.8)

## 2020-05-15 PROCEDURE — 97110 THERAPEUTIC EXERCISES: CPT

## 2020-05-15 PROCEDURE — 85025 COMPLETE CBC W/AUTO DIFF WBC: CPT | Performed by: STUDENT IN AN ORGANIZED HEALTH CARE EDUCATION/TRAINING PROGRAM

## 2020-05-15 PROCEDURE — 99232 SBSQ HOSP IP/OBS MODERATE 35: CPT | Performed by: STUDENT IN AN ORGANIZED HEALTH CARE EDUCATION/TRAINING PROGRAM

## 2020-05-15 PROCEDURE — 99232 SBSQ HOSP IP/OBS MODERATE 35: CPT | Performed by: PSYCHIATRY & NEUROLOGY

## 2020-05-15 PROCEDURE — 25010000002 LORAZEPAM PER 2 MG: Performed by: PSYCHIATRY & NEUROLOGY

## 2020-05-15 PROCEDURE — 25010000002 THIAMINE PER 100 MG: Performed by: PSYCHIATRY & NEUROLOGY

## 2020-05-15 PROCEDURE — 80053 COMPREHEN METABOLIC PANEL: CPT | Performed by: STUDENT IN AN ORGANIZED HEALTH CARE EDUCATION/TRAINING PROGRAM

## 2020-05-15 PROCEDURE — 97116 GAIT TRAINING THERAPY: CPT

## 2020-05-15 RX ORDER — LORAZEPAM 2 MG/ML
2 INJECTION INTRAMUSCULAR EVERY 6 HOURS
Status: DISCONTINUED | OUTPATIENT
Start: 2020-05-15 | End: 2020-05-16

## 2020-05-15 RX ADMIN — MELATONIN 9.75 MG: at 21:01

## 2020-05-15 RX ADMIN — LORAZEPAM 2 MG: 2 INJECTION INTRAMUSCULAR; INTRAVENOUS at 21:01

## 2020-05-15 RX ADMIN — METHOCARBAMOL 500 MG: 500 TABLET, FILM COATED ORAL at 17:53

## 2020-05-15 RX ADMIN — TERAZOSIN HYDROCHLORIDE 2 MG: 2 CAPSULE ORAL at 21:01

## 2020-05-15 RX ADMIN — LORAZEPAM 2 MG: 2 INJECTION INTRAMUSCULAR; INTRAVENOUS at 12:18

## 2020-05-15 RX ADMIN — LORAZEPAM 2 MG: 2 INJECTION INTRAMUSCULAR; INTRAVENOUS at 08:06

## 2020-05-15 RX ADMIN — NICOTINE 1 PATCH: 21 PATCH, EXTENDED RELEASE TRANSDERMAL at 15:51

## 2020-05-15 RX ADMIN — LORAZEPAM 2 MG: 2 INJECTION INTRAMUSCULAR; INTRAVENOUS at 15:51

## 2020-05-15 RX ADMIN — ESCITALOPRAM OXALATE 10 MG: 10 TABLET ORAL at 08:07

## 2020-05-15 RX ADMIN — METHOCARBAMOL 500 MG: 500 TABLET, FILM COATED ORAL at 21:01

## 2020-05-15 RX ADMIN — METHOCARBAMOL 500 MG: 500 TABLET, FILM COATED ORAL at 12:18

## 2020-05-15 RX ADMIN — METHOCARBAMOL 500 MG: 500 TABLET, FILM COATED ORAL at 08:06

## 2020-05-15 RX ADMIN — TERAZOSIN HYDROCHLORIDE 2 MG: 2 CAPSULE ORAL at 08:06

## 2020-05-15 RX ADMIN — FAMOTIDINE 20 MG: 20 TABLET, FILM COATED ORAL at 08:06

## 2020-05-15 RX ADMIN — THIAMINE HYDROCHLORIDE 200 MG: 100 INJECTION, SOLUTION INTRAMUSCULAR; INTRAVENOUS at 21:01

## 2020-05-15 RX ADMIN — QUETIAPINE FUMARATE 75 MG: 25 TABLET ORAL at 21:01

## 2020-05-15 RX ADMIN — POTASSIUM CHLORIDE 20 MEQ: 10 CAPSULE, COATED, EXTENDED RELEASE ORAL at 08:06

## 2020-05-15 RX ADMIN — THIAMINE HYDROCHLORIDE 200 MG: 100 INJECTION, SOLUTION INTRAMUSCULAR; INTRAVENOUS at 08:06

## 2020-05-15 RX ADMIN — SODIUM CHLORIDE, PRESERVATIVE FREE 10 ML: 5 INJECTION INTRAVENOUS at 08:07

## 2020-05-15 RX ADMIN — SODIUM CHLORIDE, PRESERVATIVE FREE 10 ML: 5 INJECTION INTRAVENOUS at 21:02

## 2020-05-16 LAB
ALBUMIN SERPL-MCNC: 3.2 G/DL (ref 3.5–5.2)
ALBUMIN/GLOB SERPL: 0.9 G/DL
ALP SERPL-CCNC: 110 U/L (ref 39–117)
ALT SERPL W P-5'-P-CCNC: 23 U/L (ref 1–33)
ANION GAP SERPL CALCULATED.3IONS-SCNC: 13 MMOL/L (ref 5–15)
AST SERPL-CCNC: 13 U/L (ref 1–32)
BASOPHILS # BLD AUTO: 0.04 10*3/MM3 (ref 0–0.2)
BASOPHILS NFR BLD AUTO: 1.3 % (ref 0–1.5)
BILIRUB SERPL-MCNC: <0.2 MG/DL (ref 0.2–1.2)
BUN BLD-MCNC: 11 MG/DL (ref 6–20)
BUN/CREAT SERPL: 13.4 (ref 7–25)
CALCIUM SPEC-SCNC: 9.2 MG/DL (ref 8.6–10.5)
CHLORIDE SERPL-SCNC: 99 MMOL/L (ref 98–107)
CO2 SERPL-SCNC: 23 MMOL/L (ref 22–29)
CREAT BLD-MCNC: 0.82 MG/DL (ref 0.57–1)
DEPRECATED RDW RBC AUTO: 46.5 FL (ref 37–54)
EOSINOPHIL # BLD AUTO: 0.15 10*3/MM3 (ref 0–0.4)
EOSINOPHIL NFR BLD AUTO: 4.8 % (ref 0.3–6.2)
ERYTHROCYTE [DISTWIDTH] IN BLOOD BY AUTOMATED COUNT: 13.4 % (ref 12.3–15.4)
GFR SERPL CREATININE-BSD FRML MDRD: 73 ML/MIN/1.73
GLOBULIN UR ELPH-MCNC: 3.6 GM/DL
GLUCOSE BLD-MCNC: 116 MG/DL (ref 65–99)
HCT VFR BLD AUTO: 36.4 % (ref 34–46.6)
HGB BLD-MCNC: 12.2 G/DL (ref 12–15.9)
IMM GRANULOCYTES # BLD AUTO: 0 10*3/MM3 (ref 0–0.05)
IMM GRANULOCYTES NFR BLD AUTO: 0 % (ref 0–0.5)
LYMPHOCYTES # BLD AUTO: 1.4 10*3/MM3 (ref 0.7–3.1)
LYMPHOCYTES NFR BLD AUTO: 44.7 % (ref 19.6–45.3)
MCH RBC QN AUTO: 31.9 PG (ref 26.6–33)
MCHC RBC AUTO-ENTMCNC: 33.5 G/DL (ref 31.5–35.7)
MCV RBC AUTO: 95 FL (ref 79–97)
MONOCYTES # BLD AUTO: 0.75 10*3/MM3 (ref 0.1–0.9)
MONOCYTES NFR BLD AUTO: 24 % (ref 5–12)
NEUTROPHILS # BLD AUTO: 0.79 10*3/MM3 (ref 1.7–7)
NEUTROPHILS NFR BLD AUTO: 25.2 % (ref 42.7–76)
NRBC BLD AUTO-RTO: 0 /100 WBC (ref 0–0.2)
PLATELET # BLD AUTO: 302 10*3/MM3 (ref 140–450)
PMV BLD AUTO: 9.8 FL (ref 6–12)
POTASSIUM BLD-SCNC: 3.8 MMOL/L (ref 3.5–5.2)
PROT SERPL-MCNC: 6.8 G/DL (ref 6–8.5)
RBC # BLD AUTO: 3.83 10*6/MM3 (ref 3.77–5.28)
SODIUM BLD-SCNC: 135 MMOL/L (ref 136–145)
WBC NRBC COR # BLD: 3.13 10*3/MM3 (ref 3.4–10.8)

## 2020-05-16 PROCEDURE — 97110 THERAPEUTIC EXERCISES: CPT

## 2020-05-16 PROCEDURE — 97116 GAIT TRAINING THERAPY: CPT

## 2020-05-16 PROCEDURE — 85025 COMPLETE CBC W/AUTO DIFF WBC: CPT | Performed by: STUDENT IN AN ORGANIZED HEALTH CARE EDUCATION/TRAINING PROGRAM

## 2020-05-16 PROCEDURE — 25010000002 LORAZEPAM PER 2 MG: Performed by: PSYCHIATRY & NEUROLOGY

## 2020-05-16 PROCEDURE — 90833 PSYTX W PT W E/M 30 MIN: CPT | Performed by: PSYCHIATRY & NEUROLOGY

## 2020-05-16 PROCEDURE — 63710000001 ONDANSETRON ODT 4 MG TABLET DISPERSIBLE: Performed by: STUDENT IN AN ORGANIZED HEALTH CARE EDUCATION/TRAINING PROGRAM

## 2020-05-16 PROCEDURE — 25010000002 THIAMINE PER 100 MG: Performed by: PSYCHIATRY & NEUROLOGY

## 2020-05-16 PROCEDURE — 80053 COMPREHEN METABOLIC PANEL: CPT | Performed by: STUDENT IN AN ORGANIZED HEALTH CARE EDUCATION/TRAINING PROGRAM

## 2020-05-16 PROCEDURE — 99232 SBSQ HOSP IP/OBS MODERATE 35: CPT | Performed by: PSYCHIATRY & NEUROLOGY

## 2020-05-16 PROCEDURE — 99232 SBSQ HOSP IP/OBS MODERATE 35: CPT | Performed by: STUDENT IN AN ORGANIZED HEALTH CARE EDUCATION/TRAINING PROGRAM

## 2020-05-16 RX ORDER — LORAZEPAM 2 MG/ML
2 INJECTION INTRAMUSCULAR EVERY 8 HOURS
Status: DISCONTINUED | OUTPATIENT
Start: 2020-05-16 | End: 2020-05-17

## 2020-05-16 RX ORDER — QUETIAPINE FUMARATE 25 MG/1
25 TABLET, FILM COATED ORAL 2 TIMES DAILY PRN
Status: DISCONTINUED | OUTPATIENT
Start: 2020-05-16 | End: 2020-05-19 | Stop reason: HOSPADM

## 2020-05-16 RX ORDER — QUETIAPINE FUMARATE 100 MG/1
100 TABLET, FILM COATED ORAL NIGHTLY
Status: DISCONTINUED | OUTPATIENT
Start: 2020-05-16 | End: 2020-05-19 | Stop reason: HOSPADM

## 2020-05-16 RX ORDER — ONDANSETRON 4 MG/1
4 TABLET, ORALLY DISINTEGRATING ORAL EVERY 6 HOURS PRN
Status: DISCONTINUED | OUTPATIENT
Start: 2020-05-16 | End: 2020-05-19 | Stop reason: HOSPADM

## 2020-05-16 RX ADMIN — SODIUM CHLORIDE, PRESERVATIVE FREE 10 ML: 5 INJECTION INTRAVENOUS at 20:19

## 2020-05-16 RX ADMIN — LORAZEPAM 2 MG: 2 INJECTION INTRAMUSCULAR; INTRAVENOUS at 04:25

## 2020-05-16 RX ADMIN — LORAZEPAM 2 MG: 2 INJECTION, SOLUTION INTRAMUSCULAR; INTRAVENOUS at 16:32

## 2020-05-16 RX ADMIN — QUETIAPINE 100 MG: 100 TABLET ORAL at 20:18

## 2020-05-16 RX ADMIN — MELATONIN 9.75 MG: at 20:18

## 2020-05-16 RX ADMIN — METHOCARBAMOL 500 MG: 500 TABLET, FILM COATED ORAL at 20:18

## 2020-05-16 RX ADMIN — TERAZOSIN HYDROCHLORIDE 2 MG: 2 CAPSULE ORAL at 09:06

## 2020-05-16 RX ADMIN — TERAZOSIN HYDROCHLORIDE 2 MG: 2 CAPSULE ORAL at 20:18

## 2020-05-16 RX ADMIN — METHOCARBAMOL 500 MG: 500 TABLET, FILM COATED ORAL at 09:06

## 2020-05-16 RX ADMIN — ONDANSETRON 4 MG: 4 TABLET, ORALLY DISINTEGRATING ORAL at 08:22

## 2020-05-16 RX ADMIN — ESCITALOPRAM OXALATE 10 MG: 10 TABLET ORAL at 09:06

## 2020-05-16 RX ADMIN — THIAMINE HYDROCHLORIDE 200 MG: 100 INJECTION, SOLUTION INTRAMUSCULAR; INTRAVENOUS at 09:10

## 2020-05-16 RX ADMIN — POTASSIUM CHLORIDE 20 MEQ: 10 CAPSULE, COATED, EXTENDED RELEASE ORAL at 09:06

## 2020-05-16 RX ADMIN — QUETIAPINE FUMARATE 25 MG: 25 TABLET ORAL at 14:43

## 2020-05-16 RX ADMIN — METHOCARBAMOL 500 MG: 500 TABLET, FILM COATED ORAL at 17:53

## 2020-05-16 RX ADMIN — SODIUM CHLORIDE, PRESERVATIVE FREE 10 ML: 5 INJECTION INTRAVENOUS at 09:08

## 2020-05-16 RX ADMIN — METHOCARBAMOL 500 MG: 500 TABLET, FILM COATED ORAL at 12:50

## 2020-05-16 RX ADMIN — THIAMINE HYDROCHLORIDE 200 MG: 100 INJECTION, SOLUTION INTRAMUSCULAR; INTRAVENOUS at 20:40

## 2020-05-16 RX ADMIN — FAMOTIDINE 20 MG: 20 TABLET, FILM COATED ORAL at 09:06

## 2020-05-16 RX ADMIN — NICOTINE 1 PATCH: 21 PATCH, EXTENDED RELEASE TRANSDERMAL at 12:51

## 2020-05-16 RX ADMIN — LORAZEPAM 2 MG: 2 INJECTION INTRAMUSCULAR; INTRAVENOUS at 09:06

## 2020-05-17 PROBLEM — R45.851 SUICIDAL IDEATION: Status: RESOLVED | Noted: 2019-01-05 | Resolved: 2020-05-17

## 2020-05-17 LAB
ALBUMIN SERPL-MCNC: 3.5 G/DL (ref 3.5–5.2)
ALBUMIN/GLOB SERPL: 0.9 G/DL
ALP SERPL-CCNC: 110 U/L (ref 39–117)
ALT SERPL W P-5'-P-CCNC: 24 U/L (ref 1–33)
ANION GAP SERPL CALCULATED.3IONS-SCNC: 10 MMOL/L (ref 5–15)
AST SERPL-CCNC: 14 U/L (ref 1–32)
BASOPHILS # BLD AUTO: 0.04 10*3/MM3 (ref 0–0.2)
BASOPHILS NFR BLD AUTO: 1.4 % (ref 0–1.5)
BILIRUB SERPL-MCNC: <0.2 MG/DL (ref 0.2–1.2)
BUN BLD-MCNC: 12 MG/DL (ref 6–20)
BUN/CREAT SERPL: 14.5 (ref 7–25)
CALCIUM SPEC-SCNC: 9 MG/DL (ref 8.6–10.5)
CHLORIDE SERPL-SCNC: 100 MMOL/L (ref 98–107)
CO2 SERPL-SCNC: 26 MMOL/L (ref 22–29)
CREAT BLD-MCNC: 0.83 MG/DL (ref 0.57–1)
DEPRECATED RDW RBC AUTO: 45.2 FL (ref 37–54)
EOSINOPHIL # BLD AUTO: 0.12 10*3/MM3 (ref 0–0.4)
EOSINOPHIL NFR BLD AUTO: 4.3 % (ref 0.3–6.2)
ERYTHROCYTE [DISTWIDTH] IN BLOOD BY AUTOMATED COUNT: 13 % (ref 12.3–15.4)
GFR SERPL CREATININE-BSD FRML MDRD: 72 ML/MIN/1.73
GLOBULIN UR ELPH-MCNC: 3.9 GM/DL
GLUCOSE BLD-MCNC: 87 MG/DL (ref 65–99)
HCT VFR BLD AUTO: 38.4 % (ref 34–46.6)
HGB BLD-MCNC: 12.7 G/DL (ref 12–15.9)
IMM GRANULOCYTES # BLD AUTO: 0.01 10*3/MM3 (ref 0–0.05)
IMM GRANULOCYTES NFR BLD AUTO: 0.4 % (ref 0–0.5)
LYMPHOCYTES # BLD AUTO: 1.27 10*3/MM3 (ref 0.7–3.1)
LYMPHOCYTES NFR BLD AUTO: 45.7 % (ref 19.6–45.3)
MCH RBC QN AUTO: 31.2 PG (ref 26.6–33)
MCHC RBC AUTO-ENTMCNC: 33.1 G/DL (ref 31.5–35.7)
MCV RBC AUTO: 94.3 FL (ref 79–97)
MONOCYTES # BLD AUTO: 0.53 10*3/MM3 (ref 0.1–0.9)
MONOCYTES NFR BLD AUTO: 19.1 % (ref 5–12)
NEUTROPHILS # BLD AUTO: 0.81 10*3/MM3 (ref 1.7–7)
NEUTROPHILS NFR BLD AUTO: 29.1 % (ref 42.7–76)
NRBC BLD AUTO-RTO: 0 /100 WBC (ref 0–0.2)
PLATELET # BLD AUTO: 323 10*3/MM3 (ref 140–450)
PMV BLD AUTO: 9.7 FL (ref 6–12)
POTASSIUM BLD-SCNC: 4.3 MMOL/L (ref 3.5–5.2)
PROT SERPL-MCNC: 7.4 G/DL (ref 6–8.5)
RBC # BLD AUTO: 4.07 10*6/MM3 (ref 3.77–5.28)
SODIUM BLD-SCNC: 136 MMOL/L (ref 136–145)
WBC NRBC COR # BLD: 2.78 10*3/MM3 (ref 3.4–10.8)

## 2020-05-17 PROCEDURE — 25010000002 THIAMINE PER 100 MG: Performed by: PSYCHIATRY & NEUROLOGY

## 2020-05-17 PROCEDURE — 99232 SBSQ HOSP IP/OBS MODERATE 35: CPT | Performed by: PSYCHIATRY & NEUROLOGY

## 2020-05-17 PROCEDURE — 80053 COMPREHEN METABOLIC PANEL: CPT | Performed by: STUDENT IN AN ORGANIZED HEALTH CARE EDUCATION/TRAINING PROGRAM

## 2020-05-17 PROCEDURE — 99232 SBSQ HOSP IP/OBS MODERATE 35: CPT | Performed by: STUDENT IN AN ORGANIZED HEALTH CARE EDUCATION/TRAINING PROGRAM

## 2020-05-17 PROCEDURE — 97530 THERAPEUTIC ACTIVITIES: CPT

## 2020-05-17 PROCEDURE — 25010000002 LORAZEPAM PER 2 MG: Performed by: PSYCHIATRY & NEUROLOGY

## 2020-05-17 PROCEDURE — 85025 COMPLETE CBC W/AUTO DIFF WBC: CPT | Performed by: STUDENT IN AN ORGANIZED HEALTH CARE EDUCATION/TRAINING PROGRAM

## 2020-05-17 PROCEDURE — 97116 GAIT TRAINING THERAPY: CPT

## 2020-05-17 RX ORDER — QUETIAPINE FUMARATE 25 MG/1
12.5 TABLET, FILM COATED ORAL
Status: DISCONTINUED | OUTPATIENT
Start: 2020-05-18 | End: 2020-05-18

## 2020-05-17 RX ORDER — LORAZEPAM 2 MG/ML
1 INJECTION INTRAMUSCULAR EVERY 8 HOURS
Status: DISCONTINUED | OUTPATIENT
Start: 2020-05-18 | End: 2020-05-18

## 2020-05-17 RX ADMIN — METHOCARBAMOL 500 MG: 500 TABLET, FILM COATED ORAL at 08:14

## 2020-05-17 RX ADMIN — METHOCARBAMOL 500 MG: 500 TABLET, FILM COATED ORAL at 12:34

## 2020-05-17 RX ADMIN — MELATONIN 9.75 MG: at 20:02

## 2020-05-17 RX ADMIN — NICOTINE 1 PATCH: 21 PATCH, EXTENDED RELEASE TRANSDERMAL at 14:15

## 2020-05-17 RX ADMIN — SODIUM CHLORIDE, PRESERVATIVE FREE 10 ML: 5 INJECTION INTRAVENOUS at 08:15

## 2020-05-17 RX ADMIN — QUETIAPINE FUMARATE 25 MG: 25 TABLET ORAL at 07:17

## 2020-05-17 RX ADMIN — ESCITALOPRAM OXALATE 10 MG: 10 TABLET ORAL at 08:14

## 2020-05-17 RX ADMIN — QUETIAPINE 100 MG: 100 TABLET ORAL at 20:02

## 2020-05-17 RX ADMIN — SODIUM CHLORIDE, PRESERVATIVE FREE 10 ML: 5 INJECTION INTRAVENOUS at 20:02

## 2020-05-17 RX ADMIN — FAMOTIDINE 20 MG: 20 TABLET, FILM COATED ORAL at 08:14

## 2020-05-17 RX ADMIN — METHOCARBAMOL 500 MG: 500 TABLET, FILM COATED ORAL at 20:02

## 2020-05-17 RX ADMIN — TERAZOSIN HYDROCHLORIDE 2 MG: 2 CAPSULE ORAL at 20:02

## 2020-05-17 RX ADMIN — LORAZEPAM 2 MG: 2 INJECTION, SOLUTION INTRAMUSCULAR; INTRAVENOUS at 08:19

## 2020-05-17 RX ADMIN — POTASSIUM CHLORIDE 20 MEQ: 10 CAPSULE, COATED, EXTENDED RELEASE ORAL at 08:14

## 2020-05-17 RX ADMIN — TERAZOSIN HYDROCHLORIDE 2 MG: 2 CAPSULE ORAL at 08:14

## 2020-05-17 RX ADMIN — LORAZEPAM 2 MG: 2 INJECTION, SOLUTION INTRAMUSCULAR; INTRAVENOUS at 16:33

## 2020-05-17 RX ADMIN — LORAZEPAM 2 MG: 2 INJECTION, SOLUTION INTRAMUSCULAR; INTRAVENOUS at 02:06

## 2020-05-17 RX ADMIN — QUETIAPINE FUMARATE 25 MG: 25 TABLET ORAL at 16:33

## 2020-05-17 RX ADMIN — THIAMINE HYDROCHLORIDE 200 MG: 100 INJECTION, SOLUTION INTRAMUSCULAR; INTRAVENOUS at 20:02

## 2020-05-17 RX ADMIN — METHOCARBAMOL 500 MG: 500 TABLET, FILM COATED ORAL at 18:01

## 2020-05-17 RX ADMIN — THIAMINE HYDROCHLORIDE 200 MG: 100 INJECTION, SOLUTION INTRAMUSCULAR; INTRAVENOUS at 08:14

## 2020-05-18 LAB
ALBUMIN SERPL-MCNC: 3.7 G/DL (ref 3.5–5.2)
ALBUMIN/GLOB SERPL: 1.1 G/DL
ALP SERPL-CCNC: 103 U/L (ref 39–117)
ALT SERPL W P-5'-P-CCNC: 22 U/L (ref 1–33)
ANION GAP SERPL CALCULATED.3IONS-SCNC: 11 MMOL/L (ref 5–15)
ANISOCYTOSIS BLD QL: ABNORMAL
AST SERPL-CCNC: 14 U/L (ref 1–32)
BASOPHILS # BLD AUTO: 0.05 10*3/MM3 (ref 0–0.2)
BASOPHILS # BLD MANUAL: 0.03 10*3/MM3 (ref 0–0.2)
BASOPHILS NFR BLD AUTO: 1 % (ref 0–1.5)
BASOPHILS NFR BLD AUTO: 1.7 % (ref 0–1.5)
BILIRUB SERPL-MCNC: <0.2 MG/DL (ref 0.2–1.2)
BUN BLD-MCNC: 13 MG/DL (ref 6–20)
BUN/CREAT SERPL: 15.7 (ref 7–25)
CALCIUM SPEC-SCNC: 9 MG/DL (ref 8.6–10.5)
CHLORIDE SERPL-SCNC: 99 MMOL/L (ref 98–107)
CO2 SERPL-SCNC: 25 MMOL/L (ref 22–29)
CREAT BLD-MCNC: 0.83 MG/DL (ref 0.57–1)
DEPRECATED RDW RBC AUTO: 44.5 FL (ref 37–54)
EOSINOPHIL # BLD AUTO: 0.13 10*3/MM3 (ref 0–0.4)
EOSINOPHIL # BLD MANUAL: 0.03 10*3/MM3 (ref 0–0.4)
EOSINOPHIL NFR BLD AUTO: 4.3 % (ref 0.3–6.2)
EOSINOPHIL NFR BLD MANUAL: 1 % (ref 0.3–6.2)
ERYTHROCYTE [DISTWIDTH] IN BLOOD BY AUTOMATED COUNT: 13 % (ref 12.3–15.4)
GFR SERPL CREATININE-BSD FRML MDRD: 72 ML/MIN/1.73
GLOBULIN UR ELPH-MCNC: 3.5 GM/DL
GLUCOSE BLD-MCNC: 90 MG/DL (ref 65–99)
HCT VFR BLD AUTO: 37.7 % (ref 34–46.6)
HGB BLD-MCNC: 12.6 G/DL (ref 12–15.9)
IMM GRANULOCYTES # BLD AUTO: 0.01 10*3/MM3 (ref 0–0.05)
IMM GRANULOCYTES NFR BLD AUTO: 0.3 % (ref 0–0.5)
LYMPHOCYTES # BLD AUTO: 1.31 10*3/MM3 (ref 0.7–3.1)
LYMPHOCYTES # BLD MANUAL: 1.38 10*3/MM3 (ref 0.7–3.1)
LYMPHOCYTES NFR BLD AUTO: 43.5 % (ref 19.6–45.3)
LYMPHOCYTES NFR BLD MANUAL: 24 % (ref 5–12)
LYMPHOCYTES NFR BLD MANUAL: 46 % (ref 19.6–45.3)
MCH RBC QN AUTO: 31.3 PG (ref 26.6–33)
MCHC RBC AUTO-ENTMCNC: 33.4 G/DL (ref 31.5–35.7)
MCV RBC AUTO: 93.8 FL (ref 79–97)
MONOCYTES # BLD AUTO: 0.61 10*3/MM3 (ref 0.1–0.9)
MONOCYTES # BLD AUTO: 0.72 10*3/MM3 (ref 0.1–0.9)
MONOCYTES NFR BLD AUTO: 20.3 % (ref 5–12)
NEUTROPHILS # BLD AUTO: 0.81 10*3/MM3 (ref 1.7–7)
NEUTROPHILS # BLD AUTO: 0.9 10*3/MM3 (ref 1.7–7)
NEUTROPHILS NFR BLD AUTO: 29.9 % (ref 42.7–76)
NEUTROPHILS NFR BLD MANUAL: 26 % (ref 42.7–76)
NEUTS BAND NFR BLD MANUAL: 1 % (ref 0–5)
NRBC BLD AUTO-RTO: 0 /100 WBC (ref 0–0.2)
PLATELET # BLD AUTO: 333 10*3/MM3 (ref 140–450)
PMV BLD AUTO: 9.5 FL (ref 6–12)
POTASSIUM BLD-SCNC: 4.2 MMOL/L (ref 3.5–5.2)
PROT SERPL-MCNC: 7.2 G/DL (ref 6–8.5)
RBC # BLD AUTO: 4.02 10*6/MM3 (ref 3.77–5.28)
SMALL PLATELETS BLD QL SMEAR: ADEQUATE
SODIUM BLD-SCNC: 135 MMOL/L (ref 136–145)
VARIANT LYMPHS NFR BLD MANUAL: 1 % (ref 0–5)
WBC MORPH BLD: NORMAL
WBC NRBC COR # BLD: 3.01 10*3/MM3 (ref 3.4–10.8)

## 2020-05-18 PROCEDURE — 25010000002 LORAZEPAM PER 2 MG: Performed by: PSYCHIATRY & NEUROLOGY

## 2020-05-18 PROCEDURE — 99232 SBSQ HOSP IP/OBS MODERATE 35: CPT | Performed by: PSYCHIATRY & NEUROLOGY

## 2020-05-18 PROCEDURE — 85025 COMPLETE CBC W/AUTO DIFF WBC: CPT | Performed by: STUDENT IN AN ORGANIZED HEALTH CARE EDUCATION/TRAINING PROGRAM

## 2020-05-18 PROCEDURE — 80053 COMPREHEN METABOLIC PANEL: CPT | Performed by: STUDENT IN AN ORGANIZED HEALTH CARE EDUCATION/TRAINING PROGRAM

## 2020-05-18 PROCEDURE — 85007 BL SMEAR W/DIFF WBC COUNT: CPT | Performed by: STUDENT IN AN ORGANIZED HEALTH CARE EDUCATION/TRAINING PROGRAM

## 2020-05-18 PROCEDURE — 99232 SBSQ HOSP IP/OBS MODERATE 35: CPT | Performed by: STUDENT IN AN ORGANIZED HEALTH CARE EDUCATION/TRAINING PROGRAM

## 2020-05-18 RX ORDER — NALTREXONE HYDROCHLORIDE 50 MG/1
25 TABLET, FILM COATED ORAL DAILY
Status: COMPLETED | OUTPATIENT
Start: 2020-05-18 | End: 2020-05-18

## 2020-05-18 RX ORDER — NALTREXONE HYDROCHLORIDE 50 MG/1
50 TABLET, FILM COATED ORAL DAILY
Status: DISCONTINUED | OUTPATIENT
Start: 2020-05-19 | End: 2020-05-19 | Stop reason: HOSPADM

## 2020-05-18 RX ORDER — LORAZEPAM 2 MG/ML
1 INJECTION INTRAMUSCULAR EVERY 12 HOURS
Status: DISCONTINUED | OUTPATIENT
Start: 2020-05-18 | End: 2020-05-19 | Stop reason: HOSPADM

## 2020-05-18 RX ORDER — QUETIAPINE FUMARATE 25 MG/1
12.5 TABLET, FILM COATED ORAL
Status: DISCONTINUED | OUTPATIENT
Start: 2020-05-18 | End: 2020-05-19 | Stop reason: HOSPADM

## 2020-05-18 RX ADMIN — SODIUM CHLORIDE, PRESERVATIVE FREE 10 ML: 5 INJECTION INTRAVENOUS at 08:50

## 2020-05-18 RX ADMIN — METHOCARBAMOL 500 MG: 500 TABLET, FILM COATED ORAL at 17:06

## 2020-05-18 RX ADMIN — QUETIAPINE 100 MG: 100 TABLET ORAL at 21:17

## 2020-05-18 RX ADMIN — LORAZEPAM 1 MG: 2 INJECTION, SOLUTION INTRAMUSCULAR; INTRAVENOUS at 21:17

## 2020-05-18 RX ADMIN — Medication 12.5 MG: at 11:38

## 2020-05-18 RX ADMIN — Medication 12.5 MG: at 08:46

## 2020-05-18 RX ADMIN — TERAZOSIN HYDROCHLORIDE 2 MG: 2 CAPSULE ORAL at 21:16

## 2020-05-18 RX ADMIN — MELATONIN 9.75 MG: at 21:16

## 2020-05-18 RX ADMIN — TERAZOSIN HYDROCHLORIDE 2 MG: 2 CAPSULE ORAL at 08:45

## 2020-05-18 RX ADMIN — NICOTINE 1 PATCH: 21 PATCH, EXTENDED RELEASE TRANSDERMAL at 12:46

## 2020-05-18 RX ADMIN — FAMOTIDINE 20 MG: 20 TABLET, FILM COATED ORAL at 08:45

## 2020-05-18 RX ADMIN — POTASSIUM CHLORIDE 20 MEQ: 10 CAPSULE, COATED, EXTENDED RELEASE ORAL at 08:46

## 2020-05-18 RX ADMIN — ESCITALOPRAM OXALATE 10 MG: 10 TABLET ORAL at 08:45

## 2020-05-18 RX ADMIN — Medication 12.5 MG: at 17:47

## 2020-05-18 RX ADMIN — LORAZEPAM 1 MG: 2 INJECTION, SOLUTION INTRAMUSCULAR; INTRAVENOUS at 01:41

## 2020-05-18 RX ADMIN — METHOCARBAMOL 500 MG: 500 TABLET, FILM COATED ORAL at 08:45

## 2020-05-18 RX ADMIN — QUETIAPINE FUMARATE 25 MG: 25 TABLET ORAL at 15:30

## 2020-05-18 RX ADMIN — METHOCARBAMOL 500 MG: 500 TABLET, FILM COATED ORAL at 21:16

## 2020-05-18 RX ADMIN — SODIUM CHLORIDE, PRESERVATIVE FREE 10 ML: 5 INJECTION INTRAVENOUS at 21:17

## 2020-05-18 RX ADMIN — Medication 25 MG: at 17:06

## 2020-05-18 RX ADMIN — LORAZEPAM 1 MG: 2 INJECTION, SOLUTION INTRAMUSCULAR; INTRAVENOUS at 08:46

## 2020-05-18 RX ADMIN — METHOCARBAMOL 500 MG: 500 TABLET, FILM COATED ORAL at 11:38

## 2020-05-19 ENCOUNTER — READMISSION MANAGEMENT (OUTPATIENT)
Dept: CALL CENTER | Facility: HOSPITAL | Age: 55
End: 2020-05-19

## 2020-05-19 VITALS
HEIGHT: 62 IN | WEIGHT: 117.25 LBS | TEMPERATURE: 97.5 F | HEART RATE: 92 BPM | SYSTOLIC BLOOD PRESSURE: 97 MMHG | BODY MASS INDEX: 21.57 KG/M2 | RESPIRATION RATE: 18 BRPM | OXYGEN SATURATION: 94 % | DIASTOLIC BLOOD PRESSURE: 63 MMHG

## 2020-05-19 PROBLEM — F10.931 DELIRIUM TREMENS (HCC): Status: RESOLVED | Noted: 2020-05-07 | Resolved: 2020-05-19

## 2020-05-19 PROBLEM — F10.939 ALCOHOL WITHDRAWAL (HCC): Status: RESOLVED | Noted: 2018-04-23 | Resolved: 2020-05-19

## 2020-05-19 LAB
ALBUMIN SERPL-MCNC: 3.7 G/DL (ref 3.5–5.2)
ALBUMIN/GLOB SERPL: 1 G/DL
ALP SERPL-CCNC: 103 U/L (ref 39–117)
ALT SERPL W P-5'-P-CCNC: 21 U/L (ref 1–33)
ANION GAP SERPL CALCULATED.3IONS-SCNC: 11 MMOL/L (ref 5–15)
AST SERPL-CCNC: 17 U/L (ref 1–32)
BASOPHILS # BLD AUTO: 0.06 10*3/MM3 (ref 0–0.2)
BASOPHILS NFR BLD AUTO: 1.8 % (ref 0–1.5)
BILIRUB SERPL-MCNC: <0.2 MG/DL (ref 0.2–1.2)
BUN BLD-MCNC: 13 MG/DL (ref 6–20)
BUN/CREAT SERPL: 15.9 (ref 7–25)
CALCIUM SPEC-SCNC: 9.3 MG/DL (ref 8.6–10.5)
CHLORIDE SERPL-SCNC: 100 MMOL/L (ref 98–107)
CO2 SERPL-SCNC: 24 MMOL/L (ref 22–29)
CREAT BLD-MCNC: 0.82 MG/DL (ref 0.57–1)
DEPRECATED RDW RBC AUTO: 44.5 FL (ref 37–54)
EOSINOPHIL # BLD AUTO: 0.15 10*3/MM3 (ref 0–0.4)
EOSINOPHIL NFR BLD AUTO: 4.4 % (ref 0.3–6.2)
ERYTHROCYTE [DISTWIDTH] IN BLOOD BY AUTOMATED COUNT: 13 % (ref 12.3–15.4)
GFR SERPL CREATININE-BSD FRML MDRD: 73 ML/MIN/1.73
GLOBULIN UR ELPH-MCNC: 3.6 GM/DL
GLUCOSE BLD-MCNC: 87 MG/DL (ref 65–99)
HCT VFR BLD AUTO: 38.5 % (ref 34–46.6)
HGB BLD-MCNC: 12.8 G/DL (ref 12–15.9)
IMM GRANULOCYTES # BLD AUTO: 0.01 10*3/MM3 (ref 0–0.05)
IMM GRANULOCYTES NFR BLD AUTO: 0.3 % (ref 0–0.5)
LYMPHOCYTES # BLD AUTO: 1.62 10*3/MM3 (ref 0.7–3.1)
LYMPHOCYTES NFR BLD AUTO: 47.4 % (ref 19.6–45.3)
MCH RBC QN AUTO: 31.1 PG (ref 26.6–33)
MCHC RBC AUTO-ENTMCNC: 33.2 G/DL (ref 31.5–35.7)
MCV RBC AUTO: 93.7 FL (ref 79–97)
MONOCYTES # BLD AUTO: 0.67 10*3/MM3 (ref 0.1–0.9)
MONOCYTES NFR BLD AUTO: 19.6 % (ref 5–12)
NEUTROPHILS # BLD AUTO: 0.91 10*3/MM3 (ref 1.7–7)
NEUTROPHILS NFR BLD AUTO: 26.5 % (ref 42.7–76)
NRBC BLD AUTO-RTO: 0 /100 WBC (ref 0–0.2)
PLATELET # BLD AUTO: 336 10*3/MM3 (ref 140–450)
PMV BLD AUTO: 9.6 FL (ref 6–12)
POTASSIUM BLD-SCNC: 4.2 MMOL/L (ref 3.5–5.2)
PROT SERPL-MCNC: 7.3 G/DL (ref 6–8.5)
RBC # BLD AUTO: 4.11 10*6/MM3 (ref 3.77–5.28)
SODIUM BLD-SCNC: 135 MMOL/L (ref 136–145)
WBC NRBC COR # BLD: 3.42 10*3/MM3 (ref 3.4–10.8)

## 2020-05-19 PROCEDURE — 25010000002 LORAZEPAM PER 2 MG: Performed by: PSYCHIATRY & NEUROLOGY

## 2020-05-19 PROCEDURE — 80053 COMPREHEN METABOLIC PANEL: CPT | Performed by: STUDENT IN AN ORGANIZED HEALTH CARE EDUCATION/TRAINING PROGRAM

## 2020-05-19 PROCEDURE — 85025 COMPLETE CBC W/AUTO DIFF WBC: CPT | Performed by: STUDENT IN AN ORGANIZED HEALTH CARE EDUCATION/TRAINING PROGRAM

## 2020-05-19 PROCEDURE — 99239 HOSP IP/OBS DSCHRG MGMT >30: CPT | Performed by: STUDENT IN AN ORGANIZED HEALTH CARE EDUCATION/TRAINING PROGRAM

## 2020-05-19 RX ORDER — TERAZOSIN 2 MG/1
2 CAPSULE ORAL 2 TIMES DAILY
Qty: 60 CAPSULE | Refills: 2 | Status: SHIPPED | OUTPATIENT
Start: 2020-05-19 | End: 2020-07-07 | Stop reason: HOSPADM

## 2020-05-19 RX ORDER — LORAZEPAM 1 MG/1
TABLET ORAL
Qty: 5 TABLET | Refills: 0 | Status: SHIPPED | OUTPATIENT
Start: 2020-05-19 | End: 2020-05-23

## 2020-05-19 RX ORDER — ESCITALOPRAM OXALATE 10 MG/1
10 TABLET ORAL DAILY
Qty: 30 TABLET | Refills: 0 | Status: SHIPPED | OUTPATIENT
Start: 2020-05-20 | End: 2020-06-19

## 2020-05-19 RX ORDER — QUETIAPINE FUMARATE 25 MG/1
25 TABLET, FILM COATED ORAL DAILY PRN
Qty: 30 TABLET | Refills: 0 | Status: SHIPPED | OUTPATIENT
Start: 2020-05-19 | End: 2020-05-19 | Stop reason: SDUPTHER

## 2020-05-19 RX ORDER — QUETIAPINE FUMARATE 25 MG/1
12.5 TABLET, FILM COATED ORAL
Qty: 75 TABLET | Refills: 0 | Status: SHIPPED | OUTPATIENT
Start: 2020-05-19 | End: 2020-06-05 | Stop reason: SDUPTHER

## 2020-05-19 RX ADMIN — Medication 12.5 MG: at 08:40

## 2020-05-19 RX ADMIN — POTASSIUM CHLORIDE 20 MEQ: 10 CAPSULE, COATED, EXTENDED RELEASE ORAL at 08:40

## 2020-05-19 RX ADMIN — QUETIAPINE FUMARATE 25 MG: 25 TABLET ORAL at 11:26

## 2020-05-19 RX ADMIN — TERAZOSIN HYDROCHLORIDE 2 MG: 2 CAPSULE ORAL at 08:40

## 2020-05-19 RX ADMIN — LORAZEPAM 1 MG: 2 INJECTION, SOLUTION INTRAMUSCULAR; INTRAVENOUS at 08:40

## 2020-05-19 RX ADMIN — NALTREXONE HYDROCHLORIDE 50 MG: 50 TABLET, FILM COATED ORAL at 08:40

## 2020-05-19 RX ADMIN — METHOCARBAMOL 500 MG: 500 TABLET, FILM COATED ORAL at 11:26

## 2020-05-19 RX ADMIN — METHOCARBAMOL 500 MG: 500 TABLET, FILM COATED ORAL at 09:10

## 2020-05-19 RX ADMIN — SODIUM CHLORIDE, PRESERVATIVE FREE 10 ML: 5 INJECTION INTRAVENOUS at 08:40

## 2020-05-19 RX ADMIN — ESCITALOPRAM OXALATE 10 MG: 10 TABLET ORAL at 08:40

## 2020-05-19 RX ADMIN — FAMOTIDINE 20 MG: 20 TABLET, FILM COATED ORAL at 08:40

## 2020-05-19 NOTE — OUTREACH NOTE
Prep Survey      Responses   [facility] discharged from?  [place]   Is LACE score < 7 ?  Yes   Eligibility  [facility]   Date of Admission  05/05/20   Date of Discharge  05/19/20   Discharge Disposition  Home or Self Care   Discharge diagnosis  delirium tremens   COVID-19 Test Status  Not tested   Does the patient have one of the following disease processes/diagnoses(primary or secondary)?  Other   Does the patient have Home health ordered?  No   Is there a DME ordered?  No   Prep survey completed?  Yes          Jada George RN

## 2020-05-20 ENCOUNTER — TRANSITIONAL CARE MANAGEMENT TELEPHONE ENCOUNTER (OUTPATIENT)
Dept: CALL CENTER | Facility: HOSPITAL | Age: 55
End: 2020-05-20

## 2020-05-20 NOTE — OUTREACH NOTE
Call Center TCM Note      Responses   [facility] patient discharged from?  [place]   COVID-19 Test Status  Not tested   Does the patient have one of the following disease processes/diagnoses(primary or secondary)?  Other   TCM attempt successful?  No   Unsuccessful attempts  Attempt 1          Xiomara Michael LPN    5/20/2020, 11:22

## 2020-05-20 NOTE — OUTREACH NOTE
Call Center TCM Note      Responses   [facility] patient discharged from?  [place]   COVID-19 Test Status  Not tested   Does the patient have one of the following disease processes/diagnoses(primary or secondary)?  Other   TCM attempt successful?  No   Unsuccessful attempts  Attempt 2          Xiomara Michael LPN    5/20/2020, 14:01

## 2020-05-21 ENCOUNTER — TRANSITIONAL CARE MANAGEMENT TELEPHONE ENCOUNTER (OUTPATIENT)
Dept: CALL CENTER | Facility: HOSPITAL | Age: 55
End: 2020-05-21

## 2020-05-21 NOTE — OUTREACH NOTE
Call Center TCM Note      Responses   [facility] patient discharged from?  [place]   COVID-19 Test Status  Not tested   Does the patient have one of the following disease processes/diagnoses(primary or secondary)?  Other   TCM attempt successful?  No   Unsuccessful attempts  Attempt 3          Xiomara Michael LPN    5/21/2020, 11:58

## 2020-05-27 ENCOUNTER — OFFICE VISIT (OUTPATIENT)
Dept: FAMILY MEDICINE CLINIC | Facility: CLINIC | Age: 55
End: 2020-05-27

## 2020-05-27 ENCOUNTER — HOSPITAL ENCOUNTER (OUTPATIENT)
Facility: HOSPITAL | Age: 55
Setting detail: OBSERVATION
Discharge: HOME | End: 2020-05-30
Attending: EMERGENCY MEDICINE | Admitting: HOSPITALIST

## 2020-05-27 ENCOUNTER — APPOINTMENT (OUTPATIENT)
Dept: GENERAL RADIOLOGY | Facility: HOSPITAL | Age: 55
End: 2020-05-27

## 2020-05-27 VITALS — BODY MASS INDEX: 20.61 KG/M2 | HEIGHT: 62 IN | WEIGHT: 112 LBS

## 2020-05-27 DIAGNOSIS — D72.819 LEUKOPENIA, UNSPECIFIED TYPE: Primary | ICD-10-CM

## 2020-05-27 DIAGNOSIS — F10.10 ALCOHOL ABUSE: ICD-10-CM

## 2020-05-27 DIAGNOSIS — F10.939 ALCOHOL WITHDRAWAL SYNDROME WITH COMPLICATION (HCC): ICD-10-CM

## 2020-05-27 DIAGNOSIS — R45.851 SUICIDAL IDEATION: ICD-10-CM

## 2020-05-27 DIAGNOSIS — F10.920 ALCOHOLIC INTOXICATION WITHOUT COMPLICATION (HCC): Primary | ICD-10-CM

## 2020-05-27 DIAGNOSIS — F32.9 MAJOR DEPRESSIVE DISORDER WITH CURRENT ACTIVE EPISODE, UNSPECIFIED DEPRESSION EPISODE SEVERITY, UNSPECIFIED WHETHER RECURRENT: ICD-10-CM

## 2020-05-27 LAB
ALBUMIN SERPL-MCNC: 4 G/DL (ref 3.5–5.2)
ALBUMIN/GLOB SERPL: 1 G/DL
ALP SERPL-CCNC: 97 U/L (ref 39–117)
ALT SERPL W P-5'-P-CCNC: 15 U/L (ref 1–33)
ANION GAP SERPL CALCULATED.3IONS-SCNC: 14 MMOL/L (ref 5–15)
APAP SERPL-MCNC: <5 MCG/ML (ref 10–30)
AST SERPL-CCNC: 15 U/L (ref 1–32)
BASOPHILS # BLD AUTO: 0.04 10*3/MM3 (ref 0–0.2)
BASOPHILS NFR BLD AUTO: 0.6 % (ref 0–1.5)
BILIRUB SERPL-MCNC: <0.2 MG/DL (ref 0.2–1.2)
BUN BLD-MCNC: 10 MG/DL (ref 6–20)
BUN/CREAT SERPL: 14.7 (ref 7–25)
CALCIUM SPEC-SCNC: 8.3 MG/DL (ref 8.6–10.5)
CHLORIDE SERPL-SCNC: 106 MMOL/L (ref 98–107)
CO2 SERPL-SCNC: 21 MMOL/L (ref 22–29)
CREAT BLD-MCNC: 0.68 MG/DL (ref 0.57–1)
DEPRECATED RDW RBC AUTO: 45.7 FL (ref 37–54)
EOSINOPHIL # BLD AUTO: 0.17 10*3/MM3 (ref 0–0.4)
EOSINOPHIL NFR BLD AUTO: 2.7 % (ref 0.3–6.2)
ERYTHROCYTE [DISTWIDTH] IN BLOOD BY AUTOMATED COUNT: 13.6 % (ref 12.3–15.4)
ETHANOL BLD-MCNC: 443 MG/DL (ref 0–10)
ETHANOL UR QL: 0.44 %
GFR SERPL CREATININE-BSD FRML MDRD: 90 ML/MIN/1.73
GLOBULIN UR ELPH-MCNC: 3.9 GM/DL
GLUCOSE BLD-MCNC: 115 MG/DL (ref 65–99)
HCT VFR BLD AUTO: 39.9 % (ref 34–46.6)
HGB BLD-MCNC: 13.3 G/DL (ref 12–15.9)
HOLD SPECIMEN: NORMAL
HOLD SPECIMEN: NORMAL
IMM GRANULOCYTES # BLD AUTO: 0.01 10*3/MM3 (ref 0–0.05)
IMM GRANULOCYTES NFR BLD AUTO: 0.2 % (ref 0–0.5)
LYMPHOCYTES # BLD AUTO: 3.11 10*3/MM3 (ref 0.7–3.1)
LYMPHOCYTES NFR BLD AUTO: 48.7 % (ref 19.6–45.3)
MCH RBC QN AUTO: 30.7 PG (ref 26.6–33)
MCHC RBC AUTO-ENTMCNC: 33.3 G/DL (ref 31.5–35.7)
MCV RBC AUTO: 92.1 FL (ref 79–97)
MONOCYTES # BLD AUTO: 0.54 10*3/MM3 (ref 0.1–0.9)
MONOCYTES NFR BLD AUTO: 8.5 % (ref 5–12)
NEUTROPHILS # BLD AUTO: 2.52 10*3/MM3 (ref 1.7–7)
NEUTROPHILS NFR BLD AUTO: 39.3 % (ref 42.7–76)
NRBC BLD AUTO-RTO: 0 /100 WBC (ref 0–0.2)
PLATELET # BLD AUTO: 337 10*3/MM3 (ref 140–450)
PMV BLD AUTO: 9.3 FL (ref 6–12)
POTASSIUM BLD-SCNC: 3 MMOL/L (ref 3.5–5.2)
PROT SERPL-MCNC: 7.9 G/DL (ref 6–8.5)
RBC # BLD AUTO: 4.33 10*6/MM3 (ref 3.77–5.28)
SALICYLATES SERPL-MCNC: <0.3 MG/DL
SODIUM BLD-SCNC: 141 MMOL/L (ref 136–145)
TROPONIN T SERPL-MCNC: <0.01 NG/ML (ref 0–0.03)
TSH SERPL DL<=0.05 MIU/L-ACNC: 0.63 UIU/ML (ref 0.27–4.2)
WBC NRBC COR # BLD: 6.39 10*3/MM3 (ref 3.4–10.8)
WHOLE BLOOD HOLD SPECIMEN: NORMAL
WHOLE BLOOD HOLD SPECIMEN: NORMAL

## 2020-05-27 PROCEDURE — 93005 ELECTROCARDIOGRAM TRACING: CPT | Performed by: EMERGENCY MEDICINE

## 2020-05-27 PROCEDURE — 71045 X-RAY EXAM CHEST 1 VIEW: CPT

## 2020-05-27 PROCEDURE — G0378 HOSPITAL OBSERVATION PER HR: HCPCS

## 2020-05-27 PROCEDURE — 80053 COMPREHEN METABOLIC PANEL: CPT | Performed by: EMERGENCY MEDICINE

## 2020-05-27 PROCEDURE — 25010000002 THIAMINE PER 100 MG: Performed by: EMERGENCY MEDICINE

## 2020-05-27 PROCEDURE — 80307 DRUG TEST PRSMV CHEM ANLYZR: CPT | Performed by: EMERGENCY MEDICINE

## 2020-05-27 PROCEDURE — 84443 ASSAY THYROID STIM HORMONE: CPT | Performed by: INTERNAL MEDICINE

## 2020-05-27 PROCEDURE — 85025 COMPLETE CBC W/AUTO DIFF WBC: CPT | Performed by: EMERGENCY MEDICINE

## 2020-05-27 PROCEDURE — 96366 THER/PROPH/DIAG IV INF ADDON: CPT

## 2020-05-27 PROCEDURE — 25010000002 MAGNESIUM SULFATE PER 500 MG OF MAGNESIUM: Performed by: EMERGENCY MEDICINE

## 2020-05-27 PROCEDURE — 93010 ELECTROCARDIOGRAM REPORT: CPT | Performed by: INTERNAL MEDICINE

## 2020-05-27 PROCEDURE — 84484 ASSAY OF TROPONIN QUANT: CPT | Performed by: EMERGENCY MEDICINE

## 2020-05-27 PROCEDURE — 96365 THER/PROPH/DIAG IV INF INIT: CPT

## 2020-05-27 PROCEDURE — 99214 OFFICE O/P EST MOD 30 MIN: CPT | Performed by: FAMILY MEDICINE

## 2020-05-27 PROCEDURE — 99285 EMERGENCY DEPT VISIT HI MDM: CPT

## 2020-05-27 RX ORDER — LORAZEPAM 2 MG/1
2 TABLET ORAL
Status: DISCONTINUED | OUTPATIENT
Start: 2020-05-27 | End: 2020-05-30 | Stop reason: HOSPADM

## 2020-05-27 RX ORDER — LORAZEPAM 2 MG/ML
1 INJECTION INTRAMUSCULAR
Status: DISCONTINUED | OUTPATIENT
Start: 2020-05-27 | End: 2020-05-30 | Stop reason: HOSPADM

## 2020-05-27 RX ORDER — LORAZEPAM 2 MG/ML
2 INJECTION INTRAMUSCULAR
Status: DISCONTINUED | OUTPATIENT
Start: 2020-05-27 | End: 2020-05-30 | Stop reason: HOSPADM

## 2020-05-27 RX ORDER — ACETAMINOPHEN 160 MG/5ML
650 SOLUTION ORAL EVERY 4 HOURS PRN
Status: DISCONTINUED | OUTPATIENT
Start: 2020-05-27 | End: 2020-05-30 | Stop reason: HOSPADM

## 2020-05-27 RX ORDER — MAGNESIUM SULFATE HEPTAHYDRATE 40 MG/ML
2 INJECTION, SOLUTION INTRAVENOUS AS NEEDED
Status: DISCONTINUED | OUTPATIENT
Start: 2020-05-27 | End: 2020-05-30 | Stop reason: HOSPADM

## 2020-05-27 RX ORDER — LORAZEPAM 0.5 MG/1
1 TABLET ORAL
Status: DISCONTINUED | OUTPATIENT
Start: 2020-05-27 | End: 2020-05-30 | Stop reason: HOSPADM

## 2020-05-27 RX ORDER — MAGNESIUM SULFATE HEPTAHYDRATE 40 MG/ML
4 INJECTION, SOLUTION INTRAVENOUS AS NEEDED
Status: DISCONTINUED | OUTPATIENT
Start: 2020-05-27 | End: 2020-05-30 | Stop reason: HOSPADM

## 2020-05-27 RX ORDER — CALCIUM CARBONATE 200(500)MG
2 TABLET,CHEWABLE ORAL 2 TIMES DAILY PRN
Status: DISCONTINUED | OUTPATIENT
Start: 2020-05-27 | End: 2020-05-30 | Stop reason: HOSPADM

## 2020-05-27 RX ORDER — ONDANSETRON 2 MG/ML
4 INJECTION INTRAMUSCULAR; INTRAVENOUS EVERY 6 HOURS PRN
Status: DISCONTINUED | OUTPATIENT
Start: 2020-05-27 | End: 2020-05-30 | Stop reason: HOSPADM

## 2020-05-27 RX ORDER — ACETAMINOPHEN 650 MG/1
650 SUPPOSITORY RECTAL EVERY 4 HOURS PRN
Status: DISCONTINUED | OUTPATIENT
Start: 2020-05-27 | End: 2020-05-30 | Stop reason: HOSPADM

## 2020-05-27 RX ORDER — LORAZEPAM 2 MG/ML
1 INJECTION INTRAMUSCULAR ONCE
Status: DISCONTINUED | OUTPATIENT
Start: 2020-05-27 | End: 2020-05-30 | Stop reason: HOSPADM

## 2020-05-27 RX ORDER — ONDANSETRON 4 MG/1
4 TABLET, FILM COATED ORAL EVERY 6 HOURS PRN
Status: DISCONTINUED | OUTPATIENT
Start: 2020-05-27 | End: 2020-05-30 | Stop reason: HOSPADM

## 2020-05-27 RX ORDER — HYDROXYZINE PAMOATE 25 MG/1
1 CAPSULE ORAL 3 TIMES DAILY
COMMUNITY
Start: 2020-05-26 | End: 2020-07-07 | Stop reason: HOSPADM

## 2020-05-27 RX ORDER — POTASSIUM CHLORIDE 750 MG/1
20 CAPSULE, EXTENDED RELEASE ORAL ONCE
Status: COMPLETED | OUTPATIENT
Start: 2020-05-27 | End: 2020-05-27

## 2020-05-27 RX ORDER — FAMOTIDINE 40 MG/1
40 TABLET, FILM COATED ORAL DAILY
Status: DISCONTINUED | OUTPATIENT
Start: 2020-05-28 | End: 2020-05-30 | Stop reason: HOSPADM

## 2020-05-27 RX ORDER — BISACODYL 5 MG/1
5 TABLET, DELAYED RELEASE ORAL DAILY PRN
Status: DISCONTINUED | OUTPATIENT
Start: 2020-05-27 | End: 2020-05-30 | Stop reason: HOSPADM

## 2020-05-27 RX ORDER — LANOLIN ALCOHOL/MO/W.PET/CERES
5 CREAM (GRAM) TOPICAL NIGHTLY PRN
Status: DISCONTINUED | OUTPATIENT
Start: 2020-05-27 | End: 2020-05-30 | Stop reason: HOSPADM

## 2020-05-27 RX ORDER — SODIUM CHLORIDE 0.9 % (FLUSH) 0.9 %
10 SYRINGE (ML) INJECTION AS NEEDED
Status: DISCONTINUED | OUTPATIENT
Start: 2020-05-27 | End: 2020-05-30 | Stop reason: HOSPADM

## 2020-05-27 RX ORDER — POTASSIUM CHLORIDE 1.5 G/1.77G
40 POWDER, FOR SOLUTION ORAL AS NEEDED
Status: DISCONTINUED | OUTPATIENT
Start: 2020-05-27 | End: 2020-05-30 | Stop reason: HOSPADM

## 2020-05-27 RX ORDER — ACETAMINOPHEN 325 MG/1
650 TABLET ORAL EVERY 4 HOURS PRN
Status: DISCONTINUED | OUTPATIENT
Start: 2020-05-27 | End: 2020-05-30 | Stop reason: HOSPADM

## 2020-05-27 RX ORDER — POTASSIUM CHLORIDE 750 MG/1
40 CAPSULE, EXTENDED RELEASE ORAL AS NEEDED
Status: DISCONTINUED | OUTPATIENT
Start: 2020-05-27 | End: 2020-05-30 | Stop reason: HOSPADM

## 2020-05-27 RX ORDER — ALBUTEROL SULFATE 90 UG/1
2 AEROSOL, METERED RESPIRATORY (INHALATION) EVERY 4 HOURS PRN
Qty: 18 G | Refills: 3 | Status: SHIPPED | OUTPATIENT
Start: 2020-05-27 | End: 2020-08-18 | Stop reason: SDUPTHER

## 2020-05-27 RX ORDER — SODIUM CHLORIDE 0.9 % (FLUSH) 0.9 %
10 SYRINGE (ML) INJECTION EVERY 12 HOURS SCHEDULED
Status: DISCONTINUED | OUTPATIENT
Start: 2020-05-27 | End: 2020-05-30 | Stop reason: HOSPADM

## 2020-05-27 RX ORDER — DULOXETINE 40 MG/1
1 CAPSULE, DELAYED RELEASE ORAL DAILY
COMMUNITY
Start: 2020-05-26 | End: 2020-07-07 | Stop reason: HOSPADM

## 2020-05-27 RX ORDER — MIRTAZAPINE 30 MG/1
1 TABLET, FILM COATED ORAL DAILY
COMMUNITY
Start: 2020-05-26 | End: 2020-07-13 | Stop reason: HOSPADM

## 2020-05-27 RX ORDER — BISACODYL 10 MG
10 SUPPOSITORY, RECTAL RECTAL DAILY PRN
Status: DISCONTINUED | OUTPATIENT
Start: 2020-05-27 | End: 2020-05-30 | Stop reason: HOSPADM

## 2020-05-27 RX ADMIN — POTASSIUM CHLORIDE 20 MEQ: 10 CAPSULE, COATED, EXTENDED RELEASE ORAL at 22:17

## 2020-05-27 RX ADMIN — FOLIC ACID 1000 ML/HR: 5 INJECTION, SOLUTION INTRAMUSCULAR; INTRAVENOUS; SUBCUTANEOUS at 22:10

## 2020-05-27 NOTE — PROGRESS NOTES
Subjective   Laura Bruce is a 54 y.o. female.   Cc: follow up of chronic medical issues    Alcohol Problem   This is a chronic problem. The current episode started today. The problem occurs constantly. The problem has been unchanged. Associated symptoms include anorexia, chest pain, joint swelling, myalgias, nausea, neck pain and vomiting. Pertinent negatives include no abdominal pain, arthralgias, change in bowel habit, chills, congestion, coughing, fatigue, fever, headaches, numbness, rash, sore throat, swollen glands or urinary symptoms.   Depression   Visit Type: follow-up  Patient presents with the following symptoms: anhedonia, confusion, decreased concentration, depressed mood, fatigue, feelings of hopelessness, feelings of worthlessness, insomnia, irritability, panic, psychomotor agitation, restlessness and thoughts of death.  Patient is not experiencing: chest pain, choking sensation, compulsions, dizziness, hypersomnia, obsessions, shortness of breath, suicidal ideas and suicidal planning.    Leukopenia- She had low WBC and she has been stable.    The following portions of the patient's history were reviewed and updated as appropriate: allergies, current medications, past family history, past medical history, past social history, past surgical history and problem list.    Review of Systems   Constitutional: Positive for irritability. Negative for chills, fatigue and fever.   HENT: Negative for congestion and sore throat.    Respiratory: Negative for cough, choking and shortness of breath.    Cardiovascular: Positive for chest pain.   Gastrointestinal: Positive for anorexia, nausea and vomiting. Negative for abdominal pain and change in bowel habit.   Musculoskeletal: Positive for joint swelling, myalgias and neck pain. Negative for arthralgias.   Skin: Negative for rash.   Neurological: Negative for numbness and headaches.   Psychiatric/Behavioral: Positive for confusion and decreased  "concentration. Negative for suicidal ideas. The patient has insomnia.        Objective   Physical Exam   Constitutional: She is oriented to person, place, and time. She appears well-developed and well-nourished.   HENT:   Head: Normocephalic and atraumatic.   Right Ear: External ear normal.   Left Ear: External ear normal.   Nose: Nose normal.   Mouth/Throat: Oropharynx is clear and moist.   Eyes: Pupils are equal, round, and reactive to light. Conjunctivae and EOM are normal.   Neck: Normal range of motion. Neck supple.   Pulmonary/Chest: Effort normal.   Abdominal: Soft. There is no tenderness.   Neurological: She is alert and oriented to person, place, and time.   Skin: Skin is warm and dry.   Vitals reviewed.    Exam is done at a distance.    Visit Vitals  Ht 157.5 cm (62\")   Wt 50.8 kg (112 lb)   LMP 01/24/2001 (Within Months)   Breastfeeding No   BMI 20.49 kg/m²     Body mass index is 20.49 kg/m².      Assessment/Plan   Laura was seen today for follow-up, leukopenia and alcohol problem.    Diagnoses and all orders for this visit:    Leukopenia, unspecified type    Major depressive disorder with current active episode, unspecified depression episode severity, unspecified whether recurrent    Alcohol abuse    Other orders  -     albuterol sulfate  (90 Base) MCG/ACT inhaler; Inhale 2 puffs Every 4 (Four) Hours As Needed for Wheezing.    Return to the clinic in 1 month/s.  Will contact with results as needed.    "

## 2020-05-28 LAB
ALBUMIN SERPL-MCNC: 3.8 G/DL (ref 3.5–5.2)
ALBUMIN/GLOB SERPL: 1 G/DL
ALP SERPL-CCNC: 94 U/L (ref 39–117)
ALT SERPL W P-5'-P-CCNC: 13 U/L (ref 1–33)
ANION GAP SERPL CALCULATED.3IONS-SCNC: 10 MMOL/L (ref 5–15)
ANISOCYTOSIS BLD QL: ABNORMAL
AST SERPL-CCNC: 17 U/L (ref 1–32)
BASOPHILS # BLD MANUAL: 0.05 10*3/MM3 (ref 0–0.2)
BASOPHILS NFR BLD AUTO: 1 % (ref 0–1.5)
BILIRUB SERPL-MCNC: <0.2 MG/DL (ref 0.2–1.2)
BUN BLD-MCNC: 9 MG/DL (ref 6–20)
BUN/CREAT SERPL: 17 (ref 7–25)
CALCIUM SPEC-SCNC: 7.9 MG/DL (ref 8.6–10.5)
CHLORIDE SERPL-SCNC: 108 MMOL/L (ref 98–107)
CO2 SERPL-SCNC: 19 MMOL/L (ref 22–29)
CREAT BLD-MCNC: 0.53 MG/DL (ref 0.57–1)
DEPRECATED RDW RBC AUTO: 47.8 FL (ref 37–54)
EOSINOPHIL # BLD MANUAL: 0.05 10*3/MM3 (ref 0–0.4)
EOSINOPHIL NFR BLD MANUAL: 1 % (ref 0.3–6.2)
ERYTHROCYTE [DISTWIDTH] IN BLOOD BY AUTOMATED COUNT: 13.7 % (ref 12.3–15.4)
ETHANOL BLD-MCNC: 111 MG/DL (ref 0–10)
ETHANOL UR QL: 0.11 %
GFR SERPL CREATININE-BSD FRML MDRD: 120 ML/MIN/1.73
GLOBULIN UR ELPH-MCNC: 4 GM/DL
GLUCOSE BLD-MCNC: 74 MG/DL (ref 65–99)
HCT VFR BLD AUTO: 37.7 % (ref 34–46.6)
HGB BLD-MCNC: 12.5 G/DL (ref 12–15.9)
LYMPHOCYTES # BLD MANUAL: 3.47 10*3/MM3 (ref 0.7–3.1)
LYMPHOCYTES NFR BLD MANUAL: 5 % (ref 5–12)
LYMPHOCYTES NFR BLD MANUAL: 66 % (ref 19.6–45.3)
MAGNESIUM SERPL-MCNC: 2 MG/DL (ref 1.6–2.6)
MCH RBC QN AUTO: 31.4 PG (ref 26.6–33)
MCHC RBC AUTO-ENTMCNC: 33.2 G/DL (ref 31.5–35.7)
MCV RBC AUTO: 94.7 FL (ref 79–97)
MONOCYTES # BLD AUTO: 0.26 10*3/MM3 (ref 0.1–0.9)
NEUTROPHILS # BLD AUTO: 1.37 10*3/MM3 (ref 1.7–7)
NEUTROPHILS NFR BLD MANUAL: 26 % (ref 42.7–76)
PHOSPHATE SERPL-MCNC: 2.5 MG/DL (ref 2.5–4.5)
PLATELET # BLD AUTO: 309 10*3/MM3 (ref 140–450)
PMV BLD AUTO: 10.2 FL (ref 6–12)
POTASSIUM BLD-SCNC: 3.6 MMOL/L (ref 3.5–5.2)
POTASSIUM BLD-SCNC: 4.4 MMOL/L (ref 3.5–5.2)
PROT SERPL-MCNC: 7.8 G/DL (ref 6–8.5)
RBC # BLD AUTO: 3.98 10*6/MM3 (ref 3.77–5.28)
SMALL PLATELETS BLD QL SMEAR: ADEQUATE
SODIUM BLD-SCNC: 137 MMOL/L (ref 136–145)
VARIANT LYMPHS NFR BLD MANUAL: 1 % (ref 0–5)
WBC MORPH BLD: NORMAL
WBC NRBC COR # BLD: 5.26 10*3/MM3 (ref 3.4–10.8)

## 2020-05-28 PROCEDURE — 25010000002 THIAMINE PER 100 MG: Performed by: NURSE PRACTITIONER

## 2020-05-28 PROCEDURE — 83735 ASSAY OF MAGNESIUM: CPT | Performed by: INTERNAL MEDICINE

## 2020-05-28 PROCEDURE — G0378 HOSPITAL OBSERVATION PER HR: HCPCS

## 2020-05-28 PROCEDURE — 80053 COMPREHEN METABOLIC PANEL: CPT | Performed by: INTERNAL MEDICINE

## 2020-05-28 PROCEDURE — 80307 DRUG TEST PRSMV CHEM ANLYZR: CPT | Performed by: NURSE PRACTITIONER

## 2020-05-28 PROCEDURE — 96367 TX/PROPH/DG ADDL SEQ IV INF: CPT

## 2020-05-28 PROCEDURE — 84132 ASSAY OF SERUM POTASSIUM: CPT | Performed by: NURSE PRACTITIONER

## 2020-05-28 PROCEDURE — 36415 COLL VENOUS BLD VENIPUNCTURE: CPT | Performed by: INTERNAL MEDICINE

## 2020-05-28 PROCEDURE — 96366 THER/PROPH/DIAG IV INF ADDON: CPT

## 2020-05-28 PROCEDURE — 85025 COMPLETE CBC W/AUTO DIFF WBC: CPT | Performed by: INTERNAL MEDICINE

## 2020-05-28 PROCEDURE — 25010000002 LORAZEPAM PER 2 MG: Performed by: NURSE PRACTITIONER

## 2020-05-28 PROCEDURE — 99214 OFFICE O/P EST MOD 30 MIN: CPT | Performed by: PSYCHIATRY & NEUROLOGY

## 2020-05-28 PROCEDURE — 85007 BL SMEAR W/DIFF WBC COUNT: CPT | Performed by: INTERNAL MEDICINE

## 2020-05-28 PROCEDURE — 84100 ASSAY OF PHOSPHORUS: CPT | Performed by: INTERNAL MEDICINE

## 2020-05-28 PROCEDURE — 96375 TX/PRO/DX INJ NEW DRUG ADDON: CPT

## 2020-05-28 PROCEDURE — 96376 TX/PRO/DX INJ SAME DRUG ADON: CPT

## 2020-05-28 RX ORDER — LORAZEPAM 2 MG/ML
2 INJECTION INTRAMUSCULAR EVERY 6 HOURS
Status: COMPLETED | OUTPATIENT
Start: 2020-05-28 | End: 2020-05-29

## 2020-05-28 RX ORDER — LORAZEPAM 0.5 MG/1
1 TABLET ORAL EVERY 6 HOURS
Status: DISCONTINUED | OUTPATIENT
Start: 2020-05-29 | End: 2020-05-30 | Stop reason: HOSPADM

## 2020-05-28 RX ORDER — LABETALOL HYDROCHLORIDE 5 MG/ML
20 INJECTION, SOLUTION INTRAVENOUS EVERY 6 HOURS PRN
Status: DISCONTINUED | OUTPATIENT
Start: 2020-05-28 | End: 2020-05-30 | Stop reason: HOSPADM

## 2020-05-28 RX ORDER — MIRTAZAPINE 15 MG/1
30 TABLET, FILM COATED ORAL DAILY
Status: DISCONTINUED | OUTPATIENT
Start: 2020-05-28 | End: 2020-05-28

## 2020-05-28 RX ORDER — DULOXETIN HYDROCHLORIDE 20 MG/1
40 CAPSULE, DELAYED RELEASE ORAL DAILY
Status: DISCONTINUED | OUTPATIENT
Start: 2020-05-28 | End: 2020-05-28

## 2020-05-28 RX ORDER — LORAZEPAM 2 MG/ML
2 INJECTION INTRAMUSCULAR EVERY 4 HOURS PRN
Status: DISCONTINUED | OUTPATIENT
Start: 2020-05-28 | End: 2020-05-30 | Stop reason: HOSPADM

## 2020-05-28 RX ORDER — CLONIDINE HYDROCHLORIDE 0.1 MG/1
0.1 TABLET ORAL EVERY 6 HOURS
Status: DISCONTINUED | OUTPATIENT
Start: 2020-05-28 | End: 2020-05-28

## 2020-05-28 RX ORDER — LORAZEPAM 2 MG/ML
1 INJECTION INTRAMUSCULAR EVERY 8 HOURS
Status: DISCONTINUED | OUTPATIENT
Start: 2020-05-29 | End: 2020-05-28

## 2020-05-28 RX ORDER — ESCITALOPRAM OXALATE 10 MG/1
10 TABLET ORAL DAILY
Status: DISCONTINUED | OUTPATIENT
Start: 2020-05-28 | End: 2020-05-30 | Stop reason: HOSPADM

## 2020-05-28 RX ORDER — TERAZOSIN 2 MG/1
2 CAPSULE ORAL 2 TIMES DAILY
Status: DISCONTINUED | OUTPATIENT
Start: 2020-05-28 | End: 2020-05-30 | Stop reason: HOSPADM

## 2020-05-28 RX ORDER — QUETIAPINE FUMARATE 100 MG/1
100 TABLET, FILM COATED ORAL NIGHTLY
Status: DISCONTINUED | OUTPATIENT
Start: 2020-05-28 | End: 2020-05-30 | Stop reason: HOSPADM

## 2020-05-28 RX ORDER — LORAZEPAM 2 MG/ML
1 INJECTION INTRAMUSCULAR EVERY 6 HOURS
Status: DISCONTINUED | OUTPATIENT
Start: 2020-05-28 | End: 2020-05-28

## 2020-05-28 RX ADMIN — MIRTAZAPINE 30 MG: 15 TABLET, FILM COATED ORAL at 09:20

## 2020-05-28 RX ADMIN — POTASSIUM CHLORIDE 40 MEQ: 10 CAPSULE, COATED, EXTENDED RELEASE ORAL at 13:28

## 2020-05-28 RX ADMIN — TERAZOSIN HYDROCHLORIDE 2 MG: 2 CAPSULE ORAL at 09:19

## 2020-05-28 RX ADMIN — LORAZEPAM 2 MG: 2 INJECTION INTRAMUSCULAR; INTRAVENOUS at 18:10

## 2020-05-28 RX ADMIN — THIAMINE HYDROCHLORIDE 200 MG: 100 INJECTION, SOLUTION INTRAMUSCULAR; INTRAVENOUS at 20:45

## 2020-05-28 RX ADMIN — TERAZOSIN HYDROCHLORIDE 2 MG: 2 CAPSULE ORAL at 20:45

## 2020-05-28 RX ADMIN — POTASSIUM CHLORIDE 40 MEQ: 10 CAPSULE, COATED, EXTENDED RELEASE ORAL at 18:09

## 2020-05-28 RX ADMIN — MELATONIN 5.25 MG: at 00:14

## 2020-05-28 RX ADMIN — SODIUM CHLORIDE, PRESERVATIVE FREE 10 ML: 5 INJECTION INTRAVENOUS at 20:45

## 2020-05-28 RX ADMIN — DULOXETINE HYDROCHLORIDE 40 MG: 20 CAPSULE, DELAYED RELEASE ORAL at 09:19

## 2020-05-28 RX ADMIN — QUETIAPINE 100 MG: 100 TABLET ORAL at 20:45

## 2020-05-28 RX ADMIN — THIAMINE HYDROCHLORIDE 200 MG: 100 INJECTION, SOLUTION INTRAMUSCULAR; INTRAVENOUS at 18:10

## 2020-05-28 RX ADMIN — ESCITALOPRAM OXALATE 10 MG: 10 TABLET ORAL at 18:29

## 2020-05-28 RX ADMIN — LORAZEPAM 2 MG: 2 INJECTION INTRAMUSCULAR; INTRAVENOUS at 13:19

## 2020-05-28 RX ADMIN — SODIUM CHLORIDE, PRESERVATIVE FREE 10 ML: 5 INJECTION INTRAVENOUS at 00:14

## 2020-05-28 RX ADMIN — POTASSIUM PHOSPHATE, MONOBASIC AND POTASSIUM PHOSPHATE, DIBASIC 15 MMOL: 224; 236 INJECTION, SOLUTION, CONCENTRATE INTRAVENOUS at 15:05

## 2020-05-28 RX ADMIN — THIAMINE HYDROCHLORIDE 200 MG: 100 INJECTION, SOLUTION INTRAMUSCULAR; INTRAVENOUS at 13:19

## 2020-05-28 RX ADMIN — FAMOTIDINE 40 MG: 40 TABLET, FILM COATED ORAL at 09:19

## 2020-05-29 LAB
ALBUMIN SERPL-MCNC: 3.4 G/DL (ref 3.5–5.2)
ALBUMIN/GLOB SERPL: 0.9 G/DL
ALP SERPL-CCNC: 94 U/L (ref 39–117)
ALT SERPL W P-5'-P-CCNC: 13 U/L (ref 1–33)
AMPHET+METHAMPHET UR QL: NEGATIVE
AMPHETAMINES UR QL: NEGATIVE
ANION GAP SERPL CALCULATED.3IONS-SCNC: 10 MMOL/L (ref 5–15)
AST SERPL-CCNC: 14 U/L (ref 1–32)
BARBITURATES UR QL SCN: NEGATIVE
BASOPHILS # BLD AUTO: 0.02 10*3/MM3 (ref 0–0.2)
BASOPHILS NFR BLD AUTO: 0.7 % (ref 0–1.5)
BENZODIAZ UR QL SCN: POSITIVE
BILIRUB SERPL-MCNC: 0.3 MG/DL (ref 0.2–1.2)
BILIRUB UR QL STRIP: NEGATIVE
BUN BLD-MCNC: 12 MG/DL (ref 6–20)
BUN/CREAT SERPL: 18.2 (ref 7–25)
BUPRENORPHINE SERPL-MCNC: NEGATIVE NG/ML
CALCIUM SPEC-SCNC: 9 MG/DL (ref 8.6–10.5)
CANNABINOIDS SERPL QL: POSITIVE
CHLORIDE SERPL-SCNC: 109 MMOL/L (ref 98–107)
CLARITY UR: CLEAR
CO2 SERPL-SCNC: 19 MMOL/L (ref 22–29)
COCAINE UR QL: NEGATIVE
COLOR UR: YELLOW
CREAT BLD-MCNC: 0.66 MG/DL (ref 0.57–1)
DEPRECATED RDW RBC AUTO: 45.8 FL (ref 37–54)
EOSINOPHIL # BLD AUTO: 0.17 10*3/MM3 (ref 0–0.4)
EOSINOPHIL NFR BLD AUTO: 6.2 % (ref 0.3–6.2)
ERYTHROCYTE [DISTWIDTH] IN BLOOD BY AUTOMATED COUNT: 13.4 % (ref 12.3–15.4)
GFR SERPL CREATININE-BSD FRML MDRD: 93 ML/MIN/1.73
GLOBULIN UR ELPH-MCNC: 3.7 GM/DL
GLUCOSE BLD-MCNC: 82 MG/DL (ref 65–99)
GLUCOSE UR STRIP-MCNC: NEGATIVE MG/DL
HCT VFR BLD AUTO: 39.9 % (ref 34–46.6)
HGB BLD-MCNC: 13.5 G/DL (ref 12–15.9)
HGB UR QL STRIP.AUTO: NEGATIVE
IMM GRANULOCYTES # BLD AUTO: 0 10*3/MM3 (ref 0–0.05)
IMM GRANULOCYTES NFR BLD AUTO: 0 % (ref 0–0.5)
KETONES UR QL STRIP: NEGATIVE
LEUKOCYTE ESTERASE UR QL STRIP.AUTO: NEGATIVE
LYMPHOCYTES # BLD AUTO: 1.37 10*3/MM3 (ref 0.7–3.1)
LYMPHOCYTES NFR BLD AUTO: 49.6 % (ref 19.6–45.3)
MAGNESIUM SERPL-MCNC: 1.8 MG/DL (ref 1.6–2.6)
MCH RBC QN AUTO: 31.1 PG (ref 26.6–33)
MCHC RBC AUTO-ENTMCNC: 33.8 G/DL (ref 31.5–35.7)
MCV RBC AUTO: 91.9 FL (ref 79–97)
METHADONE UR QL SCN: NEGATIVE
MONOCYTES # BLD AUTO: 0.45 10*3/MM3 (ref 0.1–0.9)
MONOCYTES NFR BLD AUTO: 16.3 % (ref 5–12)
NEUTROPHILS # BLD AUTO: 0.75 10*3/MM3 (ref 1.7–7)
NEUTROPHILS NFR BLD AUTO: 27.2 % (ref 42.7–76)
NITRITE UR QL STRIP: NEGATIVE
NRBC BLD AUTO-RTO: 0 /100 WBC (ref 0–0.2)
OPIATES UR QL: NEGATIVE
OXYCODONE UR QL SCN: NEGATIVE
PCP UR QL SCN: NEGATIVE
PH UR STRIP.AUTO: 7 [PH] (ref 5–9)
PHOSPHATE SERPL-MCNC: 2.3 MG/DL (ref 2.5–4.5)
PLATELET # BLD AUTO: 253 10*3/MM3 (ref 140–450)
PMV BLD AUTO: 9.7 FL (ref 6–12)
POTASSIUM BLD-SCNC: 4.5 MMOL/L (ref 3.5–5.2)
PROPOXYPH UR QL: NEGATIVE
PROT SERPL-MCNC: 7.1 G/DL (ref 6–8.5)
PROT UR QL STRIP: NEGATIVE
RBC # BLD AUTO: 4.34 10*6/MM3 (ref 3.77–5.28)
SODIUM BLD-SCNC: 138 MMOL/L (ref 136–145)
SP GR UR STRIP: 1.01 (ref 1–1.03)
TRICYCLICS UR QL SCN: POSITIVE
UROBILINOGEN UR QL STRIP: NORMAL
WBC NRBC COR # BLD: 2.76 10*3/MM3 (ref 3.4–10.8)

## 2020-05-29 PROCEDURE — 80306 DRUG TEST PRSMV INSTRMNT: CPT | Performed by: EMERGENCY MEDICINE

## 2020-05-29 PROCEDURE — 25010000002 THIAMINE PER 100 MG: Performed by: NURSE PRACTITIONER

## 2020-05-29 PROCEDURE — 25010000002 LORAZEPAM PER 2 MG: Performed by: NURSE PRACTITIONER

## 2020-05-29 PROCEDURE — 81003 URINALYSIS AUTO W/O SCOPE: CPT | Performed by: EMERGENCY MEDICINE

## 2020-05-29 PROCEDURE — 85025 COMPLETE CBC W/AUTO DIFF WBC: CPT | Performed by: NURSE PRACTITIONER

## 2020-05-29 PROCEDURE — 96376 TX/PRO/DX INJ SAME DRUG ADON: CPT

## 2020-05-29 PROCEDURE — 99213 OFFICE O/P EST LOW 20 MIN: CPT | Performed by: PSYCHIATRY & NEUROLOGY

## 2020-05-29 PROCEDURE — 83735 ASSAY OF MAGNESIUM: CPT | Performed by: NURSE PRACTITIONER

## 2020-05-29 PROCEDURE — 80053 COMPREHEN METABOLIC PANEL: CPT | Performed by: NURSE PRACTITIONER

## 2020-05-29 PROCEDURE — 96366 THER/PROPH/DIAG IV INF ADDON: CPT

## 2020-05-29 PROCEDURE — 25010000002 LORAZEPAM PER 2 MG: Performed by: INTERNAL MEDICINE

## 2020-05-29 PROCEDURE — G0378 HOSPITAL OBSERVATION PER HR: HCPCS

## 2020-05-29 PROCEDURE — 84100 ASSAY OF PHOSPHORUS: CPT | Performed by: NURSE PRACTITIONER

## 2020-05-29 RX ORDER — NICOTINE 21 MG/24HR
1 PATCH, TRANSDERMAL 24 HOURS TRANSDERMAL
Status: DISCONTINUED | OUTPATIENT
Start: 2020-05-29 | End: 2020-05-30 | Stop reason: HOSPADM

## 2020-05-29 RX ADMIN — THIAMINE HYDROCHLORIDE 200 MG: 100 INJECTION, SOLUTION INTRAMUSCULAR; INTRAVENOUS at 16:10

## 2020-05-29 RX ADMIN — LORAZEPAM 2 MG: 2 INJECTION INTRAMUSCULAR; INTRAVENOUS at 16:10

## 2020-05-29 RX ADMIN — THIAMINE HYDROCHLORIDE 200 MG: 100 INJECTION, SOLUTION INTRAMUSCULAR; INTRAVENOUS at 21:47

## 2020-05-29 RX ADMIN — FAMOTIDINE 40 MG: 40 TABLET, FILM COATED ORAL at 08:18

## 2020-05-29 RX ADMIN — ESCITALOPRAM OXALATE 10 MG: 10 TABLET ORAL at 08:18

## 2020-05-29 RX ADMIN — SODIUM CHLORIDE, PRESERVATIVE FREE 10 ML: 5 INJECTION INTRAVENOUS at 08:19

## 2020-05-29 RX ADMIN — QUETIAPINE 100 MG: 100 TABLET ORAL at 21:14

## 2020-05-29 RX ADMIN — TERAZOSIN HYDROCHLORIDE 2 MG: 2 CAPSULE ORAL at 21:42

## 2020-05-29 RX ADMIN — LORAZEPAM 1 MG: 0.5 TABLET ORAL at 11:40

## 2020-05-29 RX ADMIN — LORAZEPAM 2 MG: 2 INJECTION INTRAMUSCULAR; INTRAVENOUS at 05:56

## 2020-05-29 RX ADMIN — SODIUM CHLORIDE, PRESERVATIVE FREE 10 ML: 5 INJECTION INTRAVENOUS at 21:16

## 2020-05-29 RX ADMIN — LORAZEPAM 2 MG: 2 INJECTION INTRAMUSCULAR; INTRAVENOUS at 00:14

## 2020-05-29 RX ADMIN — NICOTINE 1 PATCH: 21 PATCH, EXTENDED RELEASE TRANSDERMAL at 13:17

## 2020-05-29 RX ADMIN — LORAZEPAM 1 MG: 0.5 TABLET ORAL at 17:50

## 2020-05-29 RX ADMIN — TERAZOSIN HYDROCHLORIDE 2 MG: 2 CAPSULE ORAL at 11:40

## 2020-05-29 RX ADMIN — THIAMINE HYDROCHLORIDE 200 MG: 100 INJECTION, SOLUTION INTRAMUSCULAR; INTRAVENOUS at 11:41

## 2020-05-30 ENCOUNTER — READMISSION MANAGEMENT (OUTPATIENT)
Dept: CALL CENTER | Facility: HOSPITAL | Age: 55
End: 2020-05-30

## 2020-05-30 VITALS
OXYGEN SATURATION: 93 % | WEIGHT: 116.2 LBS | RESPIRATION RATE: 16 BRPM | SYSTOLIC BLOOD PRESSURE: 130 MMHG | TEMPERATURE: 97.7 F | BODY MASS INDEX: 21.38 KG/M2 | HEIGHT: 62 IN | HEART RATE: 101 BPM | DIASTOLIC BLOOD PRESSURE: 83 MMHG

## 2020-05-30 LAB
ANION GAP SERPL CALCULATED.3IONS-SCNC: 10 MMOL/L (ref 5–15)
BASOPHILS # BLD AUTO: 0.03 10*3/MM3 (ref 0–0.2)
BASOPHILS NFR BLD AUTO: 0.9 % (ref 0–1.5)
BUN BLD-MCNC: 9 MG/DL (ref 6–20)
BUN/CREAT SERPL: 11.8 (ref 7–25)
CALCIUM SPEC-SCNC: 8.8 MG/DL (ref 8.6–10.5)
CHLORIDE SERPL-SCNC: 100 MMOL/L (ref 98–107)
CO2 SERPL-SCNC: 23 MMOL/L (ref 22–29)
CREAT BLD-MCNC: 0.76 MG/DL (ref 0.57–1)
DEPRECATED RDW RBC AUTO: 43.5 FL (ref 37–54)
EOSINOPHIL # BLD AUTO: 0.18 10*3/MM3 (ref 0–0.4)
EOSINOPHIL NFR BLD AUTO: 5.6 % (ref 0.3–6.2)
ERYTHROCYTE [DISTWIDTH] IN BLOOD BY AUTOMATED COUNT: 13.2 % (ref 12.3–15.4)
GFR SERPL CREATININE-BSD FRML MDRD: 79 ML/MIN/1.73
GLUCOSE BLD-MCNC: 97 MG/DL (ref 65–99)
HCT VFR BLD AUTO: 39.3 % (ref 34–46.6)
HGB BLD-MCNC: 13.5 G/DL (ref 12–15.9)
IMM GRANULOCYTES # BLD AUTO: 0.01 10*3/MM3 (ref 0–0.05)
IMM GRANULOCYTES NFR BLD AUTO: 0.3 % (ref 0–0.5)
LYMPHOCYTES # BLD AUTO: 1.59 10*3/MM3 (ref 0.7–3.1)
LYMPHOCYTES NFR BLD AUTO: 49.2 % (ref 19.6–45.3)
MAGNESIUM SERPL-MCNC: 2.5 MG/DL (ref 1.6–2.6)
MCH RBC QN AUTO: 31 PG (ref 26.6–33)
MCHC RBC AUTO-ENTMCNC: 34.4 G/DL (ref 31.5–35.7)
MCV RBC AUTO: 90.3 FL (ref 79–97)
MONOCYTES # BLD AUTO: 0.58 10*3/MM3 (ref 0.1–0.9)
MONOCYTES NFR BLD AUTO: 18 % (ref 5–12)
NEUTROPHILS # BLD AUTO: 0.84 10*3/MM3 (ref 1.7–7)
NEUTROPHILS NFR BLD AUTO: 26 % (ref 42.7–76)
NRBC BLD AUTO-RTO: 0 /100 WBC (ref 0–0.2)
PLATELET # BLD AUTO: 285 10*3/MM3 (ref 140–450)
PMV BLD AUTO: 9.8 FL (ref 6–12)
POTASSIUM BLD-SCNC: 3.8 MMOL/L (ref 3.5–5.2)
RBC # BLD AUTO: 4.35 10*6/MM3 (ref 3.77–5.28)
SODIUM BLD-SCNC: 133 MMOL/L (ref 136–145)
WBC NRBC COR # BLD: 3.23 10*3/MM3 (ref 3.4–10.8)

## 2020-05-30 PROCEDURE — 96366 THER/PROPH/DIAG IV INF ADDON: CPT

## 2020-05-30 PROCEDURE — 80048 BASIC METABOLIC PNL TOTAL CA: CPT | Performed by: NURSE PRACTITIONER

## 2020-05-30 PROCEDURE — 85025 COMPLETE CBC W/AUTO DIFF WBC: CPT | Performed by: NURSE PRACTITIONER

## 2020-05-30 PROCEDURE — 25010000003 MAGNESIUM SULFATE 4 GM/100ML SOLUTION: Performed by: INTERNAL MEDICINE

## 2020-05-30 PROCEDURE — G0378 HOSPITAL OBSERVATION PER HR: HCPCS

## 2020-05-30 PROCEDURE — 83735 ASSAY OF MAGNESIUM: CPT | Performed by: NURSE PRACTITIONER

## 2020-05-30 PROCEDURE — 25010000002 THIAMINE PER 100 MG: Performed by: NURSE PRACTITIONER

## 2020-05-30 RX ORDER — LORAZEPAM 1 MG/1
1 TABLET ORAL EVERY 6 HOURS PRN
Qty: 5 TABLET | Refills: 0 | Status: SHIPPED | OUTPATIENT
Start: 2020-05-30 | End: 2020-07-13 | Stop reason: HOSPADM

## 2020-05-30 RX ADMIN — THIAMINE HYDROCHLORIDE 200 MG: 100 INJECTION, SOLUTION INTRAMUSCULAR; INTRAVENOUS at 08:58

## 2020-05-30 RX ADMIN — FAMOTIDINE 40 MG: 40 TABLET, FILM COATED ORAL at 08:58

## 2020-05-30 RX ADMIN — MAGNESIUM SULFATE IN WATER 4 G: 40 INJECTION, SOLUTION INTRAVENOUS at 00:31

## 2020-05-30 RX ADMIN — ESCITALOPRAM OXALATE 10 MG: 10 TABLET ORAL at 08:58

## 2020-05-30 RX ADMIN — LORAZEPAM 1 MG: 0.5 TABLET ORAL at 00:03

## 2020-05-30 RX ADMIN — SODIUM CHLORIDE, PRESERVATIVE FREE 10 ML: 5 INJECTION INTRAVENOUS at 08:59

## 2020-05-30 RX ADMIN — NICOTINE 1 PATCH: 21 PATCH, EXTENDED RELEASE TRANSDERMAL at 08:58

## 2020-05-30 RX ADMIN — ACETAMINOPHEN 650 MG: 325 TABLET, FILM COATED ORAL at 07:34

## 2020-05-30 RX ADMIN — TERAZOSIN HYDROCHLORIDE 2 MG: 2 CAPSULE ORAL at 08:58

## 2020-05-30 RX ADMIN — LORAZEPAM 1 MG: 0.5 TABLET ORAL at 06:04

## 2020-05-30 NOTE — OUTREACH NOTE
Prep Survey      Responses   [facility] facility patient discharged from?  [place]   Is LACE score < 7 ?  Yes   Eligibility  Not Eligible   What are the reasons patient is not eligible?  [facility]al [facility]   COVID-19 Test Status  Not tested   Does the patient have one of the following disease processes/diagnoses(primary or secondary)?  Other   Prep survey completed?  Yes          Deyanira Bowman RN

## 2020-06-01 ENCOUNTER — TRANSITIONAL CARE MANAGEMENT TELEPHONE ENCOUNTER (OUTPATIENT)
Dept: CALL CENTER | Facility: HOSPITAL | Age: 55
End: 2020-06-01

## 2020-06-01 ENCOUNTER — READMISSION MANAGEMENT (OUTPATIENT)
Dept: CALL CENTER | Facility: HOSPITAL | Age: 55
End: 2020-06-01

## 2020-06-01 NOTE — OUTREACH NOTE
Call Center TCM Note      Responses   [facility] patient discharged from?  [place]   COVID-19 Test Status  Negative   Does the patient have one of the following disease processes/diagnoses(primary or secondary)?  Other   TCM attempt successful?  No   Unsuccessful attempts  Attempt 2          Lenora Pulido RN    6/1/2020, 14:26

## 2020-06-01 NOTE — OUTREACH NOTE
Call Center TCM Note      Responses   [facility] patient discharged from?  [place]   COVID-19 Test Status  Negative   Does the patient have one of the following disease processes/diagnoses(primary or secondary)?  Other   TCM attempt successful?  No   Unsuccessful attempts  Attempt 1          Lenora Pulido RN    6/1/2020, 12:40

## 2020-06-01 NOTE — OUTREACH NOTE
Prep Survey      Responses   [facility] patient discharged from?  [place]   Is LACE score < 7 ?  Yes   Eligibility  [facility]   Date of Admission  05/27/20   Date of Discharge  05/30/20   Discharge Disposition  Home or Self Care   Discharge diagnosis  suicidal ideation,  alcoholic intoxication w/o complication   COVID-19 Test Status  Negative   Does the patient have one of the following disease processes/diagnoses(primary or secondary)?  Other   Does the patient have Home health ordered?  No   Is there a DME ordered?  No   Prep survey completed?  Yes          Fern Casillas RN

## 2020-06-02 ENCOUNTER — TRANSITIONAL CARE MANAGEMENT TELEPHONE ENCOUNTER (OUTPATIENT)
Dept: CALL CENTER | Facility: HOSPITAL | Age: 55
End: 2020-06-02

## 2020-06-02 NOTE — OUTREACH NOTE
Call Center TCM Note      Responses   [facility] patient discharged from?  [place]   COVID-19 Test Status  Negative   Does the patient have one of the following disease processes/diagnoses(primary or secondary)?  Other   TCM attempt successful?  No   Unsuccessful attempts  Attempt 3          Con Muniz RN    6/2/2020, 09:06

## 2020-06-26 ENCOUNTER — TELEPHONE (OUTPATIENT)
Dept: FAMILY MEDICINE CLINIC | Facility: CLINIC | Age: 55
End: 2020-06-26

## 2020-06-30 ENCOUNTER — HOSPITAL ENCOUNTER (INPATIENT)
Facility: HOSPITAL | Age: 55
LOS: 7 days | Discharge: PSYCHIATRIC OR SUBSTANCE USE FACILITY | End: 2020-07-07
Attending: EMERGENCY MEDICINE | Admitting: INTERNAL MEDICINE

## 2020-06-30 ENCOUNTER — APPOINTMENT (OUTPATIENT)
Dept: GENERAL RADIOLOGY | Facility: HOSPITAL | Age: 55
End: 2020-06-30

## 2020-06-30 DIAGNOSIS — F10.920 ALCOHOLIC INTOXICATION WITHOUT COMPLICATION (HCC): ICD-10-CM

## 2020-06-30 DIAGNOSIS — R45.851 DEPRESSION WITH SUICIDAL IDEATION: Primary | ICD-10-CM

## 2020-06-30 DIAGNOSIS — F32.A DEPRESSION WITH SUICIDAL IDEATION: Primary | ICD-10-CM

## 2020-06-30 LAB
ALBUMIN SERPL-MCNC: 4.3 G/DL (ref 3.5–5.2)
ALBUMIN/GLOB SERPL: 1.1 G/DL
ALP SERPL-CCNC: 133 U/L (ref 39–117)
ALT SERPL W P-5'-P-CCNC: 17 U/L (ref 1–33)
AMPHET+METHAMPHET UR QL: NEGATIVE
AMPHETAMINES UR QL: NEGATIVE
ANION GAP SERPL CALCULATED.3IONS-SCNC: 13 MMOL/L (ref 5–15)
APAP SERPL-MCNC: <5 MCG/ML (ref 10–30)
AST SERPL-CCNC: 19 U/L (ref 1–32)
BARBITURATES UR QL SCN: NEGATIVE
BASOPHILS # BLD AUTO: 0.05 10*3/MM3 (ref 0–0.2)
BASOPHILS NFR BLD AUTO: 0.9 % (ref 0–1.5)
BENZODIAZ UR QL SCN: NEGATIVE
BILIRUB SERPL-MCNC: <0.2 MG/DL (ref 0.2–1.2)
BILIRUB UR QL STRIP: NEGATIVE
BUN SERPL-MCNC: 13 MG/DL (ref 6–20)
BUN/CREAT SERPL: 15.5 (ref 7–25)
BUPRENORPHINE SERPL-MCNC: NEGATIVE NG/ML
CALCIUM SPEC-SCNC: 9.3 MG/DL (ref 8.6–10.5)
CANNABINOIDS SERPL QL: NEGATIVE
CHLORIDE SERPL-SCNC: 108 MMOL/L (ref 98–107)
CLARITY UR: ABNORMAL
CO2 SERPL-SCNC: 23 MMOL/L (ref 22–29)
COCAINE UR QL: NEGATIVE
COLOR UR: YELLOW
CREAT SERPL-MCNC: 0.84 MG/DL (ref 0.57–1)
DEPRECATED RDW RBC AUTO: 42.5 FL (ref 37–54)
EOSINOPHIL # BLD AUTO: 0.12 10*3/MM3 (ref 0–0.4)
EOSINOPHIL # BLD MANUAL: 0.05 10*3/MM3 (ref 0–0.4)
EOSINOPHIL NFR BLD AUTO: 2.2 % (ref 0.3–6.2)
EOSINOPHIL NFR BLD MANUAL: 1 % (ref 0.3–6.2)
ERYTHROCYTE [DISTWIDTH] IN BLOOD BY AUTOMATED COUNT: 13.2 % (ref 12.3–15.4)
ETHANOL BLD-MCNC: 409 MG/DL (ref 0–10)
ETHANOL UR QL: 0.41 %
GFR SERPL CREATININE-BSD FRML MDRD: 70 ML/MIN/1.73
GLOBULIN UR ELPH-MCNC: 4 GM/DL
GLUCOSE SERPL-MCNC: 108 MG/DL (ref 65–99)
GLUCOSE UR STRIP-MCNC: NEGATIVE MG/DL
HCT VFR BLD AUTO: 41.9 % (ref 34–46.6)
HGB BLD-MCNC: 14.4 G/DL (ref 12–15.9)
HGB UR QL STRIP.AUTO: NEGATIVE
HOLD SPECIMEN: NORMAL
HOLD SPECIMEN: NORMAL
IMM GRANULOCYTES # BLD AUTO: 0 10*3/MM3 (ref 0–0.05)
IMM GRANULOCYTES NFR BLD AUTO: 0 % (ref 0–0.5)
KETONES UR QL STRIP: NEGATIVE
LEUKOCYTE ESTERASE UR QL STRIP.AUTO: NEGATIVE
LIPASE SERPL-CCNC: 79 U/L (ref 13–60)
LYMPHOCYTES # BLD AUTO: 3.85 10*3/MM3 (ref 0.7–3.1)
LYMPHOCYTES # BLD MANUAL: 3.99 10*3/MM3 (ref 0.7–3.1)
LYMPHOCYTES NFR BLD AUTO: 70.5 % (ref 19.6–45.3)
LYMPHOCYTES NFR BLD MANUAL: 10 % (ref 5–12)
LYMPHOCYTES NFR BLD MANUAL: 73 % (ref 19.6–45.3)
MAGNESIUM SERPL-MCNC: 2.1 MG/DL (ref 1.6–2.6)
MCH RBC QN AUTO: 30.3 PG (ref 26.6–33)
MCHC RBC AUTO-ENTMCNC: 34.4 G/DL (ref 31.5–35.7)
MCV RBC AUTO: 88.2 FL (ref 79–97)
METHADONE UR QL SCN: NEGATIVE
MONOCYTES # BLD AUTO: 0.52 10*3/MM3 (ref 0.1–0.9)
MONOCYTES # BLD AUTO: 0.55 10*3/MM3 (ref 0.1–0.9)
MONOCYTES NFR BLD AUTO: 9.5 % (ref 5–12)
NEUTROPHILS # BLD AUTO: 0.87 10*3/MM3 (ref 1.7–7)
NEUTROPHILS NFR BLD AUTO: 0.92 10*3/MM3 (ref 1.7–7)
NEUTROPHILS NFR BLD AUTO: 16.9 % (ref 42.7–76)
NEUTROPHILS NFR BLD MANUAL: 16 % (ref 42.7–76)
NITRITE UR QL STRIP: NEGATIVE
NRBC BLD AUTO-RTO: 0 /100 WBC (ref 0–0.2)
OPIATES UR QL: NEGATIVE
OXYCODONE UR QL SCN: NEGATIVE
PCP UR QL SCN: NEGATIVE
PH UR STRIP.AUTO: 7 [PH] (ref 5–9)
PLAT MORPH BLD: NORMAL
PLATELET # BLD AUTO: 211 10*3/MM3 (ref 140–450)
PMV BLD AUTO: 9.8 FL (ref 6–12)
POTASSIUM SERPL-SCNC: 3.8 MMOL/L (ref 3.5–5.2)
PROPOXYPH UR QL: NEGATIVE
PROT SERPL-MCNC: 8.3 G/DL (ref 6–8.5)
PROT UR QL STRIP: NEGATIVE
RBC # BLD AUTO: 4.75 10*6/MM3 (ref 3.77–5.28)
RBC MORPH BLD: NORMAL
SALICYLATES SERPL-MCNC: <0.3 MG/DL
SODIUM SERPL-SCNC: 144 MMOL/L (ref 136–145)
SP GR UR STRIP: 1.01 (ref 1–1.03)
TRICYCLICS UR QL SCN: POSITIVE
UROBILINOGEN UR QL STRIP: ABNORMAL
WBC # BLD AUTO: 5.46 10*3/MM3 (ref 3.4–10.8)
WBC MORPH BLD: NORMAL
WHOLE BLOOD HOLD SPECIMEN: NORMAL
WHOLE BLOOD HOLD SPECIMEN: NORMAL

## 2020-06-30 PROCEDURE — 80307 DRUG TEST PRSMV CHEM ANLYZR: CPT | Performed by: EMERGENCY MEDICINE

## 2020-06-30 PROCEDURE — 25010000002 LORAZEPAM PER 2 MG

## 2020-06-30 PROCEDURE — 83735 ASSAY OF MAGNESIUM: CPT | Performed by: EMERGENCY MEDICINE

## 2020-06-30 PROCEDURE — 80053 COMPREHEN METABOLIC PANEL: CPT | Performed by: EMERGENCY MEDICINE

## 2020-06-30 PROCEDURE — 83690 ASSAY OF LIPASE: CPT | Performed by: EMERGENCY MEDICINE

## 2020-06-30 PROCEDURE — 71045 X-RAY EXAM CHEST 1 VIEW: CPT

## 2020-06-30 PROCEDURE — 93005 ELECTROCARDIOGRAM TRACING: CPT | Performed by: EMERGENCY MEDICINE

## 2020-06-30 PROCEDURE — 85007 BL SMEAR W/DIFF WBC COUNT: CPT | Performed by: EMERGENCY MEDICINE

## 2020-06-30 PROCEDURE — 99285 EMERGENCY DEPT VISIT HI MDM: CPT

## 2020-06-30 PROCEDURE — 85025 COMPLETE CBC W/AUTO DIFF WBC: CPT | Performed by: EMERGENCY MEDICINE

## 2020-06-30 PROCEDURE — 25010000002 THIAMINE PER 100 MG: Performed by: EMERGENCY MEDICINE

## 2020-06-30 PROCEDURE — 81003 URINALYSIS AUTO W/O SCOPE: CPT | Performed by: EMERGENCY MEDICINE

## 2020-06-30 PROCEDURE — 25010000002 MAGNESIUM SULFATE PER 500 MG OF MAGNESIUM: Performed by: EMERGENCY MEDICINE

## 2020-06-30 PROCEDURE — 93010 ELECTROCARDIOGRAM REPORT: CPT | Performed by: INTERNAL MEDICINE

## 2020-06-30 PROCEDURE — G0378 HOSPITAL OBSERVATION PER HR: HCPCS

## 2020-06-30 PROCEDURE — 99284 EMERGENCY DEPT VISIT MOD MDM: CPT

## 2020-06-30 PROCEDURE — 93005 ELECTROCARDIOGRAM TRACING: CPT

## 2020-06-30 RX ORDER — QUETIAPINE FUMARATE 100 MG/1
100 TABLET, FILM COATED ORAL DAILY
Status: DISCONTINUED | OUTPATIENT
Start: 2020-07-01 | End: 2020-07-01 | Stop reason: SDUPTHER

## 2020-06-30 RX ORDER — QUETIAPINE FUMARATE 25 MG/1
12.5 TABLET, FILM COATED ORAL 3 TIMES DAILY
Status: DISCONTINUED | OUTPATIENT
Start: 2020-06-30 | End: 2020-07-01

## 2020-06-30 RX ORDER — HYDROXYZINE PAMOATE 25 MG/1
25 CAPSULE ORAL 4 TIMES DAILY PRN
Status: DISCONTINUED | OUTPATIENT
Start: 2020-06-30 | End: 2020-07-07 | Stop reason: HOSPADM

## 2020-06-30 RX ORDER — ALBUTEROL SULFATE 2.5 MG/3ML
2.5 SOLUTION RESPIRATORY (INHALATION) EVERY 4 HOURS PRN
Status: DISCONTINUED | OUTPATIENT
Start: 2020-06-30 | End: 2020-07-07 | Stop reason: HOSPADM

## 2020-06-30 RX ORDER — DULOXETIN HYDROCHLORIDE 20 MG/1
40 CAPSULE, DELAYED RELEASE ORAL DAILY
Status: DISCONTINUED | OUTPATIENT
Start: 2020-07-01 | End: 2020-07-01

## 2020-06-30 RX ORDER — LORAZEPAM 2 MG/ML
INJECTION INTRAMUSCULAR
Status: COMPLETED
Start: 2020-06-30 | End: 2020-06-30

## 2020-06-30 RX ORDER — TERAZOSIN 2 MG/1
2 CAPSULE ORAL 2 TIMES DAILY
Status: DISCONTINUED | OUTPATIENT
Start: 2020-06-30 | End: 2020-07-01

## 2020-06-30 RX ORDER — SODIUM CHLORIDE 9 MG/ML
100 INJECTION, SOLUTION INTRAVENOUS CONTINUOUS
Status: DISCONTINUED | OUTPATIENT
Start: 2020-06-30 | End: 2020-07-02

## 2020-06-30 RX ORDER — METHOCARBAMOL 500 MG/1
500 TABLET, FILM COATED ORAL 4 TIMES DAILY PRN
Status: DISCONTINUED | OUTPATIENT
Start: 2020-06-30 | End: 2020-07-07 | Stop reason: HOSPADM

## 2020-06-30 RX ORDER — SODIUM CHLORIDE 0.9 % (FLUSH) 0.9 %
10 SYRINGE (ML) INJECTION EVERY 12 HOURS SCHEDULED
Status: DISCONTINUED | OUTPATIENT
Start: 2020-06-30 | End: 2020-07-07 | Stop reason: HOSPADM

## 2020-06-30 RX ORDER — CLONIDINE HYDROCHLORIDE 0.1 MG/1
0.1 TABLET ORAL 3 TIMES DAILY
Status: DISCONTINUED | OUTPATIENT
Start: 2020-06-30 | End: 2020-07-01

## 2020-06-30 RX ORDER — MIRTAZAPINE 15 MG/1
30 TABLET, FILM COATED ORAL DAILY
Status: DISCONTINUED | OUTPATIENT
Start: 2020-07-01 | End: 2020-07-07 | Stop reason: HOSPADM

## 2020-06-30 RX ORDER — NICOTINE 21 MG/24HR
1 PATCH, TRANSDERMAL 24 HOURS TRANSDERMAL DAILY
Status: DISCONTINUED | OUTPATIENT
Start: 2020-07-01 | End: 2020-07-07 | Stop reason: HOSPADM

## 2020-06-30 RX ORDER — LORAZEPAM 2 MG/ML
2 INJECTION INTRAMUSCULAR ONCE
Status: COMPLETED | OUTPATIENT
Start: 2020-06-30 | End: 2020-06-30

## 2020-06-30 RX ORDER — SODIUM CHLORIDE 0.9 % (FLUSH) 0.9 %
10 SYRINGE (ML) INJECTION AS NEEDED
Status: DISCONTINUED | OUTPATIENT
Start: 2020-06-30 | End: 2020-07-07 | Stop reason: HOSPADM

## 2020-06-30 RX ORDER — QUETIAPINE FUMARATE 100 MG/1
100 TABLET, FILM COATED ORAL NIGHTLY
Status: DISCONTINUED | OUTPATIENT
Start: 2020-06-30 | End: 2020-07-01

## 2020-06-30 RX ORDER — HYDROXYZINE PAMOATE 25 MG/1
25 CAPSULE ORAL 3 TIMES DAILY
Status: DISCONTINUED | OUTPATIENT
Start: 2020-06-30 | End: 2020-07-01

## 2020-06-30 RX ORDER — LORAZEPAM 0.5 MG/1
1 TABLET ORAL EVERY 6 HOURS PRN
Status: DISCONTINUED | OUTPATIENT
Start: 2020-06-30 | End: 2020-07-07 | Stop reason: HOSPADM

## 2020-06-30 RX ORDER — NALTREXONE HYDROCHLORIDE 50 MG/1
50 TABLET, FILM COATED ORAL DAILY
Status: DISCONTINUED | OUTPATIENT
Start: 2020-07-01 | End: 2020-07-01

## 2020-06-30 RX ORDER — BUPROPION HYDROCHLORIDE 150 MG/1
150 TABLET ORAL DAILY
Status: DISCONTINUED | OUTPATIENT
Start: 2020-07-01 | End: 2020-07-01

## 2020-06-30 RX ADMIN — Medication 10 ML: at 21:32

## 2020-06-30 RX ADMIN — LORAZEPAM 2 MG: 2 INJECTION INTRAMUSCULAR at 22:03

## 2020-06-30 RX ADMIN — FOLIC ACID 1000 ML/HR: 5 INJECTION, SOLUTION INTRAMUSCULAR; INTRAVENOUS; SUBCUTANEOUS at 22:03

## 2020-06-30 RX ADMIN — LORAZEPAM 2 MG: 2 INJECTION INTRAMUSCULAR; INTRAVENOUS at 22:03

## 2020-06-30 RX ADMIN — SODIUM CHLORIDE 1000 ML: 900 INJECTION, SOLUTION INTRAVENOUS at 21:44

## 2020-07-01 LAB
ANION GAP SERPL CALCULATED.3IONS-SCNC: 9 MMOL/L (ref 5–15)
BASOPHILS # BLD AUTO: 0.04 10*3/MM3 (ref 0–0.2)
BASOPHILS NFR BLD AUTO: 1.1 % (ref 0–1.5)
BUN SERPL-MCNC: 12 MG/DL (ref 6–20)
BUN/CREAT SERPL: 18.2 (ref 7–25)
CALCIUM SPEC-SCNC: 7.8 MG/DL (ref 8.6–10.5)
CHLORIDE SERPL-SCNC: 113 MMOL/L (ref 98–107)
CO2 SERPL-SCNC: 20 MMOL/L (ref 22–29)
CREAT SERPL-MCNC: 0.66 MG/DL (ref 0.57–1)
DEPRECATED RDW RBC AUTO: 42.9 FL (ref 37–54)
EOSINOPHIL # BLD AUTO: 0.1 10*3/MM3 (ref 0–0.4)
EOSINOPHIL NFR BLD AUTO: 2.8 % (ref 0.3–6.2)
ERYTHROCYTE [DISTWIDTH] IN BLOOD BY AUTOMATED COUNT: 13.2 % (ref 12.3–15.4)
ETHANOL BLD-MCNC: 90 MG/DL (ref 0–10)
ETHANOL UR QL: 0.09 %
GFR SERPL CREATININE-BSD FRML MDRD: 93 ML/MIN/1.73
GLUCOSE SERPL-MCNC: 98 MG/DL (ref 65–99)
HCT VFR BLD AUTO: 33.7 % (ref 34–46.6)
HGB BLD-MCNC: 11.8 G/DL (ref 12–15.9)
IMM GRANULOCYTES # BLD AUTO: 0 10*3/MM3 (ref 0–0.05)
IMM GRANULOCYTES NFR BLD AUTO: 0 % (ref 0–0.5)
LYMPHOCYTES # BLD AUTO: 2.45 10*3/MM3 (ref 0.7–3.1)
LYMPHOCYTES NFR BLD AUTO: 68.4 % (ref 19.6–45.3)
MAGNESIUM SERPL-MCNC: 2.3 MG/DL (ref 1.6–2.6)
MCH RBC QN AUTO: 31.1 PG (ref 26.6–33)
MCHC RBC AUTO-ENTMCNC: 35 G/DL (ref 31.5–35.7)
MCV RBC AUTO: 88.7 FL (ref 79–97)
MONOCYTES # BLD AUTO: 0.32 10*3/MM3 (ref 0.1–0.9)
MONOCYTES NFR BLD AUTO: 8.9 % (ref 5–12)
NEUTROPHILS NFR BLD AUTO: 0.67 10*3/MM3 (ref 1.7–7)
NEUTROPHILS NFR BLD AUTO: 18.8 % (ref 42.7–76)
NRBC BLD AUTO-RTO: 0 /100 WBC (ref 0–0.2)
PHOSPHATE SERPL-MCNC: 3.8 MG/DL (ref 2.5–4.5)
PLATELET # BLD AUTO: 141 10*3/MM3 (ref 140–450)
PMV BLD AUTO: 9.9 FL (ref 6–12)
POTASSIUM SERPL-SCNC: 3.6 MMOL/L (ref 3.5–5.2)
RBC # BLD AUTO: 3.8 10*6/MM3 (ref 3.77–5.28)
SODIUM SERPL-SCNC: 142 MMOL/L (ref 136–145)
WBC # BLD AUTO: 3.58 10*3/MM3 (ref 3.4–10.8)

## 2020-07-01 PROCEDURE — 85025 COMPLETE CBC W/AUTO DIFF WBC: CPT | Performed by: INTERNAL MEDICINE

## 2020-07-01 PROCEDURE — 25010000002 LORAZEPAM PER 2 MG: Performed by: INTERNAL MEDICINE

## 2020-07-01 PROCEDURE — 83735 ASSAY OF MAGNESIUM: CPT | Performed by: INTERNAL MEDICINE

## 2020-07-01 PROCEDURE — 80048 BASIC METABOLIC PNL TOTAL CA: CPT | Performed by: INTERNAL MEDICINE

## 2020-07-01 PROCEDURE — 25010000002 THIAMINE PER 100 MG: Performed by: INTERNAL MEDICINE

## 2020-07-01 PROCEDURE — 25010000002 LORAZEPAM PER 2 MG: Performed by: STUDENT IN AN ORGANIZED HEALTH CARE EDUCATION/TRAINING PROGRAM

## 2020-07-01 PROCEDURE — 25010000002 LORAZEPAM PER 2 MG: Performed by: PSYCHIATRY & NEUROLOGY

## 2020-07-01 PROCEDURE — 99254 IP/OBS CNSLTJ NEW/EST MOD 60: CPT | Performed by: PSYCHIATRY & NEUROLOGY

## 2020-07-01 PROCEDURE — 80307 DRUG TEST PRSMV CHEM ANLYZR: CPT | Performed by: INTERNAL MEDICINE

## 2020-07-01 PROCEDURE — G0378 HOSPITAL OBSERVATION PER HR: HCPCS

## 2020-07-01 PROCEDURE — 84100 ASSAY OF PHOSPHORUS: CPT | Performed by: INTERNAL MEDICINE

## 2020-07-01 RX ORDER — LORAZEPAM 2 MG/ML
6 INJECTION INTRAMUSCULAR
Status: DISCONTINUED | OUTPATIENT
Start: 2020-07-01 | End: 2020-07-05

## 2020-07-01 RX ORDER — HYDRALAZINE HYDROCHLORIDE 20 MG/ML
10 INJECTION INTRAMUSCULAR; INTRAVENOUS EVERY 6 HOURS PRN
Status: DISCONTINUED | OUTPATIENT
Start: 2020-07-01 | End: 2020-07-07 | Stop reason: HOSPADM

## 2020-07-01 RX ORDER — LORAZEPAM 2 MG/ML
4 INJECTION INTRAMUSCULAR ONCE
Status: COMPLETED | OUTPATIENT
Start: 2020-07-01 | End: 2020-07-01

## 2020-07-01 RX ORDER — CLONIDINE HYDROCHLORIDE 0.2 MG/1
0.2 TABLET ORAL 3 TIMES DAILY
Status: DISCONTINUED | OUTPATIENT
Start: 2020-07-01 | End: 2020-07-07 | Stop reason: HOSPADM

## 2020-07-01 RX ORDER — LORAZEPAM 2 MG/ML
4 INJECTION INTRAMUSCULAR EVERY 4 HOURS
Status: DISCONTINUED | OUTPATIENT
Start: 2020-07-01 | End: 2020-07-04

## 2020-07-01 RX ADMIN — SODIUM CHLORIDE, PRESERVATIVE FREE 10 ML: 5 INJECTION INTRAVENOUS at 20:04

## 2020-07-01 RX ADMIN — CLONIDINE HYDROCHLORIDE 0.1 MG: 0.1 TABLET ORAL at 00:45

## 2020-07-01 RX ADMIN — HYDROXYZINE PAMOATE 25 MG: 25 CAPSULE ORAL at 08:25

## 2020-07-01 RX ADMIN — SODIUM CHLORIDE, PRESERVATIVE FREE 10 ML: 5 INJECTION INTRAVENOUS at 00:25

## 2020-07-01 RX ADMIN — LORAZEPAM 4 MG: 2 INJECTION INTRAMUSCULAR; INTRAVENOUS at 15:41

## 2020-07-01 RX ADMIN — BUPROPION HYDROCHLORIDE 150 MG: 150 TABLET, FILM COATED, EXTENDED RELEASE ORAL at 08:25

## 2020-07-01 RX ADMIN — HYDROXYZINE PAMOATE 25 MG: 25 CAPSULE ORAL at 00:46

## 2020-07-01 RX ADMIN — DULOXETINE HYDROCHLORIDE 40 MG: 20 CAPSULE, DELAYED RELEASE ORAL at 08:25

## 2020-07-01 RX ADMIN — NICOTINE 1 PATCH: 21 PATCH, EXTENDED RELEASE TRANSDERMAL at 08:06

## 2020-07-01 RX ADMIN — LORAZEPAM 1 MG: 0.5 TABLET ORAL at 14:49

## 2020-07-01 RX ADMIN — CLONIDINE HYDROCHLORIDE 0.2 MG: 0.2 TABLET ORAL at 15:42

## 2020-07-01 RX ADMIN — CLONIDINE HYDROCHLORIDE 0.1 MG: 0.1 TABLET ORAL at 08:25

## 2020-07-01 RX ADMIN — HYDROXYZINE PAMOATE 25 MG: 25 CAPSULE ORAL at 15:42

## 2020-07-01 RX ADMIN — SODIUM CHLORIDE 125 ML/HR: 900 INJECTION, SOLUTION INTRAVENOUS at 00:30

## 2020-07-01 RX ADMIN — LORAZEPAM 4 MG: 2 INJECTION INTRAMUSCULAR; INTRAVENOUS at 17:45

## 2020-07-01 RX ADMIN — NALTREXONE HYDROCHLORIDE 50 MG: 50 TABLET, FILM COATED ORAL at 08:25

## 2020-07-01 RX ADMIN — TERAZOSIN HYDROCHLORIDE ANHYDROUS 2 MG: 2 CAPSULE ORAL at 00:45

## 2020-07-01 RX ADMIN — QUETIAPINE 100 MG: 100 TABLET ORAL at 03:30

## 2020-07-01 RX ADMIN — MIRTAZAPINE 30 MG: 15 TABLET, FILM COATED ORAL at 08:25

## 2020-07-01 RX ADMIN — LORAZEPAM 4 MG: 2 INJECTION INTRAMUSCULAR; INTRAVENOUS at 20:12

## 2020-07-01 RX ADMIN — CLONIDINE HYDROCHLORIDE 0.2 MG: 0.2 TABLET ORAL at 20:03

## 2020-07-01 RX ADMIN — SODIUM CHLORIDE, PRESERVATIVE FREE 10 ML: 5 INJECTION INTRAVENOUS at 08:03

## 2020-07-01 RX ADMIN — Medication 12.5 MG: at 08:25

## 2020-07-01 RX ADMIN — TERAZOSIN HYDROCHLORIDE ANHYDROUS 2 MG: 2 CAPSULE ORAL at 08:25

## 2020-07-01 RX ADMIN — SODIUM CHLORIDE 125 ML/HR: 900 INJECTION, SOLUTION INTRAVENOUS at 08:02

## 2020-07-01 RX ADMIN — Medication 12.5 MG: at 15:42

## 2020-07-01 RX ADMIN — THIAMINE HYDROCHLORIDE 100 MG: 100 INJECTION, SOLUTION INTRAMUSCULAR; INTRAVENOUS at 08:15

## 2020-07-01 RX ADMIN — SODIUM CHLORIDE 100 ML/HR: 900 INJECTION, SOLUTION INTRAVENOUS at 17:45
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
## 2020-07-01 NOTE — PLAN OF CARE
Problem: Patient Care Overview  Goal: Plan of Care Review  Outcome: Ongoing (interventions implemented as appropriate)  Flowsheets (Taken 7/1/2020 9182)  Progress: improving  Plan of Care Reviewed With: patient  Outcome Summary: alcohol level trending down, pt started on detox ativan, orders to tx to med/surg

## 2020-07-01 NOTE — ED NOTES
Pt states she does not want to die or kill her self. Pt wants to be with her EX  and mother whom are . Pt admits to alcohol intoxication and states she took about 5 shots today.      Anita Gomez RN  [phone]

## 2020-07-01 NOTE — PLAN OF CARE
Pt admitted to CCU for alcohol and suicidal ideation. Pt is alert, disoriented with slurred speech. She is anxious and restless but cooperative without restraints at this time. 1:1 sitter present. CIWA score of 15. Vital signs stable. Will continue to monitor.
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
## 2020-07-01 NOTE — PROGRESS NOTES
[facility] Medicine Services  INPATIENT PROGRESS NOTE    Length of Stay: 0  Date of Admission: 6/30/2020  Primary Care Physician: Farhan Guzman MD    Subjective   Chief Complaint: No new complaints.    HPI: Patient is seen for follow-up.  She is 55-year-old female with history of alcoholism, bipolar disorder, nicotine dependence who was admitted for alcohol withdrawal and suicidal ideation.  She is doing better, tolerating prescribed diet and voices no new complaints.    Review of Systems   Constitutional: Positive for activity change and fatigue. Negative for appetite change, chills, diaphoresis and fever.   HENT: Negative for trouble swallowing and voice change.    Eyes: Negative for photophobia and visual disturbance.   Respiratory: Negative for cough, choking, chest tightness, shortness of breath, wheezing and stridor.    Cardiovascular: Negative for chest pain, palpitations and leg swelling.   Gastrointestinal: Negative for abdominal distention, abdominal pain, blood in stool, constipation, diarrhea, nausea and vomiting.   Endocrine: Negative for cold intolerance, heat intolerance, polydipsia, polyphagia and polyuria.   Genitourinary: Negative for decreased urine volume, difficulty urinating, dysuria, enuresis, flank pain, frequency, hematuria and urgency.   Musculoskeletal: Negative for arthralgias, gait problem, myalgias, neck pain and neck stiffness.   Skin: Negative for pallor, rash and wound.   Neurological: Negative for dizziness, tremors, seizures, syncope, facial asymmetry, speech difficulty, weakness, light-headedness, numbness and headaches.   Hematological: Does not bruise/bleed easily.   Psychiatric/Behavioral: Positive for suicidal ideas. Negative for agitation, behavioral problems and confusion.       Objective    Temp:  [97.7 °F (36.5 °C)-98.2 °F (36.8 °C)] 97.7 °F (36.5 °C)  Heart Rate:  [] 104  Resp:  [16] 16  BP: ()/(57-93)  135/68    Physical Exam   Constitutional: She is oriented to person, place, and time. She appears well-developed and well-nourished. She is cooperative. No distress.   HENT:   Head: Normocephalic and atraumatic.   Right Ear: External ear normal.   Left Ear: External ear normal.   Nose: Nose normal.   Mouth/Throat: Oropharynx is clear and moist.   Eyes: Pupils are equal, round, and reactive to light. Conjunctivae and EOM are normal.   Neck: Normal range of motion. Neck supple. No JVD present. No thyromegaly present.   Cardiovascular: Normal rate, regular rhythm, normal heart sounds and intact distal pulses. Exam reveals no gallop and no friction rub.   No murmur heard.  Pulmonary/Chest: Effort normal and breath sounds normal. No stridor. No respiratory distress. She has no wheezes. She has no rales. She exhibits no tenderness.   Abdominal: Soft. Bowel sounds are normal. She exhibits no distension and no mass. There is no tenderness. There is no rebound and no guarding. No hernia.   Musculoskeletal: Normal range of motion. She exhibits no edema, tenderness or deformity.   Neurological: She is alert and oriented to person, place, and time. She has normal reflexes. She displays normal reflexes. No cranial nerve deficit or sensory deficit. She exhibits normal muscle tone. Coordination normal.   Skin: Skin is warm and dry. No rash noted. She is not diaphoretic. No erythema. No pallor.   Psychiatric: She has a normal mood and affect. Her behavior is normal. Judgment and thought content normal.   Nursing note and vitals reviewed.        Medication Review:    Current Facility-Administered Medications:   •  albuterol (PROVENTIL) nebulizer solution 0.083% 2.5 mg/3mL, 2.5 mg, Nebulization, Q4H PRN, Maury Cagle MD  •  buPROPion XL (WELLBUTRIN XL) 24 hr tablet 150 mg, 150 mg, Oral, Daily, Maury Cagle MD, 150 mg at 07/01/20 0825  •  cloNIDine (CATAPRES) tablet 0.1 mg, 0.1 mg, Oral, TID, Maury Cagle MD, 0.1 mg  at 07/01/20 0825  •  DULoxetine (CYMBALTA) DR capsule 40 mg, 40 mg, Oral, Daily, Maury Cagle MD, 40 mg at 07/01/20 0825  •  hydrOXYzine pamoate (VISTARIL) capsule 25 mg, 25 mg, Oral, TID, Maury Cagle MD, 25 mg at 07/01/20 0825  •  hydrOXYzine pamoate (VISTARIL) capsule 25 mg, 25 mg, Oral, 4x Daily PRN, Maury Cagle MD  •  LORazepam (ATIVAN) tablet 1 mg, 1 mg, Oral, Q6H PRN, Maury Cagle MD  •  methocarbamol (ROBAXIN) tablet 500 mg, 500 mg, Oral, 4x Daily PRN, Maury Cagle MD  •  mirtazapine (REMERON) tablet 30 mg, 30 mg, Oral, Daily, Maury Cagle MD, 30 mg at 07/01/20 0825  •  naltrexone (DEPADE) tablet 50 mg, 50 mg, Oral, Daily, Maury Cagle MD, 50 mg at 07/01/20 0825  •  nicotine (NICODERM CQ) 21 MG/24HR patch 1 patch, 1 patch, Transdermal, Daily, Maury Cagle MD, 1 patch at 07/01/20 0806  •  QUEtiapine (SEROquel) half tablet 12.5 mg, 12.5 mg, Oral, TID, Maury Cagle MD, 12.5 mg at 07/01/20 0825  •  QUEtiapine (SEROquel) tablet 100 mg, 100 mg, Oral, Nightly, Maury Cagle MD, 100 mg at 07/01/20 0330  •  sodium chloride 0.9 % flush 10 mL, 10 mL, Intravenous, PRN, Dao Baker MD, 10 mL at 06/30/20 2132  •  sodium chloride 0.9 % flush 10 mL, 10 mL, Intravenous, Q12H, Maury Cagle MD, 10 mL at 07/01/20 0803  •  sodium chloride 0.9 % flush 10 mL, 10 mL, Intravenous, PRN, Maury Cagle MD  •  sodium chloride 0.9 % infusion, 125 mL/hr, Intravenous, Continuous, Dao Baker MD, Last Rate: 125 mL/hr at 07/01/20 0802, 125 mL/hr at 07/01/20 0802  •  terazosin (HYTRIN) capsule 2 mg, 2 mg, Oral, BID, Maury Cagle MD, 2 mg at 07/01/20 0825  •  thiamine (B-1) 100 mg in sodium chloride 0.9 % 100 mL IVPB, 100 mg, Intravenous, Daily, Maury Cagle MD, Last Rate: 200 mL/hr at 07/01/20 0815, 100 mg at 07/01/20 0815    Results Review:  I have reviewed the labs, radiology results, and diagnostic studies.    Laboratory Data:   Results from last 7  days   Lab Units 07/01/20 0418 06/30/20  2140   SODIUM mmol/L 142 144   POTASSIUM mmol/L 3.6 3.8   CHLORIDE mmol/L 113* 108*   CO2 mmol/L 20.0* 23.0   BUN mg/dL 12 13   CREATININE mg/dL 0.66 0.84   GLUCOSE mg/dL 98 108*   CALCIUM mg/dL 7.8* 9.3   BILIRUBIN mg/dL  --  <0.2*   ALK PHOS U/L  --  133*   ALT (SGPT) U/L  --  17   AST (SGOT) U/L  --  19   ANION GAP mmol/L 9.0 13.0     Estimated Creatinine Clearance: 80.6 mL/min (by C-G formula based on SCr of 0.66 mg/dL).  Results from last 7 days   Lab Units 07/01/20 0418 06/30/20  2140   MAGNESIUM mg/dL 2.3 2.1   PHOSPHORUS mg/dL 3.8  --          Results from last 7 days   Lab Units 07/01/20 0418 06/30/20  2140   WBC 10*3/mm3 3.58 5.46   HEMOGLOBIN g/dL 11.8* 14.4   HEMATOCRIT % 33.7* 41.9   PLATELETS 10*3/mm3 141 211           Culture Data:   No results found for: BLOODCX  No results found for: URINECX  No results found for: RESPCX  No results found for: WOUNDCX  No results found for: STOOLCX  No components found for: BODYFLD    Radiology Data:   Imaging Results (Last 24 Hours)     Procedure Component Value Units Date/Time    XR Chest 1 View [[id number]] Collected:  06/30/20 2146     Updated:  06/30/20 2205    Narrative:         CHEST 1 VIEW on 6/30/2020     CLINICAL INDICATION: EtOH intoxication    COMPARISON: 5/27/2020    FINDINGS: Emphysematous changes of the lungs are noted. The lungs  are otherwise clear. Vascular calcification is noted in the  aorta. Cardiac, hilar and mediastinal contours are within normal  limits. No bony abnormality is noted.      Impression:       No acute disease.    Electronically signed by:  El Renteria  6/30/2020 10:04 PM  CDT Workstation: [id number]          I have reviewed the patient's current medications.     Assessment/Plan     Hospital Problem List:  Active Problems:    Depression with suicidal ideation  Continue suicide precautions/72-hour hold.  Sitter is at the bedside.  Consult psychiatrist.    Alcoholism with alcohol  withdrawal: Continue alcohol withdrawal protocol.  Repeat serum alcohol level.    Nicotine dependence: Continue nicotine patch.  Nicotine cessation counseling has been discussed with the patient.    Continue GI and DVT prophylaxis.      Discharge Planning: In progress.    Shawn Malave MD   07/01/20   10:10

## 2020-07-01 NOTE — H&P
[facility] Medicine Admission      Date of Admission: 6/30/2020      Primary Care Physician: Farhan Guzman MD      Chief Complaint: Having thoughts to kill myself    HPI: Briefly this is a 55-year-old female who is well-known to the ED as well as the hospital service with patient presented the ED today carrying the following problem list    1.  Chronic alcohol abuse  2.  Bipolar disorder  3.  Past history of DTs including alcohol withdrawal seizures  4.  History of Warnicke's encephalopathy    Patient presents with intoxication blood call level in the 400s although having suicidal ideations.  She was seen and evaluated by the ED has currently a sitter and we are asked to admit secondary to her previous episodes of alcohol withdrawal and alcohol withdrawal seizures.  Patient currently with sitter and awaiting psych evaluation.    Concurrent Medical History:  has a past medical history of ADHD (attention deficit hyperactivity disorder), Alcohol abuse, Bilateral carpal tunnel syndrome, Bipolar 1 disorder (CMS/HCC), Candida vaginitis (5/5/2020), Carpal tunnel syndrome on both sides (2015), Chronic pain disorder, Collapsed lung (1986), Depression, Hyperkalemia (5/13/2020), Irritable bowel syndrome, Leukopenia (4/23/2018), Spontaneous pneumothorax, Suicidal ideation (1/5/2019), Thrombocytopenia (CMS/HCC) (5/6/2020), and Wernicke's encephalopathy (4/23/2018).    Past Surgical History:  has a past surgical history that includes Hysterectomy; Colonoscopy; Total abdominal hysterectomy w/ bilateral salpingoophorectomy; and Chest tube insertion.    Family History: family history includes Alcohol abuse in her father, paternal grandfather, and paternal grandmother; Anxiety disorder in her maternal aunt, mother, and sister; Bipolar disorder in her sister; COPD in her mother; Depression in her father and mother; Diabetes in her father.     Social History:  reports that she has been  smoking. She started smoking about 43 years ago. She has been smoking about 1.00 pack per day. She has never used smokeless tobacco. She reports that she drinks about 70.0 standard drinks of alcohol per week. She reports that she has current or past drug history. Drugs: Cocaine, Methamphetamines, and Marijuana.    Allergies:   Allergies   Allergen Reactions   • Wasp Venom Swelling       Medications:   Prior to Admission medications    Medication Sig Start Date End Date Taking? Authorizing Provider   albuterol sulfate  (90 Base) MCG/ACT inhaler Inhale 2 puffs Every 4 (Four) Hours As Needed for Wheezing. 5/27/20   Farhan Guzman MD   buPROPion XL (WELLBUTRIN XL) 150 MG 24 hr tablet Take 1 tablet by mouth Daily for depression 6/5/20      cloNIDine (CATAPRES) 0.1 MG tablet Take 1 tablet by mouth 3 (Three) Times a Day for anxiety. 6/5/20      DULoxetine HCl 40 MG capsule delayed-release particles Take 1 capsule by mouth Daily. 5/26/20   Eliana Lee MD   hydrOXYzine pamoate (VISTARIL) 25 MG capsule Take 1 capsule by mouth 3 (Three) Times a Day. 5/26/20   Eliana Lee MD   hydrOXYzine pamoate (VISTARIL) 50 MG capsule Take 1 capsule by mouth every 4 (four) hours As Needed for Anxiety. 6/5/20      LORazepam (Ativan) 1 MG tablet Take 1 tablet by mouth Every 6 (Six) Hours As Needed for Anxiety. 5/30/20   Darci Beck APRN   methocarbamol (ROBAXIN) 500 MG tablet Take 500 mg by mouth 4 (Four) Times a Day As Needed for Muscle Spasms.    Eliana Lee MD   mirtazapine (REMERON) 30 MG tablet Take 1 tablet by mouth Daily. 5/26/20   Eliana Lee MD   mirtazapine (REMERON) 30 MG tablet Take 1 tablet by mouth once a day for Depression. 6/5/20      Multiple Vitamin (THERA) tablet tablet Take 1 tablet by mouth Daily for supplement. 6/5/20      naltrexone (DEPADE) 50 MG tablet Take 1 tablet by mouth Daily. 2/12/19   Farhan Guzman MD   nicotine (NICODERM CQ) 21 MG/24HR  patch Place 1 patch on the skin as directed by provider Daily **Remove old patch before applying new patch** 6/5/20      QUEtiapine (SEROquel) 100 MG tablet Take 1 tablet by mouth Daily for sleep. 6/5/20      QUEtiapine (SEROquel) 25 MG tablet Take one-half of a tablet (12.5 mg) by mouth 3 (Three) Times a Day. 6/5/20      QUEtiapine (SEROquel) 50 MG tablet Take 2 tablets by mouth Every Night. 2/19/20   Farhan Guzman MD   terazosin (HYTRIN) 2 MG capsule Take 1 capsule by mouth 2 (Two) Times a Day. 5/19/20   Froilan Durham Jr., MD       Review of Systems:  Review of Systems   Constitutional: Positive for appetite change.   HENT: Negative.    Eyes: Negative.    Respiratory: Negative.    Cardiovascular: Negative.    Gastrointestinal: Positive for abdominal pain.   Endocrine: Positive for cold intolerance.   Genitourinary: Negative.    Musculoskeletal: Negative.    Skin: Negative.    Allergic/Immunologic: Negative.    Hematological: Negative.    Psychiatric/Behavioral: Positive for self-injury and suicidal ideas.      Otherwise complete ROS is negative except as mentioned above.    Physical Exam:   Temp:  [98.2 °F (36.8 °C)] 98.2 °F (36.8 °C)  Heart Rate:  [100-135] 100  Resp:  [16] 16  BP: (115-134)/(74-93) 115/74  Physical Exam   Constitutional:   Chronically ill-appearing and appears much older than stated age   HENT:   Head: Normocephalic and atraumatic.   Eyes: Pupils are equal, round, and reactive to light. EOM are normal.   Neck: Normal range of motion. Neck supple.   Cardiovascular: Normal rate and regular rhythm.   Pulmonary/Chest: Effort normal and breath sounds normal.   Abdominal: Soft. Bowel sounds are normal.   Musculoskeletal: Normal range of motion.   Skin: Skin is warm and dry. Capillary refill takes less than 2 seconds.   Psychiatric: She has a normal mood and affect.   Nursing note and vitals reviewed.        Results Reviewed:  I have personally reviewed current lab, radiology, and data and  agree with results.  Lab Results (last 24 hours)     Procedure Component Value Units Date/Time    [place] Draw [[id number]] Collected:  06/30/20 2140    Specimen:  Blood Updated:  06/30/20 2245    Narrative:       The following orders were created for panel order [place] Draw.  Procedure                               Abnormality         Status                     ---------                               -----------         ------                     Light Blue Top[[id number]]                                   Final result               Green Top (Gel)[[id number]]                                  Final result               Lavender Top[[id number]]                                     Final result               Gold Top - SST[[id number]]                                   Final result                 Please view results for these tests on the individual orders.    Light Blue Top [[id number]] Collected:  06/30/20 2140    Specimen:  Blood Updated:  06/30/20 2245     Extra Tube hold for add-on     Comment: Auto resulted       Green Top (Gel) [[id number]] Collected:  06/30/20 2140    Specimen:  Blood Updated:  06/30/20 2245     Extra Tube Hold for add-ons.     Comment: Auto resulted.       Lavender Top [[id number]] Collected:  06/30/20 2140    Specimen:  Blood Updated:  06/30/20 2245     Extra Tube hold for add-on     Comment: Auto resulted       Gold Top - SST [[id number]] Collected:  06/30/20 2140    Specimen:  Blood Updated:  06/30/20 2245     Extra Tube Hold for add-ons.     Comment: Auto resulted.       Manual Differential [[id number]]  (Abnormal) Collected:  06/30/20 2140    Specimen:  Blood Updated:  06/30/20 2229     Neutrophil % 16.0 %      Lymphocyte % 73.0 %      Monocyte % 10.0 %      Eosinophil % 1.0 %      Neutrophils Absolute 0.87 10*3/mm3      Lymphocytes Absolute 3.99 10*3/mm3      Monocytes Absolute 0.55 10*3/mm3      Eosinophils Absolute 0.05 10*3/mm3      RBC Morphology Normal     WBC Morphology Normal     Platelet  Morphology Normal    Lipase [[id number]]  (Abnormal) Collected:  06/30/20 2140    Specimen:  Blood Updated:  06/30/20 2217     Lipase 79 U/L     Magnesium [[id number]]  (Normal) Collected:  06/30/20 2140    Specimen:  Blood Updated:  06/30/20 2217     Magnesium 2.1 mg/dL     CBC & Differential [[id number]] Collected:  06/30/20 2140    Specimen:  Blood Updated:  06/30/20 2209    Narrative:       The following orders were created for panel order CBC & Differential.  Procedure                               Abnormality         Status                     ---------                               -----------         ------                     CBC Auto Differential[[id number]]        Abnormal            Final result                 Please view results for these tests on the individual orders.    CBC Auto Differential [[id number]]  (Abnormal) Collected:  06/30/20 2140    Specimen:  Blood Updated:  06/30/20 2209     WBC 5.46 10*3/mm3      RBC 4.75 10*6/mm3      Hemoglobin 14.4 g/dL      Hematocrit 41.9 %      MCV 88.2 fL      MCH 30.3 pg      MCHC 34.4 g/dL      RDW 13.2 %      RDW-SD 42.5 fl      MPV 9.8 fL      Platelets 211 10*3/mm3      Neutrophil % 16.9 %      Lymphocyte % 70.5 %      Monocyte % 9.5 %      Eosinophil % 2.2 %      Basophil % 0.9 %      Immature Grans % 0.0 %      Neutrophils, Absolute 0.92 10*3/mm3      Lymphocytes, Absolute 3.85 10*3/mm3      Monocytes, Absolute 0.52 10*3/mm3      Eosinophils, Absolute 0.12 10*3/mm3      Basophils, Absolute 0.05 10*3/mm3      Immature Grans, Absolute 0.00 10*3/mm3      nRBC 0.0 /100 WBC     Comprehensive Metabolic Panel [[id number]]  (Abnormal) Collected:  06/30/20 2140    Specimen:  Blood Updated:  06/30/20 2208     Glucose 108 mg/dL      BUN 13 mg/dL      Creatinine 0.84 mg/dL      Sodium 144 mmol/L      Potassium 3.8 mmol/L      Chloride 108 mmol/L      CO2 23.0 mmol/L      Calcium 9.3 mg/dL      Total Protein 8.3 g/dL      Albumin 4.30 g/dL      ALT (SGPT) 17 U/L      AST  (SGOT) 19 U/L      Alkaline Phosphatase 133 U/L      Total Bilirubin <0.2 mg/dL      eGFR Non African Amer 70 mL/min/1.73      Globulin 4.0 gm/dL      A/G Ratio 1.1 g/dL      BUN/Creatinine Ratio 15.5     Anion Gap 13.0 mmol/L     Narrative:       GFR Normal >60  Chronic Kidney Disease <60  Kidney Failure <15      Acetaminophen Level [[id number]]  (Abnormal) Collected:  06/30/20 2140    Specimen:  Blood Updated:  06/30/20 2208     Acetaminophen <5.0 mcg/mL     Ethanol [[id number]]  (Abnormal) Collected:  06/30/20 2140    Specimen:  Blood Updated:  06/30/20 2208     Ethanol 409 mg/dL      Ethanol % 0.409 %     Salicylate Level [[id number]]  (Normal) Collected:  06/30/20 2140    Specimen:  Blood Updated:  06/30/20 2208     Salicylate <0.3 mg/dL     Urine Drug Screen - Urine, Clean Catch [[id number]]  (Abnormal) Collected:  06/30/20 2140    Specimen:  Urine, Clean Catch Updated:  06/30/20 2157     THC, Screen, Urine Negative     Phencyclidine (PCP), Urine Negative     Cocaine Screen, Urine Negative     Methamphetamine, Ur Negative     Opiate Screen Negative     Amphetamine Screen, Urine Negative     Benzodiazepine Screen, Urine Negative     Tricyclic Antidepressants Screen Positive     Methadone Screen, Urine Negative     Barbiturates Screen, Urine Negative     Oxycodone Screen, Urine Negative     Propoxyphene Screen Negative     Buprenorphine, Screen, Urine Negative    Narrative:       Cutoff For Drugs Screened:    Amphetamines               500 ng/ml  Barbiturates               200 ng/ml  Benzodiazepines            150 ng/ml  Cocaine                    150 ng/ml  Methadone                  200 ng/ml  Opiates                    100 ng/ml  Phencyclidine               25 ng/ml  THC                            50 ng/ml  Methamphetamine            500 ng/ml  Tricyclic Antidepressants  300 ng/ml  Oxycodone                  100 ng/ml  Propoxyphene               300 ng/ml  Buprenorphine               10 ng/ml    The normal value  for all drugs tested is negative. This report includes unconfirmed screening results, with the cutoff values listed, to be used for medical treatment purposes only.  Unconfirmed results must not be used for non-medical purposes such as employment or legal testing.  Clinical consideration should be applied to any drug of abuse test, particularly when unconfirmed results are used.      Urinalysis With Microscopic If Indicated (No Culture) - Urine, Clean Catch [[id number]]  (Abnormal) Collected:  06/30/20 2140    Specimen:  Urine, Clean Catch Updated:  06/30/20 2151     Color, UA Yellow     Appearance, UA Cloudy     pH, UA 7.0     Specific Gravity, UA 1.007     Glucose, UA Negative     Ketones, UA Negative     Bilirubin, UA Negative     Blood, UA Negative     Protein, UA Negative     Leuk Esterase, UA Negative     Nitrite, UA Negative     Urobilinogen, UA 1.0 E.U./dL    Narrative:       Urine microscopic not indicated.        Imaging Results (Last 24 Hours)     Procedure Component Value Units Date/Time    XR Chest 1 View [[id number]] Collected:  06/30/20 2146     Updated:  06/30/20 2205    Narrative:         CHEST 1 VIEW on 6/30/2020     CLINICAL INDICATION: EtOH intoxication    COMPARISON: 5/27/2020    FINDINGS: Emphysematous changes of the lungs are noted. The lungs  are otherwise clear. Vascular calcification is noted in the  aorta. Cardiac, hilar and mediastinal contours are within normal  limits. No bony abnormality is noted.      Impression:       No acute disease.    Electronically signed by:  El Renteria  6/30/2020 10:04 PM  CDT Workstation: [id number]            Assessment:    Active Hospital Problems    Diagnosis   • Depression with suicidal ideation     Impression    1.  Suicidal ideation  - Patient admitted and await psychiatric evaluation    2.  Alcohol intoxication  -Place on withdrawal pathway  -Multiple prior episodes of withdrawal and DTs including seizures  -IV thiamine    3.  Bipolar  disorder  -Multiple medication    CODE STATUS patient is a full code    Prophylaxis  -VT with SCDs  -GI with PPI    Discussed with the ED nursing as well as ED physician please see orders placed in observation currently      Maury Cagle MD

## 2020-07-02 LAB
ETHANOL BLD-MCNC: <10 MG/DL (ref 0–10)
ETHANOL UR QL: <0.01 %

## 2020-07-02 PROCEDURE — 25010000002 THIAMINE PER 100 MG: Performed by: INTERNAL MEDICINE

## 2020-07-02 PROCEDURE — 25010000002 LORAZEPAM PER 2 MG: Performed by: INTERNAL MEDICINE

## 2020-07-02 PROCEDURE — 80307 DRUG TEST PRSMV CHEM ANLYZR: CPT | Performed by: INTERNAL MEDICINE

## 2020-07-02 PROCEDURE — 99232 SBSQ HOSP IP/OBS MODERATE 35: CPT | Performed by: PSYCHIATRY & NEUROLOGY

## 2020-07-02 PROCEDURE — 25010000002 LORAZEPAM PER 2 MG: Performed by: STUDENT IN AN ORGANIZED HEALTH CARE EDUCATION/TRAINING PROGRAM

## 2020-07-02 RX ORDER — SODIUM CHLORIDE 9 MG/ML
75 INJECTION, SOLUTION INTRAVENOUS CONTINUOUS
Status: DISCONTINUED | OUTPATIENT
Start: 2020-07-02 | End: 2020-07-07 | Stop reason: HOSPADM

## 2020-07-02 RX ADMIN — NICOTINE 1 PATCH: 21 PATCH, EXTENDED RELEASE TRANSDERMAL at 08:05

## 2020-07-02 RX ADMIN — SODIUM CHLORIDE, PRESERVATIVE FREE 10 ML: 5 INJECTION INTRAVENOUS at 20:09

## 2020-07-02 RX ADMIN — THIAMINE HYDROCHLORIDE 100 MG: 100 INJECTION, SOLUTION INTRAMUSCULAR; INTRAVENOUS at 09:13

## 2020-07-02 RX ADMIN — SODIUM CHLORIDE, PRESERVATIVE FREE 10 ML: 5 INJECTION INTRAVENOUS at 08:04

## 2020-07-02 RX ADMIN — LORAZEPAM 4 MG: 2 INJECTION INTRAMUSCULAR; INTRAVENOUS at 16:39

## 2020-07-02 RX ADMIN — LORAZEPAM 2 MG: 2 INJECTION INTRAMUSCULAR; INTRAVENOUS at 01:20

## 2020-07-02 RX ADMIN — CLONIDINE HYDROCHLORIDE 0.2 MG: 0.2 TABLET ORAL at 08:07

## 2020-07-02 RX ADMIN — MIRTAZAPINE 30 MG: 15 TABLET, FILM COATED ORAL at 08:07

## 2020-07-02 RX ADMIN — LORAZEPAM 4 MG: 2 INJECTION INTRAMUSCULAR; INTRAVENOUS at 20:10

## 2020-07-02 RX ADMIN — LORAZEPAM 4 MG: 2 INJECTION INTRAMUSCULAR; INTRAVENOUS at 04:52

## 2020-07-02 RX ADMIN — LORAZEPAM 6 MG: 2 INJECTION, SOLUTION INTRAMUSCULAR; INTRAVENOUS at 17:23

## 2020-07-02 RX ADMIN — CLONIDINE HYDROCHLORIDE 0.2 MG: 0.2 TABLET ORAL at 20:09

## 2020-07-02 RX ADMIN — LORAZEPAM 4 MG: 2 INJECTION INTRAMUSCULAR; INTRAVENOUS at 09:13

## 2020-07-02 RX ADMIN — SODIUM CHLORIDE 100 ML/HR: 900 INJECTION, SOLUTION INTRAVENOUS at 04:16

## 2020-07-02 RX ADMIN — CLONIDINE HYDROCHLORIDE 0.2 MG: 0.2 TABLET ORAL at 15:56

## 2020-07-02 RX ADMIN — SODIUM CHLORIDE 75 ML/HR: 900 INJECTION, SOLUTION INTRAVENOUS at 15:46

## 2020-07-02 RX ADMIN — LORAZEPAM 4 MG: 2 INJECTION INTRAMUSCULAR; INTRAVENOUS at 13:07

## 2020-07-02 NOTE — PROGRESS NOTES
[facility] Medicine Services  INPATIENT PROGRESS NOTE    Length of Stay: 0  Date of Admission: 6/30/2020  Primary Care Physician: Farhan Guzman MD    Subjective   Chief Complaint: Confusion.    HPI:    Patient is seen for follow-up.  She is 55-year-old female with history of alcoholism, bipolar disorder, nicotine dependence who was admitted for alcohol withdrawal and suicidal ideation.  She is pleasantly confused and tremulous.     Review of Systems  Unable to review secondary to acuity of illness.  Objective    Temp:  [98 °F (36.7 °C)-98.4 °F (36.9 °C)] 98.3 °F (36.8 °C)  Heart Rate:  [] 96  Resp:  [16-18] 16  BP: (125-184)/(74-93) 161/88    Physical Exam   Constitutional: She appears well-developed and well-nourished. She is cooperative. No distress.   HENT:   Head: Normocephalic and atraumatic.   Right Ear: External ear normal.   Left Ear: External ear normal.   Nose: Nose normal.   Mouth/Throat: Oropharynx is clear and moist.   Eyes: Pupils are equal, round, and reactive to light. Conjunctivae are normal.   Neck: Neck supple. No JVD present. No thyromegaly present.   Cardiovascular: Normal rate, regular rhythm, normal heart sounds and intact distal pulses. Exam reveals no gallop and no friction rub.   No murmur heard.  Pulmonary/Chest: Effort normal and breath sounds normal. No stridor. No respiratory distress. She has no wheezes. She has no rales. She exhibits no tenderness.   Abdominal: Soft. Bowel sounds are normal. She exhibits no distension and no mass. There is no tenderness. There is no rebound and no guarding. No hernia.   Musculoskeletal: She exhibits no edema, tenderness or deformity.   Neurological: She is alert. She has normal reflexes. She exhibits normal muscle tone.   She is alert and oriented x2.   Skin: Skin is warm and dry. No rash noted. She is not diaphoretic. No erythema. No pallor.   Nursing note and vitals reviewed.        Medication  Review:    Current Facility-Administered Medications:   •  albuterol (PROVENTIL) nebulizer solution 0.083% 2.5 mg/3mL, 2.5 mg, Nebulization, Q4H PRN, Maury Cagle MD  •  cloNIDine (CATAPRES) tablet 0.2 mg, 0.2 mg, Oral, TID, Shawn Malave MD, 0.2 mg at 07/02/20 0807  •  hydrALAZINE (APRESOLINE) injection 10 mg, 10 mg, Intravenous, Q6H PRN, Shawn Malave MD  •  hydrOXYzine pamoate (VISTARIL) capsule 25 mg, 25 mg, Oral, 4x Daily PRN, Maury Cagle MD  •  LORazepam (ATIVAN) injection 4 mg, 4 mg, Intravenous, Q4H, Froilan Durham Jr., MD, 4 mg at 07/02/20 0913  •  LORazepam (ATIVAN) injection 6 mg, 6 mg, Intravenous, Q1H PRN, Froilan Durham Jr., MD  •  LORazepam (ATIVAN) tablet 1 mg, 1 mg, Oral, Q6H PRN, Maury Cagle MD, 1 mg at 07/01/20 1449  •  methocarbamol (ROBAXIN) tablet 500 mg, 500 mg, Oral, 4x Daily PRN, Maury Cagle MD  •  mirtazapine (REMERON) tablet 30 mg, 30 mg, Oral, Daily, Maury Cagle MD, 30 mg at 07/02/20 0807  •  nicotine (NICODERM CQ) 21 MG/24HR patch 1 patch, 1 patch, Transdermal, Daily, Maury Cagle MD, 1 patch at 07/02/20 0805  •  sodium chloride 0.9 % flush 10 mL, 10 mL, Intravenous, PRN, Dao Baker MD, 10 mL at 06/30/20 2132  •  sodium chloride 0.9 % flush 10 mL, 10 mL, Intravenous, Q12H, Maury Cagle MD, 10 mL at 07/02/20 0804  •  sodium chloride 0.9 % flush 10 mL, 10 mL, Intravenous, PRN, Maury Cagle MD  •  sodium chloride 0.9 % infusion, 100 mL/hr, Intravenous, Continuous, Shawn Malave MD, Last Rate: 100 mL/hr at 07/02/20 0805, 100 mL/hr at 07/02/20 0805  •  thiamine (B-1) 100 mg in sodium chloride 0.9 % 100 mL IVPB, 100 mg, Intravenous, Daily, Maury Cagle MD, Last Rate: 200 mL/hr at 07/02/20 0913, 100 mg at 07/02/20 0913    Results Review:  I have reviewed the labs, radiology results, and diagnostic studies.    Laboratory Data:   Results from last 7 days   Lab Units 07/01/20  0418 06/30/20  2140   SODIUM mmol/L 142  144   POTASSIUM mmol/L 3.6 3.8   CHLORIDE mmol/L 113* 108*   CO2 mmol/L 20.0* 23.0   BUN mg/dL 12 13   CREATININE mg/dL 0.66 0.84   GLUCOSE mg/dL 98 108*   CALCIUM mg/dL 7.8* 9.3   BILIRUBIN mg/dL  --  <0.2*   ALK PHOS U/L  --  133*   ALT (SGPT) U/L  --  17   AST (SGOT) U/L  --  19   ANION GAP mmol/L 9.0 13.0     Estimated Creatinine Clearance: 80.6 mL/min (by C-G formula based on SCr of 0.66 mg/dL).  Results from last 7 days   Lab Units 07/01/20  0418 06/30/20  2140   MAGNESIUM mg/dL 2.3 2.1   PHOSPHORUS mg/dL 3.8  --          Results from last 7 days   Lab Units 07/01/20  0418 06/30/20  2140   WBC 10*3/mm3 3.58 5.46   HEMOGLOBIN g/dL 11.8* 14.4   HEMATOCRIT % 33.7* 41.9   PLATELETS 10*3/mm3 141 211           Culture Data:   No results found for: BLOODCX  No results found for: URINECX  No results found for: RESPCX  No results found for: WOUNDCX  No results found for: STOOLCX  No components found for: BODYFLD    Radiology Data:   Imaging Results (Last 24 Hours)     ** No results found for the last 24 hours. **          I have reviewed the patient's current medications.     Assessment/Plan     Hospital Problem List:  Active Problems:    Depression with suicidal ideation     Depression with suicidal ideation: Continue suicide precautions/72-hour hold.  Sitter remains at the bedside.  Input by psychiatrist is appreciated.     Alcoholism with alcohol withdrawal with delirium tremens: Continue alcohol withdrawal protocol and high-dose thiamine.  Repeat serum alcohol level is undetectable.  Input by psychiatrist is appreciated.     Nicotine dependence: Continue nicotine patch.  Nicotine cessation counseling has been discussed with the patient.     Continue GI and DVT prophylaxis.     Transfer out of CCU/stepdown.    Discharge Planning: In progress.    Shawn Malave MD   07/02/20   10:17

## 2020-07-02 NOTE — PROGRESS NOTES
7/2/2020    Chief Complaint: suicidal ideation    Subjective:  Patient is a 55 y.o. female who was seen for suicidal ideation and substance abuse.     Patient is oriented and cognitively intact today.  She has mild nystagmus.  She is agreeable to psych admission after detox this time.  She has had two prior detox admissions that she declined psych admission.  She is having continued heavy drinking, severe, depression, grief and suicidal thinking.  She notes after last detox she was supposed to go to [facility] but did not b/c they would not accept her psych meds.  She notes at some point between the last detox and this detox, she attempted an overdose on seroquel but states she spit out the pills.    Total Daily Ativan Dose:  7/1/20: 13mg  7/2/20: 10mg by 11:15 am     Objective     Vital Signs    Vitals:    07/01/20 2300 07/02/20 0000 07/02/20 0415 07/02/20 0802   BP: 151/81 149/78 160/77 161/88   BP Location: Left arm Left arm Left arm Left arm   Patient Position: Lying Lying Lying Lying   Pulse: 81 81 75 96   Resp:  16 18 16   Temp:    98.3 °F (36.8 °C)   TempSrc:    Temporal   SpO2:    95%   Weight:       Height:           Physical Exam:   General Appearance: alert, appears stated age and cooperative,  Hygiene:   fair  Gait & Station: deferred, in bed  Musculoskeletal: minimal tremor   Ophthalmic: mild nystagmus    Mental Status Exam:   Cooperation:  Cooperative  Eye Contact:  Good  Psychomotor Behavior:  Appropriate  Mood: Sad/Depressed  Affect:  mood-congruent  Speech:  Normal  Thought Process:  Coherent and Poverty of thought  Associations: Goal Directed  Thought Content:     Mood congruent   Suicidal:  Suicidal Ideation   Homicidal:  None   Hallucinations:  None   Delusion:  None  Cognitive Functioning:   Consciousness: awake, alert and oriented  Reliability:  fair  Insight:  Fair  Judgement:  Impaired  Impulse Control:  Impaired    Lab Results:  Lab Results (last 24 hours)     Procedure Component Value  Units Date/Time    Ethanol [[id number]] Collected:  07/02/20 0948    Specimen:  Blood Updated:  07/02/20 1011     Ethanol <10 mg/dL      Ethanol % <0.010 %     Ethanol [[id number]]  (Abnormal) Collected:  07/01/20 1030    Specimen:  Blood Updated:  07/01/20 1103     Ethanol 90 mg/dL      Ethanol % 0.090 %           Radiology Results:  Imaging Results (Last 24 Hours)     ** No results found for the last 24 hours. **          Medicine:   Current Facility-Administered Medications:   •  albuterol (PROVENTIL) nebulizer solution 0.083% 2.5 mg/3mL, 2.5 mg, Nebulization, Q4H PRN, Shawn Malave MD  •  cloNIDine (CATAPRES) tablet 0.2 mg, 0.2 mg, Oral, TID, Shawn Malave MD, 0.2 mg at 07/02/20 0807  •  hydrALAZINE (APRESOLINE) injection 10 mg, 10 mg, Intravenous, Q6H PRN, Shawn Malave MD  •  hydrOXYzine pamoate (VISTARIL) capsule 25 mg, 25 mg, Oral, 4x Daily PRN, Shawn Malave MD  •  LORazepam (ATIVAN) injection 4 mg, 4 mg, Intravenous, Q4H, Shawn Malave MD, 4 mg at 07/02/20 0913  •  LORazepam (ATIVAN) injection 6 mg, 6 mg, Intravenous, Q1H PRN, Shawn Malave MD  •  LORazepam (ATIVAN) tablet 1 mg, 1 mg, Oral, Q6H PRN, Shawn Malave MD, 1 mg at 07/01/20 1449  •  methocarbamol (ROBAXIN) tablet 500 mg, 500 mg, Oral, 4x Daily PRN, Shawn Malave MD  •  mirtazapine (REMERON) tablet 30 mg, 30 mg, Oral, Daily, Shawn Malave MD, 30 mg at 07/02/20 0807  •  nicotine (NICODERM CQ) 21 MG/24HR patch 1 patch, 1 patch, Transdermal, Daily, Shawn Malave MD, 1 patch at 07/02/20 0805  •  sodium chloride 0.9 % flush 10 mL, 10 mL, Intravenous, PRN, Shawn Malave MD, 10 mL at 06/30/20 2132  •  sodium chloride 0.9 % flush 10 mL, 10 mL, Intravenous, Q12H, Shawn Malave MD, 10 mL at 07/02/20 0804  •  sodium chloride 0.9 % flush 10 mL, 10 mL, Intravenous, PRN, Shawn Malave MD  •  sodium chloride 0.9 % infusion, 75 mL/hr, Intravenous, Continuous, Shawn Malave MD  •   thiamine (B-1) 100 mg in sodium chloride 0.9 % 100 mL IVPB, 100 mg, Intravenous, Daily, Shawn Malave MD, Last Rate: 200 mL/hr at 07/02/20 0913, 100 mg at 07/02/20 0913    Diagnoses/Assessment:     Alcohol withdrawal syndrome with complication (CMS/HCC)    Alcohol use disorder, severe, dependence (CMS/HCC)    Depression with suicidal ideation    Treatment Plan:    Recommend continuing the ativan 4mg every 4hrs by IV with 6mg IV prn based on vitals: SBP > 165, DBP > 105, HR > 105.    Will continue to hold psych meds other than remeron 30mg qhs.  Will plan to admit to psych one medical detox is over.    Thank you for this consult.  Please contact me with any further questions or concerns.     Harrison Quach MD  07/02/20 at 11:02

## 2020-07-02 NOTE — PLAN OF CARE
Problem: Patient Care Overview  Goal: Plan of Care Review  Outcome: Ongoing (interventions implemented as appropriate)  Flowsheets (Taken 7/2/2020 1603)  Progress: improving  Plan of Care Reviewed With: patient  Outcome Summary: alcohol level back to normal, scheduled ativan, med/surg bed still pending

## 2020-07-02 NOTE — DISCHARGE PLACEMENT REQUEST
"Aspen Benoit (55 y.o. Female)     Date of Birth Social Security Number Address Home Phone MRN    1965  [address] [facility] [address] [phone] [id number]    Latter-day Marital Status          Sikh Legally        Admission Date Admission Type Admitting Provider Attending Provider Department, Room/Bed    6/30/20 Emergency Maury Cagle MD Haigler, Stuart S, MD [facility] CRITICAL CARE STEPDOWN, 08/A    Discharge Date Discharge Disposition Discharge Destination                       Attending Provider:  Maury Cagle MD    Allergies:  Wasp Venom    Isolation:  None   Infection:  None   Code Status:  CPR    Ht:  157.5 cm (62\")   Wt:  53 kg (116 lb 12.8 oz)    Admission Cmt:  None   Principal Problem:  Alcohol withdrawal syndrome with complication (CMS/HCC) [F10.239]                 Active Insurance as of 6/30/2020     Primary Coverage     Payor Plan Insurance Group Employer/Plan Group    [employer] [id number]     Payor Plan Address Payor Plan Phone Number Payor Plan Fax Number Effective Dates    [address] [phone]  10/1/2015 - None Entered    [facility] [id number]       Subscriber Name Subscriber Birth Date Member ID       ASPEN BENOIT 1965 [id number]                 Emergency Contacts      (Rel.) Home Phone Work Phone Mobile Phone    Kendy Julien (Relative) [phone] -- [phone]               History & Physical      Maury Cagle MD at 06/30/20 [address] [place] [address] Medicine Admission      Date of Admission: 6/30/2020      Primary Care Physician: Farhan Guzman MD      Chief Complaint: Having thoughts to kill myself    HPI: Briefly this is a 55-year-old female who is well-known to the ED as well as the hospital service with patient presented the ED today carrying the following problem list    1.  Chronic alcohol abuse  2.  Bipolar " disorder  3.  Past history of DTs including alcohol withdrawal seizures  4.  History of Warnicke's encephalopathy    Patient presents with intoxication blood call level in the 400s although having suicidal ideations.  She was seen and evaluated by the ED has currently a sitter and we are asked to admit secondary to her previous episodes of alcohol withdrawal and alcohol withdrawal seizures.  Patient currently with sitter and awaiting psych evaluation.    Concurrent Medical History:  has a past medical history of ADHD (attention deficit hyperactivity disorder), Alcohol abuse, Bilateral carpal tunnel syndrome, Bipolar 1 disorder (CMS/HCC), Candida vaginitis (5/5/2020), Carpal tunnel syndrome on both sides (2015), Chronic pain disorder, Collapsed lung (1986), Depression, Hyperkalemia (5/13/2020), Irritable bowel syndrome, Leukopenia (4/23/2018), Spontaneous pneumothorax, Suicidal ideation (1/5/2019), Thrombocytopenia (CMS/HCC) (5/6/2020), and Wernicke's encephalopathy (4/23/2018).    Past Surgical History:  has a past surgical history that includes Hysterectomy; Colonoscopy; Total abdominal hysterectomy w/ bilateral salpingoophorectomy; and Chest tube insertion.    Family History: family history includes Alcohol abuse in her father, paternal grandfather, and paternal grandmother; Anxiety disorder in her maternal aunt, mother, and sister; Bipolar disorder in her sister; COPD in her mother; Depression in her father and mother; Diabetes in her father.     Social History:  reports that she has been smoking. She started smoking about 43 years ago. She has been smoking about 1.00 pack per day. She has never used smokeless tobacco. She reports that she drinks about 70.0 standard drinks of alcohol per week. She reports that she has current or past drug history. Drugs: Cocaine, Methamphetamines, and Marijuana.    Allergies:   Allergies   Allergen Reactions   • Wasp Venom Swelling       Medications:   Prior to Admission  medications    Medication Sig Start Date End Date Taking? Authorizing Provider   albuterol sulfate  (90 Base) MCG/ACT inhaler Inhale 2 puffs Every 4 (Four) Hours As Needed for Wheezing. 5/27/20   Farhan Guzman MD   buPROPion XL (WELLBUTRIN XL) 150 MG 24 hr tablet Take 1 tablet by mouth Daily for depression 6/5/20      cloNIDine (CATAPRES) 0.1 MG tablet Take 1 tablet by mouth 3 (Three) Times a Day for anxiety. 6/5/20      DULoxetine HCl 40 MG capsule delayed-release particles Take 1 capsule by mouth Daily. 5/26/20   Eliana Lee MD   hydrOXYzine pamoate (VISTARIL) 25 MG capsule Take 1 capsule by mouth 3 (Three) Times a Day. 5/26/20   Eliana Lee MD   hydrOXYzine pamoate (VISTARIL) 50 MG capsule Take 1 capsule by mouth every 4 (four) hours As Needed for Anxiety. 6/5/20      LORazepam (Ativan) 1 MG tablet Take 1 tablet by mouth Every 6 (Six) Hours As Needed for Anxiety. 5/30/20   Darci Beck APRN   methocarbamol (ROBAXIN) 500 MG tablet Take 500 mg by mouth 4 (Four) Times a Day As Needed for Muscle Spasms.    Eliana Lee MD   mirtazapine (REMERON) 30 MG tablet Take 1 tablet by mouth Daily. 5/26/20   Eliana Lee MD   mirtazapine (REMERON) 30 MG tablet Take 1 tablet by mouth once a day for Depression. 6/5/20      Multiple Vitamin (THERA) tablet tablet Take 1 tablet by mouth Daily for supplement. 6/5/20      naltrexone (DEPADE) 50 MG tablet Take 1 tablet by mouth Daily. 2/12/19   Farhan Guzman MD   nicotine (NICODERM CQ) 21 MG/24HR patch Place 1 patch on the skin as directed by provider Daily **Remove old patch before applying new patch** 6/5/20      QUEtiapine (SEROquel) 100 MG tablet Take 1 tablet by mouth Daily for sleep. 6/5/20      QUEtiapine (SEROquel) 25 MG tablet Take one-half of a tablet (12.5 mg) by mouth 3 (Three) Times a Day. 6/5/20      QUEtiapine (SEROquel) 50 MG tablet Take 2 tablets by mouth Every Night. 2/19/20   Farhan Guzman,  MD   terazosin (HYTRIN) 2 MG capsule Take 1 capsule by mouth 2 (Two) Times a Day. 5/19/20   Froilan Durham Jr., MD       Review of Systems:  Review of Systems   Constitutional: Positive for appetite change.   HENT: Negative.    Eyes: Negative.    Respiratory: Negative.    Cardiovascular: Negative.    Gastrointestinal: Positive for abdominal pain.   Endocrine: Positive for cold intolerance.   Genitourinary: Negative.    Musculoskeletal: Negative.    Skin: Negative.    Allergic/Immunologic: Negative.    Hematological: Negative.    Psychiatric/Behavioral: Positive for self-injury and suicidal ideas.      Otherwise complete ROS is negative except as mentioned above.    Physical Exam:   Temp:  [98.2 °F (36.8 °C)] 98.2 °F (36.8 °C)  Heart Rate:  [100-135] 100  Resp:  [16] 16  BP: (115-134)/(74-93) 115/74  Physical Exam   Constitutional:   Chronically ill-appearing and appears much older than stated age   HENT:   Head: Normocephalic and atraumatic.   Eyes: Pupils are equal, round, and reactive to light. EOM are normal.   Neck: Normal range of motion. Neck supple.   Cardiovascular: Normal rate and regular rhythm.   Pulmonary/Chest: Effort normal and breath sounds normal.   Abdominal: Soft. Bowel sounds are normal.   Musculoskeletal: Normal range of motion.   Skin: Skin is warm and dry. Capillary refill takes less than 2 seconds.   Psychiatric: She has a normal mood and affect.   Nursing note and vitals reviewed.        Results Reviewed:  I have personally reviewed current lab, radiology, and data and agree with results.  Lab Results (last 24 hours)     Procedure Component Value Units Date/Time    [place] Draw [[id number]] Collected:  06/30/20 2140    Specimen:  Blood Updated:  06/30/20 2245    Narrative:       The following orders were created for panel order [place] Draw.  Procedure                               Abnormality         Status                     ---------                               -----------         ------                      Light Blue Top[[id number]]                                   Final result               Green Top (Gel)[[id number]]                                  Final result               Lavender Top[[id number]]                                     Final result               Gold Top - SST[[id number]]                                   Final result                 Please view results for these tests on the individual orders.    Light Blue Top [[id number]] Collected:  06/30/20 2140    Specimen:  Blood Updated:  06/30/20 2245     Extra Tube hold for add-on     Comment: Auto resulted       Green Top (Gel) [[id number]] Collected:  06/30/20 2140    Specimen:  Blood Updated:  06/30/20 2245     Extra Tube Hold for add-ons.     Comment: Auto resulted.       Lavender Top [[id number]] Collected:  06/30/20 2140    Specimen:  Blood Updated:  06/30/20 2245     Extra Tube hold for add-on     Comment: Auto resulted       Gold Top - SST [[id number]] Collected:  06/30/20 2140    Specimen:  Blood Updated:  06/30/20 2245     Extra Tube Hold for add-ons.     Comment: Auto resulted.       Manual Differential [[id number]]  (Abnormal) Collected:  06/30/20 2140    Specimen:  Blood Updated:  06/30/20 2229     Neutrophil % 16.0 %      Lymphocyte % 73.0 %      Monocyte % 10.0 %      Eosinophil % 1.0 %      Neutrophils Absolute 0.87 10*3/mm3      Lymphocytes Absolute 3.99 10*3/mm3      Monocytes Absolute 0.55 10*3/mm3      Eosinophils Absolute 0.05 10*3/mm3      RBC Morphology Normal     WBC Morphology Normal     Platelet Morphology Normal    Lipase [[id number]]  (Abnormal) Collected:  06/30/20 2140    Specimen:  Blood Updated:  06/30/20 2217     Lipase 79 U/L     Magnesium [[id number]]  (Normal) Collected:  06/30/20 2140    Specimen:  Blood Updated:  06/30/20 2217     Magnesium 2.1 mg/dL     CBC & Differential [[id number]] Collected:  06/30/20 2140    Specimen:  Blood Updated:  06/30/20 2209    Narrative:       The following orders were created  for panel order CBC & Differential.  Procedure                               Abnormality         Status                     ---------                               -----------         ------                     CBC Auto Differential[[id number]]        Abnormal            Final result                 Please view results for these tests on the individual orders.    CBC Auto Differential [[id number]]  (Abnormal) Collected:  06/30/20 2140    Specimen:  Blood Updated:  06/30/20 2209     WBC 5.46 10*3/mm3      RBC 4.75 10*6/mm3      Hemoglobin 14.4 g/dL      Hematocrit 41.9 %      MCV 88.2 fL      MCH 30.3 pg      MCHC 34.4 g/dL      RDW 13.2 %      RDW-SD 42.5 fl      MPV 9.8 fL      Platelets 211 10*3/mm3      Neutrophil % 16.9 %      Lymphocyte % 70.5 %      Monocyte % 9.5 %      Eosinophil % 2.2 %      Basophil % 0.9 %      Immature Grans % 0.0 %      Neutrophils, Absolute 0.92 10*3/mm3      Lymphocytes, Absolute 3.85 10*3/mm3      Monocytes, Absolute 0.52 10*3/mm3      Eosinophils, Absolute 0.12 10*3/mm3      Basophils, Absolute 0.05 10*3/mm3      Immature Grans, Absolute 0.00 10*3/mm3      nRBC 0.0 /100 WBC     Comprehensive Metabolic Panel [[id number]]  (Abnormal) Collected:  06/30/20 2140    Specimen:  Blood Updated:  06/30/20 2208     Glucose 108 mg/dL      BUN 13 mg/dL      Creatinine 0.84 mg/dL      Sodium 144 mmol/L      Potassium 3.8 mmol/L      Chloride 108 mmol/L      CO2 23.0 mmol/L      Calcium 9.3 mg/dL      Total Protein 8.3 g/dL      Albumin 4.30 g/dL      ALT (SGPT) 17 U/L      AST (SGOT) 19 U/L      Alkaline Phosphatase 133 U/L      Total Bilirubin <0.2 mg/dL      eGFR Non African Amer 70 mL/min/1.73      Globulin 4.0 gm/dL      A/G Ratio 1.1 g/dL      BUN/Creatinine Ratio 15.5     Anion Gap 13.0 mmol/L     Narrative:       GFR Normal >60  Chronic Kidney Disease <60  Kidney Failure <15      Acetaminophen Level [[id number]]  (Abnormal) Collected:  06/30/20 2140    Specimen:  Blood Updated:  06/30/20 2208       Acetaminophen <5.0 mcg/mL     Ethanol [[id number]]  (Abnormal) Collected:  06/30/20 2140    Specimen:  Blood Updated:  06/30/20 2208     Ethanol 409 mg/dL      Ethanol % 0.409 %     Salicylate Level [[id number]]  (Normal) Collected:  06/30/20 2140    Specimen:  Blood Updated:  06/30/20 2208     Salicylate <0.3 mg/dL     Urine Drug Screen - Urine, Clean Catch [[id number]]  (Abnormal) Collected:  06/30/20 2140    Specimen:  Urine, Clean Catch Updated:  06/30/20 2157     THC, Screen, Urine Negative     Phencyclidine (PCP), Urine Negative     Cocaine Screen, Urine Negative     Methamphetamine, Ur Negative     Opiate Screen Negative     Amphetamine Screen, Urine Negative     Benzodiazepine Screen, Urine Negative     Tricyclic Antidepressants Screen Positive     Methadone Screen, Urine Negative     Barbiturates Screen, Urine Negative     Oxycodone Screen, Urine Negative     Propoxyphene Screen Negative     Buprenorphine, Screen, Urine Negative    Narrative:       Cutoff For Drugs Screened:    Amphetamines               500 ng/ml  Barbiturates               200 ng/ml  Benzodiazepines            150 ng/ml  Cocaine                    150 ng/ml  Methadone                  200 ng/ml  Opiates                    100 ng/ml  Phencyclidine               25 ng/ml  THC                            50 ng/ml  Methamphetamine            500 ng/ml  Tricyclic Antidepressants  300 ng/ml  Oxycodone                  100 ng/ml  Propoxyphene               300 ng/ml  Buprenorphine               10 ng/ml    The normal value for all drugs tested is negative. This report includes unconfirmed screening results, with the cutoff values listed, to be used for medical treatment purposes only.  Unconfirmed results must not be used for non-medical purposes such as employment or legal testing.  Clinical consideration should be applied to any drug of abuse test, particularly when unconfirmed results are used.      Urinalysis With Microscopic If Indicated  (No Culture) - Urine, Clean Catch [[id number]]  (Abnormal) Collected:  06/30/20 2140    Specimen:  Urine, Clean Catch Updated:  06/30/20 2151     Color, UA Yellow     Appearance, UA Cloudy     pH, UA 7.0     Specific Gravity, UA 1.007     Glucose, UA Negative     Ketones, UA Negative     Bilirubin, UA Negative     Blood, UA Negative     Protein, UA Negative     Leuk Esterase, UA Negative     Nitrite, UA Negative     Urobilinogen, UA 1.0 E.U./dL    Narrative:       Urine microscopic not indicated.        Imaging Results (Last 24 Hours)     Procedure Component Value Units Date/Time    XR Chest 1 View [[id number]] Collected:  06/30/20 2146     Updated:  06/30/20 2205    Narrative:         CHEST 1 VIEW on 6/30/2020     CLINICAL INDICATION: EtOH intoxication    COMPARISON: 5/27/2020    FINDINGS: Emphysematous changes of the lungs are noted. The lungs  are otherwise clear. Vascular calcification is noted in the  aorta. Cardiac, hilar and mediastinal contours are within normal  limits. No bony abnormality is noted.      Impression:       No acute disease.    Electronically signed by:  El Renteria  6/30/2020 10:04 PM  CDT Workstation: [id number]            Assessment:    Active Hospital Problems    Diagnosis   • Depression with suicidal ideation     Impression    1.  Suicidal ideation  - Patient admitted and await psychiatric evaluation    2.  Alcohol intoxication  -Place on withdrawal pathway  -Multiple prior episodes of withdrawal and DTs including seizures  -IV thiamine    3.  Bipolar disorder  -Multiple medication    CODE STATUS patient is a full code    Prophylaxis  -VT with SCDs  -GI with PPI    Discussed with the ED nursing as well as ED physician please see orders placed in observation currently      Maury Cagle MD                Electronically signed by Maury Cagle MD at 06/30/20 0741

## 2020-07-02 NOTE — PLAN OF CARE
Remains confused ,talks to self, cooperative with redirection. Tremors improving   Vital signs remain stable. Gait is unsteady requires assistance.

## 2020-07-02 NOTE — NURSING NOTE
Pt is requesting to leave/sign out. Explained to pt that she was a 1:1 pt and was not allowed to leave. Spoke with Dr. Qucah about pt demands to leave, orders placed for pt to be on 72 hour hold. Informed pt of hold orders and inability to leave.

## 2020-07-03 PROCEDURE — 25010000002 THIAMINE PER 100 MG: Performed by: PSYCHIATRY & NEUROLOGY

## 2020-07-03 PROCEDURE — 25010000002 LORAZEPAM PER 2 MG: Performed by: INTERNAL MEDICINE

## 2020-07-03 PROCEDURE — 25010000002 THIAMINE PER 100 MG: Performed by: INTERNAL MEDICINE

## 2020-07-03 PROCEDURE — 99232 SBSQ HOSP IP/OBS MODERATE 35: CPT | Performed by: PSYCHIATRY & NEUROLOGY

## 2020-07-03 RX ADMIN — THIAMINE HYDROCHLORIDE 500 MG: 100 INJECTION, SOLUTION INTRAMUSCULAR; INTRAVENOUS at 15:42

## 2020-07-03 RX ADMIN — THIAMINE HYDROCHLORIDE 100 MG: 100 INJECTION, SOLUTION INTRAMUSCULAR; INTRAVENOUS at 08:23

## 2020-07-03 RX ADMIN — MIRTAZAPINE 30 MG: 15 TABLET, FILM COATED ORAL at 08:53

## 2020-07-03 RX ADMIN — NICOTINE 1 PATCH: 21 PATCH, EXTENDED RELEASE TRANSDERMAL at 08:52

## 2020-07-03 RX ADMIN — SODIUM CHLORIDE, PRESERVATIVE FREE 10 ML: 5 INJECTION INTRAVENOUS at 09:36

## 2020-07-03 RX ADMIN — CLONIDINE HYDROCHLORIDE 0.2 MG: 0.2 TABLET ORAL at 21:35

## 2020-07-03 RX ADMIN — LORAZEPAM 4 MG: 2 INJECTION INTRAMUSCULAR; INTRAVENOUS at 08:50

## 2020-07-03 RX ADMIN — LORAZEPAM 4 MG: 2 INJECTION INTRAMUSCULAR; INTRAVENOUS at 05:26

## 2020-07-03 RX ADMIN — LORAZEPAM 4 MG: 2 INJECTION INTRAMUSCULAR; INTRAVENOUS at 00:12

## 2020-07-03 RX ADMIN — LORAZEPAM 4 MG: 2 INJECTION INTRAMUSCULAR; INTRAVENOUS at 18:20

## 2020-07-03 RX ADMIN — THIAMINE HYDROCHLORIDE 500 MG: 100 INJECTION, SOLUTION INTRAMUSCULAR; INTRAVENOUS at 21:35

## 2020-07-03 RX ADMIN — CLONIDINE HYDROCHLORIDE 0.2 MG: 0.2 TABLET ORAL at 15:42

## 2020-07-03 RX ADMIN — SODIUM CHLORIDE 75 ML/HR: 900 INJECTION, SOLUTION INTRAVENOUS at 05:26

## 2020-07-03 RX ADMIN — LORAZEPAM 6 MG: 2 INJECTION, SOLUTION INTRAMUSCULAR; INTRAVENOUS at 01:36

## 2020-07-03 RX ADMIN — CLONIDINE HYDROCHLORIDE 0.2 MG: 0.2 TABLET ORAL at 08:48

## 2020-07-03 RX ADMIN — LORAZEPAM 4 MG: 2 INJECTION INTRAMUSCULAR; INTRAVENOUS at 21:35

## 2020-07-03 RX ADMIN — LORAZEPAM 6 MG: 2 INJECTION, SOLUTION INTRAMUSCULAR; INTRAVENOUS at 13:12

## 2020-07-03 RX ADMIN — SODIUM CHLORIDE 75 ML/HR: 900 INJECTION, SOLUTION INTRAVENOUS at 21:35

## 2020-07-03 RX ADMIN — NICOTINE 1 PATCH: 21 PATCH, EXTENDED RELEASE TRANSDERMAL at 09:37

## 2020-07-03 NOTE — PROGRESS NOTES
[facility] Medicine Services  INPATIENT PROGRESS NOTE    Length of Stay: 1  Date of Admission: 6/30/2020  Primary Care Physician: Farhan Guzman MD    Subjective   Chief Complaint: No new complaints.    HPI: Patient is seen for follow-up.  She is 55-year-old female with history of alcoholism, bipolar disorder, nicotine dependence who was admitted for alcohol withdrawal and suicidal ideation.  She is doing better, tolerating prescribed diet and voices no new complaints.  She is no longer confused and mental status is back to baseline.    Review of Systems   Constitutional: Positive for activity change and fatigue. Negative for appetite change, chills, diaphoresis and fever.   HENT: Negative for trouble swallowing and voice change.    Eyes: Negative for photophobia and visual disturbance.   Respiratory: Negative for cough, choking, chest tightness, shortness of breath, wheezing and stridor.    Cardiovascular: Negative for chest pain, palpitations and leg swelling.   Gastrointestinal: Negative for abdominal distention, abdominal pain, blood in stool, constipation, diarrhea, nausea and vomiting.   Endocrine: Negative for cold intolerance, heat intolerance, polydipsia, polyphagia and polyuria.   Genitourinary: Negative for decreased urine volume, difficulty urinating, dysuria, enuresis, flank pain, frequency, hematuria and urgency.   Musculoskeletal: Negative for arthralgias, gait problem, myalgias, neck pain and neck stiffness.   Skin: Negative for pallor, rash and wound.   Neurological: Negative for dizziness, tremors, seizures, syncope, facial asymmetry, speech difficulty, weakness, light-headedness, numbness and headaches.   Hematological: Does not bruise/bleed easily.   Psychiatric/Behavioral: Negative for agitation, behavioral problems, confusion and suicidal ideas.       Objective    Temp:  [97.9 °F (36.6 °C)-98.2 °F (36.8 °C)] 98 °F (36.7 °C)  Heart Rate:  []  95  Resp:  [16-18] 16  BP: (126-172)/(73-91) 134/78    Physical Exam   Constitutional: She is oriented to person, place, and time. She appears well-developed and well-nourished. She is cooperative. No distress.   HENT:   Head: Normocephalic and atraumatic.   Right Ear: External ear normal.   Left Ear: External ear normal.   Nose: Nose normal.   Mouth/Throat: Oropharynx is clear and moist.   Eyes: Pupils are equal, round, and reactive to light. Conjunctivae and EOM are normal.   Neck: Normal range of motion. Neck supple. No JVD present. No thyromegaly present.   Cardiovascular: Normal rate, regular rhythm, normal heart sounds and intact distal pulses. Exam reveals no gallop and no friction rub.   No murmur heard.  Pulmonary/Chest: Effort normal and breath sounds normal. No stridor. No respiratory distress. She has no wheezes. She has no rales. She exhibits no tenderness.   Abdominal: Soft. Bowel sounds are normal. She exhibits no distension and no mass. There is no tenderness. There is no rebound and no guarding. No hernia.   Musculoskeletal: Normal range of motion. She exhibits no edema, tenderness or deformity.   Neurological: She is alert and oriented to person, place, and time. She has normal reflexes. She displays normal reflexes. No cranial nerve deficit or sensory deficit. She exhibits normal muscle tone. Coordination normal.   Skin: Skin is warm and dry. No rash noted. She is not diaphoretic. No erythema. No pallor.   Psychiatric: She has a normal mood and affect. Her behavior is normal. Judgment and thought content normal.   Nursing note and vitals reviewed.        Medication Review:    Current Facility-Administered Medications:   •  albuterol (PROVENTIL) nebulizer solution 0.083% 2.5 mg/3mL, 2.5 mg, Nebulization, Q4H PRN, Shawn Malave MD  •  cloNIDine (CATAPRES) tablet 0.2 mg, 0.2 mg, Oral, TID, Shawn Malave MD, 0.2 mg at 07/03/20 0848  •  hydrALAZINE (APRESOLINE) injection 10 mg, 10 mg,  Intravenous, Q6H PRN, Shawn Malave MD  •  hydrOXYzine pamoate (VISTARIL) capsule 25 mg, 25 mg, Oral, 4x Daily PRN, Shawn Malave MD  •  LORazepam (ATIVAN) injection 4 mg, 4 mg, Intravenous, Q4H, Shawn Malave MD, 4 mg at 07/03/20 0850  •  LORazepam (ATIVAN) injection 6 mg, 6 mg, Intravenous, Q1H PRN, Shawn Malave MD, 6 mg at 07/03/20 0136  •  LORazepam (ATIVAN) tablet 1 mg, 1 mg, Oral, Q6H PRN, Shawn Malave MD, 1 mg at 07/01/20 1449  •  methocarbamol (ROBAXIN) tablet 500 mg, 500 mg, Oral, 4x Daily PRN, Shawn Malave MD  •  mirtazapine (REMERON) tablet 30 mg, 30 mg, Oral, Daily, Shawn Malave MD, 30 mg at 07/02/20 0807  •  nicotine (NICODERM CQ) 21 MG/24HR patch 1 patch, 1 patch, Transdermal, Daily, Shawn Malave MD, 1 patch at 07/02/20 0805  •  sodium chloride 0.9 % flush 10 mL, 10 mL, Intravenous, PRN, Shawn Malave MD, 10 mL at 06/30/20 2132  •  sodium chloride 0.9 % flush 10 mL, 10 mL, Intravenous, Q12H, Shawn Malave MD, 10 mL at 07/02/20 2009  •  sodium chloride 0.9 % flush 10 mL, 10 mL, Intravenous, PRN, Shawn Malave MD  •  sodium chloride 0.9 % infusion, 75 mL/hr, Intravenous, Continuous, Shawn Malave MD, Last Rate: 75 mL/hr at 07/03/20 0526, 75 mL/hr at 07/03/20 0526  •  thiamine (B-1) 100 mg in sodium chloride 0.9 % 100 mL IVPB, 100 mg, Intravenous, Daily, Shawn Malave MD, Last Rate: 200 mL/hr at 07/03/20 0823, 100 mg at 07/03/20 0823    Results Review:  I have reviewed the labs, radiology results, and diagnostic studies.    Laboratory Data:   Results from last 7 days   Lab Units 07/01/20  0418 06/30/20  2140   SODIUM mmol/L 142 144   POTASSIUM mmol/L 3.6 3.8   CHLORIDE mmol/L 113* 108*   CO2 mmol/L 20.0* 23.0   BUN mg/dL 12 13   CREATININE mg/dL 0.66 0.84   GLUCOSE mg/dL 98 108*   CALCIUM mg/dL 7.8* 9.3   BILIRUBIN mg/dL  --  <0.2*   ALK PHOS U/L  --  133*   ALT (SGPT) U/L  --  17   AST (SGOT) U/L  --  19   ANION GAP mmol/L  9.0 13.0     Estimated Creatinine Clearance: 80.6 mL/min (by C-G formula based on SCr of 0.66 mg/dL).  Results from last 7 days   Lab Units 07/01/20  0418 06/30/20  2140   MAGNESIUM mg/dL 2.3 2.1   PHOSPHORUS mg/dL 3.8  --          Results from last 7 days   Lab Units 07/01/20  0418 06/30/20  2140   WBC 10*3/mm3 3.58 5.46   HEMOGLOBIN g/dL 11.8* 14.4   HEMATOCRIT % 33.7* 41.9   PLATELETS 10*3/mm3 141 211           Culture Data:   No results found for: BLOODCX  No results found for: URINECX  No results found for: RESPCX  No results found for: WOUNDCX  No results found for: STOOLCX  No components found for: BODYFLD    Radiology Data:   Imaging Results (Last 24 Hours)     ** No results found for the last 24 hours. **          I have reviewed the patient's current medications.     Assessment/Plan     Hospital Problem List:  Principal Problem:    Alcohol withdrawal syndrome with complication (CMS/HCC)  Active Problems:    Alcohol use disorder, severe, dependence (CMS/HCC)    Depression with suicidal ideation  Continue suicide precautions/72-hour hold.  Psychiatrist is following.  Ultimate plan is to have patient admitted to [facility] psych unit.    Alcoholism with alcohol withdrawal with delirium tremens: Patient is less confused and her mental status is back to baseline.  Continue alcohol withdrawal protocol and high-dose thiamine.  Repeat serum alcohol level is undetectable.    Nicotine dependence: Continue nicotine patch.  Nicotine cessation counseling has been discussed with the patient.    Continue GI and DVT prophylaxis.      Discharge Planning: In progress.    Shawn Malave MD   07/03/20   09:32

## 2020-07-03 NOTE — PLAN OF CARE
Problem: Patient Care Overview  Goal: Plan of Care Review  Outcome: Ongoing (interventions implemented as appropriate)  Flowsheets  Taken 7/3/2020 0532  Progress: improving  Outcome Summary: Pt hypertensive this shift, one extra dose of ativan given for blood pressure.  CIWA score ranging between 6 and 9 this shift. Pt confused at times, becomes confused about date, location, reorients easily.  Taken 7/3/2020 0000  Plan of Care Reviewed With: patient

## 2020-07-03 NOTE — PROGRESS NOTES
7/3/2020    Chief Complaint: suicidal ideation    Subjective:  Patient is a 55 y.o. female who was seen for suicidal ideation and substance abuse.     Patient is oriented and somewhat cognitively intact today.  She has nystagmus.  She notes AVH this morning.    Total Daily Ativan Dose:  7/1/20: 13mg  7/2/20: 28mg  7/3/20: 18mg by 12:42    Objective     Vital Signs    Vitals:    07/03/20 0800 07/03/20 0848 07/03/20 0900 07/03/20 1000   BP: 134/78 134/78 138/72 135/90   BP Location: Left arm  Left arm Left arm   Patient Position: Lying  Lying Lying   Pulse:  95     Resp: 16  16 16   Temp:       TempSrc:       SpO2: 97%      Weight:       Height:           Physical Exam:   General Appearance: alert, appears stated age and cooperative,  Hygiene:   fair  Gait & Station: deferred, in bed  Musculoskeletal: minimal tremor   Ophthalmic: clear nystagmus today    Mental Status Exam:   Cooperation:  Cooperative  Eye Contact:  Good  Psychomotor Behavior:  Appropriate  Mood: Sad/Depressed  Affect:  mood-congruent  Speech:  Normal  Thought Process:  Coherent and Poverty of thought  Associations: Goal Directed  Thought Content:     Mood congruent   Suicidal:  Currently minimizes   Homicidal:  None   Hallucinations:  Auditory and Visual   Delusion:  None  Cognitive Functioning:   Consciousness: awake, alert and oriented  Reliability:  fair  Insight:  Fair  Judgement:  Impaired  Impulse Control:  Impaired    Lab Results:  Lab Results (last 24 hours)     ** No results found for the last 24 hours. **          Radiology Results:  Imaging Results (Last 24 Hours)     ** No results found for the last 24 hours. **          Medicine:   Current Facility-Administered Medications:   •  albuterol (PROVENTIL) nebulizer solution 0.083% 2.5 mg/3mL, 2.5 mg, Nebulization, Q4H PRN, Shawn Malave MD  •  cloNIDine (CATAPRES) tablet 0.2 mg, 0.2 mg, Oral, TID, Shawn Malave MD, 0.2 mg at 07/03/20 0848  •  hydrALAZINE (APRESOLINE) injection 10  mg, 10 mg, Intravenous, Q6H PRN, Shawn Malave MD  •  hydrOXYzine pamoate (VISTARIL) capsule 25 mg, 25 mg, Oral, 4x Daily PRN, Shawn Malave MD  •  LORazepam (ATIVAN) injection 4 mg, 4 mg, Intravenous, Q4H, Shawn Malave MD, 4 mg at 07/03/20 0850  •  LORazepam (ATIVAN) injection 6 mg, 6 mg, Intravenous, Q1H PRN, Shawn Malave MD, 6 mg at 07/03/20 0136  •  LORazepam (ATIVAN) tablet 1 mg, 1 mg, Oral, Q6H PRN, Shawn Malave MD, 1 mg at 07/01/20 1449  •  methocarbamol (ROBAXIN) tablet 500 mg, 500 mg, Oral, 4x Daily PRN, Shawn Malave MD  •  mirtazapine (REMERON) tablet 30 mg, 30 mg, Oral, Daily, Shawn Malave MD, 30 mg at 07/03/20 0853  •  nicotine (NICODERM CQ) 21 MG/24HR patch 1 patch, 1 patch, Transdermal, Daily, Shawn Malave MD, 1 patch at 07/03/20 0937  •  sodium chloride 0.9 % flush 10 mL, 10 mL, Intravenous, PRN, Shawn Malave MD, 10 mL at 06/30/20 2132  •  sodium chloride 0.9 % flush 10 mL, 10 mL, Intravenous, Q12H, Shawn Malave MD, 10 mL at 07/03/20 0936  •  sodium chloride 0.9 % flush 10 mL, 10 mL, Intravenous, PRN, Shawn Malave MD  •  sodium chloride 0.9 % infusion, 75 mL/hr, Intravenous, Continuous, Shawn Malave MD, Last Rate: 75 mL/hr at 07/03/20 0526, 75 mL/hr at 07/03/20 0526  •  thiamine (B-1) 100 mg in sodium chloride 0.9 % 100 mL IVPB, 100 mg, Intravenous, Daily, Shawn Malave MD, Last Rate: 200 mL/hr at 07/03/20 0823, 100 mg at 07/03/20 0823    Diagnoses/Assessment:     Alcohol withdrawal syndrome with complication (CMS/HCC)    Alcohol use disorder, severe, dependence (CMS/HCC)    Depression with suicidal ideation    Treatment Plan:    Recommend continuing the ativan 4mg every 4hrs by IV with 6mg IV prn based on vitals: SBP > 165, DBP > 105, HR > 105.    Will increase thiamine to 500mg tid due to worsening cognition and presence of nystagmus.    Will continue to hold psych meds other than remeron 30mg qhs.  Will plan to  admit to psych one medical detox is over.    Thank you for this consult.  Please contact me with any further questions or concerns.     Harrison Quach MD  07/03/20 at 12:41

## 2020-07-03 NOTE — PLAN OF CARE
Dr. Quach examined the patient and noted suicidal ideation, confusion, agitation and nystagmus. Patient will transfer to [facility] with a sitter at bedside while continuing detoxification before being sent to the Psychiatric floor.

## 2020-07-04 PROCEDURE — 25010000002 LORAZEPAM PER 2 MG: Performed by: INTERNAL MEDICINE

## 2020-07-04 PROCEDURE — 25010000002 LORAZEPAM PER 2 MG

## 2020-07-04 PROCEDURE — 25010000002 LORAZEPAM PER 2 MG: Performed by: PSYCHIATRY & NEUROLOGY

## 2020-07-04 PROCEDURE — 99232 SBSQ HOSP IP/OBS MODERATE 35: CPT | Performed by: PSYCHIATRY & NEUROLOGY

## 2020-07-04 PROCEDURE — 25010000002 THIAMINE PER 100 MG: Performed by: PSYCHIATRY & NEUROLOGY

## 2020-07-04 RX ORDER — LORAZEPAM 2 MG/ML
3 INJECTION INTRAMUSCULAR EVERY 4 HOURS
Status: DISCONTINUED | OUTPATIENT
Start: 2020-07-04 | End: 2020-07-05

## 2020-07-04 RX ORDER — LORAZEPAM 2 MG/ML
INJECTION INTRAMUSCULAR
Status: DISPENSED
Start: 2020-07-04 | End: 2020-07-04

## 2020-07-04 RX ORDER — LORAZEPAM 2 MG/ML
INJECTION INTRAMUSCULAR
Status: COMPLETED
Start: 2020-07-04 | End: 2020-07-04

## 2020-07-04 RX ADMIN — MIRTAZAPINE 30 MG: 15 TABLET, FILM COATED ORAL at 09:02

## 2020-07-04 RX ADMIN — LORAZEPAM 4 MG: 2 INJECTION INTRAMUSCULAR; INTRAVENOUS at 04:52

## 2020-07-04 RX ADMIN — THIAMINE HYDROCHLORIDE 500 MG: 100 INJECTION, SOLUTION INTRAMUSCULAR; INTRAVENOUS at 09:01

## 2020-07-04 RX ADMIN — LORAZEPAM 3 MG: 2 INJECTION INTRAMUSCULAR; INTRAVENOUS at 20:34

## 2020-07-04 RX ADMIN — LORAZEPAM 4 MG: 2 INJECTION INTRAMUSCULAR; INTRAVENOUS at 00:48

## 2020-07-04 RX ADMIN — THIAMINE HYDROCHLORIDE 500 MG: 100 INJECTION, SOLUTION INTRAMUSCULAR; INTRAVENOUS at 20:33

## 2020-07-04 RX ADMIN — LORAZEPAM 4 MG: 2 INJECTION, SOLUTION INTRAMUSCULAR; INTRAVENOUS at 00:48

## 2020-07-04 RX ADMIN — SODIUM CHLORIDE, PRESERVATIVE FREE 10 ML: 5 INJECTION INTRAVENOUS at 09:01

## 2020-07-04 RX ADMIN — LORAZEPAM 4 MG: 2 INJECTION INTRAMUSCULAR; INTRAVENOUS at 08:54

## 2020-07-04 RX ADMIN — CLONIDINE HYDROCHLORIDE 0.2 MG: 0.2 TABLET ORAL at 20:33

## 2020-07-04 RX ADMIN — THIAMINE HYDROCHLORIDE 500 MG: 100 INJECTION, SOLUTION INTRAMUSCULAR; INTRAVENOUS at 16:07

## 2020-07-04 RX ADMIN — LORAZEPAM 3 MG: 2 INJECTION INTRAMUSCULAR; INTRAVENOUS at 16:04

## 2020-07-04 RX ADMIN — NICOTINE 1 PATCH: 21 PATCH, EXTENDED RELEASE TRANSDERMAL at 08:58

## 2020-07-04 RX ADMIN — LORAZEPAM 1 MG: 0.5 TABLET ORAL at 23:57

## 2020-07-04 RX ADMIN — CLONIDINE HYDROCHLORIDE 0.2 MG: 0.2 TABLET ORAL at 16:04

## 2020-07-04 NOTE — PROGRESS NOTES
7/4/2020    Chief Complaint: suicidal ideation    Subjective:  Patient is a 55 y.o. female who was seen for suicidal ideation and substance abuse.     Patient is oriented and reasonably cognitively intact today.  She has nystagmus.  She notes AVH today and notes having conversation with the people.  She notes feeling embarrassed about this.    Total Daily Ativan Dose:  7/1/20: 13mg  7/2/20: 28mg  7/3/20: 32mg (two PRN doses of 6mg each)  7/4/20: 12mg by 12:27    Objective     Vital Signs    Vitals:    07/04/20 0304 07/04/20 0500 07/04/20 0845 07/04/20 1138   BP: 153/77  113/71 132/79   BP Location: Right arm  Right arm Left arm   Patient Position: Lying      Pulse: 60  81 87   Resp: 18   18   Temp: 97.2 °F (36.2 °C)   97.5 °F (36.4 °C)   TempSrc: Oral   Temporal   SpO2: 92%  97% 97%   Weight:  51.8 kg (114 lb 3.2 oz)     Height:           Physical Exam:   General Appearance: alert, appears stated age and cooperative,  Hygiene:   fair  Gait & Station: deferred, in bed  Musculoskeletal: minimal tremor   Ophthalmic: clear nystagmus today    Mental Status Exam:   Cooperation:  Cooperative  Eye Contact:  Good  Psychomotor Behavior:  Appropriate  Mood: Sad/Depressed  Affect:  mood-congruent  Speech:  Normal  Thought Process:  Coherent and Poverty of thought  Associations: Goal Directed  Thought Content:     Mood congruent   Suicidal:  Currently minimizes   Homicidal:  None   Hallucinations:  Auditory and Visual   Delusion:  None  Cognitive Functioning:   Consciousness: awake, alert and oriented  Reliability:  fair  Insight:  Fair  Judgement:  Impaired  Impulse Control:  Impaired    Lab Results:  Lab Results (last 24 hours)     ** No results found for the last 24 hours. **          Radiology Results:  Imaging Results (Last 24 Hours)     ** No results found for the last 24 hours. **          Medicine:   Current Facility-Administered Medications:   •  albuterol (PROVENTIL) nebulizer solution 0.083% 2.5 mg/3mL, 2.5 mg,  Nebulization, Q4H PRN, Shawn Malave MD  •  cloNIDine (CATAPRES) tablet 0.2 mg, 0.2 mg, Oral, TID, Shawn Malave MD, Stopped at 07/04/20 0900  •  hydrALAZINE (APRESOLINE) injection 10 mg, 10 mg, Intravenous, Q6H PRN, Shawn Malave MD  •  hydrOXYzine pamoate (VISTARIL) capsule 25 mg, 25 mg, Oral, 4x Daily PRN, Shawn Malave MD  •  LORazepam (ATIVAN) 2 MG/ML injection  - ADS Override Pull, , , ,   •  LORazepam (ATIVAN) injection 4 mg, 4 mg, Intravenous, Q4H, Shawn Malave MD, 4 mg at 07/04/20 0854  •  LORazepam (ATIVAN) injection 6 mg, 6 mg, Intravenous, Q1H PRN, Shawn Malave MD, 6 mg at 07/03/20 1312  •  LORazepam (ATIVAN) tablet 1 mg, 1 mg, Oral, Q6H PRN, Shawn Malave MD, 1 mg at 07/01/20 1449  •  methocarbamol (ROBAXIN) tablet 500 mg, 500 mg, Oral, 4x Daily PRN, Shawn Malave MD  •  mirtazapine (REMERON) tablet 30 mg, 30 mg, Oral, Daily, Shawn Malave MD, 30 mg at 07/04/20 0902  •  nicotine (NICODERM CQ) 21 MG/24HR patch 1 patch, 1 patch, Transdermal, Daily, Shawn Malave MD, 1 patch at 07/04/20 0858  •  sodium chloride 0.9 % flush 10 mL, 10 mL, Intravenous, PRN, Shawn Malave MD, 10 mL at 06/30/20 2132  •  sodium chloride 0.9 % flush 10 mL, 10 mL, Intravenous, Q12H, Shawn Malave MD, 10 mL at 07/04/20 0901  •  sodium chloride 0.9 % flush 10 mL, 10 mL, Intravenous, PRN, Shawn Malave MD  •  sodium chloride 0.9 % infusion, 75 mL/hr, Intravenous, Continuous, Shawn Malave MD, Last Rate: 75 mL/hr at 07/03/20 2135, 75 mL/hr at 07/03/20 2135  •  thiamine (B-1) 500 mg in sodium chloride 0.9 % 100 mL IVPB, 500 mg, Intravenous, TID, Harrison Quach MD, Last Rate: 200 mL/hr at 07/04/20 0901, 500 mg at 07/04/20 0901    Diagnoses/Assessment:     Alcohol withdrawal syndrome with complication (CMS/HCC)    Alcohol use disorder, severe, dependence (CMS/HCC)    Depression with suicidal ideation    Treatment Plan:    Will decrease the ativan  to 3mg every 4hrs by IV and continue 6mg IV prn based on vitals: SBP > 165, DBP > 105, HR > 105.    Will increase thiamine to 500mg tid due to continuing cognition and presence of nystagmus.    Will continue to hold psych meds other than remeron 30mg qhs.  Will plan to admit to psych once medical detox is over.    Thank you for this consult.  Please contact me with any further questions or concerns.     Harrison Quach MD  07/04/20 at 12:27

## 2020-07-04 NOTE — PLAN OF CARE
Pt resting between care. VSS. Withdrawal symptoms controlled with scheduled ativan. Will continue to monitor.

## 2020-07-04 NOTE — PROGRESS NOTES
[facility]  INPATIENT PROGRESS NOTE    Length of Stay: 2  Date of Admission: 6/30/2020  Primary Care Physician: Farhan Guzman MD    Subjective   Chief Complaint: Suicidal ideation  HPI:    Patient has been admitted for suicidal ideation and substance abuse.  She is continuing on IV Ativan for withdrawal symptoms.  She seems a little confused this morning and has been complaining of some visual hallucinations including seeing people in the room.    Review of Systems   Respiratory: Negative for shortness of breath.    Cardiovascular: Negative for chest pain.          All pertinent negatives and positives are as above. All other systems have been reviewed and are negative unless otherwise stated.     Objective    Temp:  [96.1 °F (35.6 °C)-97.2 °F (36.2 °C)] 97.2 °F (36.2 °C)  Heart Rate:  [60-85] 81  Resp:  [16-18] 18  BP: (113-153)/(71-89) 113/71  Physical Exam   Constitutional: She appears well-developed and well-nourished. No distress.   HENT:   Head: Normocephalic and atraumatic.   Cardiovascular: Normal rate.   Pulmonary/Chest: Effort normal. No respiratory distress. She has no wheezes.   Abdominal: Soft. She exhibits no distension.   Musculoskeletal: Normal range of motion. She exhibits no edema.   Neurological: She is alert. No cranial nerve deficit.   Skin: Skin is warm and dry. She is not diaphoretic.   Psychiatric: She has a normal mood and affect.   Confused, having hallucination   Vitals reviewed.          Results Review:  I have reviewed the labs, radiology results, and diagnostic studies.    Laboratory Data:   Lab Results (last 24 hours)     ** No results found for the last 24 hours. **          Culture Data:   No results found for: BLOODCX  No results found for: URINECX  No results found for: RESPCX  No results found for: WOUNDCX  No results found for: STOOLCX  No components found for: BODYFLD    Radiology Data:   Imaging Results (Last 24  Hours)     ** No results found for the last 24 hours. **          I have reviewed the patient's current medications.     Assessment/Plan     Active Hospital Problems    Diagnosis   • **Alcohol withdrawal syndrome with complication (CMS/HCC)   • Depression with suicidal ideation   • Alcohol use disorder, severe, dependence (CMS/HCC)       Alcoholism with alcohol withdrawal with delirium tremens- patient seems more confused today and is also having some visual hallucinations.  We will continue with withdrawal protocol and continue with IV Ativan and thiamine.  Psychiatry is also following and managing    Tobacco dependence-continue with nicotine patch      DVT prophylaxis-SCD            Tho Tabares MD   07/04/20   11:30

## 2020-07-05 LAB
ALBUMIN SERPL-MCNC: 3.2 G/DL (ref 3.5–5.2)
ALBUMIN/GLOB SERPL: 0.9 G/DL
ALP SERPL-CCNC: 113 U/L (ref 39–117)
ALT SERPL W P-5'-P-CCNC: 34 U/L (ref 1–33)
ANION GAP SERPL CALCULATED.3IONS-SCNC: 8 MMOL/L (ref 5–15)
AST SERPL-CCNC: 31 U/L (ref 1–32)
BASOPHILS # BLD AUTO: 0.02 10*3/MM3 (ref 0–0.2)
BASOPHILS NFR BLD AUTO: 0.8 % (ref 0–1.5)
BILIRUB SERPL-MCNC: 0.2 MG/DL (ref 0.2–1.2)
BUN SERPL-MCNC: 11 MG/DL (ref 6–20)
BUN/CREAT SERPL: 15.7 (ref 7–25)
CALCIUM SPEC-SCNC: 8.7 MG/DL (ref 8.6–10.5)
CHLORIDE SERPL-SCNC: 104 MMOL/L (ref 98–107)
CO2 SERPL-SCNC: 22 MMOL/L (ref 22–29)
CREAT SERPL-MCNC: 0.7 MG/DL (ref 0.57–1)
DEPRECATED RDW RBC AUTO: 45.1 FL (ref 37–54)
EOSINOPHIL # BLD AUTO: 0.14 10*3/MM3 (ref 0–0.4)
EOSINOPHIL NFR BLD AUTO: 5.5 % (ref 0.3–6.2)
ERYTHROCYTE [DISTWIDTH] IN BLOOD BY AUTOMATED COUNT: 13.4 % (ref 12.3–15.4)
GFR SERPL CREATININE-BSD FRML MDRD: 87 ML/MIN/1.73
GLOBULIN UR ELPH-MCNC: 3.6 GM/DL
GLUCOSE SERPL-MCNC: 97 MG/DL (ref 65–99)
HCT VFR BLD AUTO: 39.2 % (ref 34–46.6)
HGB BLD-MCNC: 13.1 G/DL (ref 12–15.9)
IMM GRANULOCYTES # BLD AUTO: 0.02 10*3/MM3 (ref 0–0.05)
IMM GRANULOCYTES NFR BLD AUTO: 0.8 % (ref 0–0.5)
LYMPHOCYTES # BLD AUTO: 1.23 10*3/MM3 (ref 0.7–3.1)
LYMPHOCYTES NFR BLD AUTO: 48.4 % (ref 19.6–45.3)
MCH RBC QN AUTO: 30.7 PG (ref 26.6–33)
MCHC RBC AUTO-ENTMCNC: 33.4 G/DL (ref 31.5–35.7)
MCV RBC AUTO: 91.8 FL (ref 79–97)
MONOCYTES # BLD AUTO: 0.42 10*3/MM3 (ref 0.1–0.9)
MONOCYTES NFR BLD AUTO: 16.5 % (ref 5–12)
NEUTROPHILS NFR BLD AUTO: 0.71 10*3/MM3 (ref 1.7–7)
NEUTROPHILS NFR BLD AUTO: 28 % (ref 42.7–76)
NRBC BLD AUTO-RTO: 0 /100 WBC (ref 0–0.2)
PLATELET # BLD AUTO: 115 10*3/MM3 (ref 140–450)
PMV BLD AUTO: 10 FL (ref 6–12)
POTASSIUM SERPL-SCNC: 3.8 MMOL/L (ref 3.5–5.2)
PROT SERPL-MCNC: 6.8 G/DL (ref 6–8.5)
RBC # BLD AUTO: 4.27 10*6/MM3 (ref 3.77–5.28)
SODIUM SERPL-SCNC: 134 MMOL/L (ref 136–145)
WBC # BLD AUTO: 2.54 10*3/MM3 (ref 3.4–10.8)

## 2020-07-05 PROCEDURE — 99232 SBSQ HOSP IP/OBS MODERATE 35: CPT | Performed by: PSYCHIATRY & NEUROLOGY

## 2020-07-05 PROCEDURE — 25010000002 LORAZEPAM PER 2 MG: Performed by: PSYCHIATRY & NEUROLOGY

## 2020-07-05 PROCEDURE — 80053 COMPREHEN METABOLIC PANEL: CPT | Performed by: FAMILY MEDICINE

## 2020-07-05 PROCEDURE — 85025 COMPLETE CBC W/AUTO DIFF WBC: CPT | Performed by: FAMILY MEDICINE

## 2020-07-05 PROCEDURE — 25010000002 THIAMINE PER 100 MG: Performed by: PSYCHIATRY & NEUROLOGY

## 2020-07-05 RX ORDER — LORAZEPAM 2 MG/ML
4 INJECTION INTRAMUSCULAR
Status: DISCONTINUED | OUTPATIENT
Start: 2020-07-05 | End: 2020-07-07 | Stop reason: HOSPADM

## 2020-07-05 RX ORDER — LORAZEPAM 2 MG/ML
2 INJECTION INTRAMUSCULAR EVERY 4 HOURS
Status: DISCONTINUED | OUTPATIENT
Start: 2020-07-05 | End: 2020-07-06

## 2020-07-05 RX ADMIN — THIAMINE HYDROCHLORIDE 500 MG: 100 INJECTION, SOLUTION INTRAMUSCULAR; INTRAVENOUS at 20:35

## 2020-07-05 RX ADMIN — MIRTAZAPINE 30 MG: 15 TABLET, FILM COATED ORAL at 09:34

## 2020-07-05 RX ADMIN — LORAZEPAM 3 MG: 2 INJECTION INTRAMUSCULAR; INTRAVENOUS at 01:36

## 2020-07-05 RX ADMIN — LORAZEPAM 3 MG: 2 INJECTION INTRAMUSCULAR; INTRAVENOUS at 14:37

## 2020-07-05 RX ADMIN — SODIUM CHLORIDE 75 ML/HR: 900 INJECTION, SOLUTION INTRAVENOUS at 14:40

## 2020-07-05 RX ADMIN — THIAMINE HYDROCHLORIDE 500 MG: 100 INJECTION, SOLUTION INTRAMUSCULAR; INTRAVENOUS at 17:04

## 2020-07-05 RX ADMIN — THIAMINE HYDROCHLORIDE 500 MG: 100 INJECTION, SOLUTION INTRAMUSCULAR; INTRAVENOUS at 09:37

## 2020-07-05 RX ADMIN — LORAZEPAM 3 MG: 2 INJECTION INTRAMUSCULAR; INTRAVENOUS at 05:21

## 2020-07-05 RX ADMIN — NICOTINE 1 PATCH: 21 PATCH, EXTENDED RELEASE TRANSDERMAL at 09:32

## 2020-07-05 RX ADMIN — LORAZEPAM 3 MG: 2 INJECTION INTRAMUSCULAR; INTRAVENOUS at 09:35

## 2020-07-05 RX ADMIN — LORAZEPAM 2 MG: 2 INJECTION, SOLUTION INTRAMUSCULAR; INTRAVENOUS at 20:35

## 2020-07-05 RX ADMIN — SODIUM CHLORIDE, PRESERVATIVE FREE 10 ML: 5 INJECTION INTRAVENOUS at 09:37

## 2020-07-05 RX ADMIN — SODIUM CHLORIDE 75 ML/HR: 900 INJECTION, SOLUTION INTRAVENOUS at 01:37

## 2020-07-05 RX ADMIN — CLONIDINE HYDROCHLORIDE 0.2 MG: 0.2 TABLET ORAL at 09:33

## 2020-07-05 NOTE — PROGRESS NOTES
7/5/2020    Chief Complaint: suicidal ideation    Subjective:  Patient is a 55 y.o. female who was seen for suicidal ideation and substance abuse.     Patient is oriented and reasonably cognitively intact today.  She has no nystagmus today.  She notes AVH today and notes having conversation with the people.  She notes feeling embarrassed about this.    Total Daily Ativan Dose:  7/1/20: 13mg  7/2/20: 28mg  7/3/20: 32mg (two PRN doses of 6mg each)  7/4/20: 19mg  7/5/20:  9mg by 14:33    Objective     Vital Signs    Vitals:    07/04/20 1518 07/04/20 2031 07/05/20 0323 07/05/20 1152   BP: 139/87 141/73 127/78 136/89   BP Location: Left arm Left arm Right arm Left arm   Patient Position: Lying Sitting Lying Lying   Pulse: 77 76 73 86   Resp: 18 18 20 18   Temp:  96.9 °F (36.1 °C) 97.7 °F (36.5 °C) 95.4 °F (35.2 °C)   TempSrc:  Oral Oral Oral   SpO2: 97% 97% 95% 96%   Weight:       Height:           Physical Exam:   General Appearance: alert, appears stated age and cooperative,  Hygiene:   fair  Gait & Station: deferred, in bed  Musculoskeletal: minimal tremor   Ophthalmic: no nystagmus today    Mental Status Exam:   Cooperation:  Cooperative  Eye Contact:  Good  Psychomotor Behavior:  Appropriate  Mood: Sad/Depressed  Affect:  mood-congruent  Speech:  Normal  Thought Process:  Coherent and Poverty of thought  Associations: Goal Directed  Thought Content:     Mood congruent   Suicidal:  Currently minimizes   Homicidal:  None   Hallucinations:  Auditory and Visual   Delusion:  None  Cognitive Functioning:   Consciousness: awake, alert and oriented  Reliability:  fair  Insight:  Fair  Judgement:  Impaired  Impulse Control:  Impaired    Lab Results:  Lab Results (last 24 hours)     Procedure Component Value Units Date/Time    Comprehensive Metabolic Panel [[id number]]  (Abnormal) Collected:  07/05/20 0931    Specimen:  Blood Updated:  07/05/20 0958     Glucose 97 mg/dL      BUN 11 mg/dL      Creatinine 0.70 mg/dL      Sodium  134 mmol/L      Potassium 3.8 mmol/L      Chloride 104 mmol/L      CO2 22.0 mmol/L      Calcium 8.7 mg/dL      Total Protein 6.8 g/dL      Albumin 3.20 g/dL      ALT (SGPT) 34 U/L      AST (SGOT) 31 U/L      Alkaline Phosphatase 113 U/L      Total Bilirubin 0.2 mg/dL      eGFR Non African Amer 87 mL/min/1.73      Globulin 3.6 gm/dL      A/G Ratio 0.9 g/dL      BUN/Creatinine Ratio 15.7     Anion Gap 8.0 mmol/L     Narrative:       GFR Normal >60  Chronic Kidney Disease <60  Kidney Failure <15      CBC & Differential [[id number]] Collected:  07/05/20 0931    Specimen:  Blood Updated:  07/05/20 0942    Narrative:       The following orders were created for panel order CBC & Differential.  Procedure                               Abnormality         Status                     ---------                               -----------         ------                     CBC Auto Differential[[id number]]        Abnormal            Final result                 Please view results for these tests on the individual orders.    CBC Auto Differential [[id number]]  (Abnormal) Collected:  07/05/20 0931    Specimen:  Blood Updated:  07/05/20 0942     WBC 2.54 10*3/mm3      RBC 4.27 10*6/mm3      Hemoglobin 13.1 g/dL      Hematocrit 39.2 %      MCV 91.8 fL      MCH 30.7 pg      MCHC 33.4 g/dL      RDW 13.4 %      RDW-SD 45.1 fl      MPV 10.0 fL      Platelets 115 10*3/mm3      Neutrophil % 28.0 %      Lymphocyte % 48.4 %      Monocyte % 16.5 %      Eosinophil % 5.5 %      Basophil % 0.8 %      Immature Grans % 0.8 %      Neutrophils, Absolute 0.71 10*3/mm3      Lymphocytes, Absolute 1.23 10*3/mm3      Monocytes, Absolute 0.42 10*3/mm3      Eosinophils, Absolute 0.14 10*3/mm3      Basophils, Absolute 0.02 10*3/mm3      Immature Grans, Absolute 0.02 10*3/mm3      nRBC 0.0 /100 WBC           Radiology Results:  Imaging Results (Last 24 Hours)     ** No results found for the last 24 hours. **          Medicine:   Current Facility-Administered  Medications:   •  albuterol (PROVENTIL) nebulizer solution 0.083% 2.5 mg/3mL, 2.5 mg, Nebulization, Q4H PRN, Shawn Malave MD  •  cloNIDine (CATAPRES) tablet 0.2 mg, 0.2 mg, Oral, TID, Shawn Malave MD, 0.2 mg at 07/05/20 0933  •  hydrALAZINE (APRESOLINE) injection 10 mg, 10 mg, Intravenous, Q6H PRN, Shawn Malave MD  •  hydrOXYzine pamoate (VISTARIL) capsule 25 mg, 25 mg, Oral, 4x Daily PRN, Shawn Malave MD  •  LORazepam (ATIVAN) injection 3 mg, 3 mg, Intravenous, Q4H, Harrison Quach MD, 3 mg at 07/05/20 0935  •  LORazepam (ATIVAN) injection 6 mg, 6 mg, Intravenous, Q1H PRN, Shawn Malave MD, 6 mg at 07/03/20 1312  •  LORazepam (ATIVAN) tablet 1 mg, 1 mg, Oral, Q6H PRN, Shawn Malave MD, 1 mg at 07/04/20 2357  •  methocarbamol (ROBAXIN) tablet 500 mg, 500 mg, Oral, 4x Daily PRN, Shawn Malave MD  •  mirtazapine (REMERON) tablet 30 mg, 30 mg, Oral, Daily, Shawn Malave MD, 30 mg at 07/05/20 0934  •  nicotine (NICODERM CQ) 21 MG/24HR patch 1 patch, 1 patch, Transdermal, Daily, Shawn Malave MD, 1 patch at 07/05/20 0932  •  sodium chloride 0.9 % flush 10 mL, 10 mL, Intravenous, PRN, Shawn Malave MD, 10 mL at 06/30/20 2132  •  sodium chloride 0.9 % flush 10 mL, 10 mL, Intravenous, Q12H, Shawn Malave MD, 10 mL at 07/05/20 0937  •  sodium chloride 0.9 % flush 10 mL, 10 mL, Intravenous, PRN, Shawn Malave MD  •  sodium chloride 0.9 % infusion, 75 mL/hr, Intravenous, Continuous, Shawn Malave MD, Last Rate: 75 mL/hr at 07/05/20 0137, 75 mL/hr at 07/05/20 0137  •  thiamine (B-1) 500 mg in sodium chloride 0.9 % 100 mL IVPB, 500 mg, Intravenous, TID, Harrison Quach MD, Last Rate: 200 mL/hr at 07/05/20 0937, 500 mg at 07/05/20 0937    Diagnoses/Assessment:     Alcohol withdrawal syndrome with complication (CMS/HCC)    Alcohol use disorder, severe, dependence (CMS/HCC)    Depression with suicidal ideation    Treatment Plan:    Will  decrease the ativan to 2mg every 4hrs by IV and decrease ativan to 4mg IV prn based on vitals: SBP > 165, DBP > 105, HR > 105.    Will cont thiamine 500mg tid due to continuing cognition and presence of nystagmus.    Will continue to hold psych meds other than remeron 30mg qhs.  Will plan to admit to psych once medical detox is over.    Thank you for this consult.  Please contact me with any further questions or concerns.     Harrison Quach MD  07/05/20 at 14:33

## 2020-07-05 NOTE — PLAN OF CARE
Problem: Patient Care Overview  Goal: Plan of Care Review  Outcome: Ongoing (interventions implemented as appropriate)  Flowsheets (Taken 7/5/2020 0250)  Progress: no change  Plan of Care Reviewed With: patient  Outcome Summary: VSS, prn Ativan given x 1. New IV. Will continue to monitor.

## 2020-07-05 NOTE — PROGRESS NOTES
[facility] Medicine Services  INPATIENT PROGRESS NOTE    Length of Stay: 3  Date of Admission: 6/30/2020  Primary Care Physician: Farhan Guzman MD    Subjective   Chief Complaint: Judy ideation  HPI: Patient states she is depressed today.  She states that the auditory and visual hallucinations are still present but are less.    Review of Systems   Constitutional: Positive for activity change and appetite change. Negative for chills, fatigue, fever and unexpected weight change.   Respiratory: Negative for cough, choking, chest tightness, shortness of breath and wheezing.    Cardiovascular: Negative for chest pain, palpitations and leg swelling.   Gastrointestinal: Negative for abdominal pain, blood in stool, constipation, diarrhea, nausea and vomiting.   Genitourinary: Negative for dysuria, flank pain and hematuria.   Neurological: Negative for dizziness, seizures, syncope, speech difficulty, weakness, light-headedness, numbness and headaches.   Hematological: Does not bruise/bleed easily.   Psychiatric/Behavioral: Positive for dysphoric mood and suicidal ideas.        Auditory and visual hallucinations        All pertinent negatives and positives are as above. All other systems have been reviewed and are negative unless otherwise stated.     Objective    Temp:  [95.4 °F (35.2 °C)-97.7 °F (36.5 °C)] 95.4 °F (35.2 °C)  Heart Rate:  [73-86] 86  Resp:  [18-20] 18  BP: (127-141)/(73-89) 136/89    Physical Exam   Constitutional: She appears well-developed and well-nourished.   HENT:   Head: Normocephalic and atraumatic.   Eyes: Pupils are equal, round, and reactive to light. EOM are normal.   Neck: Normal range of motion. Neck supple.   Cardiovascular: Normal rate, regular rhythm and normal heart sounds. Exam reveals no gallop and no friction rub.   No murmur heard.  Pulmonary/Chest: Effort normal and breath sounds normal. No respiratory distress. She has no wheezes. She  has no rales. She exhibits no tenderness.   Abdominal: Soft. Bowel sounds are normal. She exhibits no distension. There is no tenderness. There is no guarding.   Musculoskeletal: She exhibits no edema.   Skin: Skin is warm and dry.   Psychiatric: Her behavior is normal. Her affect is blunt. She is actively hallucinating. She exhibits a depressed mood. She expresses suicidal ideation.   Vitals reviewed.      Results Review:  I have reviewed the labs, radiology results, and diagnostic studies.    Laboratory Data:   Results from last 7 days   Lab Units 07/05/20  0931 07/01/20  0418 06/30/20  2140   SODIUM mmol/L 134* 142 144   POTASSIUM mmol/L 3.8 3.6 3.8   CHLORIDE mmol/L 104 113* 108*   CO2 mmol/L 22.0 20.0* 23.0   BUN mg/dL 11 12 13   CREATININE mg/dL 0.70 0.66 0.84   GLUCOSE mg/dL 97 98 108*   CALCIUM mg/dL 8.7 7.8* 9.3   BILIRUBIN mg/dL 0.2  --  <0.2*   ALK PHOS U/L 113  --  133*   ALT (SGPT) U/L 34*  --  17   AST (SGOT) U/L 31  --  19   ANION GAP mmol/L 8.0 9.0 13.0     Estimated Creatinine Clearance: 74.3 mL/min (by C-G formula based on SCr of 0.7 mg/dL).  Results from last 7 days   Lab Units 07/01/20  0418 06/30/20  2140   MAGNESIUM mg/dL 2.3 2.1   PHOSPHORUS mg/dL 3.8  --          Results from last 7 days   Lab Units 07/05/20  0931 07/01/20  0418 06/30/20  2140   WBC 10*3/mm3 2.54* 3.58 5.46   HEMOGLOBIN g/dL 13.1 11.8* 14.4   HEMATOCRIT % 39.2 33.7* 41.9   PLATELETS 10*3/mm3 115* 141 211           Culture Data:   No results found for: BLOODCX  No results found for: URINECX  No results found for: RESPCX  No results found for: WOUNDCX  No results found for: STOOLCX  No components found for: BODYFLD    Radiology Data:   Imaging Results (Last 24 Hours)     ** No results found for the last 24 hours. **          I have reviewed the patient's current medications.     Assessment/Plan     Active Hospital Problems    Diagnosis   • **Alcohol withdrawal syndrome with complication (CMS/HCC)   • Depression with suicidal  ideation   • Alcohol use disorder, severe, dependence (CMS/HCC)       Plan:    1.  Alcohol abuse with withdrawal: Patient does have delirium tremens.  Improving.  Continue on scheduled and as needed Ativan.  Psychiatry is managing Ativan dosing.  Continues to have hallucinations, but per her report lessened.  2.  Depression with suicidal ideation: Patient continues to feel depressed.  Plan is for patient to go to behavioral health unit after medical detox is complete.  3.  Tobacco abuse: Continue nicotine patch.  4.  DVT prophylaxis: SCDs.      The patient was evaluated during the global COVID-19 pandemic, and the diagnosis was suspected/considered upon their initial presentation.  Evaluation, treatment, and testing were consistent with current guidelines for patients who present with complaints or symptoms that may be related to COVID-19.    Discharge Planning: I expect patient to be discharged to behavioral health unit in 2-3 days.        This document has been electronically signed by Arnulfo Mendoza MD on July 5, 2020 14:24

## 2020-07-05 NOTE — PLAN OF CARE
Problem: Patient Care Overview  Goal: Plan of Care Review  Outcome: Ongoing (interventions implemented as appropriate)   Vss, no distress, pt sleeps without difficulty, wakes up asks for meds and goes back to sleep. Sitter at bedside. No reports of hallucination, but states she is depressed today.

## 2020-07-06 PROCEDURE — 99232 SBSQ HOSP IP/OBS MODERATE 35: CPT | Performed by: PSYCHIATRY & NEUROLOGY

## 2020-07-06 PROCEDURE — 25010000002 LORAZEPAM PER 2 MG: Performed by: PSYCHIATRY & NEUROLOGY

## 2020-07-06 PROCEDURE — 25010000002 THIAMINE PER 100 MG: Performed by: PSYCHIATRY & NEUROLOGY

## 2020-07-06 RX ORDER — LORAZEPAM 2 MG/1
2 TABLET ORAL EVERY 6 HOURS
Status: DISCONTINUED | OUTPATIENT
Start: 2020-07-06 | End: 2020-07-07 | Stop reason: HOSPADM

## 2020-07-06 RX ADMIN — THIAMINE HYDROCHLORIDE 500 MG: 100 INJECTION, SOLUTION INTRAMUSCULAR; INTRAVENOUS at 20:10

## 2020-07-06 RX ADMIN — CLONIDINE HYDROCHLORIDE 0.2 MG: 0.2 TABLET ORAL at 09:23

## 2020-07-06 RX ADMIN — THIAMINE HYDROCHLORIDE 500 MG: 100 INJECTION, SOLUTION INTRAMUSCULAR; INTRAVENOUS at 09:23

## 2020-07-06 RX ADMIN — HYDROXYZINE PAMOATE 25 MG: 25 CAPSULE ORAL at 19:33

## 2020-07-06 RX ADMIN — LORAZEPAM 2 MG: 2 INJECTION, SOLUTION INTRAMUSCULAR; INTRAVENOUS at 01:05

## 2020-07-06 RX ADMIN — THIAMINE HYDROCHLORIDE 500 MG: 100 INJECTION, SOLUTION INTRAMUSCULAR; INTRAVENOUS at 17:03

## 2020-07-06 RX ADMIN — CLONIDINE HYDROCHLORIDE 0.2 MG: 0.2 TABLET ORAL at 17:02

## 2020-07-06 RX ADMIN — SODIUM CHLORIDE 75 ML/HR: 900 INJECTION, SOLUTION INTRAVENOUS at 01:05

## 2020-07-06 RX ADMIN — CLONIDINE HYDROCHLORIDE 0.2 MG: 0.2 TABLET ORAL at 20:09

## 2020-07-06 RX ADMIN — NICOTINE 1 PATCH: 21 PATCH, EXTENDED RELEASE TRANSDERMAL at 09:22

## 2020-07-06 RX ADMIN — LORAZEPAM 2 MG: 2 INJECTION, SOLUTION INTRAMUSCULAR; INTRAVENOUS at 04:56

## 2020-07-06 RX ADMIN — LORAZEPAM 2 MG: 2 TABLET ORAL at 17:03

## 2020-07-06 RX ADMIN — MIRTAZAPINE 30 MG: 15 TABLET, FILM COATED ORAL at 09:23

## 2020-07-06 RX ADMIN — LORAZEPAM 2 MG: 2 INJECTION, SOLUTION INTRAMUSCULAR; INTRAVENOUS at 13:38

## 2020-07-06 RX ADMIN — LORAZEPAM 2 MG: 2 INJECTION, SOLUTION INTRAMUSCULAR; INTRAVENOUS at 09:23

## 2020-07-06 NOTE — PLAN OF CARE
Problem: Patient Care Overview  Goal: Plan of Care Review  Outcome: Ongoing (interventions implemented as appropriate)  Flowsheets (Taken 7/6/2020 1522)  Progress: no change  Plan of Care Reviewed With: patient  Outcome Summary: Patient resting in bed. Scheduled Ativan given. Vitals stable. Will continue to monitor.

## 2020-07-06 NOTE — CONSULTS
Adult Nutrition  Assessment    Patient Name:  Laura Bruce  YOB: 1965  MRN: [id number]  Admit Date:  6/30/2020    Assessment Date:  7/6/2020    Comments:  RD consult r/t LOS. Pt admit d/t SI, likely transfer to [facility] after ETOH detox. Pt sleeping at time of visit and RD unable to ask assessment questions. Epic weight records indicate overall stable wt. Sitter reports pt has been eating well, intake records usually %. RD will monitor course and assess as indicated.    Reason for Assessment     Row Name 07/06/20 1052          Reason for Assessment    Reason For Assessment  other (see comments) LOS     Diagnosis  psychosocial;substance use/abuse     Identified At Risk by Screening Criteria  no indicators present         Nutrition/Diet History     Row Name 07/06/20 1054          Nutrition/Diet History    Typical Food/Fluid Intake  Pt sleeping. Spoke w/sitter who indicates pt has been eating well. No c/o that she is aware of .         Anthropometrics     Row Name 07/06/20 0500          Anthropometrics    Weight  54.5 kg (120 lb 1.6 oz) 2 blankets, 2 pillows         Labs/Tests/Procedures/Meds     Row Name 07/06/20 1055          Labs/Procedures/Meds    Lab Results Reviewed  reviewed        Medications    Pertinent Medications Reviewed  reviewed           Estimated/Assessed Needs     Row Name 07/06/20 1055          Calculation Measurements    Weight Used For Calculations  49.9 kg (110 lb)        Estimated/Assessed Needs    Additional Documentation  Calorie Requirements (Group);Fluid Requirements (Group);Protein Requirements (Group)        Calorie Requirements    Estimated Calorie Requirement (kcal/day)  1500        Protein Requirements    Weight Used For Protein Calculations  49.9 kg (110 lb)     Est Protein Requirement Amount (gms/kg)  0.8 gm protein     Estimated Protein Requirements (gms/day)  39.92        Fluid Requirements    Estimated Fluid Requirements (mL/day)  1500     RDA Method (mL)   1500         Nutrition Prescription Ordered     Row Name 07/06/20 1056          Nutrition Prescription PO    Current PO Diet  Regular     Fluid Consistency  Thin     Common Modifiers  Cardiac safe tray         Evaluation of Received Nutrient/Fluid Intake     Row Name 07/06/20 1057          PO Evaluation    Number of Meals  4     % PO Intake  75% x 3, 100% x 1               Electronically signed by:  Kasandra Temple RD  07/06/20 10:59

## 2020-07-06 NOTE — PROGRESS NOTES
7/6/2020    Chief Complaint: suicidal ideation    Subjective:  Patient is a 55 y.o. female who was seen for suicidal ideation and substance abuse.     Patient is oriented and reasonably cognitively intact today.  She has no nystagmus today.  She notes no AVH today.    Objective     Vital Signs    Vitals:    07/06/20 0300 07/06/20 0500 07/06/20 0900 07/06/20 1521   BP: 132/79  124/96 148/95   BP Location: Left arm  Right arm Right arm   Patient Position: Lying  Lying Lying   Pulse: 78  105 87   Resp: 18  18 18   Temp: 97.3 °F (36.3 °C)  97.2 °F (36.2 °C) 97.6 °F (36.4 °C)   TempSrc: Oral  Oral Oral   SpO2: 98%  94% 98%   Weight:  54.5 kg (120 lb 1.6 oz)     Height:           Physical Exam:   General Appearance: alert, appears stated age and cooperative,  Hygiene:   fair  Gait & Station: deferred, in bed  Musculoskeletal: minimal tremor   Ophthalmic: no nystagmus today    Mental Status Exam:   Cooperation:  Cooperative  Eye Contact:  Good  Psychomotor Behavior:  Appropriate  Mood: Sad/Depressed  Affect:  mood-congruent  Speech:  Normal  Thought Process:  Coherent and Poverty of thought  Associations: Goal Directed  Thought Content:     Mood congruent   Suicidal:  Currently minimizes   Homicidal:  None   Hallucinations:  None   Delusion:  None  Cognitive Functioning:   Consciousness: awake, alert and oriented  Reliability:  fair  Insight:  Fair  Judgement:  Impaired  Impulse Control:  Impaired    Lab Results:  Lab Results (last 24 hours)     ** No results found for the last 24 hours. **          Radiology Results:  Imaging Results (Last 24 Hours)     ** No results found for the last 24 hours. **          Medicine:   Current Facility-Administered Medications:   •  albuterol (PROVENTIL) nebulizer solution 0.083% 2.5 mg/3mL, 2.5 mg, Nebulization, Q4H PRN, Shawn Malave MD  •  cloNIDine (CATAPRES) tablet 0.2 mg, 0.2 mg, Oral, TID, Shawn Malave MD, 0.2 mg at 07/06/20 0923  •  hydrALAZINE (APRESOLINE) injection  10 mg, 10 mg, Intravenous, Q6H PRN, Shawn Malave MD  •  hydrOXYzine pamoate (VISTARIL) capsule 25 mg, 25 mg, Oral, 4x Daily PRN, Shawn Malave MD  •  LORazepam (ATIVAN) injection 2 mg, 2 mg, Intravenous, Q4H, Harrison Quach MD, 2 mg at 07/06/20 1338  •  LORazepam (ATIVAN) injection 4 mg, 4 mg, Intravenous, Q1H PRN, Harrison Quach MD  •  LORazepam (ATIVAN) tablet 1 mg, 1 mg, Oral, Q6H PRN, Shawn Malave MD, 1 mg at 07/04/20 2357  •  methocarbamol (ROBAXIN) tablet 500 mg, 500 mg, Oral, 4x Daily PRN, Shawn Malave MD  •  mirtazapine (REMERON) tablet 30 mg, 30 mg, Oral, Daily, Shawn Malave MD, 30 mg at 07/06/20 0923  •  nicotine (NICODERM CQ) 21 MG/24HR patch 1 patch, 1 patch, Transdermal, Daily, Shawn Malave MD, 1 patch at 07/06/20 0922  •  sodium chloride 0.9 % flush 10 mL, 10 mL, Intravenous, PRN, Shawn Malave MD, 10 mL at 06/30/20 2132  •  sodium chloride 0.9 % flush 10 mL, 10 mL, Intravenous, Q12H, Shawn Malave MD, 10 mL at 07/05/20 0937  •  sodium chloride 0.9 % flush 10 mL, 10 mL, Intravenous, PRN, Shawn Malave MD  •  sodium chloride 0.9 % infusion, 75 mL/hr, Intravenous, Continuous, Shawn Malave MD, Last Rate: 75 mL/hr at 07/06/20 0105, 75 mL/hr at 07/06/20 0105  •  thiamine (B-1) 500 mg in sodium chloride 0.9 % 100 mL IVPB, 500 mg, Intravenous, TID, Harrison Quach MD, Last Rate: 200 mL/hr at 07/06/20 0923, 500 mg at 07/06/20 0923    Diagnoses/Assessment:     Alcohol withdrawal syndrome with complication (CMS/HCC)    Alcohol use disorder, severe, dependence (CMS/HCC)    Depression with suicidal ideation    Treatment Plan:    Will decrease the ativan to 2mg every 6hrs by PO and cont ativan to 4mg IV prn based on vitals: SBP > 165, DBP > 105, HR > 105.    Will cont thiamine 500mg tid due to continuing cognitive issues.    Will continue to hold psych meds other than remeron 30mg qhs.  Will plan to admit to psych once medical detox is  over.    Thank you for this consult.  Please contact me with any further questions or concerns.     Harrison Quach MD  07/06/20 at 16:38

## 2020-07-06 NOTE — PROGRESS NOTES
[facility] Medicine Services  INPATIENT PROGRESS NOTE    Length of Stay: 4  Date of Admission: 6/30/2020  Primary Care Physician: Farhan Guzman MD    Subjective   Chief Complaint: Judy ideation  HPI:  Patient states she is depressed today.  She states that the auditory and visual hallucinations are no longer present.    Review of Systems   Constitutional: Positive for activity change and appetite change. Negative for chills, fatigue, fever and unexpected weight change.   Respiratory: Negative for cough, choking, chest tightness, shortness of breath and wheezing.    Cardiovascular: Negative for chest pain, palpitations and leg swelling.   Gastrointestinal: Negative for abdominal pain, blood in stool, constipation, diarrhea, nausea and vomiting.   Genitourinary: Negative for dysuria, flank pain and hematuria.   Neurological: Negative for dizziness, seizures, syncope, speech difficulty, weakness, light-headedness, numbness and headaches.   Hematological: Does not bruise/bleed easily.   Psychiatric/Behavioral: Positive for dysphoric mood and suicidal ideas.        All pertinent negatives and positives are as above. All other systems have been reviewed and are negative unless otherwise stated.     Objective    Temp:  [96.5 °F (35.8 °C)-97.3 °F (36.3 °C)] 97.2 °F (36.2 °C)  Heart Rate:  [] 105  Resp:  [18] 18  BP: (114-132)/(62-96) 124/96    Physical Exam   Constitutional: She appears well-developed and well-nourished.   HENT:   Head: Normocephalic and atraumatic.   Eyes: Pupils are equal, round, and reactive to light. EOM are normal.   Neck: Normal range of motion. Neck supple.   Cardiovascular: Normal rate, regular rhythm and normal heart sounds. Exam reveals no gallop and no friction rub.   No murmur heard.  Pulmonary/Chest: Effort normal and breath sounds normal. No respiratory distress. She has no wheezes. She has no rales. She exhibits no tenderness.      Abdominal: Soft. Bowel sounds are normal. She exhibits no distension. There is no tenderness. There is no guarding.   Musculoskeletal: She exhibits no edema.   Skin: Skin is warm and dry.   Psychiatric: Her behavior is normal. Her affect is blunt. She is not actively hallucinating. She exhibits a depressed mood. She expresses suicidal ideation.   Vitals reviewed.      Results Review:  I have reviewed the labs, radiology results, and diagnostic studies.    Laboratory Data:   Results from last 7 days   Lab Units 07/05/20  0931 07/01/20  0418 06/30/20  2140   SODIUM mmol/L 134* 142 144   POTASSIUM mmol/L 3.8 3.6 3.8   CHLORIDE mmol/L 104 113* 108*   CO2 mmol/L 22.0 20.0* 23.0   BUN mg/dL 11 12 13   CREATININE mg/dL 0.70 0.66 0.84   GLUCOSE mg/dL 97 98 108*   CALCIUM mg/dL 8.7 7.8* 9.3   BILIRUBIN mg/dL 0.2  --  <0.2*   ALK PHOS U/L 113  --  133*   ALT (SGPT) U/L 34*  --  17   AST (SGOT) U/L 31  --  19   ANION GAP mmol/L 8.0 9.0 13.0     Estimated Creatinine Clearance: 78.1 mL/min (by C-G formula based on SCr of 0.7 mg/dL).  Results from last 7 days   Lab Units 07/01/20  0418 06/30/20  2140   MAGNESIUM mg/dL 2.3 2.1   PHOSPHORUS mg/dL 3.8  --          Results from last 7 days   Lab Units 07/05/20  0931 07/01/20  0418 06/30/20  2140   WBC 10*3/mm3 2.54* 3.58 5.46   HEMOGLOBIN g/dL 13.1 11.8* 14.4   HEMATOCRIT % 39.2 33.7* 41.9   PLATELETS 10*3/mm3 115* 141 211           Culture Data:   No results found for: BLOODCX  No results found for: URINECX  No results found for: RESPCX  No results found for: WOUNDCX  No results found for: STOOLCX  No components found for: BODYFLD    Radiology Data:   Imaging Results (Last 24 Hours)     ** No results found for the last 24 hours. **          I have reviewed the patient's current medications.     Assessment/Plan     Active Hospital Problems    Diagnosis   • **Alcohol withdrawal syndrome with complication (CMS/HCC)   • Depression with suicidal ideation   • Alcohol use disorder,  severe, dependence (CMS/[facility])       Plan:    1.  Alcohol abuse with withdrawal: Patient does have delirium tremens.  Improving.  Continue on scheduled and as needed Ativan.  Psychiatry is managing Ativan dosing.  Per her report hallucinations stopped.  2.  Depression with suicidal ideation: Patient continues to feel depressed.  Plan is for patient to go to behavioral health unit after medical detox is complete.  3.  Tobacco abuse: Continue nicotine patch.  4.  DVT prophylaxis: SCDs.      The patient was evaluated during the global COVID-19 pandemic, and the diagnosis was suspected/considered upon their initial presentation.  Evaluation, treatment, and testing were consistent with current guidelines for patients who present with complaints or symptoms that may be related to COVID-19.    Discharge Planning: I expect patient to be discharged to behavioral health unit in 2-3 days.        This document has been electronically signed by Arnulfo Mendoza MD on July 6, 2020 14:31

## 2020-07-06 NOTE — PLAN OF CARE
Problem: Patient Care Overview  Goal: Plan of Care Review  Outcome: Ongoing (interventions implemented as appropriate)  Flowsheets (Taken 7/6/2020 0316)  Progress: no change  Plan of Care Reviewed With: patient  Outcome Summary: VSS, scheduled Ativan given. No acute changes this shift. Will continue to monitor.

## 2020-07-07 ENCOUNTER — HOSPITAL ENCOUNTER (INPATIENT)
Facility: HOSPITAL | Age: 55
LOS: 6 days | Discharge: HOME | End: 2020-07-13
Attending: PSYCHIATRY & NEUROLOGY | Admitting: PSYCHIATRY & NEUROLOGY

## 2020-07-07 VITALS
HEIGHT: 62 IN | SYSTOLIC BLOOD PRESSURE: 130 MMHG | WEIGHT: 121 LBS | RESPIRATION RATE: 18 BRPM | HEART RATE: 70 BPM | OXYGEN SATURATION: 95 % | TEMPERATURE: 97.8 F | DIASTOLIC BLOOD PRESSURE: 55 MMHG | BODY MASS INDEX: 22.26 KG/M2

## 2020-07-07 PROBLEM — R45.851 SUICIDAL IDEATION: Status: ACTIVE | Noted: 2020-07-07

## 2020-07-07 PROCEDURE — 93005 ELECTROCARDIOGRAM TRACING: CPT | Performed by: PSYCHIATRY & NEUROLOGY

## 2020-07-07 PROCEDURE — 25010000002 THIAMINE PER 100 MG: Performed by: PSYCHIATRY & NEUROLOGY

## 2020-07-07 PROCEDURE — 93010 ELECTROCARDIOGRAM REPORT: CPT | Performed by: INTERNAL MEDICINE

## 2020-07-07 PROCEDURE — 99232 SBSQ HOSP IP/OBS MODERATE 35: CPT | Performed by: PSYCHIATRY & NEUROLOGY

## 2020-07-07 RX ORDER — CLONIDINE HYDROCHLORIDE 0.2 MG/1
0.2 TABLET ORAL 3 TIMES DAILY
Status: DISCONTINUED | OUTPATIENT
Start: 2020-07-07 | End: 2020-07-13 | Stop reason: HOSPADM

## 2020-07-07 RX ORDER — ALBUTEROL SULFATE 2.5 MG/3ML
2.5 SOLUTION RESPIRATORY (INHALATION) EVERY 6 HOURS PRN
Status: DISCONTINUED | OUTPATIENT
Start: 2020-07-07 | End: 2020-07-13 | Stop reason: HOSPADM

## 2020-07-07 RX ORDER — CLONIDINE HYDROCHLORIDE 0.2 MG/1
0.2 TABLET ORAL 3 TIMES DAILY
Status: ON HOLD
Start: 2020-07-07 | End: 2020-09-23 | Stop reason: SDUPTHER

## 2020-07-07 RX ORDER — LORAZEPAM 1 MG/1
2 TABLET ORAL EVERY 12 HOURS SCHEDULED
Status: DISCONTINUED | OUTPATIENT
Start: 2020-07-11 | End: 2020-07-11

## 2020-07-07 RX ORDER — THIAMINE MONONITRATE (VIT B1) 100 MG
200 TABLET ORAL 3 TIMES DAILY
Status: DISCONTINUED | OUTPATIENT
Start: 2020-07-07 | End: 2020-07-13 | Stop reason: HOSPADM

## 2020-07-07 RX ORDER — LORAZEPAM 1 MG/1
2 TABLET ORAL EVERY 8 HOURS
Status: COMPLETED | OUTPATIENT
Start: 2020-07-09 | End: 2020-07-10

## 2020-07-07 RX ORDER — FOLIC ACID 1 MG/1
1 TABLET ORAL DAILY
Status: DISCONTINUED | OUTPATIENT
Start: 2020-07-07 | End: 2020-07-13 | Stop reason: HOSPADM

## 2020-07-07 RX ORDER — LORAZEPAM 1 MG/1
2 TABLET ORAL EVERY 6 HOURS
Status: COMPLETED | OUTPATIENT
Start: 2020-07-07 | End: 2020-07-08

## 2020-07-07 RX ORDER — MULTIVITAMIN,THERAPEUTIC
1 TABLET ORAL DAILY
Status: DISCONTINUED | OUTPATIENT
Start: 2020-07-07 | End: 2020-07-13 | Stop reason: HOSPADM

## 2020-07-07 RX ORDER — NICOTINE 21 MG/24HR
1 PATCH, TRANSDERMAL 24 HOURS TRANSDERMAL EVERY 24 HOURS
Status: DISCONTINUED | OUTPATIENT
Start: 2020-07-07 | End: 2020-07-09

## 2020-07-07 RX ORDER — ACETAMINOPHEN 325 MG/1
650 TABLET ORAL EVERY 4 HOURS PRN
Status: DISCONTINUED | OUTPATIENT
Start: 2020-07-07 | End: 2020-07-13 | Stop reason: HOSPADM

## 2020-07-07 RX ORDER — MIRTAZAPINE 15 MG/1
30 TABLET, FILM COATED ORAL NIGHTLY
Status: DISCONTINUED | OUTPATIENT
Start: 2020-07-08 | End: 2020-07-08

## 2020-07-07 RX ORDER — LOPERAMIDE HYDROCHLORIDE 2 MG/1
2 CAPSULE ORAL
Status: DISCONTINUED | OUTPATIENT
Start: 2020-07-07 | End: 2020-07-13 | Stop reason: HOSPADM

## 2020-07-07 RX ORDER — HYDROXYZINE PAMOATE 50 MG/1
50 CAPSULE ORAL EVERY 6 HOURS PRN
Status: DISCONTINUED | OUTPATIENT
Start: 2020-07-07 | End: 2020-07-13 | Stop reason: HOSPADM

## 2020-07-07 RX ORDER — ALUMINA, MAGNESIA, AND SIMETHICONE 2400; 2400; 240 MG/30ML; MG/30ML; MG/30ML
15 SUSPENSION ORAL EVERY 6 HOURS PRN
Status: DISCONTINUED | OUTPATIENT
Start: 2020-07-07 | End: 2020-07-13 | Stop reason: HOSPADM

## 2020-07-07 RX ORDER — TRAZODONE HYDROCHLORIDE 50 MG/1
50 TABLET ORAL NIGHTLY PRN
Status: DISCONTINUED | OUTPATIENT
Start: 2020-07-07 | End: 2020-07-13 | Stop reason: HOSPADM

## 2020-07-07 RX ADMIN — LORAZEPAM 2 MG: 2 TABLET ORAL at 05:19

## 2020-07-07 RX ADMIN — NICOTINE 1 PATCH: 21 PATCH, EXTENDED RELEASE TRANSDERMAL at 08:51

## 2020-07-07 RX ADMIN — SODIUM CHLORIDE 75 ML/HR: 900 INJECTION, SOLUTION INTRAVENOUS at 00:15

## 2020-07-07 RX ADMIN — SODIUM CHLORIDE 75 ML/HR: 900 INJECTION, SOLUTION INTRAVENOUS at 08:51

## 2020-07-07 RX ADMIN — LORAZEPAM 2 MG: 2 TABLET ORAL at 10:48

## 2020-07-07 RX ADMIN — CLONIDINE HYDROCHLORIDE 0.2 MG: 0.2 TABLET ORAL at 20:44

## 2020-07-07 RX ADMIN — FOLIC ACID 1 MG: 1 TABLET ORAL at 20:44

## 2020-07-07 RX ADMIN — THERA TABS 1 TABLET: TAB at 20:44

## 2020-07-07 RX ADMIN — LORAZEPAM 2 MG: 2 TABLET ORAL at 00:15

## 2020-07-07 RX ADMIN — THIAMINE HYDROCHLORIDE 500 MG: 100 INJECTION, SOLUTION INTRAMUSCULAR; INTRAVENOUS at 08:52

## 2020-07-07 RX ADMIN — CLONIDINE HYDROCHLORIDE 0.2 MG: 0.2 TABLET ORAL at 16:12

## 2020-07-07 RX ADMIN — THIAMINE HCL TAB 100 MG 200 MG: 100 TAB at 20:44

## 2020-07-07 RX ADMIN — TRAZODONE HYDROCHLORIDE 50 MG: 50 TABLET ORAL at 20:45

## 2020-07-07 RX ADMIN — MIRTAZAPINE 30 MG: 15 TABLET, FILM COATED ORAL at 08:51

## 2020-07-07 RX ADMIN — LORAZEPAM 2 MG: 1 TABLET ORAL at 20:43

## 2020-07-07 RX ADMIN — SODIUM CHLORIDE 75 ML/HR: 900 INJECTION, SOLUTION INTRAVENOUS at 13:46

## 2020-07-07 RX ADMIN — CLONIDINE HYDROCHLORIDE 0.2 MG: 0.2 TABLET ORAL at 08:51

## 2020-07-07 RX ADMIN — HYDROXYZINE PAMOATE 25 MG: 25 CAPSULE ORAL at 15:38

## 2020-07-07 NOTE — DISCHARGE SUMMARY
[facility]  DISCHARGE SUMMARY       Date of Admission: 6/30/2020  Date of Discharge:  7/7/2020  Primary Care Physician: Farhan Guzman MD    Presenting Problem/History of Present Illness:  Depression with suicidal ideation [F32.9, R45.851]  Alcoholic intoxication without complication (CMS/HCC) [F10.920]  Depression with suicidal ideation [F32.9, R45.851]       Final Discharge Diagnoses:  Active Hospital Problems    Diagnosis   • **Alcohol withdrawal syndrome with complication (CMS/HCC)   • Depression with suicidal ideation   • Alcohol use disorder, severe, dependence (CMS/HCC)       Consults:   Consults     Date and Time Order Name Status Description    7/1/2020 1017 Inpatient Psychiatrist Consult Completed     6/30/2020 6748 Hospitalist (on-call MD unless specified)                     Pertinent Test Results:   Lab Results (most recent)     Procedure Component Value Units Date/Time    Comprehensive Metabolic Panel [[id number]]  (Abnormal) Collected:  07/05/20 0931    Specimen:  Blood Updated:  07/05/20 0958     Glucose 97 mg/dL      BUN 11 mg/dL      Creatinine 0.70 mg/dL      Sodium 134 mmol/L      Potassium 3.8 mmol/L      Chloride 104 mmol/L      CO2 22.0 mmol/L      Calcium 8.7 mg/dL      Total Protein 6.8 g/dL      Albumin 3.20 g/dL      ALT (SGPT) 34 U/L      AST (SGOT) 31 U/L      Alkaline Phosphatase 113 U/L      Total Bilirubin 0.2 mg/dL      eGFR Non African Amer 87 mL/min/1.73      Globulin 3.6 gm/dL      A/G Ratio 0.9 g/dL      BUN/Creatinine Ratio 15.7     Anion Gap 8.0 mmol/L     Narrative:       GFR Normal >60  Chronic Kidney Disease <60  Kidney Failure <15      CBC & Differential [[id number]] Collected:  07/05/20 0931    Specimen:  Blood Updated:  07/05/20 0942    Narrative:       The following orders were created for panel order CBC & Differential.  Procedure                               Abnormality         Status                        ---------                               -----------         ------                     CBC Auto Differential[[id number]]        Abnormal            Final result                 Please view results for these tests on the individual orders.    CBC Auto Differential [[id number]]  (Abnormal) Collected:  07/05/20 0931    Specimen:  Blood Updated:  07/05/20 0942     WBC 2.54 10*3/mm3      RBC 4.27 10*6/mm3      Hemoglobin 13.1 g/dL      Hematocrit 39.2 %      MCV 91.8 fL      MCH 30.7 pg      MCHC 33.4 g/dL      RDW 13.4 %      RDW-SD 45.1 fl      MPV 10.0 fL      Platelets 115 10*3/mm3      Neutrophil % 28.0 %      Lymphocyte % 48.4 %      Monocyte % 16.5 %      Eosinophil % 5.5 %      Basophil % 0.8 %      Immature Grans % 0.8 %      Neutrophils, Absolute 0.71 10*3/mm3      Lymphocytes, Absolute 1.23 10*3/mm3      Monocytes, Absolute 0.42 10*3/mm3      Eosinophils, Absolute 0.14 10*3/mm3      Basophils, Absolute 0.02 10*3/mm3      Immature Grans, Absolute 0.02 10*3/mm3      nRBC 0.0 /100 WBC     Ethanol [[id number]] Collected:  07/02/20 0948    Specimen:  Blood Updated:  07/02/20 1011     Ethanol <10 mg/dL      Ethanol % <0.010 %     Ethanol [[id number]]  (Abnormal) Collected:  07/01/20 1030    Specimen:  Blood Updated:  07/01/20 1103     Ethanol 90 mg/dL      Ethanol % 0.090 %     CBC Auto Differential [[id number]]  (Abnormal) Collected:  07/01/20 0418    Specimen:  Blood Updated:  07/01/20 0500     WBC 3.58 10*3/mm3      RBC 3.80 10*6/mm3      Hemoglobin 11.8 g/dL      Hematocrit 33.7 %      MCV 88.7 fL      MCH 31.1 pg      MCHC 35.0 g/dL      RDW 13.2 %      RDW-SD 42.9 fl      MPV 9.9 fL      Platelets 141 10*3/mm3      Neutrophil % 18.8 %      Lymphocyte % 68.4 %      Monocyte % 8.9 %      Eosinophil % 2.8 %      Basophil % 1.1 %      Immature Grans % 0.0 %      Neutrophils, Absolute 0.67 10*3/mm3      Lymphocytes, Absolute 2.45 10*3/mm3      Monocytes, Absolute 0.32 10*3/mm3      Eosinophils, Absolute 0.10 10*3/mm3       Basophils, Absolute 0.04 10*3/mm3      Immature Grans, Absolute 0.00 10*3/mm3      nRBC 0.0 /100 WBC     Basic Metabolic Panel [[id number]]  (Abnormal) Collected:  07/01/20 0418    Specimen:  Blood Updated:  07/01/20 0441     Glucose 98 mg/dL      BUN 12 mg/dL      Creatinine 0.66 mg/dL      Sodium 142 mmol/L      Potassium 3.6 mmol/L      Chloride 113 mmol/L      CO2 20.0 mmol/L      Calcium 7.8 mg/dL      eGFR Non African Amer 93 mL/min/1.73      BUN/Creatinine Ratio 18.2     Anion Gap 9.0 mmol/L     Narrative:       GFR Normal >60  Chronic Kidney Disease <60  Kidney Failure <15      Magnesium [[id number]]  (Normal) Collected:  07/01/20 0418    Specimen:  Blood Updated:  07/01/20 0440     Magnesium 2.3 mg/dL     Phosphorus [[id number]]  (Normal) Collected:  07/01/20 0418    Specimen:  Blood Updated:  07/01/20 0440     Phosphorus 3.8 mg/dL     [place] Draw [[id number]] Collected:  06/30/20 2140    Specimen:  Blood Updated:  06/30/20 2245    Narrative:       The following orders were created for panel order [place] Draw.  Procedure                               Abnormality         Status                     ---------                               -----------         ------                     Light Blue Top[[id number]]                                   Final result               Green Top (Gel)[[id number]]                                  Final result               Lavender Top[[id number]]                                     Final result               Gold Top - SST[[id number]]                                   Final result                 Please view results for these tests on the individual orders.    Light Blue Top [[id number]] Collected:  06/30/20 2140    Specimen:  Blood Updated:  06/30/20 2245     Extra Tube hold for add-on     Comment: Auto resulted       Green Top (Gel) [[id number]] Collected:  06/30/20 2140    Specimen:  Blood Updated:  06/30/20 2245     Extra Tube Hold for add-ons.     Comment: Auto resulted.        Lavender Top [[id number]] Collected:  06/30/20 2140    Specimen:  Blood Updated:  06/30/20 2245     Extra Tube hold for add-on     Comment: Auto resulted       Gold Top - SST [[id number]] Collected:  06/30/20 2140    Specimen:  Blood Updated:  06/30/20 2245     Extra Tube Hold for add-ons.     Comment: Auto resulted.       Manual Differential [[id number]]  (Abnormal) Collected:  06/30/20 2140    Specimen:  Blood Updated:  06/30/20 2229     Neutrophil % 16.0 %      Lymphocyte % 73.0 %      Monocyte % 10.0 %      Eosinophil % 1.0 %      Neutrophils Absolute 0.87 10*3/mm3      Lymphocytes Absolute 3.99 10*3/mm3      Monocytes Absolute 0.55 10*3/mm3      Eosinophils Absolute 0.05 10*3/mm3      RBC Morphology Normal     WBC Morphology Normal     Platelet Morphology Normal    Lipase [[id number]]  (Abnormal) Collected:  06/30/20 2140    Specimen:  Blood Updated:  06/30/20 2217     Lipase 79 U/L     Magnesium [[id number]]  (Normal) Collected:  06/30/20 2140    Specimen:  Blood Updated:  06/30/20 2217     Magnesium 2.1 mg/dL     CBC & Differential [[id number]] Collected:  06/30/20 2140    Specimen:  Blood Updated:  06/30/20 2209    Narrative:       The following orders were created for panel order CBC & Differential.  Procedure                               Abnormality         Status                     ---------                               -----------         ------                     CBC Auto Differential[[id number]]        Abnormal            Final result                 Please view results for these tests on the individual orders.    Comprehensive Metabolic Panel [[id number]]  (Abnormal) Collected:  06/30/20 2140    Specimen:  Blood Updated:  06/30/20 2208     Glucose 108 mg/dL      BUN 13 mg/dL      Creatinine 0.84 mg/dL      Sodium 144 mmol/L      Potassium 3.8 mmol/L      Chloride 108 mmol/L      CO2 23.0 mmol/L      Calcium 9.3 mg/dL      Total Protein 8.3 g/dL      Albumin 4.30 g/dL      ALT (SGPT) 17 U/L      AST  (SGOT) 19 U/L      Alkaline Phosphatase 133 U/L      Total Bilirubin <0.2 mg/dL      eGFR Non African Amer 70 mL/min/1.73      Globulin 4.0 gm/dL      A/G Ratio 1.1 g/dL      BUN/Creatinine Ratio 15.5     Anion Gap 13.0 mmol/L     Narrative:       GFR Normal >60  Chronic Kidney Disease <60  Kidney Failure <15      Acetaminophen Level [[id number]]  (Abnormal) Collected:  06/30/20 2140    Specimen:  Blood Updated:  06/30/20 2208     Acetaminophen <5.0 mcg/mL     Salicylate Level [[id number]]  (Normal) Collected:  06/30/20 2140    Specimen:  Blood Updated:  06/30/20 2208     Salicylate <0.3 mg/dL     Urine Drug Screen - Urine, Clean Catch [[id number]]  (Abnormal) Collected:  06/30/20 2140    Specimen:  Urine, Clean Catch Updated:  06/30/20 2157     THC, Screen, Urine Negative     Phencyclidine (PCP), Urine Negative     Cocaine Screen, Urine Negative     Methamphetamine, Ur Negative     Opiate Screen Negative     Amphetamine Screen, Urine Negative     Benzodiazepine Screen, Urine Negative     Tricyclic Antidepressants Screen Positive     Methadone Screen, Urine Negative     Barbiturates Screen, Urine Negative     Oxycodone Screen, Urine Negative     Propoxyphene Screen Negative     Buprenorphine, Screen, Urine Negative    Narrative:       Cutoff For Drugs Screened:    Amphetamines               500 ng/ml  Barbiturates               200 ng/ml  Benzodiazepines            150 ng/ml  Cocaine                    150 ng/ml  Methadone                  200 ng/ml  Opiates                    100 ng/ml  Phencyclidine               25 ng/ml  THC                            50 ng/ml  Methamphetamine            500 ng/ml  Tricyclic Antidepressants  300 ng/ml  Oxycodone                  100 ng/ml  Propoxyphene               300 ng/ml  Buprenorphine               10 ng/ml    The normal value for all drugs tested is negative. This report includes unconfirmed screening results, with the cutoff values listed, to be used for medical treatment  purposes only.  Unconfirmed results must not be used for non-medical purposes such as employment or legal testing.  Clinical consideration should be applied to any drug of abuse test, particularly when unconfirmed results are used.      Urinalysis With Microscopic If Indicated (No Culture) - Urine, Clean Catch [[id number]]  (Abnormal) Collected:  06/30/20 2140    Specimen:  Urine, Clean Catch Updated:  06/30/20 2151     Color, UA Yellow     Appearance, UA Cloudy     pH, UA 7.0     Specific Gravity, UA 1.007     Glucose, UA Negative     Ketones, UA Negative     Bilirubin, UA Negative     Blood, UA Negative     Protein, UA Negative     Leuk Esterase, UA Negative     Nitrite, UA Negative     Urobilinogen, UA 1.0 E.U./dL    Narrative:       Urine microscopic not indicated.        Imaging Results (Most Recent)     Procedure Component Value Units Date/Time    XR Chest 1 View [[id number]] Collected:  06/30/20 2146     Updated:  06/30/20 2205    Narrative:         CHEST 1 VIEW on 6/30/2020     CLINICAL INDICATION: EtOH intoxication    COMPARISON: 5/27/2020    FINDINGS: Emphysematous changes of the lungs are noted. The lungs  are otherwise clear. Vascular calcification is noted in the  aorta. Cardiac, hilar and mediastinal contours are within normal  limits. No bony abnormality is noted.      Impression:       No acute disease.    Electronically signed by:  El Renteria  6/30/2020 10:04 PM  CDT Workstation: [id number]          Chief Complaint on Day of Discharge: Anxiety and depression    Hospital Course:  The patient is a 55 y.o. female who presented to [facility] with suicidal ideation.  Patient has known chronic alcohol abuse with previous histories of withdrawal.  She had significant alcohol withdrawal requiring scheduled and PRN IV Ativan.  Psychiatry was consulted and managed the Ativan dosage as well as evaluation for suicidal ideation.  Patient was felt to warrant inpatient psychiatric admission.   "Once she was stable from a detox standpoint she was discharged to behavioral health in good condition.    Condition on Discharge: Stable    Physical Exam on Discharge:  /67 (BP Location: Left arm, Patient Position: Lying)   Pulse 78   Temp 98.6 °F (37 °C) (Oral)   Resp 18   Ht 157.5 cm (62\")   Wt 54.9 kg (121 lb)   LMP 01/24/2001 (Within Months)   SpO2 93%   BMI 22.13 kg/m²      Physical Exam  Constitutional: She appears well-developed and well-nourished.   HENT:   Head: Normocephalic and atraumatic.   Eyes: Pupils are equal, round, and reactive to light. EOM are normal.   Neck: Normal range of motion. Neck supple.   Cardiovascular: Normal rate, regular rhythm and normal heart sounds. Exam reveals no gallop and no friction rub.   No murmur heard.  Pulmonary/Chest: Effort normal and breath sounds normal. No respiratory distress. She has no wheezes. She has no rales. She exhibits no tenderness.   Abdominal: Soft. Bowel sounds are normal. She exhibits no distension. There is no tenderness. There is no guarding.   Musculoskeletal: She exhibits no edema.   Skin: Skin is warm and dry.   Psychiatric: Her affect is blunt. She is slowed and withdrawn. She is not actively hallucinating. She exhibits a depressed mood.   Vitals reviewed.     Discharge Disposition:  Psychiatric Hospital or Unit ([facility])    Discharge Medications:     Discharge Medications      Changes to Medications      Instructions Start Date   cloNIDine 0.2 MG tablet  Commonly known as:  CATAPRES  What changed:    · medication strength  · how much to take   0.2 mg, Oral, 3 Times Daily      hydrOXYzine pamoate 50 MG capsule  Commonly known as:  VISTARIL  What changed:  Another medication with the same name was removed. Continue taking this medication, and follow the directions you see here.   Take 1 capsule by mouth every 4 (four) hours As Needed for Anxiety.         Continue These Medications      Instructions Start Date   "   albuterol sulfate  (90 Base) MCG/ACT inhaler  Commonly known as:  PROVENTIL HFA;VENTOLIN HFA;PROAIR HFA   2 puffs, Inhalation, Every 4 Hours PRN      LORazepam 1 MG tablet  Commonly known as:  Ativan   1 mg, Oral, Every 6 Hours PRN      methocarbamol 500 MG tablet  Commonly known as:  ROBAXIN   500 mg, Oral, 4 Times Daily PRN      mirtazapine 30 MG tablet  Commonly known as:  REMERON   1 tablet, Oral, Daily      mirtazapine 30 MG tablet  Commonly known as:  REMERON   Take 1 tablet by mouth once a day for Depression.      nicotine 21 MG/24HR patch  Commonly known as:  NICODERM CQ   1 patch, Transdermal, Daily         Stop These Medications    buPROPion  MG 24 hr tablet  Commonly known as:  WELLBUTRIN XL     DULoxetine HCl 40 MG capsule delayed-release particles     naltrexone 50 MG tablet  Commonly known as:  DEPADE     QUEtiapine 100 MG tablet  Commonly known as:  SEROquel     QUEtiapine 25 MG tablet  Commonly known as:  SEROquel     QUEtiapine 50 MG tablet  Commonly known as:  SEROquel     terazosin 2 MG capsule  Commonly known as:  HYTRIN     Thera tablet tablet            Discharge Diet:   Diet Instructions     Advance Diet As Tolerated            Activity at Discharge:   Activity Instructions     Activity as Tolerated            Follow-up Appointments:   Transfer to Behavioral Health  Follow up with PCP 1-2 weeks after discharge from Behavioral Health.        This document has been electronically signed by Arnulfo Mendoza MD on July 7, 2020 15:14      Time: 35 minutes

## 2020-07-07 NOTE — PROGRESS NOTES
7/7/2020    Chief Complaint: suicidal ideation    Subjective:  Patient is a 55 y.o. female who was seen for suicidal ideation and substance abuse.     Patient is oriented and reasonably cognitively intact today.  She has no nystagmus today.  She notes no AVH today.  She notes severe depression continues.    She tolerated decrease of the ativan and conversion to oral.    Objective     Vital Signs    Vitals:    07/06/20 2006 07/07/20 0355 07/07/20 0849 07/07/20 1114   BP: 135/69 133/85 140/73 127/67   BP Location: Left arm Left arm Left arm Left arm   Patient Position: Sitting Lying Sitting Lying   Pulse: 72  88 78   Resp: 18 18 16 18   Temp:  96.6 °F (35.9 °C) 97.2 °F (36.2 °C) 98.6 °F (37 °C)   TempSrc:  Oral Oral Oral   SpO2: 96% 95% 96% 93%   Weight:  54.9 kg (121 lb)     Height:           Physical Exam:   General Appearance: alert, appears stated age and cooperative,  Hygiene:   fair  Gait & Station: deferred, in bed  Musculoskeletal: minimal tremor   Ophthalmic: no nystagmus today    Mental Status Exam:   Cooperation:  Cooperative  Eye Contact:  Good  Psychomotor Behavior:  Appropriate  Mood: Sad/Depressed  Affect:  mood-congruent  Speech:  Normal  Thought Process:  Coherent and Poverty of thought  Associations: Goal Directed  Thought Content:     Mood congruent   Suicidal:  Currently minimizes   Homicidal:  None   Hallucinations:  None   Delusion:  None  Cognitive Functioning:   Consciousness: awake, alert and oriented  Reliability:  fair  Insight:  Fair  Judgement:  Impaired  Impulse Control:  Impaired    Lab Results:  Lab Results (last 24 hours)     ** No results found for the last 24 hours. **          Radiology Results:  Imaging Results (Last 24 Hours)     ** No results found for the last 24 hours. **          Medicine:   Current Facility-Administered Medications:   •  albuterol (PROVENTIL) nebulizer solution 0.083% 2.5 mg/3mL, 2.5 mg, Nebulization, Q4H PRN, Shawn Malave MD  •  cloNIDine (CATAPRES)  tablet 0.2 mg, 0.2 mg, Oral, TID, Shawn Malave MD, 0.2 mg at 07/07/20 0851  •  hydrALAZINE (APRESOLINE) injection 10 mg, 10 mg, Intravenous, Q6H PRN, Shawn Malave MD  •  hydrOXYzine pamoate (VISTARIL) capsule 25 mg, 25 mg, Oral, 4x Daily PRN, Shawn Malave MD, 25 mg at 07/06/20 1933  •  LORazepam (ATIVAN) injection 4 mg, 4 mg, Intravenous, Q1H PRN, Harrison Quach MD  •  LORazepam (ATIVAN) tablet 1 mg, 1 mg, Oral, Q6H PRN, Shawn Malave MD, 1 mg at 07/04/20 2357  •  LORazepam (ATIVAN) tablet 2 mg, 2 mg, Oral, Q6H, Harrison Quach MD, 2 mg at 07/07/20 1048  •  methocarbamol (ROBAXIN) tablet 500 mg, 500 mg, Oral, 4x Daily PRN, Shawn Malave MD  •  mirtazapine (REMERON) tablet 30 mg, 30 mg, Oral, Daily, Shawn Malave MD, 30 mg at 07/07/20 0851  •  nicotine (NICODERM CQ) 21 MG/24HR patch 1 patch, 1 patch, Transdermal, Daily, Shawn Malave MD, 1 patch at 07/07/20 0851  •  sodium chloride 0.9 % flush 10 mL, 10 mL, Intravenous, PRN, Shawn Malave MD, 10 mL at 06/30/20 2132  •  sodium chloride 0.9 % flush 10 mL, 10 mL, Intravenous, Q12H, Shawn Malave MD, 10 mL at 07/05/20 0937  •  sodium chloride 0.9 % flush 10 mL, 10 mL, Intravenous, PRN, Shawn Malave MD  •  sodium chloride 0.9 % infusion, 75 mL/hr, Intravenous, Continuous, Shawn Malave MD, Last Rate: 75 mL/hr at 07/07/20 1346, 75 mL/hr at 07/07/20 1346  •  thiamine (B-1) 500 mg in sodium chloride 0.9 % 100 mL IVPB, 500 mg, Intravenous, TID, Harrison Quach MD, Last Rate: 200 mL/hr at 07/07/20 0852, 500 mg at 07/07/20 0852    Diagnoses/Assessment:     Alcohol withdrawal syndrome with complication (CMS/HCC)    Alcohol use disorder, severe, dependence (CMS/HCC)    Depression with suicidal ideation    Treatment Plan:    Will cont the ativan to 2mg every 6hrs by PO and cont ativan to 4mg IV prn based on vitals: SBP > 165, DBP > 105, HR > 105.    Will cont thiamine 500mg tid due to continuing  cognitive issues.    Will continue to hold psych meds other than remeron 30mg qhs.    Patient has been converted to oral ativan without difficult.  Patient can be admitted to the [facility] today if no other medical concerns.    Thank you for this consult.  Please contact me with any further questions or concerns.     Harrison Quach MD  07/07/20 at 14:32
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
## 2020-07-07 NOTE — PLAN OF CARE
Problem: Patient Care Overview  Goal: Plan of Care Review  Outcome: Ongoing (interventions implemented as appropriate)  Flowsheets (Taken 7/7/2020 8251)  Progress: no change  Plan of Care Reviewed With: patient  Outcome Summary: Patient is to be sent to Behavioral Health today. Will continue to monitor.

## 2020-07-07 NOTE — PLAN OF CARE
Problem: Suicide Risk (Adult)  Goal: Physical Safety  7/7/2020 0602 by Nasim Arnold RN  Outcome: Ongoing (interventions implemented as appropriate)  7/7/2020 0600 by Nasim Arnold RN  Outcome: Ongoing (interventions implemented as appropriate)  Goal: Strength-Based Wellness/Recovery  7/7/2020 0602 by Nasim Arnold RN  Outcome: Ongoing (interventions implemented as appropriate)  7/7/2020 0600 by Nasim Arnold RN  Outcome: Ongoing (interventions implemented as appropriate)  Goal: Physical Safety  7/7/2020 0602 by Nasim Arnold RN  Outcome: Ongoing (interventions implemented as appropriate)  7/7/2020 0600 by Nasim Arnold RN  Outcome: Ongoing (interventions implemented as appropriate)     Problem: Patient Care Overview  Goal: Plan of Care Review  7/7/2020 0602 by Nasim Arnold RN  Outcome: Ongoing (interventions implemented as appropriate)  Flowsheets  Taken 7/7/2020 0602  Progress: no change  Taken 7/6/2020 2006  Plan of Care Reviewed With: patient  Taken 7/7/2020 0600  Outcome Summary: vss, resting between care, scheduled ativan given , 1:1 sitter at bedside,fall precautions in place, will continue to monitor.  7/7/2020 0600 by Nasim Arnold RN  Outcome: Ongoing (interventions implemented as appropriate)  Flowsheets  Taken 7/6/2020 1522 by Judy Borrero RN  Progress: no change  Taken 7/6/2020 2006 by Nasim Arnold RN  Plan of Care Reviewed With: patient  Taken 7/7/2020 0600 by Nasim Arnold RN  Outcome Summary: vss, resting between care, scheduled ativan given , 1:1 sitter at bedside,fall precautions in place, will continue to monitor.  Goal: Individualization and Mutuality  7/7/2020 0602 by Nasim Arnold RN  Outcome: Ongoing (interventions implemented as appropriate)  7/7/2020 0600 by Nasim Arnold RN  Outcome: Ongoing (interventions implemented as appropriate)  Goal: Discharge Needs  Assessment  7/7/2020 0602 by Nasim Arnold RN  Outcome: Ongoing (interventions implemented as appropriate)  7/7/2020 0600 by Nasim Arnold RN  Outcome: Ongoing (interventions implemented as appropriate)  Goal: Interprofessional Rounds/Family Conf  7/7/2020 0602 by Nasim Arnold RN  Outcome: Ongoing (interventions implemented as appropriate)  7/7/2020 0600 by Nasim Arnold RN  Outcome: Ongoing (interventions implemented as appropriate)     Problem: Pain, Chronic (Adult)  Goal: Acceptable Pain/Comfort Level and Functional Ability  7/7/2020 0602 by Nasim Arnold RN  Outcome: Ongoing (interventions implemented as appropriate)  7/7/2020 0600 by aNsim Arnold RN  Outcome: Ongoing (interventions implemented as appropriate)     Problem: Fall Risk (Adult)  Goal: Absence of Fall  7/7/2020 0602 by Nasim Arnold RN  Outcome: Ongoing (interventions implemented as appropriate)  7/7/2020 0600 by Nasim Arnold RN  Outcome: Ongoing (interventions implemented as appropriate)     Problem: Alcohol Withdrawal Acute, Risk/Actual (Adult)  Goal: Signs and Symptoms of Listed Potential Problems Will be Absent, Minimized or Managed (Alcohol Withdrawal Acute, Risk/Actual)  7/7/2020 0602 by Nasim Arnold RN  Outcome: Ongoing (interventions implemented as appropriate)  7/7/2020 0600 by Nasim Arnold RN  Outcome: Ongoing (interventions implemented as appropriate)     Problem: Skin Injury Risk (Adult)  Goal: Skin Health and Integrity  7/7/2020 0602 by Nasim Arnold RN  Outcome: Ongoing (interventions implemented as appropriate)  7/7/2020 0600 by Nasim Arnold RN  Outcome: Ongoing (interventions implemented as appropriate)

## 2020-07-07 NOTE — PROGRESS NOTES
[facility] Medicine Services  INPATIENT PROGRESS NOTE    Length of Stay: 5  Date of Admission: 6/30/2020  Primary Care Physician: Farhan Guzman MD    Subjective   Chief Complaint: Judy ideation  HPI: Patient remains with a very flat affect.  He states that she is doing about the same today.  She requests anxiety medication.    Review of Systems   Constitutional: Positive for activity change and appetite change. Negative for chills, fatigue, fever and unexpected weight change.   Respiratory: Negative for cough, choking, chest tightness, shortness of breath and wheezing.    Cardiovascular: Negative for chest pain, palpitations and leg swelling.   Gastrointestinal: Negative for abdominal pain, blood in stool, constipation, diarrhea, nausea and vomiting.   Genitourinary: Negative for dysuria, flank pain and hematuria.   Neurological: Negative for dizziness, seizures, syncope, speech difficulty, weakness, light-headedness, numbness and headaches.   Hematological: Does not bruise/bleed easily.   Psychiatric/Behavioral: Positive for dysphoric mood and suicidal ideas. The patient is nervous/anxious.         All pertinent negatives and positives are as above. All other systems have been reviewed and are negative unless otherwise stated.     Objective    Temp:  [96.6 °F (35.9 °C)-98.6 °F (37 °C)] 98.6 °F (37 °C)  Heart Rate:  [72-88] 78  Resp:  [16-18] 18  BP: (127-148)/(67-95) 127/67    Physical Exam   Constitutional: She appears well-developed and well-nourished.   HENT:   Head: Normocephalic and atraumatic.   Eyes: Pupils are equal, round, and reactive to light. EOM are normal.   Neck: Normal range of motion. Neck supple.   Cardiovascular: Normal rate, regular rhythm and normal heart sounds. Exam reveals no gallop and no friction rub.   No murmur heard.  Pulmonary/Chest: Effort normal and breath sounds normal. No respiratory distress. She has no wheezes. She has no rales.  She exhibits no tenderness.   Abdominal: Soft. Bowel sounds are normal. She exhibits no distension. There is no tenderness. There is no guarding.   Musculoskeletal: She exhibits no edema.   Skin: Skin is warm and dry.   Psychiatric: Her affect is blunt. She is slowed and withdrawn. She is not actively hallucinating. She exhibits a depressed mood.   Vitals reviewed.      Results Review:  I have reviewed the labs, radiology results, and diagnostic studies.    Laboratory Data:   Results from last 7 days   Lab Units 07/05/20  0931 07/01/20  0418 06/30/20  2140   SODIUM mmol/L 134* 142 144   POTASSIUM mmol/L 3.8 3.6 3.8   CHLORIDE mmol/L 104 113* 108*   CO2 mmol/L 22.0 20.0* 23.0   BUN mg/dL 11 12 13   CREATININE mg/dL 0.70 0.66 0.84   GLUCOSE mg/dL 97 98 108*   CALCIUM mg/dL 8.7 7.8* 9.3   BILIRUBIN mg/dL 0.2  --  <0.2*   ALK PHOS U/L 113  --  133*   ALT (SGPT) U/L 34*  --  17   AST (SGOT) U/L 31  --  19   ANION GAP mmol/L 8.0 9.0 13.0     Estimated Creatinine Clearance: 78.7 mL/min (by C-G formula based on SCr of 0.7 mg/dL).  Results from last 7 days   Lab Units 07/01/20  0418 06/30/20  2140   MAGNESIUM mg/dL 2.3 2.1   PHOSPHORUS mg/dL 3.8  --          Results from last 7 days   Lab Units 07/05/20  0931 07/01/20  0418 06/30/20  2140   WBC 10*3/mm3 2.54* 3.58 5.46   HEMOGLOBIN g/dL 13.1 11.8* 14.4   HEMATOCRIT % 39.2 33.7* 41.9   PLATELETS 10*3/mm3 115* 141 211           Culture Data:   No results found for: BLOODCX  No results found for: URINECX  No results found for: RESPCX  No results found for: WOUNDCX  No results found for: STOOLCX  No components found for: BODYFLD    Radiology Data:   Imaging Results (Last 24 Hours)     ** No results found for the last 24 hours. **          I have reviewed the patient's current medications.     Assessment/Plan     Active Hospital Problems    Diagnosis   • **Alcohol withdrawal syndrome with complication (CMS/HCC)   • Depression with suicidal ideation   • Alcohol use disorder,  severe, dependence (CMS/HCC)       Plan:    1.  Alcohol abuse with withdrawal:  Improving.  Continue on scheduled and as needed Ativan.  Psychiatry is managing Ativan dosing.  No further hallucinations.  2.  Depression with suicidal ideation: Patient continues to feel depressed.  Plan is for patient to go to behavioral health unit after medical detox is complete.  3.  Tobacco abuse: Continue nicotine patch.  4.  DVT prophylaxis: SCDs.      The patient was evaluated during the global COVID-19 pandemic, and the diagnosis was suspected/considered upon their initial presentation.  Evaluation, treatment, and testing were consistent with current guidelines for patients who present with complaints or symptoms that may be related to COVID-19.    Discharge Planning: I expect patient to be discharged to behavioral health unit in 2-3 days.        This document has been electronically signed by Arnulfo Mendoza MD on July 7, 2020 13:31

## 2020-07-08 PROBLEM — F43.21 COMPLICATED BEREAVEMENT: Status: ACTIVE | Noted: 2020-07-08

## 2020-07-08 LAB
CHOLEST SERPL-MCNC: 212 MG/DL (ref 0–200)
GLUCOSE P FAST SERPL-MCNC: 89 MG/DL (ref 65–99)
HDLC SERPL-MCNC: 70 MG/DL (ref 40–60)
LDLC SERPL CALC-MCNC: 132 MG/DL (ref 0–100)
LDLC/HDLC SERPL: 1.88 {RATIO}
TRIGL SERPL-MCNC: 52 MG/DL (ref 0–150)
VLDLC SERPL-MCNC: 10.4 MG/DL

## 2020-07-08 PROCEDURE — 80061 LIPID PANEL: CPT | Performed by: PSYCHIATRY & NEUROLOGY

## 2020-07-08 PROCEDURE — 99223 1ST HOSP IP/OBS HIGH 75: CPT | Performed by: PSYCHIATRY & NEUROLOGY

## 2020-07-08 PROCEDURE — 82947 ASSAY GLUCOSE BLOOD QUANT: CPT | Performed by: PSYCHIATRY & NEUROLOGY

## 2020-07-08 RX ORDER — FLUOXETINE 10 MG/1
10 CAPSULE ORAL DAILY
Status: COMPLETED | OUTPATIENT
Start: 2020-07-08 | End: 2020-07-08

## 2020-07-08 RX ORDER — FLUOXETINE HYDROCHLORIDE 20 MG/1
20 CAPSULE ORAL DAILY
Status: DISCONTINUED | OUTPATIENT
Start: 2020-07-09 | End: 2020-07-11

## 2020-07-08 RX ORDER — OLANZAPINE 10 MG/1
10 TABLET ORAL NIGHTLY
Status: DISCONTINUED | OUTPATIENT
Start: 2020-07-08 | End: 2020-07-13 | Stop reason: HOSPADM

## 2020-07-08 RX ADMIN — CLONIDINE HYDROCHLORIDE 0.2 MG: 0.2 TABLET ORAL at 20:14

## 2020-07-08 RX ADMIN — OLANZAPINE 10 MG: 10 TABLET, FILM COATED ORAL at 20:13

## 2020-07-08 RX ADMIN — LORAZEPAM 2 MG: 1 TABLET ORAL at 20:14

## 2020-07-08 RX ADMIN — CLONIDINE HYDROCHLORIDE 0.2 MG: 0.2 TABLET ORAL at 15:23

## 2020-07-08 RX ADMIN — THIAMINE HCL TAB 100 MG 200 MG: 100 TAB at 08:25

## 2020-07-08 RX ADMIN — FOLIC ACID 1 MG: 1 TABLET ORAL at 08:25

## 2020-07-08 RX ADMIN — LORAZEPAM 2 MG: 1 TABLET ORAL at 08:25

## 2020-07-08 RX ADMIN — LORAZEPAM 2 MG: 1 TABLET ORAL at 15:22

## 2020-07-08 RX ADMIN — NICOTINE 1 PATCH: 21 PATCH, EXTENDED RELEASE TRANSDERMAL at 20:18

## 2020-07-08 RX ADMIN — TRAZODONE HYDROCHLORIDE 50 MG: 50 TABLET ORAL at 20:51

## 2020-07-08 RX ADMIN — THIAMINE HCL TAB 100 MG 200 MG: 100 TAB at 20:13

## 2020-07-08 RX ADMIN — THERA TABS 1 TABLET: TAB at 08:25

## 2020-07-08 RX ADMIN — LORAZEPAM 2 MG: 1 TABLET ORAL at 02:44

## 2020-07-08 RX ADMIN — NICOTINE 1 PATCH: 21 PATCH, EXTENDED RELEASE TRANSDERMAL at 09:22

## 2020-07-08 RX ADMIN — CLONIDINE HYDROCHLORIDE 0.2 MG: 0.2 TABLET ORAL at 08:25

## 2020-07-08 RX ADMIN — FLUOXETINE 10 MG: 10 CAPSULE ORAL at 15:23

## 2020-07-08 RX ADMIN — THIAMINE HCL TAB 100 MG 200 MG: 100 TAB at 15:23

## 2020-07-08 NOTE — PLAN OF CARE
Problem: Patient Care Overview  Goal: Plan of Care Review  Outcome: Ongoing (interventions implemented as appropriate)  Flowsheets  Taken 7/8/2020 0416  Progress: no change  Patient Agreement with Plan of Care: agrees  Outcome Summary: New admit, given scheduled ativan taper, very tearful, continue to monitor  Taken 7/8/2020 0000  Plan of Care Reviewed With: patient

## 2020-07-08 NOTE — NURSING NOTE
Behavior   Note any precipitants to event or behavior   Describe level and action of any aggressive behavior or speech and associated interventions.     Anxiety: Excess Worry, Easily fatigued, Decreased sleep and Decreased concentration  Depression: depressed mood and fatigue  Pain  0  AVH   no  S/I   no  Plan  no  H/I   no  Plan  no    Affect   Depressed, anxious, crying      Note: Patient was crying during her assessment and med pass. She said that she didn't want to talk about it but she was very anxious and needed something to help her sleep. PRN trazadone given. She was very cooperative and very thankful. She said if she felt better later she would talk. Patient denies any SI/HI/AVH at this time. Continue with scheduled ativan.       Intervention    PRN medication utilized:  yes - Trazadone    Instructed in medication usage and effects  Medications administered as ordered  Encouraged to verbalize needs      Response    Verbalized understanding   Did patient take medications as ordered yes   Did patient interact with assessment?  yes     Plan    Will monitor for safety  Will monitor every 15 minutes as ordered  Will evaluate and promote the plan of care    Last BM:  unknown date  (Please chart in I/O as well)

## 2020-07-08 NOTE — PLAN OF CARE
"  Problem: Patient Care Overview  Goal: Plan of Care Review  Outcome: Ongoing (interventions implemented as appropriate)  Flowsheets (Taken 2020 1321)  Progress: improving  Plan of Care Reviewed With: patient  Patient Agreement with Plan of Care: agrees  Outcome Summary: Pt interviewed in her room at this time. She is noted to be in bed following breakfast. States \"I just dont feel like being up right now\".  Expresses that she is still so depressed since her fiance .  Pt was calm, pleasant and cooperative with care.  She denies SI/HI/AVH.     "

## 2020-07-08 NOTE — CONSULTS
[facility] Medicine Consult  Inpatient Hospitalist Consult  Consult performed by: Arnulfo Mendoza MD  Consult ordered by: Harrison Quach MD          Date of Admission: 7/7/2020  Date of Consult: 07/08/20    Primary Care Physician: Farhan Guzman MD  Referring Physician:  Harrison Quach MD      Chief Complaint/Reason for Consultation: Suicidal ideation and alcohol withdrawal    HPI: Patient states that she remains depressed today.  She is otherwise without complaint.  She states that the auditory and visual hallucinations that she was having when she was on the acute medical floor have resolved.  She does states that she has vivid dreams.  Patient underwent medical detox on the acute medical floor.  Once her digoxin her she was transferred to the psychiatric floor.    Past Medical History:  has a past medical history of ADHD (attention deficit hyperactivity disorder), Alcohol abuse, Bilateral carpal tunnel syndrome, Bipolar 1 disorder (CMS/HCC), Candida vaginitis (5/5/2020), Carpal tunnel syndrome on both sides (2015), Chronic pain disorder, Collapsed lung (1986), Depression, Hyperkalemia (5/13/2020), Irritable bowel syndrome, Leukopenia (4/23/2018), Spontaneous pneumothorax, Suicidal ideation (1/5/2019), Thrombocytopenia (CMS/HCC) (5/6/2020), and Wernicke's encephalopathy (4/23/2018).    Past Surgical History:  has a past surgical history that includes Hysterectomy; Colonoscopy; Total abdominal hysterectomy w/ bilateral salpingoophorectomy; and Chest tube insertion.    Family History: family history includes Alcohol abuse in her father, paternal grandfather, and paternal grandmother; Anxiety disorder in her maternal aunt, mother, and sister; Bipolar disorder in her sister; COPD in her mother; Depression in her father and mother; Diabetes in her father.     Social History:  reports that she has been smoking. She started smoking about 43 years ago. She has  been smoking about 1.00 pack per day. She has never used smokeless tobacco. She reports that she drinks about 70.0 standard drinks of alcohol per week. She reports that she has current or past drug history. Drugs: Cocaine, Methamphetamines, and Marijuana.    Allergies:   Allergies   Allergen Reactions   • Wasp Venom Swelling       Medications: Scheduled Meds:  cloNIDine 0.2 mg Oral TID   folic acid 1 mg Oral Daily   LORazepam 2 mg Oral Q6H   Followed by      [START ON 7/9/2020] LORazepam 2 mg Oral Q8H   Followed by      [START ON 7/11/2020] LORazepam 2 mg Oral Q12H   mirtazapine 30 mg Oral Nightly   nicotine 1 patch Transdermal Q24H   Thera 1 tablet Oral Daily   vitamin B-1 200 mg Oral TID     Continuous Infusions:   PRN Meds:.•  acetaminophen  •  albuterol  •  aluminum-magnesium hydroxide-simethicone  •  hydrOXYzine pamoate  •  loperamide  •  magnesium hydroxide  •  traZODone    Review of Systems:  Review of Systems   Constitutional: Positive for activity change and appetite change. Negative for chills, fatigue, fever and unexpected weight change.   HENT: Negative for congestion, facial swelling, hearing loss, nosebleeds, rhinorrhea, sneezing, trouble swallowing and voice change.    Eyes: Negative for photophobia and visual disturbance.   Respiratory: Negative for apnea, cough, choking, chest tightness, shortness of breath, wheezing and stridor.    Cardiovascular: Negative for chest pain, palpitations and leg swelling.   Gastrointestinal: Negative for abdominal pain, blood in stool, constipation, diarrhea, nausea and vomiting.   Endocrine: Negative for cold intolerance, heat intolerance, polydipsia, polyphagia and polyuria.   Genitourinary: Negative for dysuria, flank pain and hematuria.   Musculoskeletal: Negative for arthralgias, back pain, myalgias and neck pain.   Skin: Negative for rash and wound.   Allergic/Immunologic: Negative for immunocompromised state.   Neurological: Negative for dizziness, seizures,  syncope, speech difficulty, weakness, light-headedness, numbness and headaches.   Hematological: Does not bruise/bleed easily.   Psychiatric/Behavioral: Positive for decreased concentration, dysphoric mood, sleep disturbance and suicidal ideas. Negative for agitation, behavioral problems, confusion, hallucinations and self-injury. The patient is not nervous/anxious.       Otherwise complete ROS is negative except as mentioned above.    Physical Exam:   Temp:  [97.8 °F (36.6 °C)-98.2 °F (36.8 °C)] 98.2 °F (36.8 °C)  Heart Rate:  [70-99] 79  Resp:  [18] 18  BP: (117-130)/(55-80) 119/68  Physical Exam   Constitutional: She is oriented to person, place, and time. She appears well-developed and well-nourished.   HENT:   Head: Normocephalic and atraumatic.   Nose: Nose normal.   Mouth/Throat: Oropharynx is clear and moist.   Eyes: Pupils are equal, round, and reactive to light. Conjunctivae, EOM and lids are normal. No scleral icterus.   Neck: Normal range of motion. Neck supple. No JVD present. No tracheal tenderness and no spinous process tenderness present. No neck rigidity. No tracheal deviation, no edema and normal range of motion present.   Cardiovascular: Normal rate, regular rhythm, S1 normal, S2 normal, normal heart sounds and normal pulses. Exam reveals no gallop and no friction rub.   No murmur heard.  Pulmonary/Chest: Effort normal and breath sounds normal. No accessory muscle usage. No respiratory distress. She has no decreased breath sounds. She has no wheezes. She has no rales. She exhibits no tenderness.   Abdominal: Soft. Bowel sounds are normal. She exhibits no distension and no mass. There is no tenderness. There is no rebound and no guarding.   Musculoskeletal: She exhibits no edema or tenderness.        Right shoulder: She exhibits normal range of motion, no tenderness and no pain.   Neurological: She is alert and oriented to person, place, and time. She has normal reflexes. She displays no atrophy  and normal reflexes. No cranial nerve deficit or sensory deficit. She exhibits normal muscle tone. She displays no seizure activity. Coordination normal.   CN I: Sense of smell intact  CN II: Visual fields intact  CN III,IV,VI: extraocular movements intact  CN V: Masseter strength and sensation in all three divisions intact  CN VII: Smile and eyelid closure symmetrical  CN VIII: Hearing intact  CN IX and X: Voice and palate movement intact  CN XI: Shoulder shrug intact  CN XII: Tongue protrusion and movement intact     Skin: Skin is warm. No rash noted. No pallor.   Psychiatric: Judgment and thought content normal. Her affect is blunt. Her speech is delayed. She is withdrawn. She exhibits a depressed mood.         Results Reviewed:  I have personally reviewed current lab, radiology, and data and agree with results.  Lab Results (last 24 hours)     Procedure Component Value Units Date/Time    Glucose, Fasting [[id number]]  (Normal) Collected:  07/08/20 0621    Specimen:  Blood Updated:  07/08/20 0711     Glucose, Fasting 89 mg/dL     Lipid Panel [[id number]]  (Abnormal) Collected:  07/08/20 0621    Specimen:  Blood Updated:  07/08/20 0711     Total Cholesterol 212 mg/dL      Triglycerides 52 mg/dL      HDL Cholesterol 70 mg/dL      LDL Cholesterol  132 mg/dL      VLDL Cholesterol 10.4 mg/dL      LDL/HDL Ratio 1.88    Narrative:       Cholesterol Reference Ranges  (U.S. Department of Health and Human Services ATP III Classifications)    Desirable          <200 mg/dL  Borderline High    200-239 mg/dL  High Risk          >240 mg/dL      Triglyceride Reference Ranges  (U.S. Department of Health and Human Services ATP III Classifications)    Normal           <150 mg/dL  Borderline High  150-199 mg/dL  High             200-499 mg/dL  Very High        >500 mg/dL    HDL Reference Ranges  (U.S. Department of Health and Human Services ATP III Classifcations)    Low     <40 mg/dl (major risk factor for CHD)  High    >60 mg/dl  ('negative' risk factor for CHD)        LDL Reference Ranges  (U.S. Department of Health and Human Services ATP III Classifcations)    Optimal          <100 mg/dL  Near Optimal     100-129 mg/dL  Borderline High  130-159 mg/dL  High             160-189 mg/dL  Very High        >189 mg/dL        Imaging Results (Last 24 Hours)     ** No results found for the last 24 hours. **          Assessment:  Active Hospital Problems    Diagnosis   • Suicidal ideation             Recommendations:  1.  Suicidal ideations  2.  Chronic alcohol abuse with acute alcohol withdrawal: Patient has transitioned to oral Ativan.  3.  Tobacco abuse: Continue nicotine patch.    I discussed the patient's findings and my recommendations with: Patient    I would like to thank Dr. Ennis for involving us in the care of Laura Bruce.    We will sign off at this point.  Please call if further questions.            This document has been electronically signed by Arnulfo Mendoza MD on July 8, 2020 13:19
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
## 2020-07-08 NOTE — PAYOR COMM NOTE
"Erika Gallo   [facility]  phone [phone]  fax [phone]    Aspen Benoit (55 y.o. Female)     Date of Birth Social Security Number Address Home Phone MRN    1965  [address] [place] [address] [phone] [id number]    [employer] Marital Status          Muslim Legally        Admission Date Admission Type Admitting Provider Attending Provider Department, Room/Bed    6/30/20 Emergency Maury Cagle MD  [address], 413/1    Discharge Date Discharge Disposition Discharge Destination        7/7/2020 Psychiatric Hospital or Unit ([facility])              Attending Provider:  (none)   Allergies:  Wasp Venom    Isolation:  None   Infection:  None   Code Status:  CPR    Ht:  157.5 cm (62\")   Wt:  54.9 kg (121 lb)    Admission Cmt:  None   Principal Problem:  Alcohol withdrawal syndrome with complication (CMS/HCC) [F10.239]                 Active Insurance as of 6/30/2020     Primary Coverage     Payor Plan Insurance Group Employer/Plan Group    [employer] [id number]     Payor Plan Address Payor Plan Phone Number Payor Plan Fax Number Effective Dates    [address] [phone]  10/1/2015 - None Entered    [facility] [id number]       Subscriber Name Subscriber Birth Date Member ID       ASPEN BENOIT 1965 [id number]                 Emergency Contacts      (Rel.) Home Phone Work Phone Mobile Phone    Kendy Julien (Relative) [phone] -- [phone]               Discharge Summary      Arnulfo Mendoza MD at 07/07/20 [address] [place] [address] Medicine Services  DISCHARGE SUMMARY       Date of Admission: 6/30/2020  Date of Discharge:  7/7/2020  Primary Care Physician: Farhan Guzman MD    Presenting Problem/History of Present Illness:  Depression with suicidal ideation [F32.9, R45.851]  Alcoholic intoxication without complication " (CMS/[facility]) [F10.920]  Depression with suicidal ideation [F32.9, R45.851]       Final Discharge Diagnoses:  Active Hospital Problems    Diagnosis   • **Alcohol withdrawal syndrome with complication (CMS/[facility])   • Depression with suicidal ideation   • Alcohol use disorder, severe, dependence (CMS/[facility])       Consults:   Consults     Date and Time Order Name Status Description    7/1/2020 1017 Inpatient Psychiatrist Consult Completed     6/30/2020 0335 Hospitalist (on-call MD unless specified)                     Pertinent Test Results:   Lab Results (most recent)     Procedure Component Value Units Date/Time    Comprehensive Metabolic Panel [[id number]]  (Abnormal) Collected:  07/05/20 0931    Specimen:  Blood Updated:  07/05/20 0958     Glucose 97 mg/dL      BUN 11 mg/dL      Creatinine 0.70 mg/dL      Sodium 134 mmol/L      Potassium 3.8 mmol/L      Chloride 104 mmol/L      CO2 22.0 mmol/L      Calcium 8.7 mg/dL      Total Protein 6.8 g/dL      Albumin 3.20 g/dL      ALT (SGPT) 34 U/L      AST (SGOT) 31 U/L      Alkaline Phosphatase 113 U/L      Total Bilirubin 0.2 mg/dL      eGFR Non African Amer 87 mL/min/1.73      Globulin 3.6 gm/dL      A/G Ratio 0.9 g/dL      BUN/Creatinine Ratio 15.7     Anion Gap 8.0 mmol/L     Narrative:       GFR Normal >60  Chronic Kidney Disease <60  Kidney Failure <15      CBC & Differential [[id number]] Collected:  07/05/20 0931    Specimen:  Blood Updated:  07/05/20 0942    Narrative:       The following orders were created for panel order CBC & Differential.  Procedure                               Abnormality         Status                     ---------                               -----------         ------                     CBC Auto Differential[[id number]]        Abnormal            Final result                 Please view results for these tests on the individual orders.    CBC Auto Differential [[id number]]  (Abnormal) Collected:  07/05/20 0931    Specimen:  Blood Updated:  07/05/20  0942     WBC 2.54 10*3/mm3      RBC 4.27 10*6/mm3      Hemoglobin 13.1 g/dL      Hematocrit 39.2 %      MCV 91.8 fL      MCH 30.7 pg      MCHC 33.4 g/dL      RDW 13.4 %      RDW-SD 45.1 fl      MPV 10.0 fL      Platelets 115 10*3/mm3      Neutrophil % 28.0 %      Lymphocyte % 48.4 %      Monocyte % 16.5 %      Eosinophil % 5.5 %      Basophil % 0.8 %      Immature Grans % 0.8 %      Neutrophils, Absolute 0.71 10*3/mm3      Lymphocytes, Absolute 1.23 10*3/mm3      Monocytes, Absolute 0.42 10*3/mm3      Eosinophils, Absolute 0.14 10*3/mm3      Basophils, Absolute 0.02 10*3/mm3      Immature Grans, Absolute 0.02 10*3/mm3      nRBC 0.0 /100 WBC     Ethanol [[id number]] Collected:  07/02/20 0948    Specimen:  Blood Updated:  07/02/20 1011     Ethanol <10 mg/dL      Ethanol % <0.010 %     Ethanol [[id number]]  (Abnormal) Collected:  07/01/20 1030    Specimen:  Blood Updated:  07/01/20 1103     Ethanol 90 mg/dL      Ethanol % 0.090 %     CBC Auto Differential [[id number]]  (Abnormal) Collected:  07/01/20 0418    Specimen:  Blood Updated:  07/01/20 0500     WBC 3.58 10*3/mm3      RBC 3.80 10*6/mm3      Hemoglobin 11.8 g/dL      Hematocrit 33.7 %      MCV 88.7 fL      MCH 31.1 pg      MCHC 35.0 g/dL      RDW 13.2 %      RDW-SD 42.9 fl      MPV 9.9 fL      Platelets 141 10*3/mm3      Neutrophil % 18.8 %      Lymphocyte % 68.4 %      Monocyte % 8.9 %      Eosinophil % 2.8 %      Basophil % 1.1 %      Immature Grans % 0.0 %      Neutrophils, Absolute 0.67 10*3/mm3      Lymphocytes, Absolute 2.45 10*3/mm3      Monocytes, Absolute 0.32 10*3/mm3      Eosinophils, Absolute 0.10 10*3/mm3      Basophils, Absolute 0.04 10*3/mm3      Immature Grans, Absolute 0.00 10*3/mm3      nRBC 0.0 /100 WBC     Basic Metabolic Panel [[id number]]  (Abnormal) Collected:  07/01/20 0418    Specimen:  Blood Updated:  07/01/20 0441     Glucose 98 mg/dL      BUN 12 mg/dL      Creatinine 0.66 mg/dL      Sodium 142 mmol/L      Potassium 3.6 mmol/L      Chloride  113 mmol/L      CO2 20.0 mmol/L      Calcium 7.8 mg/dL      eGFR Non African Amer 93 mL/min/1.73      BUN/Creatinine Ratio 18.2     Anion Gap 9.0 mmol/L     Narrative:       GFR Normal >60  Chronic Kidney Disease <60  Kidney Failure <15      Magnesium [[id number]]  (Normal) Collected:  07/01/20 0418    Specimen:  Blood Updated:  07/01/20 0440     Magnesium 2.3 mg/dL     Phosphorus [[id number]]  (Normal) Collected:  07/01/20 0418    Specimen:  Blood Updated:  07/01/20 0440     Phosphorus 3.8 mg/dL     Raymond Draw [[id number]] Collected:  06/30/20 2140    Specimen:  Blood Updated:  06/30/20 2245    Narrative:       The following orders were created for panel order Raymond Draw.  Procedure                               Abnormality         Status                     ---------                               -----------         ------                     Light Blue Top[[id number]]                                   Final result               Green Top (Gel)[[id number]]                                  Final result               Lavender Top[[id number]]                                     Final result               Gold Top - SST[[id number]]                                   Final result                 Please view results for these tests on the individual orders.    Light Blue Top [[id number]] Collected:  06/30/20 2140    Specimen:  Blood Updated:  06/30/20 2245     Extra Tube hold for add-on     Comment: Auto resulted       Green Top (Gel) [[id number]] Collected:  06/30/20 2140    Specimen:  Blood Updated:  06/30/20 2245     Extra Tube Hold for add-ons.     Comment: Auto resulted.       Lavender Top [[id number]] Collected:  06/30/20 2140    Specimen:  Blood Updated:  06/30/20 2245     Extra Tube hold for add-on     Comment: Auto resulted       Gold Top - SST [[id number]] Collected:  06/30/20 2140    Specimen:  Blood Updated:  06/30/20 2245     Extra Tube Hold for add-ons.     Comment: Auto resulted.       Manual Differential  [[id number]]  (Abnormal) Collected:  06/30/20 2140    Specimen:  Blood Updated:  06/30/20 2229     Neutrophil % 16.0 %      Lymphocyte % 73.0 %      Monocyte % 10.0 %      Eosinophil % 1.0 %      Neutrophils Absolute 0.87 10*3/mm3      Lymphocytes Absolute 3.99 10*3/mm3      Monocytes Absolute 0.55 10*3/mm3      Eosinophils Absolute 0.05 10*3/mm3      RBC Morphology Normal     WBC Morphology Normal     Platelet Morphology Normal    Lipase [[id number]]  (Abnormal) Collected:  06/30/20 2140    Specimen:  Blood Updated:  06/30/20 2217     Lipase 79 U/L     Magnesium [[id number]]  (Normal) Collected:  06/30/20 2140    Specimen:  Blood Updated:  06/30/20 2217     Magnesium 2.1 mg/dL     CBC & Differential [[id number]] Collected:  06/30/20 2140    Specimen:  Blood Updated:  06/30/20 2209    Narrative:       The following orders were created for panel order CBC & Differential.  Procedure                               Abnormality         Status                     ---------                               -----------         ------                     CBC Auto Differential[[id number]]        Abnormal            Final result                 Please view results for these tests on the individual orders.    Comprehensive Metabolic Panel [[id number]]  (Abnormal) Collected:  06/30/20 2140    Specimen:  Blood Updated:  06/30/20 2208     Glucose 108 mg/dL      BUN 13 mg/dL      Creatinine 0.84 mg/dL      Sodium 144 mmol/L      Potassium 3.8 mmol/L      Chloride 108 mmol/L      CO2 23.0 mmol/L      Calcium 9.3 mg/dL      Total Protein 8.3 g/dL      Albumin 4.30 g/dL      ALT (SGPT) 17 U/L      AST (SGOT) 19 U/L      Alkaline Phosphatase 133 U/L      Total Bilirubin <0.2 mg/dL      eGFR Non African Amer 70 mL/min/1.73      Globulin 4.0 gm/dL      A/G Ratio 1.1 g/dL      BUN/Creatinine Ratio 15.5     Anion Gap 13.0 mmol/L     Narrative:       GFR Normal >60  Chronic Kidney Disease <60  Kidney Failure <15      Acetaminophen Level [[id number]]   (Abnormal) Collected:  06/30/20 2140    Specimen:  Blood Updated:  06/30/20 2208     Acetaminophen <5.0 mcg/mL     Salicylate Level [[id number]]  (Normal) Collected:  06/30/20 2140    Specimen:  Blood Updated:  06/30/20 2208     Salicylate <0.3 mg/dL     Urine Drug Screen - Urine, Clean Catch [[id number]]  (Abnormal) Collected:  06/30/20 2140    Specimen:  Urine, Clean Catch Updated:  06/30/20 2157     THC, Screen, Urine Negative     Phencyclidine (PCP), Urine Negative     Cocaine Screen, Urine Negative     Methamphetamine, Ur Negative     Opiate Screen Negative     Amphetamine Screen, Urine Negative     Benzodiazepine Screen, Urine Negative     Tricyclic Antidepressants Screen Positive     Methadone Screen, Urine Negative     Barbiturates Screen, Urine Negative     Oxycodone Screen, Urine Negative     Propoxyphene Screen Negative     Buprenorphine, Screen, Urine Negative    Narrative:       Cutoff For Drugs Screened:    Amphetamines               500 ng/ml  Barbiturates               200 ng/ml  Benzodiazepines            150 ng/ml  Cocaine                    150 ng/ml  Methadone                  200 ng/ml  Opiates                    100 ng/ml  Phencyclidine               25 ng/ml  THC                            50 ng/ml  Methamphetamine            500 ng/ml  Tricyclic Antidepressants  300 ng/ml  Oxycodone                  100 ng/ml  Propoxyphene               300 ng/ml  Buprenorphine               10 ng/ml    The normal value for all drugs tested is negative. This report includes unconfirmed screening results, with the cutoff values listed, to be used for medical treatment purposes only.  Unconfirmed results must not be used for non-medical purposes such as employment or legal testing.  Clinical consideration should be applied to any drug of abuse test, particularly when unconfirmed results are used.      Urinalysis With Microscopic If Indicated (No Culture) - Urine, Clean Catch [[id number]]  (Abnormal) Collected:   "06/30/20 2140    Specimen:  Urine, Clean Catch Updated:  06/30/20 2151     Color, UA Yellow     Appearance, UA Cloudy     pH, UA 7.0     Specific Gravity, UA 1.007     Glucose, UA Negative     Ketones, UA Negative     Bilirubin, UA Negative     Blood, UA Negative     Protein, UA Negative     Leuk Esterase, UA Negative     Nitrite, UA Negative     Urobilinogen, UA 1.0 E.U./dL    Narrative:       Urine microscopic not indicated.        Imaging Results (Most Recent)     Procedure Component Value Units Date/Time    XR Chest 1 View [[id number]] Collected:  06/30/20 2146     Updated:  06/30/20 2205    Narrative:         CHEST 1 VIEW on 6/30/2020     CLINICAL INDICATION: EtOH intoxication    COMPARISON: 5/27/2020    FINDINGS: Emphysematous changes of the lungs are noted. The lungs  are otherwise clear. Vascular calcification is noted in the  aorta. Cardiac, hilar and mediastinal contours are within normal  limits. No bony abnormality is noted.      Impression:       No acute disease.    Electronically signed by:  El Renteria  6/30/2020 10:04 PM  CDT Workstation: [id number]          Chief Complaint on Day of Discharge: Anxiety and depression    Hospital Course:  The patient is a 55 y.o. female who presented to [facility] with suicidal ideation.  Patient has known chronic alcohol abuse with previous histories of withdrawal.  She had significant alcohol withdrawal requiring scheduled and PRN IV Ativan.  Psychiatry was consulted and managed the Ativan dosage as well as evaluation for suicidal ideation.  Patient was felt to warrant inpatient psychiatric admission.  Once she was stable from a detox standpoint she was discharged to behavioral health in good condition.    Condition on Discharge: Stable    Physical Exam on Discharge:  /67 (BP Location: Left arm, Patient Position: Lying)   Pulse 78   Temp 98.6 °F (37 °C) (Oral)   Resp 18   Ht 157.5 cm (62\")   Wt 54.9 kg (121 lb)   LMP 01/24/2001 " (Within Months)   SpO2 93%   BMI 22.13 kg/m²       Physical Exam  Constitutional: She appears well-developed and well-nourished.   HENT:   Head: Normocephalic and atraumatic.   Eyes: Pupils are equal, round, and reactive to light. EOM are normal.   Neck: Normal range of motion. Neck supple.   Cardiovascular: Normal rate, regular rhythm and normal heart sounds. Exam reveals no gallop and no friction rub.   No murmur heard.  Pulmonary/Chest: Effort normal and breath sounds normal. No respiratory distress. She has no wheezes. She has no rales. She exhibits no tenderness.   Abdominal: Soft. Bowel sounds are normal. She exhibits no distension. There is no tenderness. There is no guarding.   Musculoskeletal: She exhibits no edema.   Skin: Skin is warm and dry.   Psychiatric: Her affect is blunt. She is slowed and withdrawn. She is not actively hallucinating. She exhibits a depressed mood.   Vitals reviewed.     Discharge Dis[facility]:  [facility]    Discharge Medications:     Discharge Medications      Changes to Medications      Instructions Start Date   cloNIDine 0.2 MG tablet  Commonly known as:  CATAPRES  What changed:    · medication strength  · how much to take   0.2 mg, Oral, 3 Times Daily      hydrOXYzine pamoate 50 MG capsule  Commonly known as:  VISTARIL  What changed:  Another medication with the same name was removed. Continue taking this medication, and follow the directions you see here.   Take 1 capsule by mouth every 4 (four) hours As Needed for Anxiety.         Continue These Medications      Instructions Start Date   albuterol sulfate  (90 Base) MCG/ACT inhaler  Commonly known as:  PROVENTIL HFA;VENTOLIN HFA;PROAIR HFA   2 puffs, Inhalation, Every 4 Hours PRN      LORazepam 1 MG tablet  Commonly known as:  Ativan   1 mg, Oral, Every 6 Hours PRN      methocarbamol 500 MG tablet  Commonly known as:  ROBAXIN   500 mg, Oral, 4 Times Daily PRN      mirtazapine  30 MG tablet  Commonly known as:  REMERON   1 tablet, Oral, Daily      mirtazapine 30 MG tablet  Commonly known as:  REMERON   Take 1 tablet by mouth once a day for Depression.      nicotine 21 MG/24HR patch  Commonly known as:  NICODERM CQ   1 patch, Transdermal, Daily         Stop These Medications    buPROPion  MG 24 hr tablet  Commonly known as:  WELLBUTRIN XL     DULoxetine HCl 40 MG capsule delayed-release particles     naltrexone 50 MG tablet  Commonly known as:  DEPADE     QUEtiapine 100 MG tablet  Commonly known as:  SEROquel     QUEtiapine 25 MG tablet  Commonly known as:  SEROquel     QUEtiapine 50 MG tablet  Commonly known as:  SEROquel     terazosin 2 MG capsule  Commonly known as:  HYTRIN     Thera tablet tablet            Discharge Diet:   Diet Instructions     Advance Diet As Tolerated            Activity at Discharge:   Activity Instructions     Activity as Tolerated            Follow-up Appointments:   Transfer to Behavioral Health  Follow up with PCP 1-2 weeks after discharge from Behavioral Health.        This document has been electronically signed by Arnulfo Mendoza MD on July 7, 2020 15:14      Time: 35 minutes                Electronically signed by Arnulfo Mendoza MD at 07/07/20 4484

## 2020-07-08 NOTE — NURSING NOTE
"Behavior   Note any precipitants to event or behavior   Describe level and action of any aggressive behavior or speech and associated interventions.     Anxiety: Excess Worry, Easily fatigued, Muscle tension and Decreased concentration  Depression: depressed mood, insomnia, fatigue, difficulty concentrating and hopelessness  Pain  0  AVH   no  S/I   no  Plan  no  H/I   no  Plan  no    Affect   blunted      Note: Pt interviewed in her room at this time. She is noted to be in bed following breakfast. States \"I just dont feel like being up right now\".  Expresses that she is still so depressed since her fiance .  Pt was calm, pleasant and cooperative with care.  She denies SI/HI/AVH.       Intervention    PRN medication utilized:  no    Instructed in medication usage and effects  Medications administered as ordered  Encouraged to verbalize needs      Response    Verbalized understanding   Did patient take medications as ordered yes   Did patient interact with assessment?  yes     Plan    Will monitor for safety  Will monitor every 15 minutes as ordered  Will evaluate and promote the plan of care    Last BM:  2020  (Please chart in I/O as well)    "
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
## 2020-07-09 PROCEDURE — 99232 SBSQ HOSP IP/OBS MODERATE 35: CPT | Performed by: PSYCHIATRY & NEUROLOGY

## 2020-07-09 RX ORDER — NICOTINE 21 MG/24HR
1 PATCH, TRANSDERMAL 24 HOURS TRANSDERMAL DAILY
Status: DISCONTINUED | OUTPATIENT
Start: 2020-07-09 | End: 2020-07-13 | Stop reason: HOSPADM

## 2020-07-09 RX ADMIN — THIAMINE HCL TAB 100 MG 200 MG: 100 TAB at 20:07

## 2020-07-09 RX ADMIN — THIAMINE HCL TAB 100 MG 200 MG: 100 TAB at 08:57

## 2020-07-09 RX ADMIN — THIAMINE HCL TAB 100 MG 200 MG: 100 TAB at 16:21

## 2020-07-09 RX ADMIN — CLONIDINE HYDROCHLORIDE 0.2 MG: 0.2 TABLET ORAL at 16:21

## 2020-07-09 RX ADMIN — LORAZEPAM 2 MG: 1 TABLET ORAL at 02:10

## 2020-07-09 RX ADMIN — HYDROXYZINE PAMOATE 50 MG: 50 CAPSULE ORAL at 16:21

## 2020-07-09 RX ADMIN — FLUOXETINE 20 MG: 20 CAPSULE ORAL at 08:56

## 2020-07-09 RX ADMIN — TRAZODONE HYDROCHLORIDE 50 MG: 50 TABLET ORAL at 20:06

## 2020-07-09 RX ADMIN — FOLIC ACID 1 MG: 1 TABLET ORAL at 08:57

## 2020-07-09 RX ADMIN — LORAZEPAM 2 MG: 1 TABLET ORAL at 11:42

## 2020-07-09 RX ADMIN — CLONIDINE HYDROCHLORIDE 0.2 MG: 0.2 TABLET ORAL at 20:06

## 2020-07-09 RX ADMIN — NICOTINE 1 PATCH: 21 PATCH TRANSDERMAL at 08:56

## 2020-07-09 RX ADMIN — CLONIDINE HYDROCHLORIDE 0.2 MG: 0.2 TABLET ORAL at 08:56

## 2020-07-09 RX ADMIN — OLANZAPINE 10 MG: 10 TABLET, FILM COATED ORAL at 20:06

## 2020-07-09 RX ADMIN — THERA TABS 1 TABLET: TAB at 08:57

## 2020-07-09 RX ADMIN — LORAZEPAM 2 MG: 1 TABLET ORAL at 18:30

## 2020-07-09 NOTE — NURSING NOTE
Behavior   Note any precipitants to event or behavior   Describe level and action of any aggressive behavior or speech and associated interventions.     Anxiety: Excess Worry, Easily fatigued and Decreased concentration  Depression: depressed mood, fatigue, difficulty concentrating and hopelessness  Pain  0  AVH   no  S/I   no  Plan  no  H/I   no  Plan  no    Affect   blunted      Note: Pt interviewed in her room.  She expresses that she is still depressed but feeling a little better. Pt is calm and cooperative with medications and assessment. She still appears to be sad and withdrawn and explains that she did not sleep well last night and just needs a little nap. She denies SI/HI/AVH.       Intervention    PRN medication utilized:  no    Instructed in medication usage and effects  Medications administered as ordered  Encouraged to verbalize needs      Response    Verbalized understanding   Did patient take medications as ordered yes   Did patient interact with assessment?  yes     Plan    Will monitor for safety  Will monitor every 15 minutes as ordered  Will evaluate and promote the plan of care    Last BM:  unknown date  (Please chart in I/O as well)

## 2020-07-09 NOTE — PROGRESS NOTES
"7/9/2020    Chief Complaint: suicidal ideation, substance abuse and depression    Subjective:  Patient is a 55 y.o. female that is currently inpatient on the adult U today she is seen in her room where she is doing well, She reports improvement with mood and is putting forth effort into rehab.   Patient has been attending groups, mood still appears depressed, affect is flat. Pt is denying SI at this time.  She does reports slight hopefulness, but has a ways to go she states.   Pt is compliant with medications, denies AE with meds, reports that she slept \"ok\" with medications.       Objective     Vital Signs    Temp:  [97.2 °F (36.2 °C)-98.7 °F (37.1 °C)] 98.7 °F (37.1 °C)  Heart Rate:  [75-85] 75  Resp:  [18] 18  BP: ()/(56-57) 124/57    Physical Exam:   General Appearance: alert, appears stated age and cooperative,  Hygiene:   fair  Gait & Station: Normal  Musculoskeletal: No tremors or abnormal involuntary movements    Mental Status Exam:   Cooperation:  Cooperative  Eye Contact:  Fair  Psychomotor Behavior:  Appropriate  Mood: Sad/Depressed and Improving  Affect:  flat  Speech:  Normal  Thought Process:  Appropriately abstract  Associations: Goal Directed  Thought Content:     Mood congruent   Suicidal:  None   Homicidal:  None   Hallucinations:  None   Delusion:  None  Cognitive Functioning:   Consciousness: awake, alert and oriented  Reliability:  fair  Insight:  improving  Judgement:  Impaired  Impulse Control:  Fair    Lab Results (last 24 hours)     ** No results found for the last 24 hours. **        Imaging Results (Last 24 Hours)     ** No results found for the last 24 hours. **          Medicine:   Current Facility-Administered Medications:   •  acetaminophen (TYLENOL) tablet 650 mg, 650 mg, Oral, Q4H PRN, Harrison Quach MD  •  albuterol (PROVENTIL) nebulizer solution 0.083% 2.5 mg/3mL, 2.5 mg, Nebulization, Q6H PRN, Harrison Quach MD  •  aluminum-magnesium hydroxide-simethicone (MAALOX " MAX) 400-400-40 MG/5ML suspension 15 mL, 15 mL, Oral, Q6H PRN, Harrison Quach MD  •  cloNIDine (CATAPRES) tablet 0.2 mg, 0.2 mg, Oral, TID, Harrison Quach MD, 0.2 mg at 07/09/20 0856  •  [COMPLETED] FLUoxetine (PROzac) capsule 10 mg, 10 mg, Oral, Daily, 10 mg at 07/08/20 1523 **FOLLOWED BY** FLUoxetine (PROzac) capsule 20 mg, 20 mg, Oral, Daily, Harrison Quach MD, 20 mg at 07/09/20 0856  •  folic acid (FOLVITE) tablet 1 mg, 1 mg, Oral, Daily, Harrison Quach MD, 1 mg at 07/09/20 0857  •  hydrOXYzine pamoate (VISTARIL) capsule 50 mg, 50 mg, Oral, Q6H PRN, Harrison Quach MD  •  loperamide (IMODIUM) capsule 2 mg, 2 mg, Oral, Q2H PRN, Harrison Quach MD  •  [COMPLETED] LORazepam (ATIVAN) tablet 2 mg, 2 mg, Oral, Q6H, 2 mg at 07/08/20 2014 **FOLLOWED BY** LORazepam (ATIVAN) tablet 2 mg, 2 mg, Oral, Q8H, 2 mg at 07/09/20 1142 **FOLLOWED BY** [START ON 7/11/2020] LORazepam (ATIVAN) tablet 2 mg, 2 mg, Oral, Q12H, Harrison Quach MD  •  magnesium hydroxide (MILK OF MAGNESIA) suspension 2400 mg/10mL 10 mL, 10 mL, Oral, Daily PRN, Harrison Quach MD  •  nicotine (NICODERM CQ) 21 MG/24HR patch 1 patch, 1 patch, Transdermal, Daily, Harrison Quach MD, 1 patch at 07/09/20 0856  •  OLANZapine (zyPREXA) tablet 10 mg, 10 mg, Oral, Nightly, Harrison Quach MD, 10 mg at 07/08/20 2013  •  Thera tablet 1 tablet, 1 tablet, Oral, Daily, Harrison Quach MD, 1 tablet at 07/09/20 0857  •  thiamine (VITAMIN B-1) tablet 200 mg, 200 mg, Oral, TID, Harrison Quach MD, 200 mg at 07/09/20 0857  •  traZODone (DESYREL) tablet 50 mg, 50 mg, Oral, Nightly PRN, Harrison Quach MD, 50 mg at 07/08/20 2051    Diagnoses/Assessment:     Severe episode of recurrent major depressive disorder, without psychotic features (CMS/HCC)    Alcohol use disorder, severe, dependence (CMS/HCC)    Suicidal ideation    Complicated bereavement      Treatment Plan:    1) Will continue care for the patient on the  behavioral health unit at [facility] to ensure patient safety.  2) Will continue to provide treatment with the unit milieu, activities, therapies and psychopharmacological management.  3) Patient to be placed on or continued on  Q15 minute checks  and Suicide precautions.  4) Pertinent medical issues:   --HTN: Clonodine 0.2 mg TID   5) Will order following labs: none  6) Will make the following medication changes:  --Cont Prozac taper to 20 mg daily  --Cont Ativan Taper  --Cont Zyprexa 10 mg QHS   7) Will continue discharge planning as appropriate for patient.  8) Psychotherapy provided for less than 16 minutes.    Treatment plan and medication risks and benefits discussed with: Patient    BENNETT Schwarz  07/09/20  16:07

## 2020-07-09 NOTE — PLAN OF CARE
Problem: Patient Care Overview  Goal: Plan of Care Review  Outcome: Ongoing (interventions implemented as appropriate)  Flowsheets (Taken 7/9/2020 0733)  Progress: improving  Plan of Care Reviewed With: patient  Patient Agreement with Plan of Care: agrees  Outcome Summary: Pt interviewed in her room.  She expresses that she is still depressed but feeling a little better. Pt is calm and cooperative with medications and assessment. She still appears to be sad and withdrawn and explains that she did not sleep well last night and just needs a little nap. She denies SI/HI/AVH.

## 2020-07-09 NOTE — NURSING NOTE
Behavior   Note any precipitants to event or behavior   Describe level and action of any aggressive behavior or speech and associated interventions.     Anxiety: Excess Worry and Decreased sleep  Depression: insomnia, fatigue, difficulty concentrating and hopelessness  Pain  0  AVH   no  S/I   no  Plan  no  H/I   no  Plan  no    Affect   flat      Note: Pt is alert, pleasant, cooperative et calm. She says that she is tired, et sad. She denies any S/I or hallucinations et says that she is having difficulty with sleep et requested PRN trazadone. Pt is currently resting in bed with eyes closed. Will monitor.      Intervention     PRN medication utilized:  yes - Trazadone    Instructed in medication usage and effects  Medications administered as ordered  Encouraged to verbalize needs      Response    Verbalized understanding   Did patient take medications as ordered yes   Did patient interact with assessment?  yes     Plan    Will monitor for safety  Will monitor every 15 minutes as ordered  Will evaluate and promote the plan of care    Last BM:  unknown date  (Please chart in I/O as well)

## 2020-07-09 NOTE — PLAN OF CARE
Problem: Patient Care Overview  Goal: Plan of Care Review  Outcome: Ongoing (interventions implemented as appropriate)  Flowsheets (Taken 7/9/2020 0301)  Plan of Care Reviewed With: patient  Patient Agreement with Plan of Care: agrees with comment (describe)  Outcome Summary: Pt has slept well this pm, et returned to sleep after 0245 medication administration. She denies any discomfort.

## 2020-07-10 PROCEDURE — 99232 SBSQ HOSP IP/OBS MODERATE 35: CPT | Performed by: PSYCHIATRY & NEUROLOGY

## 2020-07-10 RX ADMIN — LORAZEPAM 2 MG: 1 TABLET ORAL at 09:18

## 2020-07-10 RX ADMIN — THIAMINE HCL TAB 100 MG 200 MG: 100 TAB at 17:06

## 2020-07-10 RX ADMIN — TRAZODONE HYDROCHLORIDE 50 MG: 50 TABLET ORAL at 20:42

## 2020-07-10 RX ADMIN — THIAMINE HCL TAB 100 MG 200 MG: 100 TAB at 09:17

## 2020-07-10 RX ADMIN — NICOTINE 1 PATCH: 21 PATCH TRANSDERMAL at 09:17

## 2020-07-10 RX ADMIN — OLANZAPINE 10 MG: 10 TABLET, FILM COATED ORAL at 20:42

## 2020-07-10 RX ADMIN — CLONIDINE HYDROCHLORIDE 0.2 MG: 0.2 TABLET ORAL at 17:06

## 2020-07-10 RX ADMIN — FLUOXETINE 20 MG: 20 CAPSULE ORAL at 09:17

## 2020-07-10 RX ADMIN — CLONIDINE HYDROCHLORIDE 0.2 MG: 0.2 TABLET ORAL at 09:17

## 2020-07-10 RX ADMIN — LORAZEPAM 2 MG: 1 TABLET ORAL at 01:59

## 2020-07-10 RX ADMIN — FOLIC ACID 1 MG: 1 TABLET ORAL at 09:18

## 2020-07-10 RX ADMIN — LORAZEPAM 2 MG: 1 TABLET ORAL at 17:06

## 2020-07-10 RX ADMIN — THIAMINE HCL TAB 100 MG 200 MG: 100 TAB at 20:42

## 2020-07-10 RX ADMIN — THERA TABS 1 TABLET: TAB at 09:17

## 2020-07-10 RX ADMIN — CLONIDINE HYDROCHLORIDE 0.2 MG: 0.2 TABLET ORAL at 20:42

## 2020-07-10 NOTE — NURSING NOTE
Behavior   Note any precipitants to event or behavior   Describe level and action of any aggressive behavior or speech and associated interventions.     Anxiety: Excess Worry and Decreased concentration  Depression: depressed mood  Pain  0  AVH   no  S/I   no  Plan  no  H/I   no  Plan  no    Affect   blunted      Note:Laura reports adequate sleep last night. She states her depression and anxiety are the same this morning as they were yesterday. She would not expand on her feelings or thoughts. Pt is cooperative with care.      Intervention    PRN medication utilized:  no    Instructed in medication usage and effects  Medications administered as ordered  Encouraged to verbalize needs      Response    Verbalized understanding   Did patient take medications as ordered yes   Did patient interact with assessment?  yes     Plan    Will monitor for safety  Will monitor every 15 minutes as ordered  Will evaluate and promote the plan of care    Last BM:  unknown date  (Please chart in I/O as well)

## 2020-07-10 NOTE — PLAN OF CARE
Problem: Patient Care Overview  Goal: Plan of Care Review  Outcome: Ongoing (interventions implemented as appropriate)  Flowsheets  Taken 7/10/2020 0300 by Lizett Teague, RN  Progress: no change  Plan of Care Reviewed With: patient  Patient Agreement with Plan of Care: agrees  Taken 7/10/2020 1721 by Nathen Chi, RN  Outcome Summary: Laura reports adequate sleep last night. She states her depression and anxiety are the same this morning as they were yesterday. She would not expand on her feelings or thoughts. Pt is cooperative with care.

## 2020-07-10 NOTE — NURSING NOTE
"Behavior   Note any precipitants to event or behavior   Describe level and action of any aggressive behavior or speech and associated interventions.     Anxiety: Excess Worry  Depression: fatigue  Pain  0  AVH   no  S/I   no  Plan  no  H/I   no  Plan  no    Affect   flat      Note: Pt is alert, et denies any discomfort, S/I or hallucinations at this time. She says that she feels \"ok\" et requested PRN trazadone for sleep. She is currently resting in bed with eyes closed. Will monitor.       Intervention    PRN medication utilized:  yes - PRN trazadone    Instructed in medication usage and effects  Medications administered as ordered  Encouraged to verbalize needs      Response    Verbalized understanding   Did patient take medications as ordered yes   Did patient interact with assessment?  yes     Plan    Will monitor for safety  Will monitor every 15 minutes as ordered  Will evaluate and promote the plan of care    Last BM:  unknown date  (Please chart in I/O as well)  "

## 2020-07-10 NOTE — PLAN OF CARE
Problem: Overarching Goals (Adult)  Goal: Optimized Coping Skills in Response to Life Stressors  Outcome: Ongoing (interventions implemented as appropriate)  Note:   Met with patient and completed Recreation Therapy Assessment.  Patient currently laying bed, with flat affect. She is tearful.  She reports she is dealing with the grief of her fiance's death and as a result, increased alcohol use. She states that she spends time reading, coloring, and caring for the child of a friend she lives with.  She reports being much less active than she used to be.  She uses coloring to cope with stress and reports that she will also go for walks.  Patient states  that she has been to rehab in the past and is considering going back.  Will continue to encourage group participation to identify healthy outlets and ways to cope with stress.

## 2020-07-10 NOTE — PLAN OF CARE
Problem: Patient Care Overview  Goal: Plan of Care Review  Outcome: Ongoing (interventions implemented as appropriate)  Flowsheets (Taken 7/10/2020 0300)  Progress: no change  Plan of Care Reviewed With: patient  Patient Agreement with Plan of Care: agrees  Outcome Summary: Pt has rested well this pm et has slept approx. 6 hrs this far. No signs of discomfort or distress.

## 2020-07-11 PROCEDURE — 99232 SBSQ HOSP IP/OBS MODERATE 35: CPT | Performed by: PSYCHIATRY & NEUROLOGY

## 2020-07-11 PROCEDURE — 90833 PSYTX W PT W E/M 30 MIN: CPT | Performed by: PSYCHIATRY & NEUROLOGY

## 2020-07-11 RX ORDER — LORAZEPAM 1 MG/1
1 TABLET ORAL EVERY 12 HOURS SCHEDULED
Status: COMPLETED | OUTPATIENT
Start: 2020-07-11 | End: 2020-07-12

## 2020-07-11 RX ADMIN — FLUOXETINE 20 MG: 20 CAPSULE ORAL at 08:04

## 2020-07-11 RX ADMIN — LORAZEPAM 2 MG: 1 TABLET ORAL at 04:08

## 2020-07-11 RX ADMIN — LORAZEPAM 1 MG: 1 TABLET ORAL at 20:16

## 2020-07-11 RX ADMIN — NICOTINE 1 PATCH: 21 PATCH TRANSDERMAL at 08:07

## 2020-07-11 RX ADMIN — THIAMINE HCL TAB 100 MG 200 MG: 100 TAB at 20:16

## 2020-07-11 RX ADMIN — FOLIC ACID 1 MG: 1 TABLET ORAL at 08:04

## 2020-07-11 RX ADMIN — CLONIDINE HYDROCHLORIDE 0.2 MG: 0.2 TABLET ORAL at 16:41

## 2020-07-11 RX ADMIN — CLONIDINE HYDROCHLORIDE 0.2 MG: 0.2 TABLET ORAL at 08:04

## 2020-07-11 RX ADMIN — THERA TABS 1 TABLET: TAB at 08:04

## 2020-07-11 RX ADMIN — OLANZAPINE 10 MG: 10 TABLET, FILM COATED ORAL at 20:16

## 2020-07-11 RX ADMIN — THIAMINE HCL TAB 100 MG 200 MG: 100 TAB at 08:04

## 2020-07-11 RX ADMIN — THIAMINE HCL TAB 100 MG 200 MG: 100 TAB at 16:41

## 2020-07-11 RX ADMIN — TRAZODONE HYDROCHLORIDE 50 MG: 50 TABLET ORAL at 20:16

## 2020-07-11 RX ADMIN — LORAZEPAM 2 MG: 1 TABLET ORAL at 08:07

## 2020-07-11 NOTE — NURSING NOTE
"Behavior   Note any precipitants to event or behavior   Describe level and action of any aggressive behavior or speech and associated interventions.     Anxiety: Patient denies at this time  Depression: depressed mood  Pain  0  AVH   no  S/I   yes   Plan  no  H/I   no  Plan  no    Affect   dysphoric      Note:pt seen in milieu, appears sad, states that she feels she is unable to speak much with anyone here due to \"every time I find someone here I can talk to, they get discharged.\"  Pt states she has thoughts of suicide, denies method or intent.  Pt denies avh, hi, anxiety or pain at this time.  Pt took all routine medications, asked for trazodone for sleep.       Intervention    PRN medication utilized:  yes - trazodone    Instructed in medication usage and effects  Medications administered as ordered  Encouraged to verbalize needs      Response    Verbalized understanding   Did patient take medications as ordered yes   Did patient interact with assessment?  yes     Plan    Will monitor for safety  Will monitor every 15 minutes as ordered  Will evaluate and promote the plan of care    Last BM:  unknown date  (Please chart in I/O as well)  "

## 2020-07-11 NOTE — PLAN OF CARE
Problem: Patient Care Overview  Goal: Plan of Care Review  Outcome: Ongoing (interventions implemented as appropriate)  Flowsheets  Taken 7/11/2020 0412 by Gini Gomez, RN  Progress: improving  Plan of Care Reviewed With: patient  Patient Agreement with Plan of Care: agrees  Taken 7/11/2020 0425 by Nathen Chi, RN  Outcome Summary: Laura has improved and has smiled throughout the day. She states that her anxiety and depression are less today than yesterday

## 2020-07-11 NOTE — NURSING NOTE
Behavior   Note any precipitants to event or behavior   Describe level and action of any aggressive behavior or speech and associated interventions.     Anxiety: Excess Worry  Depression: depressed mood  Pain  0  AVH   no  S/I   no  Plan  no  H/I   no  Plan  no    Affect   euthymic/normal      Note:Laura has improved and has smiled throughout the day. She states that her anxiety and depression are less today than yesterday.      Intervention    PRN medication utilized:  no    Instructed in medication usage and effects  Medications administered as ordered  Encouraged to verbalize needs      Response    Verbalized understanding   Did patient take medications as ordered yes   Did patient interact with assessment?  yes     Plan    Will monitor for safety  Will monitor every 15 minutes as ordered  Will evaluate and promote the plan of care    Last BM:  unknown date  (Please chart in I/O as well)

## 2020-07-11 NOTE — PLAN OF CARE
Problem: Patient Care Overview  Goal: Plan of Care Review  Outcome: Ongoing (interventions implemented as appropriate)  Flowsheets (Taken 7/11/2020 0414)  Progress: improving  Plan of Care Reviewed With: patient  Patient Agreement with Plan of Care: agrees  Outcome Summary: pt slept throughout shift, awoken only for routine medications, has returned to sleep.

## 2020-07-11 NOTE — PROGRESS NOTES
7.10.2020      Chief Complaint: suicidal ideation, substance abuse and depression    Subjective:  Patient is a 55 y.o. female that is currently inpatient on the adult U today.    She is pleasant, though constricted in her affect.  Sluggish.  Depression anxiety slowly improving, these remain severe, impairing but less constant.    No significant behavioral issues.      Objective     Vital Signs    Temp:  [96.8 °F (36 °C)-99.3 °F (37.4 °C)] 96.8 °F (36 °C)  Heart Rate:  [83-88] 88  Resp:  [18] 18  BP: (103-106)/(61-65) 103/61    Physical Exam:   General Appearance: alert, appears stated age and cooperative,  Hygiene:   fair  Gait & Station: Normal  Musculoskeletal: No tremors or abnormal involuntary movements    Mental Status Exam:   Cooperation:  Cooperative  Eye Contact:  Fair  Psychomotor Behavior:  Appropriate  Mood: Sad/Depressed and Improving  Affect:  flat  Speech:  Normal  Thought Process:  Appropriately abstract  Associations: Goal Directed  Thought Content:     Mood congruent   Suicidal:  None   Homicidal:  None   Hallucinations:  None   Delusion:  None  Cognitive Functioning:   Consciousness: awake, alert and oriented  Reliability:  fair  Insight:  improving  Judgement:  Impaired  Impulse Control: Impaired and slowly improving    Lab Results (last 24 hours)     ** No results found for the last 24 hours. **        Imaging Results (Last 24 Hours)     ** No results found for the last 24 hours. **          Medicine:   Current Facility-Administered Medications:   •  acetaminophen (TYLENOL) tablet 650 mg, 650 mg, Oral, Q4H PRN, Harrison Quach MD  •  albuterol (PROVENTIL) nebulizer solution 0.083% 2.5 mg/3mL, 2.5 mg, Nebulization, Q6H PRN, Harrison Quach MD  •  aluminum-magnesium hydroxide-simethicone (MAALOX MAX) 400-400-40 MG/5ML suspension 15 mL, 15 mL, Oral, Q6H PRN, Harrison Quach MD  •  cloNIDine (CATAPRES) tablet 0.2 mg, 0.2 mg, Oral, TID, Harrison Quach MD, 0.2 mg at 07/11/20 0804  •   [COMPLETED] FLUoxetine (PROzac) capsule 10 mg, 10 mg, Oral, Daily, 10 mg at 07/08/20 1523 **FOLLOWED BY** FLUoxetine (PROzac) capsule 20 mg, 20 mg, Oral, Daily, Harrison Quach MD, 20 mg at 07/11/20 0804  •  folic acid (FOLVITE) tablet 1 mg, 1 mg, Oral, Daily, Harrison Quach MD, 1 mg at 07/11/20 0804  •  hydrOXYzine pamoate (VISTARIL) capsule 50 mg, 50 mg, Oral, Q6H PRN, Harrison Quach MD, 50 mg at 07/09/20 1621  •  loperamide (IMODIUM) capsule 2 mg, 2 mg, Oral, Q2H PRN, Harrison Quach MD  •  [COMPLETED] LORazepam (ATIVAN) tablet 2 mg, 2 mg, Oral, Q6H, 2 mg at 07/08/20 2014 **FOLLOWED BY** [COMPLETED] LORazepam (ATIVAN) tablet 2 mg, 2 mg, Oral, Q8H, 2 mg at 07/10/20 1706 **FOLLOWED BY** LORazepam (ATIVAN) tablet 2 mg, 2 mg, Oral, Q12H, Harrison Quach MD, 2 mg at 07/11/20 0807  •  magnesium hydroxide (MILK OF MAGNESIA) suspension 2400 mg/10mL 10 mL, 10 mL, Oral, Daily PRN, Harrison Quach MD  •  nicotine (NICODERM CQ) 21 MG/24HR patch 1 patch, 1 patch, Transdermal, Daily, Harrison Quach MD, 1 patch at 07/11/20 0807  •  OLANZapine (zyPREXA) tablet 10 mg, 10 mg, Oral, Nightly, Harrison Quach MD, 10 mg at 07/10/20 2042  •  Thera tablet 1 tablet, 1 tablet, Oral, Daily, Harrison Quach MD, 1 tablet at 07/11/20 0804  •  thiamine (VITAMIN B-1) tablet 200 mg, 200 mg, Oral, TID, Harrison Quach MD, 200 mg at 07/11/20 0804  •  traZODone (DESYREL) tablet 50 mg, 50 mg, Oral, Nightly PRN, Harrison Quach MD, 50 mg at 07/10/20 2042    Diagnoses/Assessment:     Severe episode of recurrent major depressive disorder, without psychotic features (CMS/HCC)    Alcohol use disorder, severe, dependence (CMS/HCC)    Suicidal ideation    Complicated bereavement      Treatment Plan:    1) Will continue care for the patient on the behavioral health unit at [facility] to ensure patient safety.  2) Will continue to provide treatment with the unit milieu, activities, therapies and  psychopharmacological management.  3) Patient to be placed on or continued on  Q15 minute checks  and Suicide precautions.  4) Pertinent medical issues:   --HTN: Clonodine 0.2 mg TID   5) Will order following labs: none  6) Will make the following medication changes:  --Cont Prozac 20 mg daily  --Cont Ativan Taper  --Cont Zyprexa 10 mg QHS   --Plan for possible Naltrexone trial over weekend vs antabuse  7) Will continue discharge planning as appropriate for patient.  8) Psychotherapy provided for less than 16 minutes.    Treatment plan and medication risks and benefits discussed with: Patient    Patrick English II, MD  07/11/20  08:33

## 2020-07-12 PROCEDURE — 99232 SBSQ HOSP IP/OBS MODERATE 35: CPT | Performed by: PSYCHIATRY & NEUROLOGY

## 2020-07-12 RX ADMIN — NICOTINE 1 PATCH: 21 PATCH TRANSDERMAL at 08:01

## 2020-07-12 RX ADMIN — CLONIDINE HYDROCHLORIDE 0.2 MG: 0.2 TABLET ORAL at 08:01

## 2020-07-12 RX ADMIN — THERA TABS 1 TABLET: TAB at 08:01

## 2020-07-12 RX ADMIN — OLANZAPINE 10 MG: 10 TABLET, FILM COATED ORAL at 20:25

## 2020-07-12 RX ADMIN — THIAMINE HCL TAB 100 MG 200 MG: 100 TAB at 17:03

## 2020-07-12 RX ADMIN — TRAZODONE HYDROCHLORIDE 50 MG: 50 TABLET ORAL at 20:25

## 2020-07-12 RX ADMIN — LORAZEPAM 1 MG: 1 TABLET ORAL at 08:01

## 2020-07-12 RX ADMIN — FOLIC ACID 1 MG: 1 TABLET ORAL at 08:01

## 2020-07-12 RX ADMIN — CLONIDINE HYDROCHLORIDE 0.2 MG: 0.2 TABLET ORAL at 20:25

## 2020-07-12 RX ADMIN — THIAMINE HCL TAB 100 MG 200 MG: 100 TAB at 08:01

## 2020-07-12 RX ADMIN — THIAMINE HCL TAB 100 MG 200 MG: 100 TAB at 20:25

## 2020-07-12 RX ADMIN — FLUOXETINE 30 MG: 20 CAPSULE ORAL at 08:00

## 2020-07-12 RX ADMIN — CLONIDINE HYDROCHLORIDE 0.2 MG: 0.2 TABLET ORAL at 17:03

## 2020-07-12 NOTE — PROGRESS NOTES
Psychiatry Progress Note   7/12/2020      HD: #5  Legal: Vol    Chief Complaint: Suicidal Ideation, Substance Abuse and Severe Depression      Subjective --  Ms. Laura Bruce is a 55 y.o. female who was seen on the Adult unit.      Patient is pleasant today.  She appears less depressed, but endorses some improvement with hospital stay.      Depression is overall less severe, less impairing, & less constant.  Benefiting from medicines.  No adverse effects.  No headache or stomachache or muscle aches.  Improving with stay.     We revisited about her going to rehab, and she is focused on returning home with outpt rehab.  Also interested in IOP.        Objective   Objective --      Vital Signs:  Temp:  [98.1 °F (36.7 °C)-99 °F (37.2 °C)] 98.1 °F (36.7 °C)  Heart Rate:  [74-95] 95  Resp:  [18] 18  BP: ()/(59-60) 114/59    Physical Exam:   -General Appearance:  alert and in NAD  -Hygiene:  Adequate   -Gait & Station:  Blank multiple: Normal  -Musculoskeletal:  No tremors or abnormal involuntary movements and No Cog Nebo or Rigidity; no atrophy  -Pulm: unlaboured    Mental Status Exam:   --Cooperation:  Cooperative  --Eye Contact:  Fair  --Psychomotor Behavior:  Improving and More Appropriate today  --Mood:  Improving  --Affect:  constricted  --Speech:  Normal r/r/v  --Thought Process:  Coherent  --Associations: Goal Directed  --Themes:  future focus  --Thought Content:     --Mood congruent   --Suicidal:  Denies    --Homicidal:  Denies   --Hallucinations:  Denies and Not appearing to respond to internal stimuli   --Delusion:  None noted/overt and No overt psychotic overlay  --Cognitive Functioning:   --Consciousness: awake and alert, Attention:  Adequate and Concentration:  Adequate  --Reliability:  adequate  --Insight:  Improving and Limited  --Judgment:  Improving  --Impulse Control:  Improving      Lab Results (last 24 hours)     ** No results found for the last 24 hours. **          Imaging Results  (Last 24 Hours)     ** No results found for the last 24 hours. **            Medications:   Scheduled Meds:    cloNIDine 0.2 mg Oral TID   FLUoxetine 30 mg Oral Daily   folic acid 1 mg Oral Daily   nicotine 1 patch Transdermal Daily   OLANZapine 10 mg Oral Nightly   Thera 1 tablet Oral Daily   vitamin B-1 200 mg Oral TID     Continuous Infusions:   PRN Meds:.•  acetaminophen  •  albuterol  •  aluminum-magnesium hydroxide-simethicone  •  hydrOXYzine pamoate  •  loperamide  •  magnesium hydroxide  •  traZODone      Assessment:     Severe episode of recurrent major depressive disorder, without psychotic features (CMS/HCC)    Alcohol use disorder, severe, dependence (CMS/HCC)    Suicidal ideation    Complicated bereavement        --> IMPRESSION: Improving affective symptoms, benefiting from hospital stay and medications.        Treatment Plan:  1) Will continue care for the patient on the behavioral health unit at [facility] to ensure patient safety given need to have further affective improvement, and stabilization of these improvements prior to discharge.    2) Will continue to provide treatment with the unit milieu, activities, therapies and psychopharmacological management.    3) Patient to be placed on or continued on  Q15 minute checks  and Suicide precautions.    4) Pertinent Concurrent Non-Psychiatric Medical Issues:   --Hypertension: Continue Catapres 0.2 mg 3 times daily    5) Will order following labs: None    6) Behavioral Health Treatment Planning:  --Cont Prozac 30 mg for mood  -Continue Zyprexa 10 mg nightly for augmentation of severe depression  -Continue multivitamin, thiamine and folic acid supplementation given EtOH Use d/o    7) Psychotherapy provided: As below    --> Dispostion Planning: To return to community, likely in the next 1-2 days.    Treatment plan and medication risks and benefits discussed with: Patient and Treatment Team.    Patrick English II, MD  07/12/20 @  17:51  Dictated using Dragon.

## 2020-07-12 NOTE — PLAN OF CARE
Problem: Patient Care Overview  Goal: Plan of Care Review  Outcome: Ongoing (interventions implemented as appropriate)  Flowsheets  Taken 7/12/2020 0430 by Gini Gomez, RN  Progress: improving  Plan of Care Reviewed With: patient  Patient Agreement with Plan of Care: agrees  Taken 7/12/2020 1623 by Nathen Chi, RN  Outcome Summary: Laura continues to improve and was awake earlier this morning at an appropriate time. She interacted with others and was observed smiling. She reports improved symptoms of depression and anxiety with very little to no feelings of either. Laura is interacting with patients more as well.

## 2020-07-12 NOTE — NURSING NOTE
Behavior   Note any precipitants to event or behavior   Describe level and action of any aggressive behavior or speech and associated interventions.     Anxiety: Excess Worry  Depression: depressed mood  Pain  0  AVH   no  S/I   no  Plan  no  H/I   no  Plan  no    Affect   euthymic/normal      Note:  Laura continues to improve and was awake earlier this morning at an appropriate time. She interacted with others and was observed smiling. She reports improved symptoms of depression and anxiety with very little to no feelings of either. Laura is interacting with patients more as well.      Intervention    PRN medication utilized:  no    Instructed in medication usage and effects  Medications administered as ordered  Encouraged to verbalize needs      Response    Verbalized understanding   Did patient take medications as ordered yes   Did patient interact with assessment?  yes     Plan    Will monitor for safety  Will monitor every 15 minutes as ordered  Will evaluate and promote the plan of care    Last BM:  unknown date  (Please chart in I/O as well)

## 2020-07-12 NOTE — PLAN OF CARE
Problem: Patient Care Overview  Goal: Plan of Care Review  Outcome: Ongoing (interventions implemented as appropriate)  Flowsheets (Taken 7/12/2020 2880)  Progress: improving  Plan of Care Reviewed With: patient  Patient Agreement with Plan of Care: agrees  Outcome Summary: pt has slept throughout shift, approximately 6.5 hours at this time.

## 2020-07-12 NOTE — NURSING NOTE
Behavior   Note any precipitants to event or behavior   Describe level and action of any aggressive behavior or speech and associated interventions.     Anxiety: Patient denies at this time  Depression: Patient denies at this time  Pain  0  AVH   no  S/I   no  Plan  no  H/I   no  Plan  no    Affect   euthymic/normal      Note:pt in milieu, pleasant and interacting well with peers and activities.  Pt denies any issues at this time.  Pt asks for trazodone for sleep.      Intervention    PRN medication utilized:  yes - trazodone    Instructed in medication usage and effects  Medications administered as ordered  Encouraged to verbalize needs      Response    Verbalized understanding   Did patient take medications as ordered yes   Did patient interact with assessment?  yes     Plan    Will monitor for safety  Will monitor every 15 minutes as ordered  Will evaluate and promote the plan of care    Last BM:  unknown date  (Please chart in I/O as well)

## 2020-07-13 VITALS
SYSTOLIC BLOOD PRESSURE: 129 MMHG | HEART RATE: 78 BPM | RESPIRATION RATE: 18 BRPM | OXYGEN SATURATION: 97 % | WEIGHT: 116 LBS | TEMPERATURE: 97 F | BODY MASS INDEX: 21.35 KG/M2 | DIASTOLIC BLOOD PRESSURE: 73 MMHG | HEIGHT: 62 IN

## 2020-07-13 PROBLEM — F10.920 ALCOHOLIC INTOXICATION WITHOUT COMPLICATION (HCC): Status: RESOLVED | Noted: 2020-05-27 | Resolved: 2020-07-13

## 2020-07-13 PROBLEM — R45.851 DEPRESSION WITH SUICIDAL IDEATION: Status: RESOLVED | Noted: 2020-06-30 | Resolved: 2020-07-13

## 2020-07-13 PROBLEM — R45.851 SUICIDAL IDEATION: Status: RESOLVED | Noted: 2020-07-07 | Resolved: 2020-07-13

## 2020-07-13 PROBLEM — F32.A DEPRESSION WITH SUICIDAL IDEATION: Status: RESOLVED | Noted: 2020-06-30 | Resolved: 2020-07-13

## 2020-07-13 PROCEDURE — 99239 HOSP IP/OBS DSCHRG MGMT >30: CPT | Performed by: PSYCHIATRY & NEUROLOGY

## 2020-07-13 RX ORDER — FOLIC ACID 1 MG/1
1 TABLET ORAL DAILY
Qty: 90 TABLET | Refills: 0 | Status: SHIPPED | OUTPATIENT
Start: 2020-07-14 | End: 2020-09-23 | Stop reason: HOSPADM

## 2020-07-13 RX ORDER — NALTREXONE HYDROCHLORIDE 50 MG/1
50 TABLET, FILM COATED ORAL DAILY
Qty: 30 TABLET | Refills: 0 | Status: SHIPPED | OUTPATIENT
Start: 2020-07-14 | End: 2020-09-23 | Stop reason: HOSPADM

## 2020-07-13 RX ORDER — MULTIVITAMIN,THERAPEUTIC
1 TABLET ORAL DAILY
Qty: 90 TABLET | Refills: 0 | Status: ON HOLD | OUTPATIENT
Start: 2020-07-14 | End: 2020-09-23 | Stop reason: SDUPTHER

## 2020-07-13 RX ORDER — NALTREXONE HYDROCHLORIDE 50 MG/1
25 TABLET, FILM COATED ORAL DAILY
Status: COMPLETED | OUTPATIENT
Start: 2020-07-13 | End: 2020-07-13

## 2020-07-13 RX ORDER — TRAZODONE HYDROCHLORIDE 100 MG/1
TABLET ORAL
Qty: 30 TABLET | Refills: 0 | Status: SHIPPED | OUTPATIENT
Start: 2020-07-13 | End: 2020-09-23 | Stop reason: HOSPADM

## 2020-07-13 RX ORDER — OLANZAPINE 10 MG/1
10 TABLET ORAL NIGHTLY
Qty: 30 TABLET | Refills: 0 | Status: SHIPPED | OUTPATIENT
Start: 2020-07-13 | End: 2020-09-23 | Stop reason: HOSPADM

## 2020-07-13 RX ORDER — NALTREXONE HYDROCHLORIDE 50 MG/1
50 TABLET, FILM COATED ORAL DAILY
Status: DISCONTINUED | OUTPATIENT
Start: 2020-07-14 | End: 2020-07-13 | Stop reason: HOSPADM

## 2020-07-13 RX ORDER — FLUOXETINE 10 MG/1
30 CAPSULE ORAL DAILY
Qty: 90 CAPSULE | Refills: 0 | Status: SHIPPED | OUTPATIENT
Start: 2020-07-14 | End: 2020-09-23 | Stop reason: HOSPADM

## 2020-07-13 RX ADMIN — NALTREXONE HYDROCHLORIDE 25 MG: 50 TABLET, FILM COATED ORAL at 11:48

## 2020-07-13 RX ADMIN — THIAMINE HCL TAB 100 MG 200 MG: 100 TAB at 09:07

## 2020-07-13 RX ADMIN — FLUOXETINE 30 MG: 20 CAPSULE ORAL at 09:07

## 2020-07-13 RX ADMIN — FOLIC ACID 1 MG: 1 TABLET ORAL at 09:07

## 2020-07-13 RX ADMIN — NICOTINE 1 PATCH: 21 PATCH TRANSDERMAL at 09:07

## 2020-07-13 RX ADMIN — CLONIDINE HYDROCHLORIDE 0.2 MG: 0.2 TABLET ORAL at 09:07

## 2020-07-13 RX ADMIN — THERA TABS 1 TABLET: TAB at 09:07

## 2020-07-13 NOTE — DISCHARGE INSTRUCTIONS
--Continue medications as currently prescribed.  --Continue follow-up with outpatient providers.  --Please contact our Behavioral Health Unit with any questions or concerns at [phone].  --Return to the ER with any suicidal or homicidal ideation, or worsening symptoms.    --The number for the National Suicide & Crisis Hotline is 1-344.402.5442.  The National Crisis Text number is 923998.  These may be contacted at any time, if needed.

## 2020-07-13 NOTE — SIGNIFICANT NOTE
LCSW met with pt 1:1 and reviewed safety/dc plan. Pt presented to the interview room, casually dressed, alert and oriented x3. Mood is fair, affect is congruent. Pt makes fair eye contact, speech is normal. Pt is pleasant and cooperative, engages fairly well in conversation. Pt denies SI/HI, AVH reports feeling hopeful about dc. LCSW voiced concern re: alcohol abuse and educated pt on risks associated with continued use; advising pt to abstain from use and encouraged her to attend support group meetings and remain compliant with outpt follow up. Pt referred to [facility] for continued therapy and substance abuse treatment. Pt educated on Crisis Hotline, advised to call as needed. Pt did participate in individual and group tx, was med compliant. Pt does not appear to be an imminent threat to self or others, denies access to firearms. Plan to dc today. BPRS completed upon dc.

## 2020-07-13 NOTE — NURSING NOTE
"Behavior   Note any precipitants to event or behavior   Describe level and action of any aggressive behavior or speech and associated interventions.     Anxiety: Patient denies at this time  Depression: Patient denies at this time  Pain  0  AVH   no  S/I   no  Plan  no  H/I   no  Plan  no    Affect   euthymic/normal      Note:pt is pleasant and engages in conversation and activities, denies all problems.  Pt took all medication and accepted education for increase in zyprexa.  Pt states,\"i feel really well.\"  Pt went to bed soon after medication, and remains asleep at this time.      Intervention    PRN medication utilized:  yes - trazodone    Instructed in medication usage and effects  Medications administered as ordered  Encouraged to verbalize needs      Response    Verbalized understanding   Did patient take medications as ordered yes   Did patient interact with assessment?  yes     Plan    Will monitor for safety  Will monitor every 15 minutes as ordered  Will evaluate and promote the plan of care    Last BM:  unknown date  (Please chart in I/O as well)  "

## 2020-07-13 NOTE — PLAN OF CARE
Problem: Patient Care Overview  Goal: Plan of Care Review  Outcome: Ongoing (interventions implemented as appropriate)  Flowsheets (Taken 7/13/2020 0357)  Progress: improving  Plan of Care Reviewed With: patient  Patient Agreement with Plan of Care: agrees  Outcome Summary: pt has slept throughout night, approximately 6.5 hours at this time.

## 2020-07-13 NOTE — NURSING NOTE
Patient discharged per MD order. AVS, prescriptions, follow up appointments and safety plan reviewed and copy given to patient. Prescriptions sent to [facility]. All personal belongings returned to patient. Patient alert and oriented x4 and ambulatory upon discharge.
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
## 2020-08-18 ENCOUNTER — OFFICE VISIT (OUTPATIENT)
Dept: FAMILY MEDICINE CLINIC | Facility: CLINIC | Age: 55
End: 2020-08-18

## 2020-08-18 VITALS
RESPIRATION RATE: 22 BRPM | HEIGHT: 62 IN | SYSTOLIC BLOOD PRESSURE: 120 MMHG | WEIGHT: 129.7 LBS | HEART RATE: 91 BPM | DIASTOLIC BLOOD PRESSURE: 78 MMHG | OXYGEN SATURATION: 93 % | BODY MASS INDEX: 23.87 KG/M2

## 2020-08-18 DIAGNOSIS — Z09 HOSPITAL DISCHARGE FOLLOW-UP: Primary | ICD-10-CM

## 2020-08-18 DIAGNOSIS — F33.9 RECURRENT MAJOR DEPRESSIVE DISORDER, REMISSION STATUS UNSPECIFIED (HCC): ICD-10-CM

## 2020-08-18 DIAGNOSIS — F10.20 ALCOHOLISM (HCC): ICD-10-CM

## 2020-08-18 PROCEDURE — 99214 OFFICE O/P EST MOD 30 MIN: CPT | Performed by: FAMILY MEDICINE

## 2020-08-18 RX ORDER — GABAPENTIN 600 MG/1
600 TABLET ORAL 3 TIMES DAILY
COMMUNITY
End: 2020-09-23 | Stop reason: HOSPADM

## 2020-08-18 RX ORDER — ALBUTEROL SULFATE 90 UG/1
2 AEROSOL, METERED RESPIRATORY (INHALATION) EVERY 4 HOURS PRN
Qty: 18 G | Refills: 3 | Status: ON HOLD | OUTPATIENT
Start: 2020-08-18 | End: 2020-09-23 | Stop reason: SDUPTHER

## 2020-08-18 NOTE — PROGRESS NOTES
Subjective   Laura Bruce is a 55 y.o. female.   Cc: follow up of chronic medical issues    Depression   Visit Type: follow-up  Patient presents with the following symptoms: anhedonia, depressed mood, excessive worry, feelings of hopelessness, feelings of worthlessness, insomnia, irritability, panic and restlessness.  Patient is not experiencing: chest pain, choking sensation, compulsions, confusion, decreased concentration, dizziness, hypersomnia, hyperventilation, memory impairment, obsessions, palpitations, suicidal ideas, suicidal planning and thoughts of death.    She has been through Alcohol recovery. She was in for 28 days. She got out yesterday. She is getting set up with various rehab programs.    The following portions of the patient's history were reviewed and updated as appropriate: allergies, current medications, past family history, past medical history, past social history, past surgical history and problem list.    Review of Systems   Constitutional: Positive for irritability.   Respiratory: Negative for choking.    Cardiovascular: Negative for palpitations.   Psychiatric/Behavioral: Negative for confusion, decreased concentration and suicidal ideas. The patient has insomnia.        Objective   Physical Exam   Constitutional: She is oriented to person, place, and time. She appears well-developed and well-nourished.   HENT:   Head: Normocephalic and atraumatic.   Right Ear: External ear normal.   Left Ear: External ear normal.   Nose: Nose normal.   Mouth/Throat: Oropharynx is clear and moist.   Eyes: Pupils are equal, round, and reactive to light.   Neck: Normal range of motion.   Cardiovascular: Normal rate, regular rhythm and normal heart sounds. Exam reveals no gallop and no friction rub.   No murmur heard.  Pulmonary/Chest: Effort normal and breath sounds normal. No stridor. No respiratory distress. She has no wheezes.   Abdominal: Soft. Bowel sounds are normal.   Neurological: She is  "alert and oriented to person, place, and time.   Skin: Skin is warm and dry.   Vitals reviewed.        Visit Vitals  /78 (BP Location: Left arm, Patient Position: Sitting, Cuff Size: Adult)   Pulse 91   Resp 22   Ht 157.5 cm (62\")   Wt 58.8 kg (129 lb 11.2 oz)   LMP 01/24/2001 (Within Months)   SpO2 93%   BMI 23.72 kg/m²     Body mass index is 23.72 kg/m².      Assessment/Plan   Laura was seen today for follow-up.    Diagnoses and all orders for this visit:    Hospital discharge follow-up    Alcoholism (CMS/[facility])    Recurrent major depressive disorder, remission status unspecified (CMS/[facility])    Other orders  -     thiamine (VITAMIN B1) 250 MG tablet; Take 1 tablet by mouth 2 (Two) Times a Day. Indications: supplementation  -     albuterol sulfate  (90 Base) MCG/ACT inhaler; Inhale 2 puffs Every 4 (Four) Hours As Needed for Wheezing. Indications: Spasm of Lung Air Passages, Reversible Disease of Blockage in Breathing Passages    Return to the clinic in 3 month/s.  Will contact with results as needed.    "

## 2020-09-15 ENCOUNTER — APPOINTMENT (OUTPATIENT)
Dept: CT IMAGING | Facility: HOSPITAL | Age: 55
End: 2020-09-15

## 2020-09-15 ENCOUNTER — HOSPITAL ENCOUNTER (OUTPATIENT)
Facility: HOSPITAL | Age: 55
Setting detail: OBSERVATION
Discharge: PSYCHIATRIC OR SUBSTANCE USE FACILITY | End: 2020-09-16
Attending: EMERGENCY MEDICINE | Admitting: INTERNAL MEDICINE

## 2020-09-15 DIAGNOSIS — S02.2XXA CLOSED FRACTURE OF NASAL BONE, INITIAL ENCOUNTER: ICD-10-CM

## 2020-09-15 DIAGNOSIS — R45.851 SUICIDAL IDEATION: ICD-10-CM

## 2020-09-15 DIAGNOSIS — F10.929 ALCOHOLIC INTOXICATION WITH COMPLICATION (HCC): Primary | ICD-10-CM

## 2020-09-15 LAB
ALBUMIN SERPL-MCNC: 4.2 G/DL (ref 3.5–5.2)
ALBUMIN/GLOB SERPL: 1.2 G/DL
ALP SERPL-CCNC: 103 U/L (ref 39–117)
ALT SERPL W P-5'-P-CCNC: 11 U/L (ref 1–33)
AMPHET+METHAMPHET UR QL: NEGATIVE
AMPHETAMINES UR QL: NEGATIVE
ANION GAP SERPL CALCULATED.3IONS-SCNC: 16 MMOL/L (ref 5–15)
APAP SERPL-MCNC: <5 MCG/ML (ref 10–30)
AST SERPL-CCNC: 12 U/L (ref 1–32)
BARBITURATES UR QL SCN: NEGATIVE
BASOPHILS # BLD AUTO: 0.04 10*3/MM3 (ref 0–0.2)
BASOPHILS NFR BLD AUTO: 0.9 % (ref 0–1.5)
BENZODIAZ UR QL SCN: NEGATIVE
BILIRUB SERPL-MCNC: 0.2 MG/DL (ref 0–1.2)
BILIRUB UR QL STRIP: NEGATIVE
BUN SERPL-MCNC: 13 MG/DL (ref 6–20)
BUN/CREAT SERPL: 18.3 (ref 7–25)
BUPRENORPHINE SERPL-MCNC: NEGATIVE NG/ML
CALCIUM SPEC-SCNC: 8.4 MG/DL (ref 8.6–10.5)
CANNABINOIDS SERPL QL: NEGATIVE
CHLORIDE SERPL-SCNC: 108 MMOL/L (ref 98–107)
CLARITY UR: CLEAR
CO2 SERPL-SCNC: 19 MMOL/L (ref 22–29)
COCAINE UR QL: NEGATIVE
COLOR UR: YELLOW
CREAT SERPL-MCNC: 0.71 MG/DL (ref 0.57–1)
DEPRECATED RDW RBC AUTO: 42 FL (ref 37–54)
EOSINOPHIL # BLD AUTO: 0.15 10*3/MM3 (ref 0–0.4)
EOSINOPHIL NFR BLD AUTO: 3.3 % (ref 0.3–6.2)
ERYTHROCYTE [DISTWIDTH] IN BLOOD BY AUTOMATED COUNT: 13.2 % (ref 12.3–15.4)
ETHANOL BLD-MCNC: 295 MG/DL (ref 0–10)
ETHANOL UR QL: 0.29 %
GFR SERPL CREATININE-BSD FRML MDRD: 85 ML/MIN/1.73
GLOBULIN UR ELPH-MCNC: 3.5 GM/DL
GLUCOSE SERPL-MCNC: 74 MG/DL (ref 65–99)
GLUCOSE UR STRIP-MCNC: NEGATIVE MG/DL
HCT VFR BLD AUTO: 40.6 % (ref 34–46.6)
HGB BLD-MCNC: 13.7 G/DL (ref 12–15.9)
HGB UR QL STRIP.AUTO: NEGATIVE
HOLD SPECIMEN: NORMAL
IMM GRANULOCYTES # BLD AUTO: 0.01 10*3/MM3 (ref 0–0.05)
IMM GRANULOCYTES NFR BLD AUTO: 0.2 % (ref 0–0.5)
KETONES UR QL STRIP: NEGATIVE
LEUKOCYTE ESTERASE UR QL STRIP.AUTO: NEGATIVE
LYMPHOCYTES # BLD AUTO: 2.03 10*3/MM3 (ref 0.7–3.1)
LYMPHOCYTES NFR BLD AUTO: 45.2 % (ref 19.6–45.3)
MCH RBC QN AUTO: 29.6 PG (ref 26.6–33)
MCHC RBC AUTO-ENTMCNC: 33.7 G/DL (ref 31.5–35.7)
MCV RBC AUTO: 87.7 FL (ref 79–97)
METHADONE UR QL SCN: NEGATIVE
MONOCYTES # BLD AUTO: 0.54 10*3/MM3 (ref 0.1–0.9)
MONOCYTES NFR BLD AUTO: 12 % (ref 5–12)
NEUTROPHILS NFR BLD AUTO: 1.72 10*3/MM3 (ref 1.7–7)
NEUTROPHILS NFR BLD AUTO: 38.4 % (ref 42.7–76)
NITRITE UR QL STRIP: NEGATIVE
NRBC BLD AUTO-RTO: 0 /100 WBC (ref 0–0.2)
OPIATES UR QL: NEGATIVE
OXYCODONE UR QL SCN: NEGATIVE
PCP UR QL SCN: NEGATIVE
PH UR STRIP.AUTO: 7 [PH] (ref 5–9)
PLATELET # BLD AUTO: 293 10*3/MM3 (ref 140–450)
PMV BLD AUTO: 9.4 FL (ref 6–12)
POTASSIUM SERPL-SCNC: 3.3 MMOL/L (ref 3.5–5.2)
PROPOXYPH UR QL: NEGATIVE
PROT SERPL-MCNC: 7.7 G/DL (ref 6–8.5)
PROT UR QL STRIP: NEGATIVE
RBC # BLD AUTO: 4.63 10*6/MM3 (ref 3.77–5.28)
SALICYLATES SERPL-MCNC: <0.3 MG/DL
SODIUM SERPL-SCNC: 143 MMOL/L (ref 136–145)
SP GR UR STRIP: 1 (ref 1–1.03)
TRICYCLICS UR QL SCN: NEGATIVE
UROBILINOGEN UR QL STRIP: ABNORMAL
WBC # BLD AUTO: 4.49 10*3/MM3 (ref 3.4–10.8)
WHOLE BLOOD HOLD SPECIMEN: NORMAL

## 2020-09-15 PROCEDURE — G0378 HOSPITAL OBSERVATION PER HR: HCPCS

## 2020-09-15 PROCEDURE — 25010000002 THIAMINE PER 100 MG: Performed by: EMERGENCY MEDICINE

## 2020-09-15 PROCEDURE — 85025 COMPLETE CBC W/AUTO DIFF WBC: CPT | Performed by: EMERGENCY MEDICINE

## 2020-09-15 PROCEDURE — 99285 EMERGENCY DEPT VISIT HI MDM: CPT

## 2020-09-15 PROCEDURE — 84100 ASSAY OF PHOSPHORUS: CPT | Performed by: INTERNAL MEDICINE

## 2020-09-15 PROCEDURE — 70450 CT HEAD/BRAIN W/O DYE: CPT

## 2020-09-15 PROCEDURE — 25010000002 LORAZEPAM PER 2 MG: Performed by: INTERNAL MEDICINE

## 2020-09-15 PROCEDURE — 80307 DRUG TEST PRSMV CHEM ANLYZR: CPT | Performed by: EMERGENCY MEDICINE

## 2020-09-15 PROCEDURE — 81003 URINALYSIS AUTO W/O SCOPE: CPT | Performed by: EMERGENCY MEDICINE

## 2020-09-15 PROCEDURE — 96376 TX/PRO/DX INJ SAME DRUG ADON: CPT

## 2020-09-15 PROCEDURE — 83735 ASSAY OF MAGNESIUM: CPT | Performed by: INTERNAL MEDICINE

## 2020-09-15 PROCEDURE — 80053 COMPREHEN METABOLIC PANEL: CPT | Performed by: EMERGENCY MEDICINE

## 2020-09-15 PROCEDURE — 96375 TX/PRO/DX INJ NEW DRUG ADDON: CPT

## 2020-09-15 PROCEDURE — 25010000002 LORAZEPAM PER 2 MG

## 2020-09-15 PROCEDURE — 70486 CT MAXILLOFACIAL W/O DYE: CPT

## 2020-09-15 PROCEDURE — 25010000002 MAGNESIUM SULFATE PER 500 MG OF MAGNESIUM: Performed by: EMERGENCY MEDICINE

## 2020-09-15 PROCEDURE — 96365 THER/PROPH/DIAG IV INF INIT: CPT

## 2020-09-15 RX ORDER — TRAZODONE HYDROCHLORIDE 100 MG/1
100 TABLET ORAL NIGHTLY
Status: DISCONTINUED | OUTPATIENT
Start: 2020-09-15 | End: 2020-09-16 | Stop reason: HOSPADM

## 2020-09-15 RX ORDER — ALBUTEROL SULFATE 2.5 MG/3ML
2.5 SOLUTION RESPIRATORY (INHALATION) EVERY 4 HOURS PRN
Status: DISCONTINUED | OUTPATIENT
Start: 2020-09-15 | End: 2020-09-16 | Stop reason: HOSPADM

## 2020-09-15 RX ORDER — CLONIDINE HYDROCHLORIDE 0.2 MG/1
0.2 TABLET ORAL 3 TIMES DAILY
Status: DISCONTINUED | OUTPATIENT
Start: 2020-09-15 | End: 2020-09-16 | Stop reason: HOSPADM

## 2020-09-15 RX ORDER — LORAZEPAM 2 MG/ML
1 INJECTION INTRAMUSCULAR ONCE
Status: COMPLETED | OUTPATIENT
Start: 2020-09-15 | End: 2020-09-15

## 2020-09-15 RX ORDER — SODIUM CHLORIDE 0.9 % (FLUSH) 0.9 %
10 SYRINGE (ML) INJECTION AS NEEDED
Status: DISCONTINUED | OUTPATIENT
Start: 2020-09-15 | End: 2020-09-15

## 2020-09-15 RX ORDER — LORAZEPAM 2 MG/ML
INJECTION INTRAMUSCULAR
Status: COMPLETED
Start: 2020-09-15 | End: 2020-09-15

## 2020-09-15 RX ORDER — SODIUM CHLORIDE, SODIUM LACTATE, POTASSIUM CHLORIDE, CALCIUM CHLORIDE 600; 310; 30; 20 MG/100ML; MG/100ML; MG/100ML; MG/100ML
50 INJECTION, SOLUTION INTRAVENOUS CONTINUOUS
Status: DISCONTINUED | OUTPATIENT
Start: 2020-09-15 | End: 2020-09-16 | Stop reason: HOSPADM

## 2020-09-15 RX ORDER — MULTIVITAMIN,THERAPEUTIC
1 TABLET ORAL DAILY
Status: DISCONTINUED | OUTPATIENT
Start: 2020-09-16 | End: 2020-09-16 | Stop reason: HOSPADM

## 2020-09-15 RX ORDER — LORAZEPAM 2 MG/ML
1 INJECTION INTRAMUSCULAR EVERY 8 HOURS
Status: DISCONTINUED | OUTPATIENT
Start: 2020-09-16 | End: 2020-09-16 | Stop reason: HOSPADM

## 2020-09-15 RX ORDER — NICOTINE 21 MG/24HR
1 PATCH, TRANSDERMAL 24 HOURS TRANSDERMAL DAILY
Status: DISCONTINUED | OUTPATIENT
Start: 2020-09-16 | End: 2020-09-16 | Stop reason: HOSPADM

## 2020-09-15 RX ORDER — SODIUM CHLORIDE 0.9 % (FLUSH) 0.9 %
10 SYRINGE (ML) INJECTION AS NEEDED
Status: DISCONTINUED | OUTPATIENT
Start: 2020-09-15 | End: 2020-09-16 | Stop reason: HOSPADM

## 2020-09-15 RX ORDER — SODIUM CHLORIDE 0.9 % (FLUSH) 0.9 %
10 SYRINGE (ML) INJECTION EVERY 12 HOURS SCHEDULED
Status: DISCONTINUED | OUTPATIENT
Start: 2020-09-15 | End: 2020-09-16 | Stop reason: HOSPADM

## 2020-09-15 RX ORDER — LORAZEPAM 2 MG/ML
1 INJECTION INTRAMUSCULAR EVERY 6 HOURS
Status: DISCONTINUED | OUTPATIENT
Start: 2020-09-15 | End: 2020-09-16 | Stop reason: HOSPADM

## 2020-09-15 RX ORDER — OLANZAPINE 10 MG/1
10 TABLET ORAL NIGHTLY
Status: DISCONTINUED | OUTPATIENT
Start: 2020-09-15 | End: 2020-09-16 | Stop reason: HOSPADM

## 2020-09-15 RX ORDER — FOLIC ACID 1 MG/1
1 TABLET ORAL DAILY
Status: DISCONTINUED | OUTPATIENT
Start: 2020-09-16 | End: 2020-09-16 | Stop reason: HOSPADM

## 2020-09-15 RX ORDER — GABAPENTIN 300 MG/1
600 CAPSULE ORAL EVERY 8 HOURS SCHEDULED
Status: DISCONTINUED | OUTPATIENT
Start: 2020-09-15 | End: 2020-09-16 | Stop reason: HOSPADM

## 2020-09-15 RX ADMIN — LORAZEPAM 1 MG: 2 INJECTION INTRAMUSCULAR; INTRAVENOUS at 23:21

## 2020-09-15 RX ADMIN — Medication 10 ML: at 16:20

## 2020-09-15 RX ADMIN — FOLIC ACID 1000 ML/HR: 5 INJECTION, SOLUTION INTRAMUSCULAR; INTRAVENOUS; SUBCUTANEOUS at 18:13

## 2020-09-15 RX ADMIN — LORAZEPAM: 2 INJECTION INTRAMUSCULAR at 18:38

## 2020-09-15 RX ADMIN — LORAZEPAM: 2 INJECTION INTRAMUSCULAR; INTRAVENOUS at 18:38

## 2020-09-15 NOTE — ED NOTES
"Pt asking \"What are they gonna do with me, What are people gonna do with me. I have no place to go.\"      Bon De La Garza  09/15/20 8528    "

## 2020-09-15 NOTE — ED NOTES
"Pt crying stating that she \"feels like she is subconsciously trying to kill herself to be with Gene, But it's against gods plan.\" Pt crying in bed stating that \"she has no reason to live anymore.\"     Bon De La Garza  09/15/20 1808    "

## 2020-09-15 NOTE — ED NOTES
This tech sitting 1:1 with this pt since 1650. Pt In yellow gown at this time. No loose bandages noted at this time. Pt head is exposed. Pt does have two warm blankets...      Bon De La Garza  09/15/20 0580

## 2020-09-15 NOTE — ED NOTES
Pt back in room at this time. Pt placed back on the monitor and provided with a warm blanket.. Pt head is exposed. No loose bandages Noted at this time. This tech sitting 1:1 with pt      Bon De La Garza  09/15/20 1978

## 2020-09-15 NOTE — ED NOTES
This tech sitting 1:1 with pt. Pt requesting a nicotine patch     Bon De La Garza  09/15/20 0353
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
## 2020-09-15 NOTE — ED NOTES
"Pt requesting \"Something to knock her out, just to help her sleep.\" Pt also stating \" I don't know what I'm gonna do.\" Pt wanting \"Something to make her relax\"      Bon De La Garza  09/15/20 1828    "
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
## 2020-09-16 ENCOUNTER — HOSPITAL ENCOUNTER (INPATIENT)
Facility: HOSPITAL | Age: 55
LOS: 7 days | Discharge: NURSING, REHAB OR LONG-TERM CARE | End: 2020-09-23
Attending: PSYCHIATRY & NEUROLOGY | Admitting: PSYCHIATRY & NEUROLOGY

## 2020-09-16 VITALS
HEART RATE: 83 BPM | OXYGEN SATURATION: 96 % | HEIGHT: 62 IN | SYSTOLIC BLOOD PRESSURE: 138 MMHG | WEIGHT: 128 LBS | BODY MASS INDEX: 23.55 KG/M2 | DIASTOLIC BLOOD PRESSURE: 84 MMHG | RESPIRATION RATE: 20 BRPM | TEMPERATURE: 97.8 F

## 2020-09-16 DIAGNOSIS — R26.89 DECREASED FUNCTIONAL MOBILITY: ICD-10-CM

## 2020-09-16 PROBLEM — F10.929 ALCOHOL INTOXICATION (HCC): Chronic | Status: ACTIVE | Noted: 2020-09-15

## 2020-09-16 PROBLEM — R45.851 SUICIDAL IDEATION: Chronic | Status: ACTIVE | Noted: 2020-07-07

## 2020-09-16 PROBLEM — F32.A DEPRESSION: Chronic | Status: ACTIVE | Noted: 2020-06-30

## 2020-09-16 LAB
ANION GAP SERPL CALCULATED.3IONS-SCNC: 8 MMOL/L (ref 5–15)
BUN SERPL-MCNC: 13 MG/DL (ref 6–20)
BUN/CREAT SERPL: 18.8 (ref 7–25)
CALCIUM SPEC-SCNC: 8.4 MG/DL (ref 8.6–10.5)
CHLORIDE SERPL-SCNC: 106 MMOL/L (ref 98–107)
CO2 SERPL-SCNC: 23 MMOL/L (ref 22–29)
CREAT SERPL-MCNC: 0.69 MG/DL (ref 0.57–1)
ETHANOL BLD-MCNC: <10 MG/DL (ref 0–10)
ETHANOL UR QL: <0.01 %
GFR SERPL CREATININE-BSD FRML MDRD: 88 ML/MIN/1.73
GLUCOSE SERPL-MCNC: 96 MG/DL (ref 65–99)
MAGNESIUM SERPL-MCNC: 2 MG/DL (ref 1.6–2.6)
MAGNESIUM SERPL-MCNC: 2 MG/DL (ref 1.6–2.6)
PHOSPHATE SERPL-MCNC: 2.8 MG/DL (ref 2.5–4.5)
PHOSPHATE SERPL-MCNC: 2.8 MG/DL (ref 2.5–4.5)
POTASSIUM SERPL-SCNC: 3.2 MMOL/L (ref 3.5–5.2)
SODIUM SERPL-SCNC: 137 MMOL/L (ref 136–145)
WHOLE BLOOD HOLD SPECIMEN: NORMAL

## 2020-09-16 PROCEDURE — 25010000002 THIAMINE PER 100 MG: Performed by: INTERNAL MEDICINE

## 2020-09-16 PROCEDURE — 25010000002 LORAZEPAM PER 2 MG

## 2020-09-16 PROCEDURE — 96376 TX/PRO/DX INJ SAME DRUG ADON: CPT

## 2020-09-16 PROCEDURE — 80307 DRUG TEST PRSMV CHEM ANLYZR: CPT | Performed by: STUDENT IN AN ORGANIZED HEALTH CARE EDUCATION/TRAINING PROGRAM

## 2020-09-16 PROCEDURE — 36415 COLL VENOUS BLD VENIPUNCTURE: CPT | Performed by: STUDENT IN AN ORGANIZED HEALTH CARE EDUCATION/TRAINING PROGRAM

## 2020-09-16 PROCEDURE — 96372 THER/PROPH/DIAG INJ SC/IM: CPT

## 2020-09-16 PROCEDURE — G0378 HOSPITAL OBSERVATION PER HR: HCPCS

## 2020-09-16 PROCEDURE — 25010000002 ENOXAPARIN PER 10 MG: Performed by: INTERNAL MEDICINE

## 2020-09-16 PROCEDURE — 93010 ELECTROCARDIOGRAM REPORT: CPT | Performed by: INTERNAL MEDICINE

## 2020-09-16 PROCEDURE — 80048 BASIC METABOLIC PNL TOTAL CA: CPT | Performed by: INTERNAL MEDICINE

## 2020-09-16 PROCEDURE — 96367 TX/PROPH/DG ADDL SEQ IV INF: CPT

## 2020-09-16 PROCEDURE — 93005 ELECTROCARDIOGRAM TRACING: CPT | Performed by: PSYCHIATRY & NEUROLOGY

## 2020-09-16 PROCEDURE — 83735 ASSAY OF MAGNESIUM: CPT | Performed by: INTERNAL MEDICINE

## 2020-09-16 PROCEDURE — 84100 ASSAY OF PHOSPHORUS: CPT | Performed by: INTERNAL MEDICINE

## 2020-09-16 PROCEDURE — 25010000002 THIAMINE PER 100 MG: Performed by: PSYCHIATRY & NEUROLOGY

## 2020-09-16 RX ORDER — NICOTINE 21 MG/24HR
1 PATCH, TRANSDERMAL 24 HOURS TRANSDERMAL EVERY 24 HOURS
Status: DISCONTINUED | OUTPATIENT
Start: 2020-09-16 | End: 2020-09-23 | Stop reason: HOSPADM

## 2020-09-16 RX ORDER — FOLIC ACID 1 MG/1
1 TABLET ORAL DAILY
Status: COMPLETED | OUTPATIENT
Start: 2020-09-17 | End: 2020-09-19

## 2020-09-16 RX ORDER — LOPERAMIDE HYDROCHLORIDE 2 MG/1
2 CAPSULE ORAL 4 TIMES DAILY PRN
Status: DISCONTINUED | OUTPATIENT
Start: 2020-09-16 | End: 2020-09-23 | Stop reason: HOSPADM

## 2020-09-16 RX ORDER — LORAZEPAM 2 MG/ML
INJECTION INTRAMUSCULAR
Status: COMPLETED
Start: 2020-09-16 | End: 2020-09-16

## 2020-09-16 RX ORDER — HYDROXYZINE PAMOATE 50 MG/1
50 CAPSULE ORAL EVERY 6 HOURS PRN
Status: DISCONTINUED | OUTPATIENT
Start: 2020-09-16 | End: 2020-09-23 | Stop reason: HOSPADM

## 2020-09-16 RX ORDER — LORAZEPAM 1 MG/1
1 TABLET ORAL EVERY 8 HOURS
Status: COMPLETED | OUTPATIENT
Start: 2020-09-17 | End: 2020-09-18

## 2020-09-16 RX ORDER — ALBUTEROL SULFATE 2.5 MG/3ML
2.5 SOLUTION RESPIRATORY (INHALATION) EVERY 6 HOURS PRN
Status: DISCONTINUED | OUTPATIENT
Start: 2020-09-16 | End: 2020-09-23 | Stop reason: HOSPADM

## 2020-09-16 RX ORDER — ALUMINA, MAGNESIA, AND SIMETHICONE 2400; 2400; 240 MG/30ML; MG/30ML; MG/30ML
15 SUSPENSION ORAL EVERY 6 HOURS PRN
Status: DISCONTINUED | OUTPATIENT
Start: 2020-09-16 | End: 2020-09-23 | Stop reason: HOSPADM

## 2020-09-16 RX ORDER — LORAZEPAM 1 MG/1
1 TABLET ORAL
Status: DISCONTINUED | OUTPATIENT
Start: 2020-09-16 | End: 2020-09-23 | Stop reason: HOSPADM

## 2020-09-16 RX ORDER — LORAZEPAM 1 MG/1
2 TABLET ORAL
Status: DISCONTINUED | OUTPATIENT
Start: 2020-09-16 | End: 2020-09-23 | Stop reason: HOSPADM

## 2020-09-16 RX ORDER — THIAMINE MONONITRATE (VIT B1) 100 MG
200 TABLET ORAL 3 TIMES DAILY
Status: COMPLETED | OUTPATIENT
Start: 2020-09-17 | End: 2020-09-17

## 2020-09-16 RX ORDER — ACETAMINOPHEN 325 MG/1
650 TABLET ORAL EVERY 4 HOURS PRN
Status: DISCONTINUED | OUTPATIENT
Start: 2020-09-16 | End: 2020-09-23 | Stop reason: HOSPADM

## 2020-09-16 RX ORDER — LORAZEPAM 2 MG/ML
2 INJECTION INTRAMUSCULAR
Status: DISCONTINUED | OUTPATIENT
Start: 2020-09-16 | End: 2020-09-23 | Stop reason: HOSPADM

## 2020-09-16 RX ORDER — LORAZEPAM 1 MG/1
1 TABLET ORAL EVERY 6 HOURS
Status: COMPLETED | OUTPATIENT
Start: 2020-09-16 | End: 2020-09-17

## 2020-09-16 RX ORDER — THIAMINE HYDROCHLORIDE 100 MG/ML
100 INJECTION, SOLUTION INTRAMUSCULAR; INTRAVENOUS ONCE
Status: COMPLETED | OUTPATIENT
Start: 2020-09-16 | End: 2020-09-16

## 2020-09-16 RX ORDER — FAMOTIDINE 20 MG/1
20 TABLET, FILM COATED ORAL 2 TIMES DAILY PRN
Status: DISCONTINUED | OUTPATIENT
Start: 2020-09-16 | End: 2020-09-23 | Stop reason: HOSPADM

## 2020-09-16 RX ORDER — TRAZODONE HYDROCHLORIDE 50 MG/1
50 TABLET ORAL NIGHTLY PRN
Status: DISCONTINUED | OUTPATIENT
Start: 2020-09-16 | End: 2020-09-18

## 2020-09-16 RX ORDER — CLONIDINE HYDROCHLORIDE 0.2 MG/1
0.2 TABLET ORAL 3 TIMES DAILY
Status: DISCONTINUED | OUTPATIENT
Start: 2020-09-16 | End: 2020-09-23 | Stop reason: HOSPADM

## 2020-09-16 RX ORDER — LORAZEPAM 2 MG/ML
1 INJECTION INTRAMUSCULAR
Status: DISCONTINUED | OUTPATIENT
Start: 2020-09-16 | End: 2020-09-23 | Stop reason: HOSPADM

## 2020-09-16 RX ORDER — MULTIVITAMIN,THERAPEUTIC
1 TABLET ORAL DAILY
Status: DISCONTINUED | OUTPATIENT
Start: 2020-09-16 | End: 2020-09-23 | Stop reason: HOSPADM

## 2020-09-16 RX ADMIN — GABAPENTIN 600 MG: 300 CAPSULE ORAL at 14:17

## 2020-09-16 RX ADMIN — CLONIDINE HYDROCHLORIDE 0.2 MG: 0.2 TABLET ORAL at 01:00

## 2020-09-16 RX ADMIN — LORAZEPAM 1 MG: 2 INJECTION INTRAMUSCULAR; INTRAVENOUS at 08:59

## 2020-09-16 RX ADMIN — THIAMINE HYDROCHLORIDE 100 MG: 100 INJECTION, SOLUTION INTRAMUSCULAR; INTRAVENOUS at 20:31

## 2020-09-16 RX ADMIN — THERA TABS 1 TABLET: TAB at 20:29

## 2020-09-16 RX ADMIN — ENOXAPARIN SODIUM 40 MG: 40 INJECTION SUBCUTANEOUS at 08:54

## 2020-09-16 RX ADMIN — THIAMINE HYDROCHLORIDE 250 MG: 100 INJECTION, SOLUTION INTRAMUSCULAR; INTRAVENOUS at 08:49

## 2020-09-16 RX ADMIN — HYDROXYZINE PAMOATE 50 MG: 50 CAPSULE ORAL at 20:31

## 2020-09-16 RX ADMIN — NICOTINE 1 PATCH: 21 PATCH, EXTENDED RELEASE TRANSDERMAL at 08:52

## 2020-09-16 RX ADMIN — FLUOXETINE 30 MG: 20 CAPSULE ORAL at 08:48

## 2020-09-16 RX ADMIN — THERA TABS 1 TABLET: TAB at 08:48

## 2020-09-16 RX ADMIN — CLONIDINE HYDROCHLORIDE 0.2 MG: 0.2 TABLET ORAL at 20:29

## 2020-09-16 RX ADMIN — LORAZEPAM 1 MG: 1 TABLET ORAL at 20:29

## 2020-09-16 RX ADMIN — FOLIC ACID 1 MG: 1 TABLET ORAL at 08:49

## 2020-09-16 RX ADMIN — SODIUM CHLORIDE, POTASSIUM CHLORIDE, SODIUM LACTATE AND CALCIUM CHLORIDE 50 ML/HR: 600; 310; 30; 20 INJECTION, SOLUTION INTRAVENOUS at 01:00

## 2020-09-16 RX ADMIN — GABAPENTIN 600 MG: 300 CAPSULE ORAL at 01:00

## 2020-09-16 NOTE — PLAN OF CARE
Problem: Adult Behavioral Health Plan of Care  Goal: Plan of Care Review  Outcome: Ongoing, Progressing   Goal Outcome Evaluation:

## 2020-09-16 NOTE — ED NOTES
Sleeping. NAD noted. Respirations even/unlabored. No tremors noted. Skin W/D/P.     Gela Monte RN  09/16/20 2168

## 2020-09-16 NOTE — DISCHARGE SUMMARY
Discharge Summary  Iván Raymundo MD  Hospitalist     Date of Discharge:  9/16/2020    Discharge Diagnosis:      Alcohol intoxication (CMS/HCC)    Suicidal ideation    Depression      Presenting Problem/History of Present Illness  Acute alcohol intoxication    Hospital Course  Patient is a 55 y.o. female admitted for acute alcohol intoxication and suicidal ideas. As her vital signs are stable and as she has sobered up she will be transferred to  Psychiatry for further workup and management.     Consults:   Consults     Date and Time Order Name Status Description    9/15/2020 2026 Hospitalist (on-call MD unless specified)            Pertinent Test Results: admission BAL of 0.295    Condition on Discharge:  stable    Vital Signs  Temp:  [97.7 °F (36.5 °C)] 97.7 °F (36.5 °C)  Heart Rate:  [70-99] 75  Resp:  [16-23] 18  BP: (106-176)/(59-89) 166/75    Physical Exam:  Physical Exam  Vitals signs reviewed.   Constitutional:       General: She is not in acute distress.     Appearance: Normal appearance.   Eyes:      General: No scleral icterus.     Extraocular Movements: Extraocular movements intact.      Pupils: Pupils are equal, round, and reactive to light.   Neck:      Musculoskeletal: Normal range of motion and neck supple. No neck rigidity.   Cardiovascular:      Rate and Rhythm: Normal rate and regular rhythm.   Pulmonary:      Effort: Pulmonary effort is normal. No respiratory distress.      Breath sounds: No stridor. Rhonchi present.   Abdominal:      General: Bowel sounds are normal. There is no distension.      Palpations: Abdomen is soft. There is no mass.      Hernia: No hernia is present.   Musculoskeletal: Normal range of motion.         General: No swelling, tenderness or deformity.   Skin:     General: Skin is dry.      Coloration: Skin is not jaundiced or pale.      Findings: No erythema.   Neurological:      General: No focal deficit present.      Mental Status: She is alert and oriented to person,  place, and time.      Cranial Nerves: No cranial nerve deficit.      Sensory: No sensory deficit.   Psychiatric:         Mood and Affect: Mood normal.         Behavior: Behavior normal.         Discharge Disposition  Psychiatric Hospital ([facility]) w/Planned Readmission    Discharge Medications     Discharge Medications      ASK your doctor about these medications      Instructions Start Date   albuterol sulfate  (90 Base) MCG/ACT inhaler  Commonly known as: PROVENTIL HFA;VENTOLIN HFA;PROAIR HFA   2 puffs, Inhalation, Every 4 Hours PRN      cloNIDine 0.2 MG tablet  Commonly known as: CATAPRES   0.2 mg, Oral, 3 Times Daily      FLUoxetine 10 MG capsule  Commonly known as: PROzac   30 mg, Oral, Daily      folic acid 1 MG tablet  Commonly known as: FOLVITE   1 mg, Oral, Daily      gabapentin 600 MG tablet  Commonly known as: NEURONTIN   600 mg, Oral, 3 Times Daily      hydrOXYzine pamoate 50 MG capsule  Commonly known as: VISTARIL   Take 1 capsule by mouth every 4 (four) hours As Needed for Anxiety.      naltrexone 50 MG tablet  Commonly known as: DEPADE   50 mg, Oral, Daily      nicotine 21 MG/24HR patch  Commonly known as: NICODERM CQ   Place 1 patch on the skin as directed by provider Daily **Remove old patch before applying new patch**      OLANZapine 10 MG tablet  Commonly known as: zyPREXA   10 mg, Oral, Nightly      Thera tablet tablet   1 tablet, Oral, Daily      thiamine 250 MG tablet  Commonly known as: VITAMIN B1   250 mg, Oral, 2 Times Daily      traZODone 100 MG tablet  Commonly known as: DESYREL   Take 1/2 to 1 tab 1-3 hrs before bedtime as needed for sleep.           Discharge Diet: as tolerated    Activity at Discharge: as tolerated    Follow-up Appointments  Future Appointments   Date Time Provider Department Center   11/18/2020 10:15 AM Farhan Guzman MD [facility] None          Iván Raymundo MD  09/16/20  16:01 CDT
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
## 2020-09-16 NOTE — ED NOTES
This tech sitting 1:1 with patient at this time, the sitter (Renea) went on break     Shani Arnold  09/16/20 1122

## 2020-09-16 NOTE — ED NOTES
Pt now A&O x 4. Pt requests food/drink-provided as ordered. Pt also c/o anxiety and tremor. Ativan given as ordered.     Gela Monte RN  09/15/20 [id number]

## 2020-09-16 NOTE — ED NOTES
Made room visit, patient is asleep and snoring at this time. Sitter present at bedside. Will continue to monitor.      Bia Espinoza RN  09/16/20 [id number]
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
## 2020-09-17 PROBLEM — I10 ESSENTIAL HYPERTENSION: Status: ACTIVE | Noted: 2020-09-17

## 2020-09-17 PROCEDURE — 99223 1ST HOSP IP/OBS HIGH 75: CPT | Performed by: PSYCHIATRY & NEUROLOGY

## 2020-09-17 PROCEDURE — 90833 PSYTX W PT W E/M 30 MIN: CPT | Performed by: PSYCHIATRY & NEUROLOGY

## 2020-09-17 RX ORDER — OLANZAPINE 5 MG/1
5 TABLET ORAL NIGHTLY
Status: DISCONTINUED | OUTPATIENT
Start: 2020-09-17 | End: 2020-09-19

## 2020-09-17 RX ORDER — METHOCARBAMOL 750 MG/1
750 TABLET, FILM COATED ORAL EVERY 6 HOURS PRN
Status: DISCONTINUED | OUTPATIENT
Start: 2020-09-17 | End: 2020-09-22

## 2020-09-17 RX ORDER — METHOCARBAMOL 500 MG/1
1000 TABLET, FILM COATED ORAL ONCE
Status: COMPLETED | OUTPATIENT
Start: 2020-09-17 | End: 2020-09-17

## 2020-09-17 RX ORDER — FLUOXETINE HYDROCHLORIDE 20 MG/1
20 CAPSULE ORAL DAILY
Status: DISCONTINUED | OUTPATIENT
Start: 2020-09-17 | End: 2020-09-18

## 2020-09-17 RX ADMIN — TRAZODONE HYDROCHLORIDE 50 MG: 50 TABLET ORAL at 21:28

## 2020-09-17 RX ADMIN — METHOCARBAMOL 1000 MG: 500 TABLET, FILM COATED ORAL at 15:30

## 2020-09-17 RX ADMIN — Medication 200 MG: at 15:31

## 2020-09-17 RX ADMIN — LORAZEPAM 1 MG: 1 TABLET ORAL at 01:46

## 2020-09-17 RX ADMIN — CLONIDINE HYDROCHLORIDE 0.2 MG: 0.2 TABLET ORAL at 15:31

## 2020-09-17 RX ADMIN — LORAZEPAM 1 MG: 1 TABLET ORAL at 20:10

## 2020-09-17 RX ADMIN — CLONIDINE HYDROCHLORIDE 0.2 MG: 0.2 TABLET ORAL at 08:06

## 2020-09-17 RX ADMIN — LORAZEPAM 1 MG: 1 TABLET ORAL at 15:31

## 2020-09-17 RX ADMIN — CLONIDINE HYDROCHLORIDE 0.2 MG: 0.2 TABLET ORAL at 20:09

## 2020-09-17 RX ADMIN — OLANZAPINE 5 MG: 5 TABLET, FILM COATED ORAL at 20:09

## 2020-09-17 RX ADMIN — NICOTINE 1 PATCH: 21 PATCH, EXTENDED RELEASE TRANSDERMAL at 08:07

## 2020-09-17 RX ADMIN — Medication 200 MG: at 20:09

## 2020-09-17 RX ADMIN — HYDROXYZINE PAMOATE 50 MG: 50 CAPSULE ORAL at 20:09

## 2020-09-17 RX ADMIN — LORAZEPAM 1 MG: 1 TABLET ORAL at 08:06

## 2020-09-17 RX ADMIN — Medication 200 MG: at 08:06

## 2020-09-17 RX ADMIN — THERA TABS 1 TABLET: TAB at 08:06

## 2020-09-17 RX ADMIN — FOLIC ACID 1 MG: 1 TABLET ORAL at 08:07

## 2020-09-17 RX ADMIN — FLUOXETINE 20 MG: 20 CAPSULE ORAL at 15:31

## 2020-09-17 NOTE — NURSING NOTE
"Behavior   Note any precipitants to event or behavior   Describe level and action of any aggressive behavior or speech and associated interventions.     Anxiety: Patient denies at this time  Depression      Depressed mood  Pain  0  AVH   no  S/I   no  Plan  no  H/I   no  Plan  no    Affect   flat      Note:Patient alert and oriented x4 Patient stated \"had been having nightmares and panic attacks.\" patient denies SI/HI/AVh at this time. Took medications as ordered. Patient said was on Neurontin 600mg tid and wanted to know if it could be reordered. Will continue to monitor. CIWA was 1 one hour post med administration.       Intervention    PRN medication utilized:  no    Instructed in medication usage and effects  Medications administered as ordered  Encouraged to verbalize needs      Response    Verbalized understanding   Did patient take medications as ordered yes   Did patient interact with assessment?  yes     Plan    Will monitor for safety  Will monitor every 15 minutes as ordered  Will evaluate and promote the plan of care    Last BM:  unknown date  (Please chart in I/O as well)    "
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
## 2020-09-17 NOTE — H&P
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 bereavement    Essential hypertension    Non-Psychiatric Medical Conditions Include:  --High blood pressure    Psychosocial Stressors Include:  --current living situation  --no stable income  --unable to maintain relationship with her son      Treatment Plan:  1) Will admit patient to the behavioral health unit at [facility], adult behavioral health unit, as a voluntary admission to ensure patient safety given imminent risk status and need for emergent inpatient stabilization and treatment.    2) Patient will be provided treatment with the unit milieu, activities, therapies and psychopharmacological management.    3) Patient placed on  Q15 minute checks and Suicide precautions.    4) Hospitalist consulted for assistance in management of medical comorbidities.    5) Will order following labs:   --TSH, Folate, B12, Vitamin D to evaluate for any contributing etiologies.   --Fastling lipids & glucose to establish baseline for any anti-d2 agent therapy    6) Will restart patient on the following psychiatric home meds:   --Restart Prozac & Zyprexa given hx of prior positive response.     7) Will make the following medication changes, and proceed with the following treatment planning:   --Prozac 20 mg  daily for affective symptoms  -Zyprexa to 5 mg nightly for augmentation of severe affective symptoms  -CIWA for alcohol detox  -Scheduled Ativan taper  - Thiamine and multivitamin supplementation    8) Will begin discharge planning as appropriate for patient.    9) Psychotherapy provided: As below.     All questions answered for the patient.  Treatment plan and medication risks and benefits discussed with: Patient.  Benefits, risks including BBW re: suicidality, alternatives, and adverse effects discussed with pt, including worsening affective sx, risk of beoni/hypomania and associated s/sx, need to stop and seek urgent medical care.    EPS, TD, metabolic sequelae with anti-D2 therapy.  All questions answered for pt (these primarily revolved around benefits of agents). Patient was able to arrive at an informed decision.  Informed consent provided to proceed with medication trials.          Estimated Length of Stay: 1 week  Prognosis: fair    Patrick English II, MD   09/17/20 @ 09:01 CDT      Psychotherapy Note  --Total Psychotherapy Time: 20 minutes  --Participants: Patient, myself  --Current Clinical Status: depressed  --Focus of Therapeutic Encounter: Depression  --Intervention Type: Supportive, CBT/cognitive, ME/Motivational Enhancement, Psycho-Educational and Insight Focused  --Therapy notes: I provided reflective listening, supportive therapy, reflection, and allowed them to express affect in therapy course.  Cognitive behavioral therapy targeting schema burden, distortions - identifying and challenging these.  Educational regarding diagnosis and treatment planning.  Motivational enhancement regarding substance use and sobriety.  Insight to increase awareness about dysfunctional behaviors perpetuating situation.  --DX: as above  --Plan: Continue to work on developing & strengthening coping skills; correcting maladaptive schema; work on insight; work on rationale for sobriety.  --Post therapy status: improving  affect and rapport        I will START or STAY ON the medications listed below when I get home from the hospital:  None

## 2020-09-17 NOTE — PLAN OF CARE
Problem: Adult Behavioral Health Plan of Care  Goal: Plan of Care Review  Outcome: Ongoing, Progressing  Flowsheets  Taken 9/17/2020 0250  Progress: no change  Plan of Care Reviewed With: patient  Patient Agreement with Plan of Care: agrees  Outcome Summary: patient slept approx 6 hours at this time.  Taken 9/16/2020 2322  Plan of Care Reviewed With: patient   Goal Outcome Evaluation:  Plan of Care Reviewed With: patient  Progress: no change  Outcome Summary: patient slept approx 6 hours at this time.

## 2020-09-17 NOTE — PLAN OF CARE
Problem: Adult Behavioral Health Plan of Care  Goal: Plan of Care Review  Flowsheets (Taken 9/17/2020 9424)  Progress: no change  Plan of Care Reviewed With: patient  Outcome Summary: Initial assessment.  My Plate use to provided ed regarding basic nutrition.  Food sources of Vit B-1 reviewed.
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
## 2020-09-17 NOTE — CONSULTS
Adult Nutrition  Assessment    Patient Name:  Laura Bruce  YOB: 1965  MRN: [id number]  Admit Date:  9/16/2020    Assessment Date:  9/17/2020    Comments:  Pt with dx alcohol use disorder.  Pt reports good appetite.  Reports 20 lb wt gain the past couple of mo due to being in rehab.  Willing to receive milk with meals.  Intake 100% - 3x plus 0% for 1 snack.  Folic Acid, Vit B-1, Thera tab prescribed. Consult received regarding diet ed.  My Plate used to provide ed regarding planning a healthy meal.  Meat, beans, grains encouraged to promote Vit B-1 intake for recovery.  Will maintain pt on prescribed diet adding milk to meals.    Reason for Assessment     Row Name 09/17/20 1654          Reason for Assessment    Reason For Assessment  physician consult     Identified At Risk by Screening Criteria  need for education             Labs/Tests/Procedures/Meds     Row Name 09/17/20 1656          Labs/Procedures/Meds    Lab Results Reviewed  reviewed        Medications    Pertinent Medications Reviewed  reviewed, pertinent           Estimated/Assessed Needs     Row Name 09/17/20 7850          Calculation Measurements    Weight Used For Calculations  58.1 kg (128 lb 1.4 oz)        Estimated/Assessed Needs    Additional Documentation  Calorie Requirements (Group);Fluid Requirements (Group);Bradford-St. Jeor Equation (Group);Protein Requirements (Group)        Calorie Requirements    Estimated Calorie Requirement (kcal/day)  1468        Bradford-St. Jeor Equation    RMR (Bradford-St. Jeor Equation)  1129.377        Protein Requirements    Weight Used For Protein Calculations  58.1 kg (128 lb 1.4 oz)     Est Protein Requirement Amount (gms/kg)  1.1 gm protein     Estimated Protein Requirements (gms/day)  63.91        Fluid Requirements    Fluid Requirements (mL/day)  1743     RDA Method (mL)  1743         Nutrition Prescription Ordered     Row Name 09/17/20 2173          Nutrition Prescription PO    Current  PO Diet  Regular         Evaluation of Received Nutrient/Fluid Intake     Row Name 09/17/20 1657          PO Evaluation    Number of Meals  3 plus 1 snack     % PO Intake  100% - 3x plus 0% for 1 snack               Electronically signed by:  Eun Duvall RD  09/17/20 16:58 CDT

## 2020-09-17 NOTE — PLAN OF CARE
Met with patient and completed Recreation Therapy Assessment.  Patient presents with depressed mood and blunted affect.  She reports that following her last admission, she completed a 30 day rehab and within a week she had returned to drinking.  She states that her plan is to go to a long term program.  Patient identifies a lot of leisure interest she used to enjoy, but states that she  currently stays in her room and does nothing.  Patient states that drinking is the only coping skill she has been using.  Patient states that she wants to improve her mood and energy level, as well as develop better coping skills.  Will continue to encourage group participation and social interaction.

## 2020-09-17 NOTE — PLAN OF CARE
Goal Outcome Evaluation:  Plan of Care Reviewed With: patient  Progress: no change  Outcome Summary: patient has been anxious and tearful today. Patient has isolated in her roon. Patient denies SI at this time.

## 2020-09-18 PROBLEM — F33.2 SEVERE EPISODE OF RECURRENT MAJOR DEPRESSIVE DISORDER, WITHOUT PSYCHOTIC FEATURES (HCC): Status: RESOLVED | Noted: 2018-04-23 | Resolved: 2020-09-18

## 2020-09-18 PROBLEM — R45.851 SUICIDAL IDEATIONS: Status: RESOLVED | Noted: 2019-01-05 | Resolved: 2020-09-18

## 2020-09-18 PROBLEM — F10.20 ALCOHOL USE DISORDER, SEVERE, DEPENDENCE (HCC): Status: RESOLVED | Noted: 2019-01-07 | Resolved: 2020-09-18

## 2020-09-18 PROCEDURE — 99232 SBSQ HOSP IP/OBS MODERATE 35: CPT | Performed by: PSYCHIATRY & NEUROLOGY

## 2020-09-18 RX ORDER — DULOXETIN HYDROCHLORIDE 20 MG/1
20 CAPSULE, DELAYED RELEASE ORAL DAILY
Status: DISCONTINUED | OUTPATIENT
Start: 2020-09-19 | End: 2020-09-20

## 2020-09-18 RX ORDER — LANOLIN ALCOHOL/MO/W.PET/CERES
3 CREAM (GRAM) TOPICAL NIGHTLY
Status: DISCONTINUED | OUTPATIENT
Start: 2020-09-18 | End: 2020-09-23 | Stop reason: HOSPADM

## 2020-09-18 RX ADMIN — CLONIDINE HYDROCHLORIDE 0.2 MG: 0.2 TABLET ORAL at 08:29

## 2020-09-18 RX ADMIN — LORAZEPAM 1 MG: 1 TABLET ORAL at 11:45

## 2020-09-18 RX ADMIN — MELATONIN 3 MG: 3 TAB ORAL at 20:02

## 2020-09-18 RX ADMIN — CLONIDINE HYDROCHLORIDE 0.2 MG: 0.2 TABLET ORAL at 20:02

## 2020-09-18 RX ADMIN — FLUOXETINE 20 MG: 20 CAPSULE ORAL at 08:29

## 2020-09-18 RX ADMIN — LORAZEPAM 1 MG: 1 TABLET ORAL at 03:16

## 2020-09-18 RX ADMIN — FOLIC ACID 1 MG: 1 TABLET ORAL at 08:30

## 2020-09-18 RX ADMIN — HYDROXYZINE PAMOATE 50 MG: 50 CAPSULE ORAL at 20:02

## 2020-09-18 RX ADMIN — NICOTINE 1 PATCH: 21 PATCH, EXTENDED RELEASE TRANSDERMAL at 08:30

## 2020-09-18 RX ADMIN — CLONIDINE HYDROCHLORIDE 0.2 MG: 0.2 TABLET ORAL at 16:15

## 2020-09-18 RX ADMIN — OLANZAPINE 5 MG: 5 TABLET, FILM COATED ORAL at 20:02

## 2020-09-18 RX ADMIN — THERA TABS 1 TABLET: TAB at 08:30

## 2020-09-18 NOTE — NURSING NOTE
Behavior   Note any precipitants to event or behavior   Describe level and action of any aggressive behavior or speech and associated interventions.     Anxiety: Excess Worry  Depression: hopelessness  Pain  0  AVH   no  S/I   no  Plan  no  H/I   no  Plan  no    Affect   mood-congruent      Note: Patient is tearful today. Patient reports no SI at this time. Patient states she wants to go to a long term rehab facility. Patient has attended group therapy and engages well with staff. Patient encouraged to interact with peers.      Intervention    PRN medication utilized:  no    Instructed in medication usage and effects  Medications administered as ordered  Encouraged to verbalize needs      Response    Verbalized understanding   Did patient take medications as ordered yes   Did patient interact with assessment?  yes     Plan    Will monitor for safety  Will monitor every 15 minutes as ordered  Will evaluate and promote the plan of care    Last BM:  unknown date  (Please chart in I/O as well)

## 2020-09-18 NOTE — PLAN OF CARE
Problem: Adult Behavioral Health Plan of Care  Goal: Plan of Care Review  Outcome: Ongoing, Progressing  Flowsheets (Taken 9/18/2020 0250)  Progress: no change  Plan of Care Reviewed With: patient  Patient Agreement with Plan of Care: agrees   Goal Outcome Evaluation:  Plan of Care Reviewed With: patient  Progress: no change  Outcome Summary: Initial assessment.  My Plate use to provided ed regarding basic nutrition.  Food sources of Vit B-1 reviewed.

## 2020-09-18 NOTE — NURSING NOTE
Behavior   Note any precipitants to event or behavior   Describe level and action of any aggressive behavior or speech and associated interventions.     Anxiety: Restless/Edgy  Depression: depressed mood  Pain  0  AVH   no  S/I   no  Plan  no  H/I   no  Plan  no    Affect   flat      Note:Patient alert and oriented x4. Patient stated still having nightmares and feels anxious and having trouble going to sleep tonight. Patient took medications as ordered. Denies Si/HI/AVh at this time. Patient is on scheduled ativan. Will continue to monitor.       Intervention    PRN medication utilized:  yes - trazadone and vistaril    Instructed in medication usage and effects  Medications administered as ordered  Encouraged to verbalize needs      Response    Verbalized understanding   Did patient take medications as ordered yes   Did patient interact with assessment?  yes     Plan    Will monitor for safety  Will monitor every 15 minutes as ordered  Will evaluate and promote the plan of care    Last BM:  unknown date  (Please chart in I/O as well)

## 2020-09-18 NOTE — PLAN OF CARE
Goal Outcome Evaluation:  Plan of Care Reviewed With: patient  Progress: improving  Outcome Summary: Patient is tearful today. Patient reports no SI at this time. Patient states she wants to go to a long term rehab facility. Patient has attended group therapy and engages well with staff. Patient encouraged to interact with peers.

## 2020-09-18 NOTE — PROGRESS NOTES
LCSW followed up with pt to discuss disposition, ie [facility]. Per pt, she cannot pay the $200 but is interested in going to [facility]. LCSW contacted [facility] and made referral. Per [facility], pt has been accepted there as long as she does not get Neurontin or Trazadone. Discussed with pt and MD. Plan to not prescribe either meds at [place] and pt will transition to [facility] for long term recovery program.

## 2020-09-18 NOTE — PROGRESS NOTES
9/18/2020    Chief Complaint: suicidal ideation, substance abuse and depression    Subjective:  Patient is a 55 y.o. female that is currently inpatient on the adult U today she is seen individually and in treatment team. Pt reports that she is having shakes from withdraws but otherwise tolerating it well.   Pt reports that she is disappointed in herself for drinking alcohol after rehab, but that she was in the same environment as she had been.   Pt states she is hopeful that sober living will be a good choice and help her get back on track.    Pt is alert/oriented, she is able to express reasons for living  Pt is agreeable to stop Neurontin and trazodone to get into [facility]  Pt is alert/oriented, she is reality based and is logical in thought. Denies SI/HI/AVH    Objective     Vital Signs    Temp:  [97 °F (36.1 °C)-98.1 °F (36.7 °C)] 97 °F (36.1 °C)  Heart Rate:  [73-87] 87  Resp:  [18] 18  BP: (127-142)/(70-76) 142/76    Physical Exam:   General Appearance: alert, appears stated age and cooperative,  Hygiene:   fair  Gait & Station: Normal  Musculoskeletal: No tremors or abnormal involuntary movements    Mental Status Exam:   Cooperation:  Cooperative  Eye Contact:  Fair  Psychomotor Behavior:  Appropriate  Mood: Sad/Depressed  Affect:  normal  Speech:  Normal  Thought Process:  Coherent  Associations: Goal Directed  Thought Content:     Mood congruent   Suicidal:  None   Homicidal:  None   Hallucinations:  None   Delusion:  None  Cognitive Functioning:   Consciousness: awake, alert and oriented  Reliability:  fair  Insight:  Fair  Judgement:  Fair  Impulse Control:  Fair    Lab Results (last 24 hours)     ** No results found for the last 24 hours. **        Imaging Results (Last 24 Hours)     ** No results found for the last 24 hours. **          Medicine:   Current Facility-Administered Medications:   •  acetaminophen (TYLENOL) tablet 650 mg, 650 mg, Oral, Q4H PRN, Patrick English II, MD  •   albuterol (PROVENTIL) nebulizer solution 0.083% 2.5 mg/3mL, 2.5 mg, Nebulization, Q6H PRN, Patrick English II, MD  •  aluminum-magnesium hydroxide-simethicone (MAALOX MAX) 400-400-40 MG/5ML suspension 15 mL, 15 mL, Oral, Q6H PRN, Patrick English II, MD  •  cloNIDine (CATAPRES) tablet 0.2 mg, 0.2 mg, Oral, TID, Patrick English II, MD, 0.2 mg at 09/18/20 0829  •  famotidine (PEPCID) tablet 20 mg, 20 mg, Oral, BID PRN, Patrick English II, MD  •  FLUoxetine (PROzac) capsule 20 mg, 20 mg, Oral, Daily, Patrick English II, MD, 20 mg at 09/18/20 0829  •  [COMPLETED] thiamine (VITAMIN B-1) tablet 200 mg, 200 mg, Oral, TID, 200 mg at 09/17/20 2009 **AND** Thera tablet 1 tablet, 1 tablet, Oral, Daily, 1 tablet at 09/18/20 0830 **AND** folic acid (FOLVITE) tablet 1 mg, 1 mg, Oral, Daily, Patrick English II, MD, 1 mg at 09/18/20 0830  •  hydrOXYzine pamoate (VISTARIL) capsule 50 mg, 50 mg, Oral, Q6H PRN, Patrick English II, MD, 50 mg at 09/17/20 2009  •  loperamide (IMODIUM) capsule 2 mg, 2 mg, Oral, 4x Daily PRN, Patrick English II, MD  •  LORazepam (ATIVAN) tablet 1 mg, 1 mg, Oral, Q2H PRN **OR** LORazepam (ATIVAN) injection 1 mg, 1 mg, Intravenous, Q2H PRN **OR** LORazepam (ATIVAN) tablet 2 mg, 2 mg, Oral, Q1H PRN **OR** LORazepam (ATIVAN) injection 2 mg, 2 mg, Intravenous, Q1H PRN **OR** LORazepam (ATIVAN) injection 2 mg, 2 mg, Intravenous, Q15 Min PRN **OR** LORazepam (ATIVAN) injection 2 mg, 2 mg, Intramuscular, Q15 Min PRN, Patrick English II, MD  •  magnesium hydroxide (MILK OF MAGNESIA) suspension 2400 mg/10mL 10 mL, 10 mL, Oral, Daily PRN, Patrick English II, MD  •  methocarbamol (ROBAXIN) tablet 750 mg, 750 mg, Oral, Q6H PRN, Patrick English II, MD  •  nicotine (NICODERM CQ) 21 MG/24HR patch 1 patch, 1 patch, Transdermal, Q24H, Patrick English II, MD, 1 patch at 09/18/20 0830  •  OLANZapine (zyPREXA) tablet 5 mg, 5 mg, Oral, Nightly, [place]  Patrick Solis II, MD, 5 mg at 09/17/20 2009  •  traZODone (DESYREL) tablet 50 mg, 50 mg, Oral, Nightly PRN, Patrick English II, MD, 50 mg at 09/17/20 2124    Diagnoses/Assessment:     Suicidal ideation    Severe episode of recurrent major depressive disorder, without psychotic features (CMS/HCC)    Post traumatic stress disorder (PTSD)    Alcohol use disorder, severe, dependence (CMS/HCC)    Complicated bereavement    Essential hypertension      Treatment Plan:    1) Will continue care for the patient on the behavioral health unit at [facility] to ensure patient safety.  2) Will continue to provide treatment with the unit milieu, activities, therapies and psychopharmacological management.  3) Patient to be placed on or continued on  Q15 minute checks  and Suicide and Fall precautions.  4) Pertinent medical issues: none  5) Will order following labs: none  6) Will make the following medication changes:   --Discontinue Prozac  --Discontinue PRN Trazodone  --Start Cymbalta 20 mg tomorrow, increase as needed  --Start Melatonin 3 mg QHS   7) Will continue discharge planning as appropriate for patient.  8) Psychotherapy provided for less than 16 minutes.    Treatment plan and medication risks and benefits discussed with: Patient    BENNETT Schwarz  09/18/20  13:32 CDT

## 2020-09-19 LAB
25(OH)D3 SERPL-MCNC: 41.9 NG/ML (ref 30–100)
FOLATE SERPL-MCNC: >20 NG/ML (ref 4.78–24.2)
TSH SERPL DL<=0.05 MIU/L-ACNC: 1.49 UIU/ML (ref 0.27–4.2)
VIT B12 BLD-MCNC: 807 PG/ML (ref 211–946)

## 2020-09-19 PROCEDURE — 84443 ASSAY THYROID STIM HORMONE: CPT | Performed by: PSYCHIATRY & NEUROLOGY

## 2020-09-19 PROCEDURE — 82607 VITAMIN B-12: CPT | Performed by: PSYCHIATRY & NEUROLOGY

## 2020-09-19 PROCEDURE — 82306 VITAMIN D 25 HYDROXY: CPT | Performed by: PSYCHIATRY & NEUROLOGY

## 2020-09-19 PROCEDURE — 82746 ASSAY OF FOLIC ACID SERUM: CPT | Performed by: PSYCHIATRY & NEUROLOGY

## 2020-09-19 PROCEDURE — 99232 SBSQ HOSP IP/OBS MODERATE 35: CPT | Performed by: PSYCHIATRY & NEUROLOGY

## 2020-09-19 PROCEDURE — 90833 PSYTX W PT W E/M 30 MIN: CPT | Performed by: PSYCHIATRY & NEUROLOGY

## 2020-09-19 RX ORDER — ACETAMINOPHEN, ASPIRIN AND CAFFEINE 250; 250; 65 MG/1; MG/1; MG/1
1 TABLET, FILM COATED ORAL ONCE
Status: COMPLETED | OUTPATIENT
Start: 2020-09-19 | End: 2020-09-19

## 2020-09-19 RX ADMIN — OLANZAPINE 7.5 MG: 2.5 TABLET, FILM COATED ORAL at 20:20

## 2020-09-19 RX ADMIN — FOLIC ACID 1 MG: 1 TABLET ORAL at 08:20

## 2020-09-19 RX ADMIN — CLONIDINE HYDROCHLORIDE 0.2 MG: 0.2 TABLET ORAL at 16:10

## 2020-09-19 RX ADMIN — HYDROXYZINE PAMOATE 50 MG: 50 CAPSULE ORAL at 13:37

## 2020-09-19 RX ADMIN — ACETAMINOPHEN, ASPIRIN AND CAFFEINE 1 TABLET: 250; 250; 65 TABLET, FILM COATED ORAL at 20:19

## 2020-09-19 RX ADMIN — MELATONIN 3 MG: 3 TAB ORAL at 20:20

## 2020-09-19 RX ADMIN — DULOXETINE HYDROCHLORIDE 20 MG: 20 CAPSULE, DELAYED RELEASE ORAL at 08:20

## 2020-09-19 RX ADMIN — NICOTINE 1 PATCH: 21 PATCH, EXTENDED RELEASE TRANSDERMAL at 09:24

## 2020-09-19 RX ADMIN — CLONIDINE HYDROCHLORIDE 0.2 MG: 0.2 TABLET ORAL at 08:20

## 2020-09-19 RX ADMIN — THERA TABS 1 TABLET: TAB at 08:20

## 2020-09-19 RX ADMIN — CLONIDINE HYDROCHLORIDE 0.2 MG: 0.2 TABLET ORAL at 20:20

## 2020-09-19 NOTE — NURSING NOTE
"Behavior   Note any precipitants to event or behavior   Describe level and action of any aggressive behavior or speech and associated interventions.     Anxiety: Excess Worry,restless/edgy  Depression: depressed mood  Pain  0  AVH   no  S/I   no  Plan  no  H/I   no  Plan  no    Affect   flat      Note:pt seen in milieu for breakfast.  Pt ate well.  Pt alert and oriented x 4, denies avh, denies si,hi, depression.  During medication pass, prior to receiving routine medication but while medications were being discussed, pt asked,\"so where's my hydroxyzine, I dont understand why he keeps changing my medication.\"  Pt was educated on regimen, was informed that hydroxyzine is a prn for times when anxiety is very high, and not scheduled.  Pt states,\"then I want it.\"  Pt was asked to take routine medication first, to give them a chance before asking for a prn because it would be difficult to tell if the routine medication is doing its job if masked by a prn.  Pt reluctantly agreed.  Pt took medications and has not asked for prn after at this time.    Intervention    PRN medication utilized:  no    Instructed in medication usage and effects  Medications administered as ordered  Encouraged to verbalize needs      Response    Verbalized understanding   Did patient take medications as ordered yes   Did patient interact with assessment?  yes     Plan    Will monitor for safety  Will monitor every 15 minutes as ordered  Will evaluate and promote the plan of care    Last BM:  unknown date  (Please chart in I/O as well)  "

## 2020-09-19 NOTE — PLAN OF CARE
Problem: Adult Behavioral Health Plan of Care  Goal: Plan of Care Review  Outcome: Ongoing, Progressing  Flowsheets  Taken 9/19/2020 1650  Progress: no change  Plan of Care Reviewed With: patient  Patient Agreement with Plan of Care: agrees  Outcome Summary: pt remains very anxious, came to station tearful around 2pm, asked for vistaril, states she is very worried about what happens when she leaves and what changes she needs to make and was overwhelmed.  pt took vistaril and layed down, was able to calm and states minimal anxiety within 1 hour.  Taken 9/19/2020 1200  Plan of Care Reviewed With: patient  Patient Agreement with Plan of Care: agrees   Goal Outcome Evaluation:  Plan of Care Reviewed With: patient  Progress: no change  Outcome Summary: pt remains very anxious, came to station tearful around 2pm, asked for vistaril, states she is very worried about what happens when she leaves and what changes she needs to make and was overwhelmed.  pt took vistaril and layed down, was able to calm and states minimal anxiety within 1 hour.

## 2020-09-19 NOTE — PLAN OF CARE
Problem: Adult Behavioral Health Plan of Care  Goal: Plan of Care Review  Outcome: Ongoing, Progressing  Flowsheets  Taken 9/19/2020 0305  Outcome Summary: Patient alert and oriented x4. Patient states she is having trouble sleeping at night and was disappointed that her trazadone was discontinued.  Patient took medications as ordered. pt resting well this shift. Denies Si/HI/AVh at this time.  Will continue to monitor.  Taken 9/19/2020 0126  Plan of Care Reviewed With: patient  Patient Agreement with Plan of Care: agrees

## 2020-09-19 NOTE — NURSING NOTE
Behavior   Note any precipitants to event or behavior   Describe level and action of any aggressive behavior or speech and associated interventions.     Anxiety: Restless/Edgy  Depression: depressed mood  Pain  0  AVH   no  S/I   no  Plan  no  H/I   no  Plan  no    Affect   flat      Note:Patient alert and oriented x4. Patient states she is having trouble sleeping at night and was disappointed that her trazadone was discontinued.  Patient took medications as ordered. Denies Si/HI/AVh at this time.  Will continue to monitor.       Intervention    PRN medication utilized:  Yes- vistaril and melatonin    Instructed in medication usage and effects  Medications administered as ordered  Encouraged to verbalize needs      Response    Verbalized understanding   Did patient take medications as ordered yes   Did patient interact with assessment?  yes     Plan    Will monitor for safety  Will monitor every 15 minutes as ordered  Will evaluate and promote the plan of care    Last BM:  unknown date  (Please chart in I/O as well)

## 2020-09-20 PROCEDURE — 99232 SBSQ HOSP IP/OBS MODERATE 35: CPT | Performed by: PSYCHIATRY & NEUROLOGY

## 2020-09-20 RX ORDER — DULOXETIN HYDROCHLORIDE 20 MG/1
40 CAPSULE, DELAYED RELEASE ORAL DAILY
Status: DISCONTINUED | OUTPATIENT
Start: 2020-09-21 | End: 2020-09-22

## 2020-09-20 RX ORDER — PRAZOSIN HYDROCHLORIDE 1 MG/1
1 CAPSULE ORAL NIGHTLY
Status: DISCONTINUED | OUTPATIENT
Start: 2020-09-20 | End: 2020-09-21

## 2020-09-20 RX ADMIN — DULOXETINE HYDROCHLORIDE 20 MG: 20 CAPSULE, DELAYED RELEASE ORAL at 08:41

## 2020-09-20 RX ADMIN — CLONIDINE HYDROCHLORIDE 0.2 MG: 0.2 TABLET ORAL at 20:27

## 2020-09-20 RX ADMIN — CLONIDINE HYDROCHLORIDE 0.2 MG: 0.2 TABLET ORAL at 15:58

## 2020-09-20 RX ADMIN — CLONIDINE HYDROCHLORIDE 0.2 MG: 0.2 TABLET ORAL at 08:41

## 2020-09-20 RX ADMIN — METHOCARBAMOL 750 MG: 750 TABLET, FILM COATED ORAL at 21:09

## 2020-09-20 RX ADMIN — HYDROXYZINE PAMOATE 50 MG: 50 CAPSULE ORAL at 20:27

## 2020-09-20 RX ADMIN — NICOTINE 1 PATCH: 21 PATCH, EXTENDED RELEASE TRANSDERMAL at 08:20

## 2020-09-20 RX ADMIN — OLANZAPINE 7.5 MG: 2.5 TABLET, FILM COATED ORAL at 20:28

## 2020-09-20 RX ADMIN — PRAZOSIN HYDROCHLORIDE 1 MG: 1 CAPSULE ORAL at 20:27

## 2020-09-20 RX ADMIN — MELATONIN 3 MG: 3 TAB ORAL at 20:28

## 2020-09-20 RX ADMIN — THERA TABS 1 TABLET: TAB at 08:41

## 2020-09-20 RX ADMIN — HYDROXYZINE PAMOATE 50 MG: 50 CAPSULE ORAL at 12:49

## 2020-09-20 NOTE — PLAN OF CARE
Goal Outcome Evaluation:  Plan of Care Reviewed With: patient  Progress: no change  Outcome Summary: pt continues to complain of mid day anxiety, requested vistaril after coming to station tearful, states she is worried about what happens after discharge.  pt has participated in activities and has been social.

## 2020-09-20 NOTE — NURSING NOTE
Behavior   Note any precipitants to event or behavior   Describe level and action of any aggressive behavior or speech and associated interventions.     Anxiety: Restless/Edgy  Depression: depressed mood  Pain  0  AVH   no  S/I   no  Plan  no  H/I   no  Plan  no    Affect   flat      Note:Patient alert and oriented x4. Patient requests melatonin prn and vistaril states it helps her sleep.   Patient took medications as ordered. Denies Si/HI/AVh anxiety and depression at this time.  Will continue to monitor.       Intervention    PRN medication utilized:  Yes- vistaril and melatonin    Instructed in medication usage and effects  Medications administered as ordered  Encouraged to verbalize needs      Response    Verbalized understanding   Did patient take medications as ordered yes   Did patient interact with assessment?  yes     Plan    Will monitor for safety  Will monitor every 15 minutes as ordered  Will evaluate and promote the plan of care    Last BM:  unknown date  (Please chart in I/O as well)

## 2020-09-20 NOTE — NURSING NOTE
Behavior   Note any precipitants to event or behavior   Describe level and action of any aggressive behavior or speech and associated interventions.     Anxiety: Patient denies at this time  Depression: depressed mood  Pain  0  AVH   no  S/I   no  Plan  no  H/I   no  Plan  no    Affect   euthymic/normal      Note:pt in milieu with peers, laughing, smiling, and states she feel better but has lingering depression.  Pt denies any other problems.  Pt took all medication, did not request any prn's and participated in morning group.      Intervention    PRN medication utilized:  no    Instructed in medication usage and effects  Medications administered as ordered  Encouraged to verbalize needs      Response    Verbalized understanding   Did patient take medications as ordered yes   Did patient interact with assessment?  yes     Plan    Will monitor for safety  Will monitor every 15 minutes as ordered  Will evaluate and promote the plan of care    Last BM:  unknown date  (Please chart in I/O as well)

## 2020-09-20 NOTE — PLAN OF CARE
Goal Outcome Evaluation:  Plan of Care Reviewed With: patient  Progress: no change  Outcome Summary: Patient alert and oriented x4. Patient requests melatonin prn and vistaril states it helps her sleep.   Patient took medications as ordered. Denies Si/HI/AVh anxiety and depression at this time.  Will continue to monitor.

## 2020-09-20 NOTE — NURSING NOTE
"RN talked to family member via phone. Family member addressed concerns about pt being homeless after discharge stating, \"She has no where to go because of all the drama she caused where she was at before so she can't go back there.\" Also, family member was wanting to know if this longer term rehab was court ordered or voluntary because she is afraid pt will back out like she has before.   "

## 2020-09-20 NOTE — PROGRESS NOTES
Psychiatry Progress Note   9/19/2020      HD: #3  Legal: Vol    Chief Complaint: Suicidal Ideation, Substance Abuse and Severe Depression      Subjective --  Ms. Laura Bruce is a 55 y.o. female who was seen on the Adult unit.      Pleasant.  Engaged fairly well.      She denies any difficulties with sleep.     Depression ongoing: severe, impairing, constant.    Worry about future.    We d/w pt moving forward with transition to rehab.  Processed distortions and focused on challenging these to good effect.     No AE to medications: no HA/SA/MA.           Objective   Objective --      Vital Signs:  Temp:  [98.8 °F (37.1 °C)] 98.8 °F (37.1 °C)  Heart Rate:  [64] 64  Resp:  [18] 18  BP: (133)/(76) 133/76    Patient Vitals for the past 24 hrs:   BP Temp Temp src Pulse Resp SpO2   09/19/20 2015 133/76 98.8 °F (37.1 °C) Tympanic 64 18 97 %         Physical Exam:   -General Appearance:  alert and in NAD  -Hygiene:  Adequate   -Gait & Station:  Blank multiple: Normal  -Musculoskeletal:  No tremors or abnormal involuntary movements and No atrophy noted  -Pulm: unlaboured     Mental Status Exam:   --Cooperation:  Cooperative  --Eye Contact:  Non-sustained  --Psychomotor Behavior:  Slow  --Mood:  Sad/Depressed  --Affect:  mood-congruent and heavily constricted  --Speech:  Normal r/r/v  --Thought Process:  Coherent and Sluggish  --Associations: Goal Directed  --Themes:  None overt  --Thought Content:     --Mood congruent   --Suicidal:  Denies    --Homicidal:  Denies   --Hallucinations:  Denies and Not appearing to respond to internal stimuli   --Delusion:  None noted/overt and No overt psychotic overlay  --Cognitive Functioning:  --Consciousness: awake and alert  Attention:  Distractible  Concentration:  Distractible  Fund of Knowledge:  Average to Below Average based on interaction  --Reliability:  adequate  --Insight:  Diminished  --Judgment:  Diminished  --Impulse Control:  Diminished      Lab Results (last 24 hours)      Procedure Component Value Units Date/Time    Vitamin B12 [[id number]]  (Normal) Collected: 09/19/20 0556    Specimen: Blood Updated: 09/19/20 1247     Vitamin B-12 807 pg/mL     Narrative:      Results may be falsely increased if patient taking Biotin.      Folate [[id number]]  (Normal) Collected: 09/19/20 0556    Specimen: Blood Updated: 09/19/20 1247     Folate >20.00 ng/mL     Narrative:      Results may be falsely increased if patient taking Biotin.      Vitamin D 25 Hydroxy [[id number]]  (Normal) Collected: 09/19/20 0556    Specimen: Blood Updated: 09/19/20 1247     25 Hydroxy, Vitamin D 41.9 ng/ml     Narrative:      Reference Range for Total Vitamin D 25(OH)     Deficiency <20.0 ng/mL   Insufficiency 21-29 ng/mL   Sufficiency  ng/mL  Toxicity >100 ng/ml    Results may be falsely increased if patient taking Biotin.      TSH [[id number]]  (Normal) Collected: 09/19/20 0556    Specimen: Blood Updated: 09/19/20 0711     TSH 1.490 uIU/mL           Imaging Results (Last 24 Hours)     ** No results found for the last 24 hours. **            Medications:   Scheduled Meds:cloNIDine, 0.2 mg, Oral, TID  DULoxetine, 20 mg, Oral, Daily  melatonin, 3 mg, Oral, Nightly  nicotine, 1 patch, Transdermal, Q24H  OLANZapine, 7.5 mg, Oral, Nightly  Thera, 1 tablet, Oral, Daily      Continuous Infusions:   PRN Meds:.•  acetaminophen  •  albuterol  •  aluminum-magnesium hydroxide-simethicone  •  famotidine  •  hydrOXYzine pamoate  •  loperamide  •  LORazepam **OR** LORazepam **OR** LORazepam **OR** LORazepam **OR** LORazepam **OR** LORazepam  •  magnesium hydroxide  •  methocarbamol      Assessment:     Suicidal ideation    Severe episode of recurrent major depressive disorder, without psychotic features (CMS/HCC)    Post traumatic stress disorder (PTSD)    Alcohol use disorder, severe, dependence (CMS/HCC)    Complicated bereavement    Essential hypertension         --> IMPRESSION: Ongoing significant affective sx.  Slowly  improving and tolerating medications.       Treatment Plan:  1) Will continue care for the patient on the behavioral health unit at [facility] to ensure patient safety given Profound Functional impairment from her affective sx causing need for further monitoring and medication management, with currently unexceptable level of risk for discharge.    2) Will continue to provide treatment with the unit milieu, activities, therapies and psychopharmacological management.    3) Patient to be placed on or continued on  Q15 minute checks  and Suicide precautions.    4) Pertinent Concurrent Non-Psychiatric Medical Issues:   --HTN: stable, Catapres PRN    5) Will order following labs: none    6) Behavioral Health Treatment Planning:  --Cymbalta to 30mg daily  --Zyprexa to 7.5mg qHS for augmentation    7) Psychotherapy provided: as below    --> Dispostion Planning: to rehab, in next 3-4 days    Treatment plan and medication risks and benefits discussed with: Patient and Treatment Team.    Patrick English II, MD  09/19/20 @ 21:43 CDT    Dictated using Loto Labson.      Psychotherapy Note  --Total Psychotherapy Time: 20 minutes  --Participants: Patient, myself  --Current Clinical Status: depressed  --Focus of Therapeutic Encounter: depression, coping, schema  --Intervention Type: Supportive, CBT/cognitive, ME/Motivational Enhancement and Insight Focused  --Therapy notes: I provided reflective listening, supportive therapy, reflection, and allowed them to express affect in therapy course.  Cognitive behavioral therapy targeting schema burden, distortions - identifying and challenging these.  ME re: rehab; action stage of change.  Insight to increase awareness about situation and dysfunctional dynamics.    --DX: as above  --Plan: Continue to work on developing & strengthening coping skills; correcting maladaptive schema; work on insight.  --Post therapy status: improving affect and insight      Patrick English II,  MD  09/19/20 @ 21:43 CDT

## 2020-09-21 PROCEDURE — 99232 SBSQ HOSP IP/OBS MODERATE 35: CPT | Performed by: PSYCHIATRY & NEUROLOGY

## 2020-09-21 RX ORDER — PRAZOSIN HYDROCHLORIDE 1 MG/1
1 CAPSULE ORAL EVERY 12 HOURS SCHEDULED
Status: DISCONTINUED | OUTPATIENT
Start: 2020-09-21 | End: 2020-09-23 | Stop reason: HOSPADM

## 2020-09-21 RX ADMIN — CLONIDINE HYDROCHLORIDE 0.2 MG: 0.2 TABLET ORAL at 15:53

## 2020-09-21 RX ADMIN — OLANZAPINE 7.5 MG: 2.5 TABLET, FILM COATED ORAL at 20:10

## 2020-09-21 RX ADMIN — CLONIDINE HYDROCHLORIDE 0.2 MG: 0.2 TABLET ORAL at 20:10

## 2020-09-21 RX ADMIN — CLONIDINE HYDROCHLORIDE 0.2 MG: 0.2 TABLET ORAL at 08:03

## 2020-09-21 RX ADMIN — METHOCARBAMOL 750 MG: 750 TABLET, FILM COATED ORAL at 11:03

## 2020-09-21 RX ADMIN — DULOXETINE HYDROCHLORIDE 40 MG: 20 CAPSULE, DELAYED RELEASE ORAL at 08:03

## 2020-09-21 RX ADMIN — THERA TABS 1 TABLET: TAB at 08:03

## 2020-09-21 RX ADMIN — NICOTINE 1 PATCH: 21 PATCH, EXTENDED RELEASE TRANSDERMAL at 08:06

## 2020-09-21 RX ADMIN — PRAZOSIN HYDROCHLORIDE 1 MG: 1 CAPSULE ORAL at 20:10

## 2020-09-21 RX ADMIN — HYDROXYZINE PAMOATE 50 MG: 50 CAPSULE ORAL at 20:11

## 2020-09-21 RX ADMIN — ACETAMINOPHEN 650 MG: 325 TABLET, FILM COATED ORAL at 09:47

## 2020-09-21 RX ADMIN — MELATONIN 3 MG: 3 TAB ORAL at 20:10

## 2020-09-21 RX ADMIN — METHOCARBAMOL 750 MG: 750 TABLET, FILM COATED ORAL at 20:11

## 2020-09-21 RX ADMIN — HYDROXYZINE PAMOATE 50 MG: 50 CAPSULE ORAL at 11:03

## 2020-09-21 NOTE — PROGRESS NOTES
"9/21/2020    Chief Complaint: suicidal ideation, substance abuse, anxiety and depression    Subjective:  Patient is a 55 y.o. female that is currently inpatient on the adult U  Today she is reporting that she is depressed and anxious about where she is going to be living, she is afraid that [facility] will not accept her.   Pt reports that she has been up most of the day instead of laying in bed, she reports that being around others has been very helpful, but increases anxiety as well.   Pt is alert/oriented, she denies SI/HI/AVH she is flat, does not have much conversation, she reports that she is attending groups.    Pt discusses that she \"always go back to the places I came from and it ends up bad\"   She states that she is hopeful to stay away and live in better surroundings in hopes to get a job and make it on her own.     Objective     Vital Signs    Temp:  [97 °F (36.1 °C)-97.5 °F (36.4 °C)] 97.5 °F (36.4 °C)  Heart Rate:  [70-80] 80  Resp:  [16-18] 18  BP: (111-129)/(52-68) 129/68    Physical Exam:   General Appearance: alert, appears stated age and cooperative,  Hygiene:   fair  Gait & Station: Normal  Musculoskeletal: No tremors or abnormal involuntary movements    Mental Status Exam:   Cooperation:  Cooperative  Eye Contact:  Fair  Psychomotor Behavior:  Slow  Mood: Euthymic  Affect:  flat  Speech:  Normal  Thought Process:  Coherent  Associations: Goal Directed  Thought Content:     Mood congruent   Suicidal:  None   Homicidal:  None   Hallucinations:  None   Delusion: Paranoid  Cognitive Functioning:   Consciousness: awake, alert and oriented  Reliability:  fair  Insight:  Fair  Judgement:  improving  Impulse Control:  Fair    Lab Results (last 24 hours)     ** No results found for the last 24 hours. **        Imaging Results (Last 24 Hours)     ** No results found for the last 24 hours. **          Medicine:   Current Facility-Administered Medications:   •  acetaminophen (TYLENOL) tablet 650 mg, " 650 mg, Oral, Q4H PRN, Patrick English II, MD, 650 mg at 09/21/20 0947  •  albuterol (PROVENTIL) nebulizer solution 0.083% 2.5 mg/3mL, 2.5 mg, Nebulization, Q6H PRN, Patrick English II, MD  •  aluminum-magnesium hydroxide-simethicone (MAALOX MAX) 400-400-40 MG/5ML suspension 15 mL, 15 mL, Oral, Q6H PRN, Patrick English II, MD  •  cloNIDine (CATAPRES) tablet 0.2 mg, 0.2 mg, Oral, TID, Patrick English II, MD, 0.2 mg at 09/21/20 0803  •  DULoxetine (CYMBALTA) DR capsule 40 mg, 40 mg, Oral, Daily, Patrick English II, MD, 40 mg at 09/21/20 0803  •  famotidine (PEPCID) tablet 20 mg, 20 mg, Oral, BID PRN, Patrick English II, MD  •  hydrOXYzine pamoate (VISTARIL) capsule 50 mg, 50 mg, Oral, Q6H PRN, Patrick English II, MD, 50 mg at 09/21/20 1103  •  loperamide (IMODIUM) capsule 2 mg, 2 mg, Oral, 4x Daily PRN, Patrick English II, MD  •  LORazepam (ATIVAN) tablet 1 mg, 1 mg, Oral, Q2H PRN **OR** LORazepam (ATIVAN) injection 1 mg, 1 mg, Intravenous, Q2H PRN **OR** LORazepam (ATIVAN) tablet 2 mg, 2 mg, Oral, Q1H PRN **OR** LORazepam (ATIVAN) injection 2 mg, 2 mg, Intravenous, Q1H PRN **OR** LORazepam (ATIVAN) injection 2 mg, 2 mg, Intravenous, Q15 Min PRN **OR** LORazepam (ATIVAN) injection 2 mg, 2 mg, Intramuscular, Q15 Min PRN, Patrick English II, MD  •  magnesium hydroxide (MILK OF MAGNESIA) suspension 2400 mg/10mL 10 mL, 10 mL, Oral, Daily PRN, Patrick English II, MD  •  melatonin tablet 3 mg, 3 mg, Oral, Nightly, Sherry Arana, APRN, 3 mg at 09/20/20 2028  •  methocarbamol (ROBAXIN) tablet 750 mg, 750 mg, Oral, Q6H PRN, Sherry Arana, APRN, 750 mg at 09/21/20 1103  •  nicotine (NICODERM CQ) 21 MG/24HR patch 1 patch, 1 patch, Transdermal, Q24H, Patrick English II, MD, 1 patch at 09/21/20 0806  •  OLANZapine (zyPREXA) tablet 7.5 mg, 7.5 mg, Oral, Nightly, Patrick English II, MD, 7.5 mg at 09/20/20 2028  •  prazosin (MINIPRESS) capsule 1 mg, 1 mg,  Oral, Q12H, Sherry Arana APRN  •  [COMPLETED] thiamine (VITAMIN B-1) tablet 200 mg, 200 mg, Oral, TID, 200 mg at 09/17/20 2009 **AND** Thera tablet 1 tablet, 1 tablet, Oral, Daily, 1 tablet at 09/21/20 0803 **AND** [COMPLETED] folic acid (FOLVITE) tablet 1 mg, 1 mg, Oral, Daily, Patrick English II, MD, 1 mg at 09/19/20 0820    Diagnoses/Assessment:     Suicidal ideation    Severe episode of recurrent major depressive disorder, without psychotic features (CMS/HCC)    Post traumatic stress disorder (PTSD)    Alcohol use disorder, severe, dependence (CMS/HCC)    Complicated bereavement    Essential hypertension      Treatment Plan:    1) Will continue care for the patient on the behavioral health unit at [facility] to ensure patient safety.  2) Will continue to provide treatment with the unit milieu, activities, therapies and psychopharmacological management.  3) Patient to be placed on or continued on  Q15 minute checks  and Suicide precautions.  4) Pertinent medical issues:   --HTN: Cont Catapress PRN   5) Will order following labs: none  6) Will make the following medication changes:   --Increase Cymbalta to 60 mg daily  --Cont Zyprexa 7.5 mg nightly   --Increase Prazosin 1 mg BID due to tense, PTSD and Nightmares  7) Will continue discharge planning as appropriate for patient.  8) Psychotherapy provided for less than 16 minutes.    Treatment plan and medication risks and benefits discussed with: Patient    BENNETT Schwarz  09/21/20  13:25 CDT

## 2020-09-21 NOTE — PLAN OF CARE
Problem: Adult Behavioral Health Plan of Care  Goal: Plan of Care Review  Outcome: Ongoing, Progressing  Flowsheets (Taken 9/21/2020 9162)  Progress: improving  Plan of Care Reviewed With: patient  Patient Agreement with Plan of Care: agrees  Outcome Summary: Pt anxious this day et  awaiting phone call from rehab to see if they will accept her. Pt is pleasant et denies any S/I or H/I.   Goal Outcome Evaluation:  Plan of Care Reviewed With: patient  Progress: improving  Outcome Summary: Pt anxious this day et  awaiting phone call from rehab to see if they will accept her. Pt is pleasant et denies any S/I or H/I.

## 2020-09-21 NOTE — NURSING NOTE
Behavior   Note any precipitants to event or behavior   Describe level and action of any aggressive behavior or speech and associated interventions.     Anxiety: Excess Worry and Restless/Edgy  Depression: Patient denies at this time  Pain  0  AVH   no  S/I   no  Plan  no  H/I   no  Plan  no    Affect   euthymic/normal      Note: Pt is alert with c/o muscle spasms this am. She requested PRN et is awaiting medication arrival from pharmacy. She denies any S/I, H/I et says that her mind is clearer now than before.       Intervention    PRN medication utilized:  yes - Requested PRN muscle relaxer. Had to request.     Instructed in medication usage and effects  Medications administered as ordered  Encouraged to verbalize needs      Response    Verbalized understanding   Did patient take medications as ordered yes   Did patient interact with assessment?  yes     Plan    Will monitor for safety  Will monitor every 15 minutes as ordered  Will evaluate and promote the plan of care    Last BM:  unknown date  (Please chart in I/O as well)

## 2020-09-21 NOTE — PROGRESS NOTES
LCSW contacted staff at [facility] to discuss pt transitioning to their program upon dc from [facility]. They requested to speak with pt and LCSW facilitated phone conference. Pt waiting on call back re: acceptance/denial into their program.

## 2020-09-21 NOTE — PROGRESS NOTES
Psychiatry Progress Note   9/20/2020      HD: #4  Legal: Vol    Chief Complaint: Suicidal Ideation, Substance Abuse and Severe Depression      Subjective --  Ms. Laura Bruce is a 55 y.o. female who was seen on the Adult unit.      Pleasant, engaged better today.     Nightmares overnight. Terazosin was not helpful before.  Agreeable to Prazosin trial.     Less worry about future.  More future orientated.     Depression slowly improving, less severe, less impairing, less constant today.  Benefiting from tx.      No HA/SA/MA.        Objective   Objective --      Vital Signs:  Temp:  [96.7 °F (35.9 °C)-97 °F (36.1 °C)] 97 °F (36.1 °C)  Heart Rate:  [70-92] 70  Resp:  [16-18] 16  BP: (111-114)/(52-64) 111/52    Patient Vitals for the past 24 hrs:   BP Temp Temp src Pulse Resp SpO2   09/20/20 2100 111/52 97 °F (36.1 °C) Tympanic 70 16 98 %   09/20/20 0730 114/64 96.7 °F (35.9 °C) Tympanic 92 18 94 %       Physical Exam:   -General Appearance:  alert and in NAD  -Hygiene:  Adequate   -Gait & Station:  Blank multiple: Normal  -Musculoskeletal:  No tremors or abnormal involuntary movements and No atrophy noted  -Pulm: unlaboured      Mental Status Exam:   --Cooperation:  Cooperative  --Eye Contact:  Non-sustained  --Psychomotor Behavior:  Slow  --Mood:  Sad/Depressed  --Affect:  mood-congruent and heavily constricted  --Speech:  Normal r/r/v  --Thought Process:  Coherent and Sluggish  --Associations: Goal Directed  --Themes:  None overt  --Thought Content:                --Mood congruent              --Suicidal:  Denies               --Homicidal:  Denies              --Hallucinations:  Denies and Not appearing to respond to internal stimuli              --Delusion:  None noted/overt and No overt psychotic overlay  --Cognitive Functioning:  --Consciousness: awake and alert  Attention:  Distractible  Concentration:  Distractible  Fund of Knowledge:  Average to Below Average based on interaction  --Reliability:   adequate  --Insight:  Diminished  --Judgment:  Diminished  --Impulse Control:  Diminished    Lab Results (last 24 hours)     ** No results found for the last 24 hours. **          Imaging Results (Last 24 Hours)     ** No results found for the last 24 hours. **            Medications:   Scheduled Meds:cloNIDine, 0.2 mg, Oral, TID  [START ON 9/21/2020] DULoxetine, 40 mg, Oral, Daily  melatonin, 3 mg, Oral, Nightly  nicotine, 1 patch, Transdermal, Q24H  OLANZapine, 7.5 mg, Oral, Nightly  prazosin, 1 mg, Oral, Nightly  Thera, 1 tablet, Oral, Daily      Continuous Infusions:   PRN Meds:.•  acetaminophen  •  albuterol  •  aluminum-magnesium hydroxide-simethicone  •  famotidine  •  hydrOXYzine pamoate  •  loperamide  •  LORazepam **OR** LORazepam **OR** LORazepam **OR** LORazepam **OR** LORazepam **OR** LORazepam  •  magnesium hydroxide  •  methocarbamol      Assessment:     Suicidal ideation    Severe episode of recurrent major depressive disorder, without psychotic features (CMS/HCC)    Post traumatic stress disorder (PTSD)    Alcohol use disorder, severe, dependence (CMS/HCC)    Complicated bereavement    Essential hypertension          --> IMPRESSION:  Ongoing significant affective sx.  Slowly improving and tolerating medications.         Treatment Plan:  1) Will continue care for the patient on the behavioral health unit at [facility] to ensure patient safety given Profound Functional impairment from her affective sx causing need for further monitoring and medication management, with currently unexceptable level of risk for discharge.     2) Will continue to provide treatment with the unit milieu, activities, therapies and psychopharmacological management.     3) Patient to be placed on or continued on  Q15 minute checks  and Suicide precautions.     4) Pertinent Concurrent Non-Psychiatric Medical Issues:   --HTN: stable, Catapres PRN     5) Will order following labs: none     6) Behavioral Health  Treatment Planning:  --Cymbalta to 40mg daily for affective sx  --Cont Zyprexa 7.5mg qHS for augmentation  --Add Prazosin 1mg qHS for nightmares and PTSD   --Cont CIWA today, d/c tomorrow given improvements    7) Psychotherapy provided: as below     --> Dispostion Planning: to rehab, in next 2-3 days     Treatment plan and medication risks and benefits discussed with: Patient and Treatment Team.       Patrick English II, MD  09/20/20 @ 22:53 CDT    Dictated using Dragon.

## 2020-09-21 NOTE — NURSING NOTE
Pt has requested PRN vistaril, et muscle relaxer due to increased anxiety. Pt said that she is anxious due to her possibly being discharged in a few days et not knowing where she is going. She also c/o discomfort et stiffness to her left shoulder with no complaints since receiving PRN's.

## 2020-09-21 NOTE — NURSING NOTE
Behavior   Note any precipitants to event or behavior   Describe level and action of any aggressive behavior or speech and associated interventions.     Anxiety: Restless/Edgy  Depression: depressed mood  Pain  0  AVH   no  S/I   no  Plan  no  H/I   no  Plan  no    Affect   flat      Note:Patient alert and oriented x4. Patient requests melatonin prn and vistaril states it helps her sleep.   Patient took medications as ordered. Denies Si/HI/AVh anxiety and depression at this time.  Will continue to monitor.       Intervention    PRN medication utilized:  Yes- vistaril, melatonin, and Robaxin for muscle spasms.     Instructed in medication usage and effects  Medications administered as ordered  Encouraged to verbalize needs      Response    Verbalized understanding   Did patient take medications as ordered yes   Did patient interact with assessment?  yes     Plan    Will monitor for safety  Will monitor every 15 minutes as ordered  Will evaluate and promote the plan of care    Last BM:  unknown date  (Please chart in I/O as well)

## 2020-09-22 PROCEDURE — 97161 PT EVAL LOW COMPLEX 20 MIN: CPT

## 2020-09-22 PROCEDURE — 99232 SBSQ HOSP IP/OBS MODERATE 35: CPT | Performed by: NURSE PRACTITIONER

## 2020-09-22 RX ORDER — LIDOCAINE 50 MG/G
1 PATCH TOPICAL
Status: DISCONTINUED | OUTPATIENT
Start: 2020-09-22 | End: 2020-09-23 | Stop reason: HOSPADM

## 2020-09-22 RX ORDER — METHOCARBAMOL 500 MG/1
1000 TABLET, FILM COATED ORAL EVERY 8 HOURS PRN
Status: DISCONTINUED | OUTPATIENT
Start: 2020-09-22 | End: 2020-09-23 | Stop reason: HOSPADM

## 2020-09-22 RX ORDER — METHOCARBAMOL 500 MG/1
1000 TABLET, FILM COATED ORAL ONCE
Status: COMPLETED | OUTPATIENT
Start: 2020-09-22 | End: 2020-09-22

## 2020-09-22 RX ORDER — NAPROXEN 500 MG/1
500 TABLET ORAL 2 TIMES DAILY WITH MEALS
Status: DISCONTINUED | OUTPATIENT
Start: 2020-09-22 | End: 2020-09-23 | Stop reason: HOSPADM

## 2020-09-22 RX ORDER — DULOXETIN HYDROCHLORIDE 60 MG/1
60 CAPSULE, DELAYED RELEASE ORAL DAILY
Status: DISCONTINUED | OUTPATIENT
Start: 2020-09-23 | End: 2020-09-23 | Stop reason: HOSPADM

## 2020-09-22 RX ORDER — MUSCLE RUB CREAM 100; 150 MG/G; MG/G
CREAM TOPICAL 3 TIMES DAILY
Status: DISCONTINUED | OUTPATIENT
Start: 2020-09-22 | End: 2020-09-23 | Stop reason: HOSPADM

## 2020-09-22 RX ADMIN — METHOCARBAMOL 1000 MG: 500 TABLET, FILM COATED ORAL at 20:33

## 2020-09-22 RX ADMIN — MELATONIN 3 MG: 3 TAB ORAL at 20:33

## 2020-09-22 RX ADMIN — CLONIDINE HYDROCHLORIDE 0.2 MG: 0.2 TABLET ORAL at 20:32

## 2020-09-22 RX ADMIN — PRAZOSIN HYDROCHLORIDE 1 MG: 1 CAPSULE ORAL at 08:20

## 2020-09-22 RX ADMIN — THERA TABS 1 TABLET: TAB at 08:20

## 2020-09-22 RX ADMIN — DULOXETINE HYDROCHLORIDE 40 MG: 20 CAPSULE, DELAYED RELEASE ORAL at 08:20

## 2020-09-22 RX ADMIN — MENTHOL, METHYL SALICYLATE: 10; 15 CREAM TOPICAL at 11:42

## 2020-09-22 RX ADMIN — CLONIDINE HYDROCHLORIDE 0.2 MG: 0.2 TABLET ORAL at 15:38

## 2020-09-22 RX ADMIN — ACETAMINOPHEN 650 MG: 325 TABLET, FILM COATED ORAL at 08:20

## 2020-09-22 RX ADMIN — NAPROXEN 500 MG: 500 TABLET ORAL at 11:42

## 2020-09-22 RX ADMIN — METHOCARBAMOL 750 MG: 750 TABLET, FILM COATED ORAL at 09:35

## 2020-09-22 RX ADMIN — CLONIDINE HYDROCHLORIDE 0.2 MG: 0.2 TABLET ORAL at 08:20

## 2020-09-22 RX ADMIN — MENTHOL, METHYL SALICYLATE: 10; 15 CREAM TOPICAL at 15:38

## 2020-09-22 RX ADMIN — OLANZAPINE 7.5 MG: 2.5 TABLET, FILM COATED ORAL at 20:33

## 2020-09-22 RX ADMIN — HYDROXYZINE PAMOATE 50 MG: 50 CAPSULE ORAL at 15:38

## 2020-09-22 RX ADMIN — LIDOCAINE 1 PATCH: 50 PATCH CUTANEOUS at 09:38

## 2020-09-22 RX ADMIN — PRAZOSIN HYDROCHLORIDE 1 MG: 1 CAPSULE ORAL at 20:33

## 2020-09-22 RX ADMIN — NICOTINE 1 PATCH: 21 PATCH, EXTENDED RELEASE TRANSDERMAL at 08:23

## 2020-09-22 RX ADMIN — MENTHOL, METHYL SALICYLATE 1 APPLICATION: 10; 15 CREAM TOPICAL at 20:36

## 2020-09-22 RX ADMIN — METHOCARBAMOL 1000 MG: 500 TABLET, FILM COATED ORAL at 11:49

## 2020-09-22 RX ADMIN — NAPROXEN 500 MG: 500 TABLET ORAL at 17:44

## 2020-09-22 NOTE — PROGRESS NOTES
"9/22/2020    Chief Complaint: suicidal ideation, substance abuse, anxiety and depression    Subjective:  Patient is a 55 y.o. female that is currently inpatient on the adult U  Today she is sitting in the common area, she is reporting that mood is \"ok\" but that pain in back and shoulder is keeping her from sleeping and enjoying others.   Pt is alert/oriented, she is reality based. She is still hoping to go to [facility] soon.    Pt denies SI/HI/AVH, she has been medication compliant, she reports that she is eating well.  No AE in regards to medications.       **update: [facility] has denied, pt has been given information to [facility] in [place], [place]   Objective     Vital Signs    Temp:  [97.2 °F (36.2 °C)-97.4 °F (36.3 °C)] 97.2 °F (36.2 °C)  Heart Rate:  [] 108  Resp:  [16-18] 18  BP: (118-129)/(64-66) 129/66    Physical Exam:   General Appearance: alert, appears stated age and cooperative,  Hygiene:   fair  Gait & Station: Normal  Musculoskeletal: No tremors or abnormal involuntary movements    Mental Status Exam:   Cooperation:  Cooperative  Eye Contact:  Fair  Psychomotor Behavior:  Appropriate  Mood: Sad/Depressed and Improving  Affect:  mood-congruent  Speech:  Normal  Thought Process:  Coherent and Appropriately abstract  Associations: Goal Directed  Thought Content:     Mood congruent   Suicidal:  None   Homicidal:  None   Hallucinations:  None   Delusion:  None  Cognitive Functioning:   Consciousness: awake, alert and oriented  Reliability:  fair  Insight:  Fair  Judgement:  Fair  Impulse Control:  Fair    Lab Results (last 24 hours)     ** No results found for the last 24 hours. **        Imaging Results (Last 24 Hours)     ** No results found for the last 24 hours. **          Medicine:   Current Facility-Administered Medications:   •  acetaminophen (TYLENOL) tablet 650 mg, 650 mg, Oral, Q4H PRN, Patrick English II, MD, 650 mg at 09/22/20 0820  •  albuterol (PROVENTIL) " nebulizer solution 0.083% 2.5 mg/3mL, 2.5 mg, Nebulization, Q6H PRN, Patrick English II, MD  •  aluminum-magnesium hydroxide-simethicone (MAALOX MAX) 400-400-40 MG/5ML suspension 15 mL, 15 mL, Oral, Q6H PRN, Patrick English II, MD  •  cloNIDine (CATAPRES) tablet 0.2 mg, 0.2 mg, Oral, TID, Patrick English II, MD, 0.2 mg at 09/22/20 0820  •  DULoxetine (CYMBALTA) DR capsule 40 mg, 40 mg, Oral, Daily, Patrick English II, MD, 40 mg at 09/22/20 0820  •  famotidine (PEPCID) tablet 20 mg, 20 mg, Oral, BID PRN, Patrick English II, MD  •  hydrOXYzine pamoate (VISTARIL) capsule 50 mg, 50 mg, Oral, Q6H PRN, Patrick English II, MD, 50 mg at 09/21/20 2011  •  lidocaine (LIDODERM) 5 % 1 patch, 1 patch, Transdermal, Q24H, Sherry Arana APRN, 1 patch at 09/22/20 0938  •  loperamide (IMODIUM) capsule 2 mg, 2 mg, Oral, 4x Daily PRN, Patrick English II, MD  •  LORazepam (ATIVAN) tablet 1 mg, 1 mg, Oral, Q2H PRN **OR** LORazepam (ATIVAN) injection 1 mg, 1 mg, Intravenous, Q2H PRN **OR** LORazepam (ATIVAN) tablet 2 mg, 2 mg, Oral, Q1H PRN **OR** LORazepam (ATIVAN) injection 2 mg, 2 mg, Intravenous, Q1H PRN **OR** LORazepam (ATIVAN) injection 2 mg, 2 mg, Intravenous, Q15 Min PRN **OR** LORazepam (ATIVAN) injection 2 mg, 2 mg, Intramuscular, Q15 Min PRN, Patrick English II, MD  •  magnesium hydroxide (MILK OF MAGNESIA) suspension 2400 mg/10mL 10 mL, 10 mL, Oral, Daily PRN, Patrick English II, MD  •  melatonin tablet 3 mg, 3 mg, Oral, Nightly, Sherry Arana, APRN, 3 mg at 09/21/20 2010  •  methocarbamol (ROBAXIN) tablet 750 mg, 750 mg, Oral, Q6H PRN, Sherry Arana, APRN, 750 mg at 09/22/20 0935  •  nicotine (NICODERM CQ) 21 MG/24HR patch 1 patch, 1 patch, Transdermal, Q24H, Patrick English II, MD, 1 patch at 09/22/20 0823  •  OLANZapine (zyPREXA) tablet 7.5 mg, 7.5 mg, Oral, Nightly, Patrick English II, MD, 7.5 mg at 09/21/20 2010  •  prazosin (MINIPRESS) capsule 1  mg, 1 mg, Oral, Q12H, Sherry Arana BENNETT, 1 mg at 09/22/20 0820  •  [COMPLETED] thiamine (VITAMIN B-1) tablet 200 mg, 200 mg, Oral, TID, 200 mg at 09/17/20 2009 **AND** Thera tablet 1 tablet, 1 tablet, Oral, Daily, 1 tablet at 09/22/20 0820 **AND** [COMPLETED] folic acid (FOLVITE) tablet 1 mg, 1 mg, Oral, Daily, Patrick English II, MD, 1 mg at 09/19/20 0820    Diagnoses/Assessment:     Suicidal ideation    Severe episode of recurrent major depressive disorder, without psychotic features (CMS/HCC)    Post traumatic stress disorder (PTSD)    Alcohol use disorder, severe, dependence (CMS/HCC)    Complicated bereavement    Essential hypertension      Treatment Plan:    1) Will continue care for the patient on the behavioral health unit at [facility] to ensure patient safety.  2) Will continue to provide treatment with the unit milieu, activities, therapies and psychopharmacological management.  3) Patient to be placed on or continued on  Q15 minute checks  and Suicide precautions.  4) Pertinent medical issues:   --HTN: Cont Catapress 0.2mg TID  --Chronic Pain/Muscle Ache: Lidoderm Patch to left shoulder, Robaxin PRN  5) Will order following labs:   none  6) Will make the following medication changes:   --Cont Cymbalta 60 mg daily   --Cont Melatonin 3 mg nightly   --Cont Zyprexa 7.5 mg Nightly  --Cont Prazosin 1 mg BIG     7) Will continue discharge planning as appropriate for patient.  8) Psychotherapy provided for less than 16 minutes.    Treatment plan and medication risks and benefits discussed with: Patient    BENNETT Schwarz  09/22/20  10:50 CDT

## 2020-09-22 NOTE — PLAN OF CARE
Problem: Adult Behavioral Health Plan of Care  Goal: Plan of Care Review  Outcome: Ongoing, Progressing  Flowsheets (Taken 9/22/2020 1500)  Progress: no change  Plan of Care Reviewed With: patient  Patient Agreement with Plan of Care: agrees  Outcome Summary: Pt alert et pleasant with requests for PRNs this day. Started on new dosage of robaxin routinely cymbalta, bengay et naproxin. Pt not upset about not getting accepted into rehab et is apprehensive that she will find a better suit for her. No c/o pain since using naproxin.   Goal Outcome Evaluation:  Plan of Care Reviewed With: patient  Progress: no change  Outcome Summary: Pt alert et pleasant with requests for PRNs this day. Started on new dosage of robaxin routinely cymbalta, bengay et naproxin. Pt not upset about not getting accepted into rehab et is apprehensive that she will find a better suit for her. No c/o pain since using naproxin.

## 2020-09-22 NOTE — PLAN OF CARE
Goal Outcome Evaluation:  Plan of Care Reviewed With: patient  Progress: improving  Outcome Summary: Patient was anxious at the beginning of the shift, vistaril appears to be effective, patient resting between care, continue to monitor

## 2020-09-22 NOTE — NURSING NOTE
Behavior   Note any precipitants to event or behavior   Describe level and action of any aggressive behavior or speech and associated interventions.     Anxiety: Excess Worry  Depression: depressed mood  Pain  0  AVH   no  S/I   no  Plan  no  H/I   no  Plan  no    Affect   flat      Note: Laura denies SI/HI/AVH at this time. She is sitting at a table in the hallway interacting with her peers. She is complaining for back stiffness that is causing her shoulders to be stiff and anxiety, PRN muscle relaxer and vistaril given. She is worried about her discharge tomorrow. Encouraged open communication with staff.       Intervention    PRN medication utilized:  yes - Robaxin, vistaril    Instructed in medication usage and effects  Medications administered as ordered  Encouraged to verbalize needs      Response    Verbalized understanding   Did patient take medications as ordered yes   Did patient interact with assessment?  yes     Plan    Will monitor for safety  Will monitor every 15 minutes as ordered  Will evaluate and promote the plan of care    Last BM:  unknown date  (Please chart in I/O as well)

## 2020-09-22 NOTE — NURSING NOTE
Behavior   Note any precipitants to event or behavior   Describe level and action of any aggressive behavior or speech and associated interventions.     Anxiety: Patient denies at this time  Depression: Patient denies at this time  Pain  3  AVH   no  S/I   no  Plan  no  H/I   no  Plan  no    Affect   euthymic/normal      Note: Pt is alert et cooperative with c/o having shoulder pain this am et PRN tylenol given. She suggested that robaxin be ordered for her to take late this am. Pt says that her anxiety has decreased since yesterday et she is still waiting for phone call from [facility] about placement. She continues to deny any S/I,  H/I, or hallucinations. Will monitor.       Intervention    PRN medication utilized:  yes - Tylenol     Instructed in medication usage and effects  Medications administered as ordered  Encouraged to verbalize needs      Response    Verbalized understanding   Did patient take medications as ordered yes   Did patient interact with assessment?  yes     Plan    Will monitor for safety  Will monitor every 15 minutes as ordered  Will evaluate and promote the plan of care    Last BM:  unknown date  (Please chart in I/O as well)

## 2020-09-22 NOTE — PLAN OF CARE
"  Problem: Adult Behavioral Health Plan of Care  Goal: Plan of Care Review  Outcome: Ongoing, Progressing  Flowsheets (Taken 9/22/2020 1213)  Consent Given to Review Plan with: PT eval complete. Pt pleasant and agreeble to PT. Pt seen for chronic Bilateral shoulder and neck pain that has worsened since admission. No functional deficits noted. C/c is intense \"burning\" sensation in L post shoulder/scapula. Decreased L cervical side-flexion and L cervical rotation noted with pain. Forward head/shoulder posture noted with increased elevation of L shoulder/scap compared to R. Performed sitting scapular squeezes (3-5s hold x20), supine chin retractions (3-5s hold x20), Steve UT and levator scap stretch (3x15s), alleviated pt's pain from 7/10 to 5/10 post intervention. Educated pt in postural correction with good response. Goals established. Continue skilled PT short-term and low frequency to establish independence with HEP to manage pt's symptoms during and after admission.  Plan of Care Reviewed With: patient     "

## 2020-09-22 NOTE — THERAPY EVALUATION
Patient Name: Laura Bruce  : 1965    MRN: [id number]                              Today's Date: 2020     Initial Evaluation  Admit Date: 2020    Visit Dx:     ICD-10-CM ICD-9-CM   1. Decreased functional mobility  R26.89 781.99     Patient Active Problem List   Diagnosis   • Alcohol withdrawal syndrome with complication (CMS/HCC)   • Protein malnutrition (CMS/HCC)   • Alcoholic ketoacidosis   • Elevated blood pressure reading without diagnosis of hypertension   • Leukopenia   • Epistaxis   • Acute renal failure (CMS/HCC)   • Cachexia (CMS/HCC)   • Severe episode of recurrent major depressive disorder, without psychotic features (CMS/HCC)   • Post traumatic stress disorder (PTSD)   • Alcohol use disorder, severe, dependence (CMS/HCC)   • Suicidal ideation   • Complicated bereavement   • Alcohol intoxication (CMS/HCC)   • Essential hypertension     Past Medical History:   Diagnosis Date   • ADHD (attention deficit hyperactivity disorder)    • Alcohol abuse    • Bilateral carpal tunnel syndrome    • Bipolar 1 disorder (CMS/[facility])    • Candida vaginitis 2020    -Reports that she has painful, itching, foul discharge.  Unsure of the color. -Positive for Candida.  Treat with fluconazole 150 mg. -Gonorrhea, chlamydia, trichomonas, and Gardnerella negative.   • Carpal tunnel syndrome on both sides    • Chronic pain disorder     Back   • Collapsed lung    • Depression    • Essential hypertension 2020   • Hyperkalemia 2020    Results from last 7 days Lab Units 20 1136 20 0500 20 0516 20 0507 05/10/20 0549 20 0555 20 0520 POTASSIUM mmol/L 4.3 5.4* 4.3 3.8 3.7 3.5 3.7  -EKG showed normal sinus rhythm and no peaked T waves. -Recheck potassium this afternoon and intervene if necessary.   • Irritable bowel syndrome    • Leukopenia 2018    Appears to be chronic   • Spontaneous pneumothorax    • Suicidal ideation 2019    -History of alcohol  abuse, depression, anxiety, and bipolar.  Symptoms worsened after the passing of her fiancé in December. -Ethanol levels less than 10.  Psych consulted. -Suicide precautions discontinued on 5/11. -She is questioning behavioral admission versus outpatient rehab.  Psych continues to discuss with her. -Intermittently expresses some disagreement with admission to behavioral health    • Thrombocytopenia (CMS/[facility]) 5/6/2020    Results from last 7 days Lab Units 05/13/20 0500 05/12/20 0516 05/11/20 0507 05/10/20 0549 05/09/20 0555 05/08/20 0520 PLATELETS 10*3/mm3 224 146 167 144 126* 102*     • Wernicke's encephalopathy 4/23/2018    -Banana bag -Completed 6 doses of thiamine 500 mg.  Completed 9 doses course of thiamine of 250 mg on 5/12     Past Surgical History:   Procedure Laterality Date   • CHEST TUBE INSERTION      30 years ago for a spontaneous pneumothorax   • COLONOSCOPY     • HYSTERECTOMY     • TOTAL ABDOMINAL HYSTERECTOMY WITH SALPINGO OOPHORECTOMY       General Information     Row Name 09/22/20 1213          Physical Therapy Time and Intention    Document Type  evaluation  -     Mode of Treatment  physical therapy;individual therapy  -     Row Name 09/22/20 1213          General Information    Patient Profile Reviewed  yes  -KW     Prior Level of Function  independent:  -KW     Existing Precautions/Restrictions  no known precautions/restrictions  -     Barriers to Rehab  none identified  -     Row Name 09/22/20 1213          Living Environment    Lives With  friend(s) pt states she cannot return to this location upon d/c; seeking inpatient rehabiliation  -     Row Name 09/22/20 1213          Cognition    Orientation Status (Cognition)  oriented x 4  -     Row Name 09/22/20 1213          Safety Issues, Functional Mobility    Impairments Affecting Function (Mobility)  pain;strength;range of motion (ROM);muscle tone abnormal  -       User Key  (r) = Recorded By, (t) = Taken By, (c) = Cosigned By     Initials Name Provider Type    KW Charli Fulton, PT Physical Therapist        Mobility     Row Name 09/22/20 1214          Bed Mobility    Bed Mobility  bed mobility (all) activities  -KW     All Activities, [place] (Bed Mobility)  independent  -KW     Row Name 09/22/20 1214          Sit-Stand Transfer    Sit-Stand [place] (Transfers)  independent  -KW     Row Name 09/22/20 1214          Gait/Stairs (Locomotion)    [place] Level (Gait)  independent  -KW     Distance in Feet (Gait)  200'  -KW     Comment (Gait/Stairs)  accelerated gt speed, no instability or LOB noted  -KW       User Key  (r) = Recorded By, (t) = Taken By, (c) = Cosigned By    Initials Name Provider Type    KW Charli Fulton, PT Physical Therapist        Obj/Interventions     Row Name 09/22/20 1215          Range of Motion Comprehensive    General Range of Motion  neck/trunk range of motion deficits identified  -KW     Row Name 09/22/20 1215          Strength Comprehensive (MMT)    General Manual Muscle Testing (MMT) Assessment  no strength deficits identified  -[facility] Name 09/22/20 1215          Motor Skills    Therapeutic Exercise  other (see comments) performed sitting scapular squeezes (3-5s hold x20), supine chin retractions (3-5s hold x20), Steve [place] and levator scap stretch (3x15s)  -KW     Row Name 09/22/20 1215          Balance    Balance Assessment  sitting static balance;sitting dynamic balance;standing dynamic balance;standing static balance  -KW     Static Sitting Balance  WNL  -KW     Dynamic Sitting Balance  WNL  -KW     Static Standing Balance  WNL  -KW     Dynamic Standing Balance  WNL  -KW     Row Name 09/22/20 1215          Sensory Assessment (Somatosensory)    Sensory Assessment (Somatosensory)  sensation intact  -KW     Row Name 09/22/20 1215          General Neck/Trunk Range of Motion    Neck/Trunk: Range of Motion  neck ROM  -KW     [facility] Name 09/22/20 1215          Neck, Range of Motion    Neck, Range of Motion   "ROM in all planes of movement is WFL except;lateral flexion, left;rotation, left c/o L scapular \"burning\"  -KW       User Key  (r) = Recorded By, (t) = Taken By, (c) = Cosigned By    Initials Name Provider Type    KW Charli Fulton, PT Physical Therapist        Goals/Plan     Row Name 09/22/20 1218          ROM Goal 1 (PT)    ROM Goal 1 (PT)  Pt will demonstrate neck ROM WFL in all planes  -KW     Time Frame (ROM Goal 1, PT)  long-term goal (LTG);by discharge  -KW     Progress/Outcome (ROM Goal 1, PT)  goal not met  -KW     [facility] Name 09/22/20 1218          Patient Education Goal (PT)    Activity (Patient Education Goal, PT)  Pt will demonstrate independence with performing HEP  -KW     Jenkins/Cues/Accuracy (Memory Goal 2, PT)  demonstrates adequately  -KW     Time Frame (Patient Education Goal, PT)  2 days;short term goal (STG)  -KW     Progress/Outcome (Patient Education Goal, PT)  goal not met  -KW       User Key  (r) = Recorded By, (t) = Taken By, (c) = Cosigned By    Initials Name Provider Type    KW Charli Fulton, PT Physical Therapist        Clinical Impression     [facility] Name 09/22/20 1213          Pain    Additional Documentation  Pain Scale: Numbers Pre/Post-Treatment (Group)  -KW     Row Name 09/22/20 1213          Pain Scale: Numbers Pre/Post-Treatment    Pretreatment Pain Rating  7/10  -KW     Posttreatment Pain Rating  5/10  -KW     Pain Location - Side  Left  -KW     Pain Location - Orientation  posterior  -KW     Pain Location  shoulder  -KW     Pre/Posttreatment Pain Comment  \"burning\"  -KW     Pain Intervention(s)  Medication (See MAR);Ambulation/increased activity;Repositioned;Therapeutic touch;Relaxation technique;Emotional support  -[facility] [place] Name 09/22/20 1213          Plan of Care Review    Plan of Care Reviewed With  patient  -KW     Row Name 09/22/20 1213          Therapy Assessment/Plan (PT)    Rehab Potential (PT)  good, to achieve stated therapy goals  -     Criteria for Skilled " Interventions Met (PT)  yes;meets criteria  -KW     Row Name 09/22/20 1213          Vital Signs    O2 Delivery Pre Treatment  room air  -KW     O2 Delivery Intra Treatment  room air  -KW     O2 Delivery Post Treatment  room air  -KW     Pre Patient Position  Standing  -KW     Intra Patient Position  Sitting  -KW     Post Patient Position  Standing  -KW     Row Name 09/22/20 1213          Positioning and Restraints    Pre-Treatment Position  sitting in chair/recliner  -KW     Post Treatment Position  chair recreation  -KW     In Chair  with other staff  -KW       User Key  (r) = Recorded By, (t) = Taken By, (c) = Cosigned By    Initials Name Provider Type    Charli Kendall PT Physical Therapist        Outcome Measures     Row Name 09/22/20 1213          How much help from another person do you currently need...    Turning from your back to your side while in flat bed without using bedrails?  4  -KW     Moving from lying on back to sitting on the side of a flat bed without bedrails?  4  -KW     Moving to and from a bed to a chair (including a wheelchair)?  4  -KW     Standing up from a chair using your arms (e.g., wheelchair, bedside chair)?  4  -KW     Climbing 3-5 steps with a railing?  4  -KW     To walk in hospital room?  4  -KW     AM-PAC 6 Clicks Score (PT)  24  -KW     Row Name 09/22/20 1213          Functional Assessment    Outcome Measure Options  AM-PAC 6 Clicks Basic Mobility (PT)  -KW       User Key  (r) = Recorded By, (t) = Taken By, (c) = Cosigned By    Initials Name Provider Type    Charli Kendall PT Physical Therapist        Physical Therapy Education                 Title: PT OT SLP Therapies (In Progress)     Topic: Physical Therapy (In Progress)     Point: Mobility training (Not Started)     Learner Progress:  Not documented in this visit.          Point: Home exercise program (Done)     Learning Progress Summary           Patient Acceptance, E, VU by AREN at 9/22/2020 1225    Comment: PT POC  and prognosis; postural re-education; HEP                   Point: Body mechanics (Done)     Learning Progress Summary           Patient Acceptance, E, VU by  at 9/22/2020 1225    Comment: PT POC and prognosis; postural re-education; HEP                   Point: Precautions (Not Started)     Learner Progress:  Not documented in this visit.                      User Key     Initials Effective Dates Name Provider Type Discipline     08/09/20 -  Charli Fulton PT Physical Therapist PT              PT Recommendation and Plan  Planned Therapy Interventions (PT): home exercise program, manual therapy techniques, neuromuscular re-education, patient/family education, postural re-education, ROM (range of motion), strengthening, stretching  Plan of Care Reviewed With: patient     Time Calculation:   PT Charges     Row Name 09/22/20 1231             Time Calculation    Start Time  1107  -KW      Stop Time  1138  -KW      Time Calculation (min)  31 min  -KW      PT Received On  09/22/20  -KW      PT Goal Re-Cert Due Date  10/05/20  -KW        User Key  (r) = Recorded By, (t) = Taken By, (c) = Cosigned By    Initials Name Provider Type    Charli Kendall PT Physical Therapist        Therapy Charges for Today     Code Description Service Date Service Provider Modifiers Qty    [id number] HC PT EVAL LOW COMPLEXITY 2 9/22/2020 Charli Fulton PT GP 1          PT G-Codes  Outcome Measure Options: AM-PAC 6 Clicks Basic Mobility (PT)  AM-PAC 6 Clicks Score (PT): 24    Charli Fulton PT  9/22/2020

## 2020-09-23 VITALS
HEART RATE: 80 BPM | OXYGEN SATURATION: 98 % | RESPIRATION RATE: 18 BRPM | BODY MASS INDEX: 23.57 KG/M2 | HEIGHT: 62 IN | SYSTOLIC BLOOD PRESSURE: 124 MMHG | WEIGHT: 128.09 LBS | DIASTOLIC BLOOD PRESSURE: 73 MMHG | TEMPERATURE: 97 F

## 2020-09-23 PROBLEM — R45.851 SUICIDAL IDEATION: Chronic | Status: RESOLVED | Noted: 2020-07-07 | Resolved: 2020-09-23

## 2020-09-23 PROCEDURE — 99239 HOSP IP/OBS DSCHRG MGMT >30: CPT | Performed by: PSYCHIATRY & NEUROLOGY

## 2020-09-23 RX ORDER — METHOCARBAMOL 500 MG/1
750 TABLET, FILM COATED ORAL EVERY 8 HOURS PRN
Qty: 9 TABLET | Refills: 0 | Status: SHIPPED | OUTPATIENT
Start: 2020-09-23 | End: 2020-09-26

## 2020-09-23 RX ORDER — CLONIDINE HYDROCHLORIDE 0.2 MG/1
0.2 TABLET ORAL 3 TIMES DAILY
Qty: 90 TABLET | Refills: 0 | Status: SHIPPED | OUTPATIENT
Start: 2020-09-23

## 2020-09-23 RX ORDER — NAPROXEN 250 MG/1
250 TABLET ORAL ONCE
Status: COMPLETED | OUTPATIENT
Start: 2020-09-23 | End: 2020-09-23

## 2020-09-23 RX ORDER — NICOTINE 21 MG/24HR
1 PATCH, TRANSDERMAL 24 HOURS TRANSDERMAL EVERY 24 HOURS
Qty: 28 EACH | Refills: 0 | Status: SHIPPED | OUTPATIENT
Start: 2020-09-24 | End: 2020-11-29

## 2020-09-23 RX ORDER — MULTIVITAMIN,THERAPEUTIC
1 TABLET ORAL DAILY
Qty: 90 TABLET | Refills: 0 | Status: SHIPPED | OUTPATIENT
Start: 2020-09-23

## 2020-09-23 RX ORDER — LANOLIN ALCOHOL/MO/W.PET/CERES
3 CREAM (GRAM) TOPICAL NIGHTLY
Qty: 30 TABLET | Refills: 0 | Status: SHIPPED | OUTPATIENT
Start: 2020-09-23

## 2020-09-23 RX ORDER — HYDROXYZINE PAMOATE 50 MG/1
50 CAPSULE ORAL 3 TIMES DAILY PRN
Qty: 45 CAPSULE | Refills: 1 | Status: SHIPPED | OUTPATIENT
Start: 2020-09-23 | End: 2021-01-01

## 2020-09-23 RX ORDER — ALBUTEROL SULFATE 90 UG/1
2 AEROSOL, METERED RESPIRATORY (INHALATION) EVERY 4 HOURS PRN
Qty: 18 G | Refills: 0 | Status: SHIPPED | OUTPATIENT
Start: 2020-09-23 | End: 2022-01-01 | Stop reason: SDUPTHER

## 2020-09-23 RX ORDER — PRAZOSIN HYDROCHLORIDE 1 MG/1
1 CAPSULE ORAL EVERY 12 HOURS SCHEDULED
Qty: 30 CAPSULE | Refills: 0 | Status: SHIPPED | OUTPATIENT
Start: 2020-09-23 | End: 2020-11-29

## 2020-09-23 RX ORDER — DULOXETIN HYDROCHLORIDE 60 MG/1
60 CAPSULE, DELAYED RELEASE ORAL DAILY
Qty: 30 CAPSULE | Refills: 0 | Status: SHIPPED | OUTPATIENT
Start: 2020-09-24 | End: 2020-10-28 | Stop reason: SDUPTHER

## 2020-09-23 RX ORDER — METHOCARBAMOL 750 MG/1
1500 TABLET, FILM COATED ORAL ONCE
Status: COMPLETED | OUTPATIENT
Start: 2020-09-23 | End: 2020-09-23

## 2020-09-23 RX ORDER — OLANZAPINE 7.5 MG/1
7.5 TABLET ORAL NIGHTLY
Qty: 30 TABLET | Refills: 0 | Status: SHIPPED | OUTPATIENT
Start: 2020-09-23 | End: 2022-01-01 | Stop reason: SDUPTHER

## 2020-09-23 RX ADMIN — DULOXETINE HYDROCHLORIDE 60 MG: 60 CAPSULE, DELAYED RELEASE ORAL at 08:08

## 2020-09-23 RX ADMIN — NAPROXEN 250 MG: 250 TABLET ORAL at 15:12

## 2020-09-23 RX ADMIN — CLONIDINE HYDROCHLORIDE 0.2 MG: 0.2 TABLET ORAL at 08:08

## 2020-09-23 RX ADMIN — LIDOCAINE 1 PATCH: 50 PATCH CUTANEOUS at 08:08

## 2020-09-23 RX ADMIN — NICOTINE 1 PATCH: 21 PATCH, EXTENDED RELEASE TRANSDERMAL at 08:41

## 2020-09-23 RX ADMIN — METHOCARBAMOL 1500 MG: 750 TABLET, FILM COATED ORAL at 15:12

## 2020-09-23 RX ADMIN — METHOCARBAMOL 1000 MG: 500 TABLET, FILM COATED ORAL at 08:08

## 2020-09-23 RX ADMIN — NAPROXEN 500 MG: 500 TABLET ORAL at 07:14

## 2020-09-23 RX ADMIN — THERA TABS 1 TABLET: TAB at 08:08

## 2020-09-23 RX ADMIN — PRAZOSIN HYDROCHLORIDE 1 MG: 1 CAPSULE ORAL at 08:08

## 2020-09-23 NOTE — NURSING NOTE
Pt discharged at this time. Belongings returned to pt and discharge instructions given to and discussed with pt. Verbalizes understanding.  She is escorted to main entrance by [facility] and is met by representative from [facility].

## 2020-09-23 NOTE — SIGNIFICANT NOTE
LCSW met with pt 1:1 and reviewed safety/dc plan. Pt presented to the dayroom, casually dressed, alert and oriented x3. Mood is fair, affect is congruent. Pt makes fair eye contact, speech is normal. Pt is observed interacting well with peers and staff on the unit. Pt denies SI/HI ,AVH. PT reports feeling hopeful and motivated re: plan of care. Pt reports that she is going to be picked up and taken to addiction/recovery house in [facility]. Pt has follow up scheduled at [facility] in [place]. Pt was educated on Crisis Hotline, advised to call as needed. PT participated well in individual and group tx, was med compliant. Concern was voiced regarding substance use and educated patient on risks associated with use. Pt was advised to abstain from use and educated on community resources that can help with sobriety and recovery. Pt does not appear to be an imminent risk to self or others, denies access to firearms. Plan to dc this afternoon.
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
## 2020-09-23 NOTE — NURSING NOTE
Behavior   Note any precipitants to event or behavior   Describe level and action of any aggressive behavior or speech and associated interventions.     Anxiety: Patient denies at this time  Depression: Patient denies at this time  Pain  0  AVH   no  S/I   no  Plan  no  H/I   no  Plan  no    Affect   euthymic/normal      Note: Laura denies SI/HI/AVH at this time. She stated that she is feeling hopeful about going to a shelter in [facility] tomorrow. She requested robaxin q8 PRN , order obtained from Sherry GUAJARDO. She is sitting in the hallway interacting with her peers. Encourage open communication with staff.       Intervention    PRN medication utilized:  yes - Robaxin    Instructed in medication usage and effects  Medications administered as ordered  Encouraged to verbalize needs      Response    Verbalized understanding   Did patient take medications as ordered yes   Did patient interact with assessment?  yes     Plan    Will monitor for safety  Will monitor every 15 minutes as ordered  Will evaluate and promote the plan of care    Last BM:  unknown date  (Please chart in I/O as well)

## 2020-09-23 NOTE — PLAN OF CARE
Goal Outcome Evaluation:  Plan of Care Reviewed With: patient  Progress: improving  Outcome Summary: Appears to be sleeping well throughout the night, anticipating discharge tomorrow

## 2020-09-23 NOTE — DISCHARGE INSTRUCTIONS
--Continue medications as currently prescribed.  --Continue follow-up with outpatient providers.  --Please contact our Behavioral Health Unit with any questions or concerns at [phone].  --Return to the ER with any suicidal or homicidal ideation, or worsening symptoms.    --The number for the National Suicide & Crisis Hotline is 1-481.269.1172.  The National Crisis Text number is 314353.  These may be contacted at any time, if needed.

## 2020-10-27 RX ORDER — DULOXETIN HYDROCHLORIDE 60 MG/1
CAPSULE, DELAYED RELEASE ORAL
Qty: 30 CAPSULE | Refills: 5 | OUTPATIENT
Start: 2020-10-27

## 2020-10-27 NOTE — TELEPHONE ENCOUNTER
Patient is no longer under my care (was seen as an inpatient).  Has outpatient Behavioral Health Follow-up.    Will forward to PCP for informational purposes    Patrick English II, MD  10/27/20 @ 18:08 CDT

## 2020-10-28 RX ORDER — DULOXETIN HYDROCHLORIDE 60 MG/1
60 CAPSULE, DELAYED RELEASE ORAL DAILY
Qty: 30 CAPSULE | Refills: 1 | Status: SHIPPED | OUTPATIENT
Start: 2020-10-28 | End: 2022-01-01 | Stop reason: SDUPTHER

## 2020-11-18 RX ORDER — CLONIDINE HYDROCHLORIDE 0.2 MG/1
TABLET ORAL
Qty: 90 TABLET | Refills: 0 | OUTPATIENT
Start: 2020-11-18

## 2020-11-18 RX ORDER — DULOXETIN HYDROCHLORIDE 60 MG/1
CAPSULE, DELAYED RELEASE ORAL
Qty: 30 CAPSULE | Refills: 0 | OUTPATIENT
Start: 2020-11-18

## 2020-11-18 NOTE — TELEPHONE ENCOUNTER
Patient is no longer under my care (was seen as an inpatient).  Has outpatient Behavioral Health Follow-up.    Will forward to PCP for informational purposes    Patrick English II, MD  11/18/20 @ 07:51 CST

## 2021-03-25 RX ORDER — HYDROXYZINE PAMOATE 50 MG/1
CAPSULE ORAL
Qty: 45 CAPSULE | Refills: 5 | OUTPATIENT
Start: 2021-03-25

## 2021-03-25 NOTE — TELEPHONE ENCOUNTER
Patient is no longer under my care (was seen as an inpatient).  Has outpatient Behavioral Health Follow-up.    Will forward to PCP for informational purposes    Patrick English II, MD  03/25/21 @ 09:39 CDT

## 2022-01-01 ENCOUNTER — APPOINTMENT (OUTPATIENT)
Dept: INTERVENTIONAL RADIOLOGY/VASCULAR | Facility: HOSPITAL | Age: 57
End: 2022-01-01

## 2022-01-01 ENCOUNTER — APPOINTMENT (OUTPATIENT)
Dept: ULTRASOUND IMAGING | Facility: HOSPITAL | Age: 57
End: 2022-01-01

## 2022-01-01 ENCOUNTER — TELEPHONE (OUTPATIENT)
Dept: FAMILY MEDICINE CLINIC | Facility: CLINIC | Age: 57
End: 2022-01-01

## 2022-01-01 ENCOUNTER — APPOINTMENT (OUTPATIENT)
Dept: GENERAL RADIOLOGY | Facility: HOSPITAL | Age: 57
End: 2022-01-01

## 2022-01-01 ENCOUNTER — OFFICE VISIT (OUTPATIENT)
Dept: FAMILY MEDICINE CLINIC | Facility: CLINIC | Age: 57
End: 2022-01-01

## 2022-01-01 ENCOUNTER — HOSPITAL ENCOUNTER (INPATIENT)
Facility: HOSPITAL | Age: 57
LOS: 1 days | End: 2022-04-17
Attending: FAMILY MEDICINE | Admitting: FAMILY MEDICINE

## 2022-01-01 ENCOUNTER — HOSPITAL ENCOUNTER (OUTPATIENT)
Dept: MRI IMAGING | Facility: HOSPITAL | Age: 57
Discharge: HOME | End: 2022-04-07
Admitting: RADIOLOGY

## 2022-01-01 ENCOUNTER — HOSPITAL ENCOUNTER (EMERGENCY)
Facility: HOSPITAL | Age: 57
Discharge: LEFT BEFORE DISCHARGE | End: 2022-03-24
Attending: FAMILY MEDICINE | Admitting: EMERGENCY MEDICINE

## 2022-01-01 ENCOUNTER — APPOINTMENT (OUTPATIENT)
Dept: CT IMAGING | Facility: HOSPITAL | Age: 57
End: 2022-01-01

## 2022-01-01 VITALS
OXYGEN SATURATION: 59 % | HEIGHT: 62 IN | BODY MASS INDEX: 15.64 KG/M2 | SYSTOLIC BLOOD PRESSURE: 115 MMHG | DIASTOLIC BLOOD PRESSURE: 59 MMHG | WEIGHT: 85 LBS | TEMPERATURE: 90.1 F

## 2022-01-01 VITALS
TEMPERATURE: 97.6 F | WEIGHT: 96.7 LBS | OXYGEN SATURATION: 96 % | DIASTOLIC BLOOD PRESSURE: 70 MMHG | BODY MASS INDEX: 17.79 KG/M2 | SYSTOLIC BLOOD PRESSURE: 130 MMHG | HEART RATE: 106 BPM | HEIGHT: 62 IN

## 2022-01-01 VITALS
WEIGHT: 105 LBS | DIASTOLIC BLOOD PRESSURE: 88 MMHG | HEART RATE: 91 BPM | BODY MASS INDEX: 19.2 KG/M2 | OXYGEN SATURATION: 97 % | SYSTOLIC BLOOD PRESSURE: 142 MMHG | TEMPERATURE: 97.7 F

## 2022-01-01 VITALS
HEIGHT: 62 IN | DIASTOLIC BLOOD PRESSURE: 86 MMHG | TEMPERATURE: 98.1 F | SYSTOLIC BLOOD PRESSURE: 177 MMHG | HEART RATE: 103 BPM | BODY MASS INDEX: 20.24 KG/M2 | WEIGHT: 110 LBS | RESPIRATION RATE: 26 BRPM | OXYGEN SATURATION: 95 %

## 2022-01-01 DIAGNOSIS — Z86.59 HISTORY OF MAJOR DEPRESSION: ICD-10-CM

## 2022-01-01 DIAGNOSIS — M25.512 CHRONIC LEFT SHOULDER PAIN: Primary | ICD-10-CM

## 2022-01-01 DIAGNOSIS — R56.9 SEIZURE: ICD-10-CM

## 2022-01-01 DIAGNOSIS — J45.20 INTERMITTENT ASTHMA WITHOUT COMPLICATION, UNSPECIFIED ASTHMA SEVERITY: ICD-10-CM

## 2022-01-01 DIAGNOSIS — R41.82 ALTERED MENTAL STATUS, UNSPECIFIED ALTERED MENTAL STATUS TYPE: ICD-10-CM

## 2022-01-01 DIAGNOSIS — R25.1 TREMOR DUE TO DISORDER OF CENTRAL NERVOUS SYSTEM: Primary | ICD-10-CM

## 2022-01-01 DIAGNOSIS — G89.29 OTHER CHRONIC PAIN: ICD-10-CM

## 2022-01-01 DIAGNOSIS — J18.9 PNEUMONIA OF LEFT LUNG DUE TO INFECTIOUS ORGANISM, UNSPECIFIED PART OF LUNG: ICD-10-CM

## 2022-01-01 DIAGNOSIS — J96.01 ACUTE RESPIRATORY FAILURE WITH HYPOXIA: Primary | ICD-10-CM

## 2022-01-01 DIAGNOSIS — G89.29 CHRONIC LEFT SHOULDER PAIN: Primary | ICD-10-CM

## 2022-01-01 DIAGNOSIS — Z00.00 ENCOUNTER FOR MEDICAL EXAMINATION TO ESTABLISH CARE: Primary | ICD-10-CM

## 2022-01-01 DIAGNOSIS — R55 SYNCOPE AND COLLAPSE: ICD-10-CM

## 2022-01-01 DIAGNOSIS — G96.9 TREMOR DUE TO DISORDER OF CENTRAL NERVOUS SYSTEM: Primary | ICD-10-CM

## 2022-01-01 LAB
ALBUMIN SERPL-MCNC: 3.4 G/DL (ref 3.5–5.2)
ALBUMIN/GLOB SERPL: 1.5 G/DL
ALP SERPL-CCNC: 92 U/L (ref 39–117)
ALT SERPL W P-5'-P-CCNC: 38 U/L (ref 1–33)
AMMONIA BLD-SCNC: 20 UMOL/L (ref 11–51)
AMPHET+METHAMPHET UR QL: POSITIVE
AMPHETAMINES UR QL: POSITIVE
ANION GAP SERPL CALCULATED.3IONS-SCNC: 8 MMOL/L (ref 5–15)
APAP SERPL-MCNC: <5 MCG/ML (ref 0–30)
ARTERIAL PATENCY WRIST A: POSITIVE
AST SERPL-CCNC: 29 U/L (ref 1–32)
ATMOSPHERIC PRESS: 751 MMHG
BARBITURATES UR QL SCN: NEGATIVE
BASE EXCESS BLDA CALC-SCNC: -10.1 MMOL/L (ref 0–2)
BASOPHILS # BLD AUTO: 0.02 10*3/MM3 (ref 0–0.2)
BASOPHILS # BLD AUTO: 0.03 10*3/MM3 (ref 0–0.2)
BASOPHILS NFR BLD AUTO: 0.5 % (ref 0–1.5)
BASOPHILS NFR BLD AUTO: 0.5 % (ref 0–1.5)
BDY SITE: ABNORMAL
BENZODIAZ UR QL SCN: NEGATIVE
BILIRUB SERPL-MCNC: <0.2 MG/DL (ref 0–1.2)
BILIRUB UR QL STRIP: NEGATIVE
BUN SERPL-MCNC: 21 MG/DL (ref 6–20)
BUN/CREAT SERPL: 19.6 (ref 7–25)
BUPRENORPHINE SERPL-MCNC: NEGATIVE NG/ML
CALCIUM SPEC-SCNC: 7.9 MG/DL (ref 8.6–10.5)
CANNABINOIDS SERPL QL: NEGATIVE
CHLORIDE SERPL-SCNC: 107 MMOL/L (ref 98–107)
CK SERPL-CCNC: 76 U/L (ref 20–180)
CLARITY UR: CLEAR
CO2 SERPL-SCNC: 20 MMOL/L (ref 22–29)
COCAINE UR QL: NEGATIVE
COLOR UR: YELLOW
CREAT SERPL-MCNC: 1.07 MG/DL (ref 0.57–1)
D-LACTATE SERPL-SCNC: 3.3 MMOL/L (ref 0.5–2)
DEPRECATED RDW RBC AUTO: 44.6 FL (ref 37–54)
DEPRECATED RDW RBC AUTO: 47.3 FL (ref 37–54)
EGFRCR SERPLBLD CKD-EPI 2021: 61.1 ML/MIN/1.73
EOSINOPHIL # BLD AUTO: 0.06 10*3/MM3 (ref 0–0.4)
EOSINOPHIL # BLD AUTO: 0.1 10*3/MM3 (ref 0–0.4)
EOSINOPHIL NFR BLD AUTO: 1.1 % (ref 0.3–6.2)
EOSINOPHIL NFR BLD AUTO: 2.7 % (ref 0.3–6.2)
ERYTHROCYTE [DISTWIDTH] IN BLOOD BY AUTOMATED COUNT: 13.5 % (ref 12.3–15.4)
ERYTHROCYTE [DISTWIDTH] IN BLOOD BY AUTOMATED COUNT: 13.5 % (ref 12.3–15.4)
ETHANOL BLD-MCNC: <10 MG/DL (ref 0–10)
ETHANOL UR QL: <0.01 %
GLOBULIN UR ELPH-MCNC: 2.3 GM/DL
GLUCOSE SERPL-MCNC: 260 MG/DL (ref 65–99)
GLUCOSE UR STRIP-MCNC: NEGATIVE MG/DL
HCO3 BLDA-SCNC: 20.8 MMOL/L (ref 20–26)
HCT VFR BLD AUTO: 34.7 % (ref 34–46.6)
HCT VFR BLD AUTO: 39.4 % (ref 34–46.6)
HGB BLD-MCNC: 11.2 G/DL (ref 12–15.9)
HGB BLD-MCNC: 13.3 G/DL (ref 12–15.9)
HGB UR QL STRIP.AUTO: NEGATIVE
HOLD SPECIMEN: NORMAL
IMM GRANULOCYTES # BLD AUTO: 0 10*3/MM3 (ref 0–0.05)
IMM GRANULOCYTES # BLD AUTO: 0.15 10*3/MM3 (ref 0–0.05)
IMM GRANULOCYTES NFR BLD AUTO: 0 % (ref 0–0.5)
IMM GRANULOCYTES NFR BLD AUTO: 2.6 % (ref 0–0.5)
INHALED O2 CONCENTRATION: 100 %
INR PPP: 1.09 (ref 0.8–1.2)
KETONES UR QL STRIP: NEGATIVE
LEUKOCYTE ESTERASE UR QL STRIP.AUTO: NEGATIVE
LYMPHOCYTES # BLD AUTO: 1.42 10*3/MM3 (ref 0.7–3.1)
LYMPHOCYTES # BLD AUTO: 1.85 10*3/MM3 (ref 0.7–3.1)
LYMPHOCYTES NFR BLD AUTO: 32.5 % (ref 19.6–45.3)
LYMPHOCYTES NFR BLD AUTO: 38.8 % (ref 19.6–45.3)
Lab: ABNORMAL
MAGNESIUM SERPL-MCNC: 2.2 MG/DL (ref 1.6–2.6)
MCH RBC QN AUTO: 30.4 PG (ref 26.6–33)
MCH RBC QN AUTO: 30.8 PG (ref 26.6–33)
MCHC RBC AUTO-ENTMCNC: 32.3 G/DL (ref 31.5–35.7)
MCHC RBC AUTO-ENTMCNC: 33.8 G/DL (ref 31.5–35.7)
MCV RBC AUTO: 90.2 FL (ref 79–97)
MCV RBC AUTO: 95.3 FL (ref 79–97)
METHADONE UR QL SCN: NEGATIVE
MODALITY: ABNORMAL
MONOCYTES # BLD AUTO: 0.39 10*3/MM3 (ref 0.1–0.9)
MONOCYTES # BLD AUTO: 0.49 10*3/MM3 (ref 0.1–0.9)
MONOCYTES NFR BLD AUTO: 13.4 % (ref 5–12)
MONOCYTES NFR BLD AUTO: 6.9 % (ref 5–12)
NEUTROPHILS NFR BLD AUTO: 1.63 10*3/MM3 (ref 1.7–7)
NEUTROPHILS NFR BLD AUTO: 3.21 10*3/MM3 (ref 1.7–7)
NEUTROPHILS NFR BLD AUTO: 44.6 % (ref 42.7–76)
NEUTROPHILS NFR BLD AUTO: 56.4 % (ref 42.7–76)
NITRITE UR QL STRIP: NEGATIVE
NRBC BLD AUTO-RTO: 0 /100 WBC (ref 0–0.2)
NRBC BLD AUTO-RTO: 0 /100 WBC (ref 0–0.2)
NT-PROBNP SERPL-MCNC: 1560 PG/ML (ref 0–900)
NT-PROBNP SERPL-MCNC: 1727 PG/ML (ref 0–900)
OPIATES UR QL: NEGATIVE
OXYCODONE UR QL SCN: NEGATIVE
PCO2 BLDA: 72.6 MM HG (ref 35–45)
PCP UR QL SCN: NEGATIVE
PEEP RESPIRATORY: 5 CM[H2O]
PH BLDA: 7.07 PH UNITS (ref 7.35–7.45)
PH UR STRIP.AUTO: 6 [PH] (ref 5–9)
PLATELET # BLD AUTO: 252 10*3/MM3 (ref 140–450)
PLATELET # BLD AUTO: 306 10*3/MM3 (ref 140–450)
PMV BLD AUTO: 10 FL (ref 6–12)
PMV BLD AUTO: 9.3 FL (ref 6–12)
PO2 BLDA: 190 MM HG (ref 83–108)
POTASSIUM SERPL-SCNC: 4.8 MMOL/L (ref 3.5–5.2)
PROPOXYPH UR QL: NEGATIVE
PROT SERPL-MCNC: 5.7 G/DL (ref 6–8.5)
PROT UR QL STRIP: NEGATIVE
PROTHROMBIN TIME: 13.8 SECONDS (ref 11.1–15.3)
QT INTERVAL: 362 MS
QT INTERVAL: 486 MS
QTC INTERVAL: 447 MS
QTC INTERVAL: 539 MS
RBC # BLD AUTO: 3.64 10*6/MM3 (ref 3.77–5.28)
RBC # BLD AUTO: 4.37 10*6/MM3 (ref 3.77–5.28)
SALICYLATES SERPL-MCNC: <0.3 MG/DL
SAO2 % BLDCOA: 98.3 % (ref 94–99)
SET MECH RESP RATE: 20
SODIUM SERPL-SCNC: 135 MMOL/L (ref 136–145)
SP GR UR STRIP: 1.02 (ref 1–1.03)
TOTAL RATE: 20 BREATHS/MINUTE
TRICYCLICS UR QL SCN: NEGATIVE
TROPONIN T SERPL-MCNC: <0.01 NG/ML (ref 0–0.03)
UROBILINOGEN UR QL STRIP: NORMAL
VENTILATOR MODE: AC
VT ON VENT VENT: 450 ML
WBC NRBC COR # BLD: 3.66 10*3/MM3 (ref 3.4–10.8)
WBC NRBC COR # BLD: 5.69 10*3/MM3 (ref 3.4–10.8)

## 2022-01-01 PROCEDURE — 83605 ASSAY OF LACTIC ACID: CPT | Performed by: FAMILY MEDICINE

## 2022-01-01 PROCEDURE — 73030 X-RAY EXAM OF SHOULDER: CPT

## 2022-01-01 PROCEDURE — 81003 URINALYSIS AUTO W/O SCOPE: CPT | Performed by: FAMILY MEDICINE

## 2022-01-01 PROCEDURE — 71045 X-RAY EXAM CHEST 1 VIEW: CPT

## 2022-01-01 PROCEDURE — 80143 DRUG ASSAY ACETAMINOPHEN: CPT | Performed by: FAMILY MEDICINE

## 2022-01-01 PROCEDURE — 93005 ELECTROCARDIOGRAM TRACING: CPT | Performed by: PHYSICIAN ASSISTANT

## 2022-01-01 PROCEDURE — 82550 ASSAY OF CK (CPK): CPT | Performed by: FAMILY MEDICINE

## 2022-01-01 PROCEDURE — 87040 BLOOD CULTURE FOR BACTERIA: CPT | Performed by: FAMILY MEDICINE

## 2022-01-01 PROCEDURE — 96360 HYDRATION IV INFUSION INIT: CPT

## 2022-01-01 PROCEDURE — 93010 ELECTROCARDIOGRAM REPORT: CPT | Performed by: INTERNAL MEDICINE

## 2022-01-01 PROCEDURE — 83735 ASSAY OF MAGNESIUM: CPT | Performed by: FAMILY MEDICINE

## 2022-01-01 PROCEDURE — 70551 MRI BRAIN STEM W/O DYE: CPT

## 2022-01-01 PROCEDURE — 36600 WITHDRAWAL OF ARTERIAL BLOOD: CPT

## 2022-01-01 PROCEDURE — 82803 BLOOD GASES ANY COMBINATION: CPT

## 2022-01-01 PROCEDURE — 99285 EMERGENCY DEPT VISIT HI MDM: CPT

## 2022-01-01 PROCEDURE — 36415 COLL VENOUS BLD VENIPUNCTURE: CPT

## 2022-01-01 PROCEDURE — 93005 ELECTROCARDIOGRAM TRACING: CPT | Performed by: FAMILY MEDICINE

## 2022-01-01 PROCEDURE — 80306 DRUG TEST PRSMV INSTRMNT: CPT | Performed by: FAMILY MEDICINE

## 2022-01-01 PROCEDURE — 99283 EMERGENCY DEPT VISIT LOW MDM: CPT

## 2022-01-01 PROCEDURE — 80179 DRUG ASSAY SALICYLATE: CPT | Performed by: FAMILY MEDICINE

## 2022-01-01 PROCEDURE — 99284 EMERGENCY DEPT VISIT MOD MDM: CPT

## 2022-01-01 PROCEDURE — 70450 CT HEAD/BRAIN W/O DYE: CPT

## 2022-01-01 PROCEDURE — 83880 ASSAY OF NATRIURETIC PEPTIDE: CPT | Performed by: FAMILY MEDICINE

## 2022-01-01 PROCEDURE — 85025 COMPLETE CBC W/AUTO DIFF WBC: CPT | Performed by: FAMILY MEDICINE

## 2022-01-01 PROCEDURE — 82140 ASSAY OF AMMONIA: CPT | Performed by: FAMILY MEDICINE

## 2022-01-01 PROCEDURE — 85025 COMPLETE CBC W/AUTO DIFF WBC: CPT | Performed by: PHYSICIAN ASSISTANT

## 2022-01-01 PROCEDURE — 85610 PROTHROMBIN TIME: CPT | Performed by: FAMILY MEDICINE

## 2022-01-01 PROCEDURE — 83880 ASSAY OF NATRIURETIC PEPTIDE: CPT | Performed by: PHYSICIAN ASSISTANT

## 2022-01-01 PROCEDURE — 82077 ASSAY SPEC XCP UR&BREATH IA: CPT | Performed by: FAMILY MEDICINE

## 2022-01-01 PROCEDURE — 99213 OFFICE O/P EST LOW 20 MIN: CPT | Performed by: STUDENT IN AN ORGANIZED HEALTH CARE EDUCATION/TRAINING PROGRAM

## 2022-01-01 PROCEDURE — 84484 ASSAY OF TROPONIN QUANT: CPT | Performed by: FAMILY MEDICINE

## 2022-01-01 PROCEDURE — 80053 COMPREHEN METABOLIC PANEL: CPT | Performed by: FAMILY MEDICINE

## 2022-01-01 RX ORDER — DULOXETIN HYDROCHLORIDE 60 MG/1
60 CAPSULE, DELAYED RELEASE ORAL DAILY
Qty: 30 CAPSULE | Refills: 1 | Status: SHIPPED | OUTPATIENT
Start: 2022-01-01 | End: 2022-01-01 | Stop reason: SDUPTHER

## 2022-01-01 RX ORDER — ALBUTEROL SULFATE 90 UG/1
2 AEROSOL, METERED RESPIRATORY (INHALATION) EVERY 4 HOURS PRN
Qty: 18 G | Refills: 0 | Status: SHIPPED | OUTPATIENT
Start: 2022-01-01 | End: 2022-01-01 | Stop reason: SDUPTHER

## 2022-01-01 RX ORDER — SODIUM CHLORIDE 9 MG/ML
125 INJECTION, SOLUTION INTRAVENOUS CONTINUOUS
Status: DISCONTINUED | OUTPATIENT
Start: 2022-01-01 | End: 2022-01-01 | Stop reason: HOSPADM

## 2022-01-01 RX ORDER — GABAPENTIN 800 MG/1
800 TABLET ORAL 3 TIMES DAILY
COMMUNITY

## 2022-01-01 RX ORDER — DULOXETIN HYDROCHLORIDE 60 MG/1
60 CAPSULE, DELAYED RELEASE ORAL DAILY
Qty: 30 CAPSULE | Refills: 1 | Status: SHIPPED | OUTPATIENT
Start: 2022-01-01 | End: 2022-01-01

## 2022-01-01 RX ORDER — IBUPROFEN 800 MG/1
TABLET ORAL
COMMUNITY
Start: 2022-01-01 | End: 2022-01-01 | Stop reason: SDUPTHER

## 2022-01-01 RX ORDER — OLANZAPINE 7.5 MG/1
7.5 TABLET ORAL NIGHTLY
Qty: 30 TABLET | Refills: 4 | Status: SHIPPED | OUTPATIENT
Start: 2022-01-01

## 2022-01-01 RX ORDER — TOPIRAMATE 50 MG/1
50 TABLET, FILM COATED ORAL DAILY
Qty: 30 TABLET | Refills: 1 | Status: SHIPPED | OUTPATIENT
Start: 2022-01-01

## 2022-01-01 RX ORDER — SODIUM CHLORIDE 0.9 % (FLUSH) 0.9 %
10 SYRINGE (ML) INJECTION AS NEEDED
Status: DISCONTINUED | OUTPATIENT
Start: 2022-01-01 | End: 2022-01-01 | Stop reason: HOSPADM

## 2022-01-01 RX ORDER — OLANZAPINE 7.5 MG/1
7.5 TABLET ORAL NIGHTLY
Qty: 30 TABLET | Refills: 0 | Status: SHIPPED | OUTPATIENT
Start: 2022-01-01 | End: 2022-01-01 | Stop reason: SDUPTHER

## 2022-01-01 RX ORDER — DULOXETIN HYDROCHLORIDE 60 MG/1
60 CAPSULE, DELAYED RELEASE ORAL DAILY
Qty: 30 CAPSULE | Refills: 1 | Status: SHIPPED | OUTPATIENT
Start: 2022-01-01

## 2022-01-01 RX ORDER — ACETAMINOPHEN 325 MG/1
650 TABLET ORAL ONCE
Status: COMPLETED | OUTPATIENT
Start: 2022-01-01 | End: 2022-01-01

## 2022-01-01 RX ORDER — ALBUTEROL SULFATE 90 UG/1
2 AEROSOL, METERED RESPIRATORY (INHALATION) EVERY 4 HOURS PRN
Qty: 18 G | Refills: 4 | Status: SHIPPED | OUTPATIENT
Start: 2022-01-01

## 2022-01-01 RX ORDER — IBUPROFEN 800 MG/1
800 TABLET ORAL EVERY 6 HOURS PRN
Qty: 90 TABLET | Refills: 3 | Status: SHIPPED | OUTPATIENT
Start: 2022-01-01

## 2022-01-01 RX ADMIN — SODIUM CHLORIDE 125 ML/HR: 9 INJECTION, SOLUTION INTRAVENOUS at 09:15

## 2022-01-01 RX ADMIN — SODIUM CHLORIDE 1000 ML: 0.9 INJECTION, SOLUTION INTRAVENOUS at 08:30

## 2022-01-01 RX ADMIN — ACETAMINOPHEN 650 MG: 325 TABLET, FILM COATED ORAL at 15:51

## 2022-03-24 NOTE — ED PROVIDER NOTES
Subjective   Patient presents to emergency department for chronic left shoulder pain which flares up every 6 months.  States this flare is worse than usual.  She took several Aleve at home prior to arrival which did not help her symptoms.  Denies any acute injury, numbness/tingling, weakness.  States pain is better with raising arm above her head.      History provided by:  Patient   used: No        Review of Systems   Constitutional: Negative for chills and fever.   HENT: Negative for sore throat and trouble swallowing.    Respiratory: Negative for cough and shortness of breath.    Cardiovascular: Negative for chest pain and leg swelling.   Gastrointestinal: Negative for abdominal pain, nausea and vomiting.   Genitourinary: Negative for dysuria and flank pain.   Musculoskeletal: Positive for arthralgias. Negative for myalgias and neck pain.   Skin: Negative for color change.   Allergic/Immunologic: Negative for immunocompromised state.   Neurological: Negative for tremors and weakness.   Hematological: Negative for adenopathy.   Psychiatric/Behavioral: Negative for confusion.       Past Medical History:   Diagnosis Date   • ADHD (attention deficit hyperactivity disorder)    • Alcohol abuse    • Bilateral carpal tunnel syndrome    • Bipolar 1 disorder (HCC)    • Candida vaginitis 5/5/2020    -Reports that she has painful, itching, foul discharge.  Unsure of the color. -Positive for Candida.  Treat with fluconazole 150 mg. -Gonorrhea, chlamydia, trichomonas, and Gardnerella negative.   • Carpal tunnel syndrome on both sides 2015   • Chronic pain disorder     Back   • Collapsed lung 1986   • Depression    • Essential hypertension 9/17/2020   • Hyperkalemia 5/13/2020    Results from last 7 days Lab Units 05/13/20 1136 05/13/20 0500 05/12/20 0516 05/11/20 0507 05/10/20 0549 05/09/20 0555 05/08/20 0520 POTASSIUM mmol/L 4.3 5.4* 4.3 3.8 3.7 3.5 3.7  -EKG showed normal sinus rhythm and no peaked T  waves. -Recheck potassium this afternoon and intervene if necessary.   • Irritable bowel syndrome    • Leukopenia 4/23/2018    Appears to be chronic   • Spontaneous pneumothorax    • Suicidal ideation 1/5/2019    -History of alcohol abuse, depression, anxiety, and bipolar.  Symptoms worsened after the passing of her fiancé in December. -Ethanol levels less than 10.  Psych consulted. -Suicide precautions discontinued on 5/11. -She is questioning behavioral admission versus outpatient rehab.  Psych continues to discuss with her. -Intermittently expresses some disagreement with admission to behavioral health    • Thrombocytopenia (HCC) 5/6/2020    Results from last 7 days Lab Units 05/13/20 0500 05/12/20 0516 05/11/20 0507 05/10/20 0549 05/09/20 0555 05/08/20 0520 PLATELETS 10*3/mm3 224 146 167 144 126* 102*     • Wernicke's encephalopathy 4/23/2018    -Banana bag -Completed 6 doses of thiamine 500 mg.  Completed 9 doses course of thiamine of 250 mg on 5/12       Allergies   Allergen Reactions   • Wasp Venom Swelling       Past Surgical History:   Procedure Laterality Date   • CHEST TUBE INSERTION      30 years ago for a spontaneous pneumothorax   • COLONOSCOPY     • HYSTERECTOMY     • TOTAL ABDOMINAL HYSTERECTOMY WITH SALPINGO OOPHORECTOMY         Family History   Problem Relation Age of Onset   • Depression Mother    • Anxiety disorder Mother    • COPD Mother    • Alcohol abuse Father    • Depression Father    • Diabetes Father    • Anxiety disorder Sister    • Bipolar disorder Sister    • Anxiety disorder Maternal Aunt    • Alcohol abuse Paternal Grandfather    • Alcohol abuse Paternal Grandmother    • Suicide Attempts Neg Hx        Social History     Socioeconomic History   • Marital status: Single   Tobacco Use   • Smoking status: Current Every Day Smoker     Packs/day: 1.00     Start date: 1977   • Smokeless tobacco: Never Used   Vaping Use   • Vaping Use: Never used   Substance and Sexual Activity   • Alcohol  "use: Not Currently     Alcohol/week: 70.0 standard drinks     Types: 70 Shots of liquor per week     Comment: previous drinker   • Drug use: Not Currently     Comment: has a hx of methamphetamine use   • Sexual activity: Yes     Partners: Male           Objective      /86 (BP Location: Right arm, Patient Position: Sitting)   Pulse 103   Temp 98.1 °F (36.7 °C) (Oral)   Resp 26   Ht 157.5 cm (62\")   Wt 49.9 kg (110 lb)   LMP 01/24/2001 (Within Months)   SpO2 95%   BMI 20.12 kg/m²     Physical Exam  Vitals and nursing note reviewed.   Constitutional:       General: She is not in acute distress.     Appearance: Normal appearance. She is normal weight. She is not ill-appearing.   HENT:      Head: Normocephalic and atraumatic.      Mouth/Throat:      Pharynx: Oropharynx is clear.   Cardiovascular:      Rate and Rhythm: Normal rate and regular rhythm.      Pulses: Normal pulses.      Heart sounds: Normal heart sounds.   Pulmonary:      Effort: Pulmonary effort is normal. No respiratory distress.      Breath sounds: Normal breath sounds. No wheezing.   Musculoskeletal:         General: Tenderness (generalized left shoulder) present. No swelling or deformity. Normal range of motion.   Neurological:      Mental Status: She is alert.   Psychiatric:         Mood and Affect: Mood normal.         Behavior: Behavior normal.         Thought Content: Thought content normal.         ECG 12 Lead      Date/Time: 3/24/2022 3:56 PM  Performed by: Ren Sun PA-C  Authorized by: Ren Sun PA-C   Interpreted by physician  Comparison: compared with previous ECG from 9/16/2020  Similar to previous ECG  Rhythm: sinus rhythm  Rate: normal  BPM: 92  ST Segments: ST segments normal  Other findings: prolonged QTc interval  Clinical impression: abnormal ECG                 ED Course      Results for orders placed or performed during the hospital encounter of 03/24/22   BNP    Specimen: Blood   Result Value Ref " Range    proBNP 1,560.0 (H) 0.0 - 900.0 pg/mL   CBC Auto Differential    Specimen: Blood   Result Value Ref Range    WBC 3.66 3.40 - 10.80 10*3/mm3    RBC 4.37 3.77 - 5.28 10*6/mm3    Hemoglobin 13.3 12.0 - 15.9 g/dL    Hematocrit 39.4 34.0 - 46.6 %    MCV 90.2 79.0 - 97.0 fL    MCH 30.4 26.6 - 33.0 pg    MCHC 33.8 31.5 - 35.7 g/dL    RDW 13.5 12.3 - 15.4 %    RDW-SD 44.6 37.0 - 54.0 fl    MPV 10.0 6.0 - 12.0 fL    Platelets 306 140 - 450 10*3/mm3    Neutrophil % 44.6 42.7 - 76.0 %    Lymphocyte % 38.8 19.6 - 45.3 %    Monocyte % 13.4 (H) 5.0 - 12.0 %    Eosinophil % 2.7 0.3 - 6.2 %    Basophil % 0.5 0.0 - 1.5 %    Immature Grans % 0.0 0.0 - 0.5 %    Neutrophils, Absolute 1.63 (L) 1.70 - 7.00 10*3/mm3    Lymphocytes, Absolute 1.42 0.70 - 3.10 10*3/mm3    Monocytes, Absolute 0.49 0.10 - 0.90 10*3/mm3    Eosinophils, Absolute 0.10 0.00 - 0.40 10*3/mm3    Basophils, Absolute 0.02 0.00 - 0.20 10*3/mm3    Immature Grans, Absolute 0.00 0.00 - 0.05 10*3/mm3    nRBC 0.0 0.0 - 0.2 /100 WBC     XR Shoulder 2+ View Left    Result Date: 3/24/2022  Narrative: EXAM DESCRIPTION:  XR SHOULDER 2+ VW LEFT CLINICAL HISTORY: 56 years  Female  left shoulder pain COMPARISON: None available TECHNIQUE: Three views-AP internal and external rotated radiographs of the left shoulder with a Y scapular view. FINDINGS: There is generalized decreased bony mineralization. There is no evidence of an acute fracture. The alignment of the left shoulder is satisfactory. No significant articular abnormalities are identified. The visualized ribs on the left are intact.     Impression: 1. Generalized decreased bony mineralization without evidence of an acute fracture or significant degenerative change. Electronically signed by:  Radha Forrest MD  3/24/2022 2:50 PM CDT Workstation: [id number]    XR Chest 1 View    Result Date: 3/24/2022  Narrative: EXAM DESCRIPTION:  XR CHEST 1 VW CLINICAL HISTORY: 56 years  Female  left shoulder pain COMPARISON: June 2020  TECHNIQUE: One view-PA radiograph the chest FINDINGS: The lungs are clear. The cardiac silhouette and pulmonary vasculature are within normal limits. There are no pleural effusions. There is a remote/healed rib fractures involving the right seventh rib posteriorly     Impression: 1. No radiographic evidence of acute cardiopulmonary disease. Electronically signed by:  Radha Forrest MD  3/24/2022 2:49 PM CDT Workstation: [id number]                                               [facility]    Final diagnoses:   Chronic left shoulder pain       ED Disposition  ED Disposition     ED Disposition   AMA    Condition   --    Comment   --             [facility] - FAMILY MEDICINE  [address] [place] [address] [phone]  Call in 1 day      [facility] EMERGENCY DEPARTMENT  [address]  [facility] [address] [phone]  Go to   if symptoms worsen.         Medication List      No changes were made to your prescriptions during this visit.          Ren Sun PA-C  03/24/22 1640

## 2022-03-24 NOTE — ED NOTES
"Pt presents to ED with c/o left shoulder pain. Pt states \"I have a pinched nerve in the left shoulder and around every 6 months it flares up\". Pt denies recent fall. Pt states \"I just need something to help relieve this\".  "

## 2022-03-31 NOTE — PROGRESS NOTES
Family Medicine Residency  Maury Turner MD    Subjective:     Laura Bruce is a 56 y.o. female who presents to [facility] care and have seizures evaluated.    Patient states that she had a day at work where she had 4 seizures and lost consciousness.  Her boss wanted her to be evaluated.  She also has shoulder tension that leads to a headache and arm pain.  She is attempted to switch her PCP at this time.  Patient was hoping to also get her neurontin filled, but she was informed that we could not fill controlled substances until a UDS was complete.  She asked if it had to be done today, and I stated it just has to be done before the next visit.    The following portions of the patient's history were reviewed and updated as appropriate: allergies, current medications, past family history, past medical history, past social history, past surgical history and problem list.    Past Medical Hx:  Past Medical History:   Diagnosis Date   • ADHD (attention deficit hyperactivity disorder)    • Alcohol abuse    • Bilateral carpal tunnel syndrome    • Bipolar 1 disorder (HCC)    • Candida vaginitis 5/5/2020    -Reports that she has painful, itching, foul discharge.  Unsure of the color. -Positive for Candida.  Treat with fluconazole 150 mg. -Gonorrhea, chlamydia, trichomonas, and Gardnerella negative.   • Carpal tunnel syndrome on both sides 2015   • Chronic pain disorder     Back   • Collapsed lung 1986   • Depression    • Essential hypertension 9/17/2020   • Hyperkalemia 5/13/2020    Results from last 7 days Lab Units 05/13/20 1136 05/13/20 0500 05/12/20 0516 05/11/20 0507 05/10/20 0549 05/09/20 0555 05/08/20 0520 POTASSIUM mmol/L 4.3 5.4* 4.3 3.8 3.7 3.5 3.7  -EKG showed normal sinus rhythm and no peaked T waves. -Recheck potassium this afternoon and intervene if necessary.   • Irritable bowel syndrome    • Leukopenia 4/23/2018    Appears to be chronic   • Spontaneous pneumothorax    • Suicidal ideation 1/5/2019     -History of alcohol abuse, depression, anxiety, and bipolar.  Symptoms worsened after the passing of her fiancé in December. -Ethanol levels less than 10.  Psych consulted. -Suicide precautions discontinued on 5/11. -She is questioning behavioral admission versus outpatient rehab.  Psych continues to discuss with her. -Intermittently expresses some disagreement with admission to behavioral health    • Thrombocytopenia (HCC) 5/6/2020    Results from last 7 days Lab Units 05/13/20 0500 05/12/20 0516 05/11/20 0507 05/10/20 0549 05/09/20 0555 05/08/20 0520 PLATELETS 10*3/mm3 224 146 167 144 126* 102*     • Wernicke's encephalopathy 4/23/2018    -Banana bag -Completed 6 doses of thiamine 500 mg.  Completed 9 doses course of thiamine of 250 mg on 5/12       Past Surgical Hx:  Past Surgical History:   Procedure Laterality Date   • CHEST TUBE INSERTION      30 years ago for a spontaneous pneumothorax   • COLONOSCOPY     • HYSTERECTOMY     • TOTAL ABDOMINAL HYSTERECTOMY WITH SALPINGO OOPHORECTOMY         Current Meds:    Current Outpatient Medications:   •  albuterol sulfate  (90 Base) MCG/ACT inhaler, Inhale 2 puffs Every 4 (Four) Hours As Needed for Wheezing. Indications: Spasm of Lung Air Passages, Reversible Disease of Blockage in Breathing Passages, Disp: 18 g, Rfl: 4  •  cloNIDine (CATAPRES) 0.2 MG tablet, Take 1 tablet by mouth 3 (Three) Times a Day. Indications: High Blood Pressure Disorder, Disp: 90 tablet, Rfl: 0  •  DULoxetine (CYMBALTA) 60 MG capsule, Take 1 capsule by mouth Daily. Indications: Major Depressive Disorder, Disp: 30 capsule, Rfl: 1  •  gabapentin (NEURONTIN) 800 MG tablet, Take 800 mg by mouth 3 (Three) Times a Day., Disp: , Rfl:   •  ibuprofen (ADVIL,MOTRIN) 800 MG tablet, Take 1 tablet by mouth Every 6 (Six) Hours As Needed for Mild Pain ., Disp: 90 tablet, Rfl: 3  •  melatonin 3 MG tablet, Take 1 tablet by mouth Every Night. Indications: Trouble Sleeping, Disp: 30 tablet, Rfl: 0  •   Multiple Vitamin (Thera) tablet tablet, Take 1 tablet by mouth Daily. Indications: supplementation, Disp: 90 tablet, Rfl: 0  •  OLANZapine (zyPREXA) 7.5 MG tablet, Take 1 tablet by mouth Every Night. Indications: Major Depressive Disorder, Disp: 30 tablet, Rfl: 4    Allergies:  Allergies   Allergen Reactions   • Wasp Venom Swelling       Family Hx:  Family History   Problem Relation Age of Onset   • Depression Mother    • Anxiety disorder Mother    • COPD Mother    • Alcohol abuse Father    • Depression Father    • Diabetes Father    • Anxiety disorder Sister    • Bipolar disorder Sister    • Anxiety disorder Maternal Aunt    • Alcohol abuse Paternal Grandfather    • Alcohol abuse Paternal Grandmother    • Suicide Attempts Neg Hx         Social History:  Social History     Socioeconomic History   • Marital status: Single   Tobacco Use   • Smoking status: Current Every Day Smoker     Packs/day: 1.00     Start date: 1977   • Smokeless tobacco: Never Used   Vaping Use   • Vaping Use: Never used   Substance and Sexual Activity   • Alcohol use: Not Currently     Alcohol/week: 70.0 standard drinks     Types: 70 Shots of liquor per week     Comment: previous drinker   • Drug use: Not Currently     Comment: has a hx of methamphetamine use   • Sexual activity: Yes     Partners: Male       Review of Systems  Review of Systems   Constitutional: Negative for chills and fever.   HENT: Negative for congestion and rhinorrhea.    Eyes: Negative for visual disturbance.   Respiratory: Negative for shortness of breath.    Cardiovascular: Negative for chest pain.   Gastrointestinal: Negative for abdominal pain, diarrhea, nausea and vomiting.   Endocrine: Negative for polyuria.   Genitourinary: Negative for difficulty urinating and dyspareunia.   Musculoskeletal: Positive for myalgias. Negative for back pain.   Skin: Negative for rash and wound.   Neurological: Positive for seizures, weakness, numbness and headaches.    Psychiatric/Behavioral: Negative for agitation, behavioral problems and confusion.       Objective:     /88   Pulse 91   Temp 97.7 °F (36.5 °C)   Wt 47.6 kg (105 lb)   LMP 01/24/2001 (Within Months)   SpO2 97%   BMI 19.20 kg/m²   Physical Exam  Constitutional:       General: She is not in acute distress.  HENT:      Head: Normocephalic and atraumatic.      Right Ear: Tympanic membrane normal.      Left Ear: Tympanic membrane normal.   Cardiovascular:      Rate and Rhythm: Normal rate and regular rhythm.      Heart sounds: Normal heart sounds.   Pulmonary:      Effort: Pulmonary effort is normal. No respiratory distress.      Breath sounds: Normal breath sounds.   Abdominal:      Palpations: Abdomen is soft.      Tenderness: There is no abdominal tenderness.   Musculoskeletal:      Right lower leg: No edema.      Left lower leg: No edema.   Skin:     General: Skin is warm and dry.   Neurological:      General: No focal deficit present.      Mental Status: She is alert.      Sensory: No sensory deficit.      Deep Tendon Reflexes: Reflexes normal.   Psychiatric:         Mood and Affect: Mood normal.         Behavior: Behavior normal.          Assessment/Plan:     Diagnoses and all orders for this visit:    1. Encounter for medical examination to establish care (Primary)  Aside from seizures and arm pain, no other concerns.  Given age will risk stratify.  -     Hemoglobin A1c; Future  -     Lipid panel; Future  -     TSH+Free T4; Future  -     Comprehensive metabolic panel; Future  -     Urine Drug Screen - Urine, Clean Catch; Future  -     Urinalysis With Culture If Indicated -; Future    2. History of major depression  Stable on current regimen.  -     DULoxetine (CYMBALTA) 60 MG capsule; Take 1 capsule by mouth Daily. Indications: Major Depressive Disorder  Dispense: 30 capsule; Refill: 1  -     OLANZapine (zyPREXA) 7.5 MG tablet; Take 1 tablet by mouth Every Night. Indications: Major Depressive  Disorder  Dispense: 30 tablet; Refill: 4    3. Other chronic pain  Stable.  -     ibuprofen (ADVIL,MOTRIN) 800 MG tablet; Take 1 tablet by mouth Every 6 (Six) Hours As Needed for Mild Pain .  Dispense: 90 tablet; Refill: 3    4. Intermittent asthma without complication, unspecified asthma severity  Stable.  -     albuterol sulfate  (90 Base) MCG/ACT inhaler; Inhale 2 puffs Every 4 (Four) Hours As Needed for Wheezing. Indications: Spasm of Lung Air Passages, Reversible Disease of Blockage in Breathing Passages  Dispense: 18 g; Refill: 4    5. Seizure (HCC)  Etiology unclear.  CT scan of head from 2 years ago was negative.  MRI indicated.  -     MRI brain wo contrast; Future    6. Syncope and collapse  Unsure if seizure is the reason.  Will also rule out other etiologies.  Could just be syncope rather than seizure.  -     Duplex Carotid Ultrasound CAR; Future             Follow-up:     1 month    Preventative:  Health Maintenance   Topic Date Due   • COLORECTAL CANCER SCREENING  Never done   • ANNUAL PHYSICAL  Never done   • COVID-19 Vaccine (1) Never done   • Pneumococcal Vaccine 0-64 (1 of 2 - PPSV23) Never done   • TDAP/TD VACCINES (1 - Tdap) Never done   • ZOSTER VACCINE (1 of 2) Never done   • MAMMOGRAM  Never done   • PAP SMEAR  Never done   • INFLUENZA VACCINE  Never done   • HEPATITIS C SCREENING  Completed       Alcohol use:  reports previous alcohol use of about 70.0 standard drinks of alcohol per week.  Nicotine status  reports that she has been smoking. She started smoking about 45 years ago. She has been smoking about 1.00 pack per day. She has never used smokeless tobacco.    RISK SCORE: 1      This document has been electronically signed by Maury Turner MD on March 31, 2022 15:22 CDT

## 2022-04-15 NOTE — TELEPHONE ENCOUNTER
Incoming Refill Request      Medication requested (name and dose):     Pharmacy where request should be sent:    Additional details provided by patient:     Best call back number:     Does the patient have less than a 3 day supply:  [] Yes  [] No    Mukul Chavira Rep  04/15/22, 13:11 CDT

## 2022-04-17 PROBLEM — J96.01 ACUTE RESPIRATORY FAILURE WITH HYPOXIA: Status: ACTIVE | Noted: 2022-01-01

## 2022-04-18 ENCOUNTER — TELEPHONE (OUTPATIENT)
Dept: EMERGENCY DEPT | Facility: HOSPITAL | Age: 57
End: 2022-04-18

## 2022-04-22 LAB — BACTERIA SPEC AEROBE CULT: NORMAL

## 2022-05-11 NOTE — PROGRESS NOTES
Family Medicine Residency  Maury Turner MD    Subjective:     Laura Bruce is a 56 y.o. female who presents for evaluation of tremor.    Patient states she has started to have a tremor in her hands.  Finds the tremor debilitating.  Would like something to help alleviate the tremor.  Finds the tremor is worse with movement.    The following portions of the patient's history were reviewed and updated as appropriate: allergies, current medications, past family history, past medical history, past social history, past surgical history and problem list.    Past Medical Hx:  Past Medical History:   Diagnosis Date   • ADHD (attention deficit hyperactivity disorder)    • Alcohol abuse    • Bilateral carpal tunnel syndrome    • Bipolar 1 disorder (HCC)    • Candida vaginitis 5/5/2020    -Reports that she has painful, itching, foul discharge.  Unsure of the color. -Positive for Candida.  Treat with fluconazole 150 mg. -Gonorrhea, chlamydia, trichomonas, and Gardnerella negative.   • Carpal tunnel syndrome on both sides 2015   • Chronic pain disorder     Back   • Collapsed lung 1986   • Depression    • Essential hypertension 9/17/2020   • Hyperkalemia 5/13/2020    Results from last 7 days Lab Units 05/13/20 1136 05/13/20 0500 05/12/20 0516 05/11/20 0507 05/10/20 0549 05/09/20 0555 05/08/20 0520 POTASSIUM mmol/L 4.3 5.4* 4.3 3.8 3.7 3.5 3.7  -EKG showed normal sinus rhythm and no peaked T waves. -Recheck potassium this afternoon and intervene if necessary.   • Irritable bowel syndrome    • Leukopenia 4/23/2018    Appears to be chronic   • Spontaneous pneumothorax    • Suicidal ideation 1/5/2019    -History of alcohol abuse, depression, anxiety, and bipolar.  Symptoms worsened after the passing of her fiancé in December. -Ethanol levels less than 10.  Psych consulted. -Suicide precautions discontinued on 5/11. -She is questioning behavioral admission versus outpatient rehab.  Psych continues to discuss with her.  -Intermittently expresses some disagreement with admission to behavioral health    • Thrombocytopenia (HCC) 5/6/2020    Results from last 7 days Lab Units 05/13/20 0500 05/12/20 0516 05/11/20 0507 05/10/20 0549 05/09/20 0555 05/08/20 0520 PLATELETS 10*3/mm3 224 146 167 144 126* 102*     • Wernicke's encephalopathy 4/23/2018    -Banana bag -Completed 6 doses of thiamine 500 mg.  Completed 9 doses course of thiamine of 250 mg on 5/12       Past Surgical Hx:  Past Surgical History:   Procedure Laterality Date   • CHEST TUBE INSERTION      30 years ago for a spontaneous pneumothorax   • COLONOSCOPY     • HYSTERECTOMY     • TOTAL ABDOMINAL HYSTERECTOMY WITH SALPINGO OOPHORECTOMY         Current Meds:    Current Outpatient Medications:   •  albuterol sulfate  (90 Base) MCG/ACT inhaler, Inhale 2 puffs Every 4 (Four) Hours As Needed for Wheezing. Indications: Spasm of Lung Air Passages, Reversible Disease of Blockage in Breathing Passages, Disp: 18 g, Rfl: 4  •  cloNIDine (CATAPRES) 0.2 MG tablet, Take 1 tablet by mouth 3 (Three) Times a Day. Indications: High Blood Pressure Disorder, Disp: 90 tablet, Rfl: 0  •  DULoxetine (CYMBALTA) 60 MG capsule, Take 1 capsule by mouth Daily. Indications: Major Depressive Disorder, Disp: 30 capsule, Rfl: 1  •  gabapentin (NEURONTIN) 800 MG tablet, Take 800 mg by mouth 3 (Three) Times a Day., Disp: , Rfl:   •  ibuprofen (ADVIL,MOTRIN) 800 MG tablet, Take 1 tablet by mouth Every 6 (Six) Hours As Needed for Mild Pain ., Disp: 90 tablet, Rfl: 3  •  melatonin 3 MG tablet, Take 1 tablet by mouth Every Night. Indications: Trouble Sleeping, Disp: 30 tablet, Rfl: 0  •  Multiple Vitamin (Thera) tablet tablet, Take 1 tablet by mouth Daily. Indications: supplementation, Disp: 90 tablet, Rfl: 0  •  OLANZapine (zyPREXA) 7.5 MG tablet, Take 1 tablet by mouth Every Night. Indications: Major Depressive Disorder, Disp: 30 tablet, Rfl: 4  •  topiramate (TOPAMAX) 50 MG tablet, Take 1 tablet by mouth  "Daily., Disp: 30 tablet, Rfl: 1    Allergies:  Allergies   Allergen Reactions   • Wasp Venom Swelling       Family Hx:  Family History   Problem Relation Age of Onset   • Depression Mother    • Anxiety disorder Mother    • COPD Mother    • Alcohol abuse Father    • Depression Father    • Diabetes Father    • Anxiety disorder Sister    • Bipolar disorder Sister    • Anxiety disorder Maternal Aunt    • Alcohol abuse Paternal Grandfather    • Alcohol abuse Paternal Grandmother    • Suicide Attempts Neg Hx         Social History:  Social History     Socioeconomic History   • Marital status: Single   Tobacco Use   • Smoking status: Current Every Day Smoker     Packs/day: 1.00     Years: 15.00     Pack years: 15.00     Start date: 1977   • Smokeless tobacco: Never Used   Vaping Use   • Vaping Use: Never used   Substance and Sexual Activity   • Alcohol use: Not Currently     Alcohol/week: 70.0 standard drinks     Types: 70 Shots of liquor per week     Comment: previous drinker   • Drug use: Not Currently     Comment: has a hx of methamphetamine use   • Sexual activity: Yes     Partners: Male       Review of Systems  Review of Systems   Constitutional: Negative for chills and fever.   HENT: Negative for congestion and rhinorrhea.    Eyes: Negative for visual disturbance.   Respiratory: Negative for shortness of breath.    Cardiovascular: Negative for chest pain.   Gastrointestinal: Negative for abdominal pain, diarrhea, nausea and vomiting.   Endocrine: Negative for polyuria.   Genitourinary: Negative for difficulty urinating and dyspareunia.   Musculoskeletal: Negative for back pain and myalgias.   Skin: Negative for rash and wound.   Neurological: Positive for tremors. Negative for weakness, numbness and headaches.   Psychiatric/Behavioral: Negative for agitation, behavioral problems and confusion. The patient is nervous/anxious.        Objective:     /70   Pulse 106   Temp 97.6 °F (36.4 °C)   Ht 157.5 cm (62\")  "  Wt 43.9 kg (96 lb 11.2 oz)   LMP 01/24/2001 (Within Months)   SpO2 96%   BMI 17.69 kg/m²   Physical Exam  Constitutional:       General: She is not in acute distress.  HENT:      Head: Normocephalic and atraumatic.   Cardiovascular:      Rate and Rhythm: Normal rate and regular rhythm.      Heart sounds: Normal heart sounds.   Pulmonary:      Effort: Pulmonary effort is normal. No respiratory distress.      Breath sounds: Normal breath sounds.   Abdominal:      Palpations: Abdomen is soft.      Tenderness: There is no abdominal tenderness.   Musculoskeletal:      Right lower leg: No edema.      Left lower leg: No edema.   Skin:     General: Skin is warm and dry.   Neurological:      General: No focal deficit present.      Mental Status: She is alert and oriented to person, place, and time.      Sensory: No sensory deficit.      Motor: No weakness.      Comments: Patient has a tremor more pronounced with finger to nose testing.  Slight tremor at rest.   Psychiatric:         Mood and Affect: Mood normal.         Behavior: Behavior normal.          Assessment/Plan:     Diagnoses and all orders for this visit:    1. Tremor due to disorder of central nervous system (Primary)  Worsening of baseline tremor.  Worse with intention.  Likely some anxiety in conjunction with chronic brain damage from ETOH abuse.  Possible essential tremor, but not episodic.  Will forego propanolol for longer lasting treatment.  -     topiramate (TOPAMAX) 50 MG tablet; Take 1 tablet by mouth Daily.  Dispense: 30 tablet; Refill: 1             Follow-up:     1 month    Preventative:  There are no preventive care reminders to display for this patient.    Alcohol use:  reports previous alcohol use of about 70.0 standard drinks of alcohol per week.  Nicotine status  reports that she has been smoking. She started smoking about 45 years ago. She has a 15.00 pack-year smoking history. She has never used smokeless tobacco.    RISK SCORE: 2      This  document has been electronically signed by Maury Turner MD on May 10, 2022 19:54 CDT

## 2022-05-11 NOTE — PROGRESS NOTES
I have spoken with the patient .     I have reviewed the notes, assessments, and/or procedures performed by Dr. Maury Turner,   I concur with   his  documentation and assessment and plan for Laura Bruce.    Recent events reviewed        This document has been electronically signed by Kennedy Rogers MD on May 11, 2022 14:31 CDT
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
